# Patient Record
Sex: MALE | Race: WHITE | NOT HISPANIC OR LATINO | Employment: OTHER | URBAN - NONMETROPOLITAN AREA
[De-identification: names, ages, dates, MRNs, and addresses within clinical notes are randomized per-mention and may not be internally consistent; named-entity substitution may affect disease eponyms.]

---

## 2023-03-22 LAB
ANION GAP IN SER/PLAS: 13 MMOL/L (ref 10–20)
CALCIUM (MG/DL) IN SER/PLAS: 9 MG/DL (ref 8.6–10.3)
CARBON DIOXIDE, TOTAL (MMOL/L) IN SER/PLAS: 29 MMOL/L (ref 21–32)
CHLORIDE (MMOL/L) IN SER/PLAS: 100 MMOL/L (ref 98–107)
CREATININE (MG/DL) IN SER/PLAS: 0.73 MG/DL (ref 0.5–1.3)
GFR MALE: 90 ML/MIN/1.73M2
GLUCOSE (MG/DL) IN SER/PLAS: 95 MG/DL (ref 74–99)
POTASSIUM (MMOL/L) IN SER/PLAS: 4.7 MMOL/L (ref 3.5–5.3)
SODIUM (MMOL/L) IN SER/PLAS: 137 MMOL/L (ref 136–145)
UREA NITROGEN (MG/DL) IN SER/PLAS: 14 MG/DL (ref 6–23)

## 2023-05-17 DIAGNOSIS — I10 PRIMARY HYPERTENSION: Primary | ICD-10-CM

## 2023-05-17 RX ORDER — AMLODIPINE BESYLATE 5 MG/1
1 TABLET ORAL DAILY
COMMUNITY
Start: 2022-04-21 | End: 2023-05-17 | Stop reason: SDUPTHER

## 2023-05-18 RX ORDER — AMLODIPINE BESYLATE 5 MG/1
5 TABLET ORAL DAILY
Qty: 90 TABLET | Refills: 1 | Status: SHIPPED | OUTPATIENT
Start: 2023-05-18 | End: 2023-11-15 | Stop reason: SDUPTHER

## 2023-06-23 DIAGNOSIS — I10 PRIMARY HYPERTENSION: Primary | ICD-10-CM

## 2023-06-23 DIAGNOSIS — E78.49 OTHER HYPERLIPIDEMIA: ICD-10-CM

## 2023-06-23 RX ORDER — METOPROLOL TARTRATE 25 MG/1
25 TABLET, FILM COATED ORAL 2 TIMES DAILY
COMMUNITY
End: 2023-06-23 | Stop reason: SDUPTHER

## 2023-06-23 RX ORDER — ATORVASTATIN CALCIUM 40 MG/1
40 TABLET, FILM COATED ORAL NIGHTLY
COMMUNITY
End: 2023-06-23 | Stop reason: SDUPTHER

## 2023-06-24 RX ORDER — METOPROLOL TARTRATE 25 MG/1
25 TABLET, FILM COATED ORAL 2 TIMES DAILY
Qty: 90 TABLET | Refills: 0 | Status: SHIPPED | OUTPATIENT
Start: 2023-06-24 | End: 2023-08-10 | Stop reason: SDUPTHER

## 2023-06-24 RX ORDER — ATORVASTATIN CALCIUM 40 MG/1
40 TABLET, FILM COATED ORAL NIGHTLY
Qty: 90 TABLET | Refills: 0 | Status: SHIPPED | OUTPATIENT
Start: 2023-06-24 | End: 2023-07-20 | Stop reason: SDUPTHER

## 2023-07-20 DIAGNOSIS — E78.49 OTHER HYPERLIPIDEMIA: ICD-10-CM

## 2023-07-20 RX ORDER — ATORVASTATIN CALCIUM 40 MG/1
40 TABLET, FILM COATED ORAL NIGHTLY
Qty: 90 TABLET | Refills: 3 | Status: SHIPPED | OUTPATIENT
Start: 2023-07-20 | End: 2024-07-19

## 2023-07-31 ENCOUNTER — OFFICE VISIT (OUTPATIENT)
Dept: PRIMARY CARE | Facility: CLINIC | Age: 83
End: 2023-07-31
Payer: MEDICARE

## 2023-07-31 VITALS
HEART RATE: 82 BPM | HEIGHT: 70 IN | SYSTOLIC BLOOD PRESSURE: 120 MMHG | TEMPERATURE: 98.6 F | OXYGEN SATURATION: 94 % | DIASTOLIC BLOOD PRESSURE: 80 MMHG | BODY MASS INDEX: 19.33 KG/M2 | WEIGHT: 135 LBS

## 2023-07-31 DIAGNOSIS — I74.3 ARTERIAL EMBOLISM AND THROMBOSIS OF LOWER EXTREMITY (MULTI): ICD-10-CM

## 2023-07-31 DIAGNOSIS — Z13.6 SCREENING FOR CARDIOVASCULAR CONDITION: ICD-10-CM

## 2023-07-31 DIAGNOSIS — R79.9 ABNORMAL FINDING OF BLOOD CHEMISTRY, UNSPECIFIED: ICD-10-CM

## 2023-07-31 DIAGNOSIS — M25.551 PAIN OF BOTH HIP JOINTS: ICD-10-CM

## 2023-07-31 DIAGNOSIS — M25.552 PAIN OF BOTH HIP JOINTS: ICD-10-CM

## 2023-07-31 DIAGNOSIS — Z00.00 ROUTINE GENERAL MEDICAL EXAMINATION AT HEALTH CARE FACILITY: Primary | ICD-10-CM

## 2023-07-31 PROBLEM — I61.4: Status: ACTIVE | Noted: 2023-07-31

## 2023-07-31 PROBLEM — L08.9 WOUND INFECTION: Status: ACTIVE | Noted: 2023-07-31

## 2023-07-31 PROBLEM — J01.90 ACUTE SINUSITIS: Status: ACTIVE | Noted: 2023-07-31

## 2023-07-31 PROBLEM — I10 BENIGN ESSENTIAL HYPERTENSION: Status: ACTIVE | Noted: 2023-07-31

## 2023-07-31 PROBLEM — G91.2 NPH (NORMAL PRESSURE HYDROCEPHALUS) (MULTI): Status: ACTIVE | Noted: 2023-07-31

## 2023-07-31 PROBLEM — R31.9 HEMATURIA: Status: ACTIVE | Noted: 2023-07-31

## 2023-07-31 PROBLEM — L29.9 ITCHING: Status: ACTIVE | Noted: 2023-07-31

## 2023-07-31 PROBLEM — H66.90 ACUTE OTITIS MEDIA: Status: ACTIVE | Noted: 2023-07-31

## 2023-07-31 PROBLEM — J20.9 ACUTE BRONCHITIS: Status: ACTIVE | Noted: 2023-07-31

## 2023-07-31 PROBLEM — R73.9 HYPERGLYCEMIA: Status: ACTIVE | Noted: 2023-07-31

## 2023-07-31 PROBLEM — H61.21 EXCESSIVE CERUMEN IN RIGHT EAR CANAL: Status: ACTIVE | Noted: 2023-07-31

## 2023-07-31 PROBLEM — T14.8XXA WOUND INFECTION: Status: ACTIVE | Noted: 2023-07-31

## 2023-07-31 PROBLEM — R53.83 FATIGUE: Status: ACTIVE | Noted: 2023-07-31

## 2023-07-31 PROBLEM — E78.5 HYPERLIPIDEMIA: Status: ACTIVE | Noted: 2023-07-31

## 2023-07-31 PROBLEM — I48.0 PAROXYSMAL ATRIAL FIBRILLATION (MULTI): Status: ACTIVE | Noted: 2023-07-31

## 2023-07-31 PROBLEM — A37.90 PERTUSSIS: Status: ACTIVE | Noted: 2023-07-31

## 2023-07-31 PROBLEM — I63.9 ACUTE ISCHEMIC STROKE (MULTI): Status: ACTIVE | Noted: 2023-07-31

## 2023-07-31 PROBLEM — J44.9 COPD (CHRONIC OBSTRUCTIVE PULMONARY DISEASE) (MULTI): Status: ACTIVE | Noted: 2023-07-31

## 2023-07-31 PROBLEM — Z95.828 S/P BYPASS GRAFT OF EXTREMITY: Status: ACTIVE | Noted: 2023-07-31

## 2023-07-31 PROBLEM — S31.109A OPEN WOUND OF GROIN WITH COMPLICATION: Status: ACTIVE | Noted: 2023-07-31

## 2023-07-31 PROBLEM — Z20.822 SUSPECTED COVID-19 VIRUS INFECTION: Status: ACTIVE | Noted: 2023-07-31

## 2023-07-31 PROBLEM — E53.8 VITAMIN B12 DEFICIENCY: Status: ACTIVE | Noted: 2023-07-31

## 2023-07-31 PROBLEM — R82.998 CALCIUM OXALATE CRYSTALS IN URINE: Status: ACTIVE | Noted: 2023-07-31

## 2023-07-31 PROBLEM — J31.0 RHINITIS: Status: ACTIVE | Noted: 2023-07-31

## 2023-07-31 PROBLEM — I27.20 PULMONARY HYPERTENSION (MULTI): Status: ACTIVE | Noted: 2023-07-31

## 2023-07-31 PROBLEM — F51.02 ADJUSTMENT INSOMNIA: Status: ACTIVE | Noted: 2023-07-31

## 2023-07-31 PROBLEM — R42 VERTIGO: Status: ACTIVE | Noted: 2023-07-31

## 2023-07-31 PROBLEM — I73.9 PAD (PERIPHERAL ARTERY DISEASE) (CMS-HCC): Status: ACTIVE | Noted: 2023-07-31

## 2023-07-31 PROBLEM — H65.20 CHRONIC SEROUS OTITIS MEDIA: Status: ACTIVE | Noted: 2023-07-31

## 2023-07-31 PROBLEM — R05.3 COUGH, PERSISTENT: Status: ACTIVE | Noted: 2023-07-31

## 2023-07-31 PROBLEM — F41.9 ANXIETY: Status: ACTIVE | Noted: 2023-07-31

## 2023-07-31 PROBLEM — R31.29 MICROSCOPIC HEMATURIA: Status: ACTIVE | Noted: 2023-07-31

## 2023-07-31 PROCEDURE — 3074F SYST BP LT 130 MM HG: CPT

## 2023-07-31 PROCEDURE — 80061 LIPID PANEL: CPT

## 2023-07-31 PROCEDURE — 3079F DIAST BP 80-89 MM HG: CPT

## 2023-07-31 PROCEDURE — 1160F RVW MEDS BY RX/DR IN RCRD: CPT

## 2023-07-31 PROCEDURE — 1170F FXNL STATUS ASSESSED: CPT

## 2023-07-31 PROCEDURE — G0439 PPPS, SUBSEQ VISIT: HCPCS

## 2023-07-31 PROCEDURE — 1126F AMNT PAIN NOTED NONE PRSNT: CPT

## 2023-07-31 PROCEDURE — 85027 COMPLETE CBC AUTOMATED: CPT

## 2023-07-31 PROCEDURE — 1159F MED LIST DOCD IN RCRD: CPT

## 2023-07-31 RX ORDER — CLOPIDOGREL BISULFATE 75 MG/1
75 TABLET ORAL DAILY
COMMUNITY
End: 2024-04-09 | Stop reason: SDUPTHER

## 2023-07-31 ASSESSMENT — ENCOUNTER SYMPTOMS
GASTROINTESTINAL NEGATIVE: 1
MUSCULOSKELETAL NEGATIVE: 1
OCCASIONAL FEELINGS OF UNSTEADINESS: 0
HEADACHES: 0
UNEXPECTED WEIGHT CHANGE: 0
PSYCHIATRIC NEGATIVE: 1
ACTIVITY CHANGE: 0
ABDOMINAL PAIN: 0
SHORTNESS OF BREATH: 0
DEPRESSION: 0
FEVER: 0
ENDOCRINE NEGATIVE: 1
CARDIOVASCULAR NEGATIVE: 1
DIZZINESS: 0
FATIGUE: 0
WEAKNESS: 0
LOSS OF SENSATION IN FEET: 0
RESPIRATORY NEGATIVE: 1
ALLERGIC/IMMUNOLOGIC NEGATIVE: 1

## 2023-07-31 ASSESSMENT — PATIENT HEALTH QUESTIONNAIRE - PHQ9
2. FEELING DOWN, DEPRESSED OR HOPELESS: NOT AT ALL
1. LITTLE INTEREST OR PLEASURE IN DOING THINGS: NOT AT ALL
SUM OF ALL RESPONSES TO PHQ9 QUESTIONS 1 AND 2: 0

## 2023-07-31 ASSESSMENT — ACTIVITIES OF DAILY LIVING (ADL)
BATHING: INDEPENDENT
MANAGING_FINANCES: INDEPENDENT
DOING_HOUSEWORK: INDEPENDENT
DRESSING: INDEPENDENT
TAKING_MEDICATION: INDEPENDENT
GROCERY_SHOPPING: INDEPENDENT

## 2023-07-31 NOTE — PATIENT INSTRUCTIONS
See in 6 months for routine checkup  Continue healthy diet and exercise  Lipid, CBC today  Motorized wheelchair with [facility]    Thank you for coming in today, if any questions or concerns arise, please call my office.   ARMEN Oliveira-CNP

## 2023-07-31 NOTE — PROGRESS NOTES
"Subjective   Reason for Visit: Sheldon Arteaga is an 83 y.o. male here for a Medicare Wellness visit.     Past Medical, Surgical, and Family History reviewed and updated in chart.    Reviewed all medications by prescribing practitioner or clinical pharmacist (such as prescriptions, OTCs, herbal therapies and supplements) and documented in the medical record.    [facility] visit today  No concerns currently  Sees Vascular NP every 6 months r/t thrombus, newly started on Plavix.   Hx Stroke   Smokes 1 PPD    F2F for motorized wheelchair:  Muscle weakness, trouble walking short distances  Strength 3/5 bilateral legs  4/5 bilateral arms.   Bilateral Emboli history in both legs.   Bone graft in left knee.   Would not benefit from Cane or walker     No falls in the past 6 months.         Patient Care Team:  Marcus Costa MD as PCP - General  Marcus Costa MD [facility] PCP [facility]     Review of Systems   Constitutional:  Negative for activity change, fatigue, fever and unexpected weight change.   HENT: Negative.     Respiratory: Negative.  Negative for shortness of breath.    Cardiovascular: Negative.  Negative for chest pain.   Gastrointestinal: Negative.  Negative for abdominal pain.   Endocrine: Negative.    Musculoskeletal: Negative.    Skin: Negative.    Allergic/Immunologic: Negative.    Neurological:  Negative for dizziness, weakness and headaches.   Psychiatric/Behavioral: Negative.         Objective   Vitals:  /80   Pulse 82   Temp 37 °C (98.6 °F)   Ht 1.778 m (5' 10\")   Wt 61.2 kg (135 lb)   SpO2 94%   BMI 19.37 kg/m²       Physical Exam  Vitals and nursing note reviewed.   Constitutional:       Appearance: Normal appearance.   HENT:      Head: Normocephalic.      Mouth/Throat:      Mouth: Mucous membranes are moist.   Cardiovascular:      Rate and Rhythm: Normal rate and regular rhythm.      Pulses: Normal pulses.      Heart sounds: Normal heart sounds. No murmur heard.     No " friction rub. No gallop.   Pulmonary:      Effort: Pulmonary effort is normal. No respiratory distress.      Breath sounds: Normal breath sounds. No wheezing.   Abdominal:      General: Bowel sounds are normal. There is no distension.      Palpations: Abdomen is soft.      Tenderness: There is no abdominal tenderness.   Musculoskeletal:         General: No deformity. Normal range of motion.   Skin:     General: Skin is warm and dry.      Capillary Refill: Capillary refill takes less than 2 seconds.   Neurological:      General: No focal deficit present.      Mental Status: He is alert and oriented to person, place, and time.   Psychiatric:         Mood and Affect: Mood normal.         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

## 2023-08-01 ENCOUNTER — TELEPHONE (OUTPATIENT)
Dept: PRIMARY CARE | Facility: CLINIC | Age: 83
End: 2023-08-01
Payer: MEDICARE

## 2023-08-01 DIAGNOSIS — R79.9 ABNORMAL FINDING OF BLOOD CHEMISTRY, UNSPECIFIED: Primary | ICD-10-CM

## 2023-08-01 LAB
CHOLESTEROL (MG/DL) IN SER/PLAS: 157 MG/DL (ref 0–199)
CHOLESTEROL IN HDL (MG/DL) IN SER/PLAS: 53.5 MG/DL
CHOLESTEROL/HDL RATIO: 2.9
ERYTHROCYTE DISTRIBUTION WIDTH (RATIO) BY AUTOMATED COUNT: 14.3 % (ref 11.5–14.5)
ERYTHROCYTE MEAN CORPUSCULAR HEMOGLOBIN CONCENTRATION (G/DL) BY AUTOMATED: 30.5 G/DL (ref 32–36)
ERYTHROCYTE MEAN CORPUSCULAR VOLUME (FL) BY AUTOMATED COUNT: 102 FL (ref 80–100)
ERYTHROCYTES (10*6/UL) IN BLOOD BY AUTOMATED COUNT: 4.07 X10E12/L (ref 4.5–5.9)
HEMATOCRIT (%) IN BLOOD BY AUTOMATED COUNT: 41.6 % (ref 41–52)
HEMOGLOBIN (G/DL) IN BLOOD: 12.7 G/DL (ref 13.5–17.5)
LDL: 79 MG/DL (ref 0–99)
LEUKOCYTES (10*3/UL) IN BLOOD BY AUTOMATED COUNT: 6.6 X10E9/L (ref 4.4–11.3)
NRBC (PER 100 WBCS) BY AUTOMATED COUNT: 0 /100 WBC (ref 0–0)
PLATELETS (10*3/UL) IN BLOOD AUTOMATED COUNT: 241 X10E9/L (ref 150–450)
TRIGLYCERIDE (MG/DL) IN SER/PLAS: 121 MG/DL (ref 0–149)
VLDL: 24 MG/DL (ref 0–40)

## 2023-08-01 NOTE — RESULT ENCOUNTER NOTE
Lipid panel is normal, CBC shows slight anemia compared to prior study. Let's check again in 2 weeks to trend. Ordering.

## 2023-08-01 NOTE — TELEPHONE ENCOUNTER
----- Message from ARMEN Oliveira-CNP sent at 8/1/2023  8:03 AM EDT -----  Lipid panel is normal, CBC shows slight anemia compared to prior study. Let's check again in 2 weeks to trend. Ordering.

## 2023-08-10 DIAGNOSIS — I10 PRIMARY HYPERTENSION: ICD-10-CM

## 2023-08-10 RX ORDER — METOPROLOL TARTRATE 25 MG/1
25 TABLET, FILM COATED ORAL 2 TIMES DAILY
Qty: 180 TABLET | Refills: 0 | Status: SHIPPED | OUTPATIENT
Start: 2023-08-10 | End: 2023-11-06 | Stop reason: SDUPTHER

## 2023-08-28 ENCOUNTER — CLINICAL SUPPORT (OUTPATIENT)
Dept: PRIMARY CARE | Facility: CLINIC | Age: 83
End: 2023-08-28
Payer: MEDICARE

## 2023-08-28 DIAGNOSIS — R79.9 ABNORMAL FINDING OF BLOOD CHEMISTRY, UNSPECIFIED: ICD-10-CM

## 2023-08-28 PROCEDURE — 85025 COMPLETE CBC W/AUTO DIFF WBC: CPT

## 2023-08-29 ENCOUNTER — TELEPHONE (OUTPATIENT)
Dept: PRIMARY CARE | Facility: CLINIC | Age: 83
End: 2023-08-29
Payer: MEDICARE

## 2023-08-29 LAB
BASOPHILS (10*3/UL) IN BLOOD BY AUTOMATED COUNT: 0.05 X10E9/L (ref 0–0.1)
BASOPHILS/100 LEUKOCYTES IN BLOOD BY AUTOMATED COUNT: 0.8 % (ref 0–2)
EOSINOPHILS (10*3/UL) IN BLOOD BY AUTOMATED COUNT: 0.25 X10E9/L (ref 0–0.4)
EOSINOPHILS/100 LEUKOCYTES IN BLOOD BY AUTOMATED COUNT: 3.9 % (ref 0–6)
ERYTHROCYTE DISTRIBUTION WIDTH (RATIO) BY AUTOMATED COUNT: 14.8 % (ref 11.5–14.5)
ERYTHROCYTE MEAN CORPUSCULAR HEMOGLOBIN CONCENTRATION (G/DL) BY AUTOMATED: 31.2 G/DL (ref 32–36)
ERYTHROCYTE MEAN CORPUSCULAR VOLUME (FL) BY AUTOMATED COUNT: 101 FL (ref 80–100)
ERYTHROCYTES (10*6/UL) IN BLOOD BY AUTOMATED COUNT: 4.24 X10E12/L (ref 4.5–5.9)
HEMATOCRIT (%) IN BLOOD BY AUTOMATED COUNT: 43 % (ref 41–52)
HEMOGLOBIN (G/DL) IN BLOOD: 13.4 G/DL (ref 13.5–17.5)
IMMATURE GRANULOCYTES/100 LEUKOCYTES IN BLOOD BY AUTOMATED COUNT: 0.5 % (ref 0–0.9)
LEUKOCYTES (10*3/UL) IN BLOOD BY AUTOMATED COUNT: 6.4 X10E9/L (ref 4.4–11.3)
LYMPHOCYTES (10*3/UL) IN BLOOD BY AUTOMATED COUNT: 2.1 X10E9/L (ref 0.8–3)
LYMPHOCYTES/100 LEUKOCYTES IN BLOOD BY AUTOMATED COUNT: 32.9 % (ref 13–44)
MONOCYTES (10*3/UL) IN BLOOD BY AUTOMATED COUNT: 0.66 X10E9/L (ref 0.05–0.8)
MONOCYTES/100 LEUKOCYTES IN BLOOD BY AUTOMATED COUNT: 10.3 % (ref 2–10)
NEUTROPHILS (10*3/UL) IN BLOOD BY AUTOMATED COUNT: 3.3 X10E9/L (ref 1.6–5.5)
NEUTROPHILS/100 LEUKOCYTES IN BLOOD BY AUTOMATED COUNT: 51.6 % (ref 40–80)
NRBC (PER 100 WBCS) BY AUTOMATED COUNT: 0 /100 WBC (ref 0–0)
PLATELETS (10*3/UL) IN BLOOD AUTOMATED COUNT: 219 X10E9/L (ref 150–450)

## 2023-11-06 DIAGNOSIS — I10 PRIMARY HYPERTENSION: ICD-10-CM

## 2023-11-08 RX ORDER — METOPROLOL TARTRATE 25 MG/1
25 TABLET, FILM COATED ORAL 2 TIMES DAILY
Qty: 180 TABLET | Refills: 0 | Status: SHIPPED | OUTPATIENT
Start: 2023-11-08 | End: 2023-11-09

## 2023-11-09 DIAGNOSIS — I10 PRIMARY HYPERTENSION: ICD-10-CM

## 2023-11-09 RX ORDER — METOPROLOL TARTRATE 25 MG/1
25 TABLET, FILM COATED ORAL 2 TIMES DAILY
Qty: 180 TABLET | Refills: 0 | Status: SHIPPED | OUTPATIENT
Start: 2023-11-09 | End: 2024-02-09 | Stop reason: SDUPTHER

## 2023-11-15 DIAGNOSIS — I10 PRIMARY HYPERTENSION: ICD-10-CM

## 2023-11-16 RX ORDER — AMLODIPINE BESYLATE 5 MG/1
5 TABLET ORAL DAILY
Qty: 90 TABLET | Refills: 1 | Status: SHIPPED | OUTPATIENT
Start: 2023-11-16 | End: 2024-05-28 | Stop reason: SDUPTHER

## 2024-01-23 ENCOUNTER — APPOINTMENT (OUTPATIENT)
Dept: PRIMARY CARE | Facility: CLINIC | Age: 84
End: 2024-01-23
Payer: MEDICARE

## 2024-01-24 ENCOUNTER — OFFICE VISIT (OUTPATIENT)
Dept: PRIMARY CARE | Facility: CLINIC | Age: 84
End: 2024-01-24
Payer: MEDICARE

## 2024-01-24 VITALS
HEART RATE: 93 BPM | OXYGEN SATURATION: 96 % | DIASTOLIC BLOOD PRESSURE: 60 MMHG | SYSTOLIC BLOOD PRESSURE: 120 MMHG | WEIGHT: 139 LBS | TEMPERATURE: 97.1 F | BODY MASS INDEX: 19.94 KG/M2

## 2024-01-24 DIAGNOSIS — J01.00 ACUTE NON-RECURRENT MAXILLARY SINUSITIS: Primary | ICD-10-CM

## 2024-01-24 PROCEDURE — 1157F ADVNC CARE PLAN IN RCRD: CPT

## 2024-01-24 PROCEDURE — 1160F RVW MEDS BY RX/DR IN RCRD: CPT

## 2024-01-24 PROCEDURE — 3074F SYST BP LT 130 MM HG: CPT

## 2024-01-24 PROCEDURE — 99214 OFFICE O/P EST MOD 30 MIN: CPT

## 2024-01-24 PROCEDURE — 1159F MED LIST DOCD IN RCRD: CPT

## 2024-01-24 PROCEDURE — 3078F DIAST BP <80 MM HG: CPT

## 2024-01-24 PROCEDURE — 1126F AMNT PAIN NOTED NONE PRSNT: CPT

## 2024-01-24 RX ORDER — FLUTICASONE PROPIONATE 50 MCG
1 SPRAY, SUSPENSION (ML) NASAL DAILY
Qty: 16 G | Refills: 11 | Status: SHIPPED | OUTPATIENT
Start: 2024-01-24 | End: 2025-01-23

## 2024-01-24 RX ORDER — AMOXICILLIN AND CLAVULANATE POTASSIUM 875; 125 MG/1; MG/1
875 TABLET, FILM COATED ORAL 2 TIMES DAILY
Qty: 20 TABLET | Refills: 0 | Status: SHIPPED | OUTPATIENT
Start: 2024-01-24 | End: 2024-02-03

## 2024-01-24 RX ORDER — VITAMIN A 3000 MCG
1 CAPSULE ORAL AS NEEDED
Qty: 50 ML | Refills: 1 | Status: SHIPPED | OUTPATIENT
Start: 2024-01-24

## 2024-01-24 ASSESSMENT — ENCOUNTER SYMPTOMS
FEVER: 0
GASTROINTESTINAL NEGATIVE: 1
RHINORRHEA: 1
ENDOCRINE NEGATIVE: 1
SINUS PRESSURE: 1
CARDIOVASCULAR NEGATIVE: 1
HEMATOLOGIC/LYMPHATIC NEGATIVE: 1
FATIGUE: 1
NEUROLOGICAL NEGATIVE: 1
SORE THROAT: 1
ALLERGIC/IMMUNOLOGIC NEGATIVE: 1
EYE ITCHING: 1
RESPIRATORY NEGATIVE: 1
EYE DISCHARGE: 1
PSYCHIATRIC NEGATIVE: 1
MUSCULOSKELETAL NEGATIVE: 1

## 2024-01-24 NOTE — PATIENT INSTRUCTIONS
Start antibiotic for 10 days  Nasal sprays for the congestion    Thank you for coming in today, if any questions or concerns arise, please call my office.   Alvaro Cedeño, APRN-CNP

## 2024-01-24 NOTE — PROGRESS NOTES
Subjective   Patient ID: Sheldon Arteaga is a 83 y.o. male who presents for Sinusitis (Sheldon is here for a right sided sinus infection for over a month. Has been taking OTC cough medications, tylenol sinus and headache, with no relief.In the morning his nose runs for a few hours then starts to decrease a little. ).  Subjective  Sheldon Arteaga is a 83 y.o. male who presents for evaluation of sinus pain. Symptoms include: clear rhinorrhea, congestion, cough, frequent clearing of the throat, and sinus pressure. Onset of symptoms was a few weeks ago. Symptoms have been gradually worsening since that time. Past history is significant for occasional episodes of bronchitis. Patient is a non-smoker.    Objective  [unfilled]     Assessment/Plan  Acute bacterial sinusitis.    Augmentin per medication orders.  Added nasal sprays          Vitals:    01/24/24 1122   BP: 120/60   Pulse: 93   Temp: 36.2 °C (97.1 °F)   SpO2: 96%       Review of Systems   Constitutional:  Positive for fatigue. Negative for fever.   HENT:  Positive for congestion, dental problem, postnasal drip, rhinorrhea, sinus pressure and sore throat. Negative for hearing loss.    Eyes:  Positive for discharge and itching.   Respiratory: Negative.     Cardiovascular: Negative.    Gastrointestinal: Negative.    Endocrine: Negative.    Genitourinary: Negative.    Musculoskeletal: Negative.    Skin: Negative.    Allergic/Immunologic: Negative.    Neurological: Negative.    Hematological: Negative.    Psychiatric/Behavioral: Negative.         Objective   Physical Exam  Constitutional:       General: He is not in acute distress.     Appearance: Normal appearance.   HENT:      Head: Normocephalic.      Right Ear: Tympanic membrane normal.      Left Ear: Tympanic membrane normal.      Nose: Congestion and rhinorrhea present.      Mouth/Throat:      Pharynx: Oropharyngeal exudate and posterior oropharyngeal erythema present.   Eyes:      Extraocular Movements:  Extraocular movements intact.      Pupils: Pupils are equal, round, and reactive to light.   Cardiovascular:      Rate and Rhythm: Normal rate and regular rhythm.   Pulmonary:      Effort: No respiratory distress.   Abdominal:      General: Abdomen is flat. There is no distension.   Skin:     General: Skin is warm and dry.      Capillary Refill: Capillary refill takes less than 2 seconds.   Neurological:      Mental Status: He is alert.   Psychiatric:         Mood and Affect: Mood normal.         Assessment/Plan   Problem List Items Addressed This Visit       Acute sinusitis - Primary    Relevant Medications    amoxicillin-pot clavulanate (Augmentin) 875-125 mg tablet    fluticasone (Flonase) 50 mcg/actuation nasal spray    sodium chloride (Saline NasaL) 0.65 % nasal spray            Thank you for coming in today, please call my office if you have any concerns or questions.     Alvaro AYERS, CNP

## 2024-02-09 DIAGNOSIS — I10 PRIMARY HYPERTENSION: ICD-10-CM

## 2024-02-09 RX ORDER — METOPROLOL TARTRATE 25 MG/1
25 TABLET, FILM COATED ORAL 2 TIMES DAILY
Qty: 180 TABLET | Refills: 0 | Status: SHIPPED | OUTPATIENT
Start: 2024-02-09 | End: 2024-05-13 | Stop reason: SDUPTHER

## 2024-02-14 ENCOUNTER — TELEPHONE (OUTPATIENT)
Dept: PRIMARY CARE | Facility: CLINIC | Age: 84
End: 2024-02-14
Payer: MEDICARE

## 2024-02-14 DIAGNOSIS — U07.1 COVID-19: Primary | ICD-10-CM

## 2024-02-14 NOTE — TELEPHONE ENCOUNTER
Daughter called stating patient tested positive today for covid-symptoms started yesterday    Current symptoms include  -nasal congestion   -non productive cough  -headache  -sneezing  -sinus drainage      Requesting paxlovid if possible    [facility]

## 2024-04-09 DIAGNOSIS — I10 BENIGN ESSENTIAL HYPERTENSION: ICD-10-CM

## 2024-04-09 DIAGNOSIS — I74.3 ARTERIAL EMBOLISM AND THROMBOSIS OF LOWER EXTREMITY (MULTI): ICD-10-CM

## 2024-04-10 RX ORDER — CLOPIDOGREL BISULFATE 75 MG/1
75 TABLET ORAL DAILY
Qty: 90 TABLET | Refills: 1 | Status: SHIPPED | OUTPATIENT
Start: 2024-04-10

## 2024-05-04 ENCOUNTER — APPOINTMENT (OUTPATIENT)
Dept: RADIOLOGY | Facility: HOSPITAL | Age: 84
End: 2024-05-04
Payer: MEDICARE

## 2024-05-04 ENCOUNTER — HOSPITAL ENCOUNTER (OUTPATIENT)
Facility: HOSPITAL | Age: 84
Setting detail: OBSERVATION
Discharge: HOME | End: 2024-05-05
Attending: STUDENT IN AN ORGANIZED HEALTH CARE EDUCATION/TRAINING PROGRAM | Admitting: INTERNAL MEDICINE
Payer: MEDICARE

## 2024-05-04 ENCOUNTER — APPOINTMENT (OUTPATIENT)
Dept: CARDIOLOGY | Facility: HOSPITAL | Age: 84
End: 2024-05-04
Payer: MEDICARE

## 2024-05-04 DIAGNOSIS — J45.909 ASTHMA, UNSPECIFIED ASTHMA SEVERITY, UNSPECIFIED WHETHER COMPLICATED, UNSPECIFIED WHETHER PERSISTENT (HHS-HCC): ICD-10-CM

## 2024-05-04 DIAGNOSIS — J18.9 COMMUNITY ACQUIRED PNEUMONIA, UNSPECIFIED LATERALITY: Primary | ICD-10-CM

## 2024-05-04 DIAGNOSIS — J15.9 COMMUNITY ACQUIRED BACTERIAL PNEUMONIA: ICD-10-CM

## 2024-05-04 PROBLEM — J15.8 PNEUMONIA DUE TO OTHER SPECIFIED BACTERIA (MULTI): Status: ACTIVE | Noted: 2024-05-04

## 2024-05-04 LAB
ALBUMIN SERPL BCP-MCNC: 3.9 G/DL (ref 3.4–5)
ALP SERPL-CCNC: 60 U/L (ref 33–136)
ALT SERPL W P-5'-P-CCNC: 7 U/L (ref 10–52)
ANION GAP BLDV CALCULATED.4IONS-SCNC: 7 MMOL/L (ref 10–25)
ANION GAP SERPL CALC-SCNC: 12 MMOL/L (ref 10–20)
AST SERPL W P-5'-P-CCNC: 17 U/L (ref 9–39)
BASE EXCESS BLDV CALC-SCNC: 4.8 MMOL/L (ref -2–3)
BASOPHILS # BLD AUTO: 0.04 X10*3/UL (ref 0–0.1)
BASOPHILS NFR BLD AUTO: 0.4 %
BILIRUB SERPL-MCNC: 0.6 MG/DL (ref 0–1.2)
BODY TEMPERATURE: ABNORMAL
BUN SERPL-MCNC: 13 MG/DL (ref 6–23)
CA-I BLDV-SCNC: 1.13 MMOL/L (ref 1.1–1.33)
CALCIUM SERPL-MCNC: 8.4 MG/DL (ref 8.6–10.3)
CARDIAC TROPONIN I PNL SERPL HS: 5 NG/L (ref 0–20)
CARDIAC TROPONIN I PNL SERPL HS: 9 NG/L (ref 0–20)
CHLORIDE BLDV-SCNC: 95 MMOL/L (ref 98–107)
CHLORIDE SERPL-SCNC: 96 MMOL/L (ref 98–107)
CO2 SERPL-SCNC: 25 MMOL/L (ref 21–32)
CREAT SERPL-MCNC: 0.74 MG/DL (ref 0.5–1.3)
EGFRCR SERPLBLD CKD-EPI 2021: 89 ML/MIN/1.73M*2
EOSINOPHIL # BLD AUTO: 0.1 X10*3/UL (ref 0–0.4)
EOSINOPHIL NFR BLD AUTO: 1 %
ERYTHROCYTE [DISTWIDTH] IN BLOOD BY AUTOMATED COUNT: 13.9 % (ref 11.5–14.5)
FLUAV RNA RESP QL NAA+PROBE: NOT DETECTED
FLUBV RNA RESP QL NAA+PROBE: NOT DETECTED
GLUCOSE BLDV-MCNC: 129 MG/DL (ref 74–99)
GLUCOSE SERPL-MCNC: 158 MG/DL (ref 74–99)
HCO3 BLDV-SCNC: 29.9 MMOL/L (ref 22–26)
HCT VFR BLD AUTO: 31.7 % (ref 41–52)
HCT VFR BLD EST: 39 % (ref 41–52)
HGB BLD-MCNC: 10.6 G/DL (ref 13.5–17.5)
HGB BLDV-MCNC: 13 G/DL (ref 13.5–17.5)
IMM GRANULOCYTES # BLD AUTO: 0.06 X10*3/UL (ref 0–0.5)
IMM GRANULOCYTES NFR BLD AUTO: 0.6 % (ref 0–0.9)
INHALED O2 CONCENTRATION: 96 %
LACTATE BLDV-SCNC: 1.9 MMOL/L (ref 0.4–2)
LYMPHOCYTES # BLD AUTO: 0.93 X10*3/UL (ref 0.8–3)
LYMPHOCYTES NFR BLD AUTO: 9.6 %
MAGNESIUM SERPL-MCNC: 1.6 MG/DL (ref 1.6–2.4)
MCH RBC QN AUTO: 31.2 PG (ref 26–34)
MCHC RBC AUTO-ENTMCNC: 33.4 G/DL (ref 32–36)
MCV RBC AUTO: 93 FL (ref 80–100)
MONOCYTES # BLD AUTO: 0.79 X10*3/UL (ref 0.05–0.8)
MONOCYTES NFR BLD AUTO: 8.2 %
NEUTROPHILS # BLD AUTO: 7.75 X10*3/UL (ref 1.6–5.5)
NEUTROPHILS NFR BLD AUTO: 80.2 %
NRBC BLD-RTO: 0 /100 WBCS (ref 0–0)
OXYHGB MFR BLDV: 67.8 % (ref 45–75)
PCO2 BLDV: 45 MM HG (ref 41–51)
PH BLDV: 7.43 PH (ref 7.33–7.43)
PLATELET # BLD AUTO: 213 X10*3/UL (ref 150–450)
PO2 BLDV: 40 MM HG (ref 35–45)
POTASSIUM BLDV-SCNC: 4.2 MMOL/L (ref 3.5–5.3)
POTASSIUM SERPL-SCNC: 4 MMOL/L (ref 3.5–5.3)
PROT SERPL-MCNC: 6.7 G/DL (ref 6.4–8.2)
RBC # BLD AUTO: 3.4 X10*6/UL (ref 4.5–5.9)
RSV RNA RESP QL NAA+PROBE: NOT DETECTED
SAO2 % BLDV: 70 % (ref 45–75)
SARS-COV-2 RNA RESP QL NAA+PROBE: NOT DETECTED
SODIUM BLDV-SCNC: 128 MMOL/L (ref 136–145)
SODIUM SERPL-SCNC: 129 MMOL/L (ref 136–145)
WBC # BLD AUTO: 9.7 X10*3/UL (ref 4.4–11.3)

## 2024-05-04 PROCEDURE — 86738 MYCOPLASMA ANTIBODY: CPT | Performed by: INTERNAL MEDICINE

## 2024-05-04 PROCEDURE — 71046 X-RAY EXAM CHEST 2 VIEWS: CPT | Performed by: STUDENT IN AN ORGANIZED HEALTH CARE EDUCATION/TRAINING PROGRAM

## 2024-05-04 PROCEDURE — S4991 NICOTINE PATCH NONLEGEND: HCPCS | Performed by: INTERNAL MEDICINE

## 2024-05-04 PROCEDURE — 87637 SARSCOV2&INF A&B&RSV AMP PRB: CPT | Performed by: STUDENT IN AN ORGANIZED HEALTH CARE EDUCATION/TRAINING PROGRAM

## 2024-05-04 PROCEDURE — 84484 ASSAY OF TROPONIN QUANT: CPT | Performed by: STUDENT IN AN ORGANIZED HEALTH CARE EDUCATION/TRAINING PROGRAM

## 2024-05-04 PROCEDURE — 94668 MNPJ CHEST WALL SBSQ: CPT

## 2024-05-04 PROCEDURE — G0378 HOSPITAL OBSERVATION PER HR: HCPCS

## 2024-05-04 PROCEDURE — 87449 NOS EACH ORGANISM AG IA: CPT | Mod: GEALAB | Performed by: INTERNAL MEDICINE

## 2024-05-04 PROCEDURE — 96365 THER/PROPH/DIAG IV INF INIT: CPT | Performed by: INTERNAL MEDICINE

## 2024-05-04 PROCEDURE — 2500000001 HC RX 250 WO HCPCS SELF ADMINISTERED DRUGS (ALT 637 FOR MEDICARE OP): Performed by: INTERNAL MEDICINE

## 2024-05-04 PROCEDURE — 96361 HYDRATE IV INFUSION ADD-ON: CPT | Performed by: INTERNAL MEDICINE

## 2024-05-04 PROCEDURE — 87632 RESP VIRUS 6-11 TARGETS: CPT | Performed by: INTERNAL MEDICINE

## 2024-05-04 PROCEDURE — 94760 N-INVAS EAR/PLS OXIMETRY 1: CPT

## 2024-05-04 PROCEDURE — 84132 ASSAY OF SERUM POTASSIUM: CPT | Mod: 91 | Performed by: STUDENT IN AN ORGANIZED HEALTH CARE EDUCATION/TRAINING PROGRAM

## 2024-05-04 PROCEDURE — 36415 COLL VENOUS BLD VENIPUNCTURE: CPT | Performed by: STUDENT IN AN ORGANIZED HEALTH CARE EDUCATION/TRAINING PROGRAM

## 2024-05-04 PROCEDURE — 71250 CT THORAX DX C-: CPT | Performed by: STUDENT IN AN ORGANIZED HEALTH CARE EDUCATION/TRAINING PROGRAM

## 2024-05-04 PROCEDURE — 2500000002 HC RX 250 W HCPCS SELF ADMINISTERED DRUGS (ALT 637 FOR MEDICARE OP, ALT 636 FOR OP/ED): Performed by: INTERNAL MEDICINE

## 2024-05-04 PROCEDURE — 83930 ASSAY OF BLOOD OSMOLALITY: CPT | Mod: GEALAB | Performed by: INTERNAL MEDICINE

## 2024-05-04 PROCEDURE — 2500000001 HC RX 250 WO HCPCS SELF ADMINISTERED DRUGS (ALT 637 FOR MEDICARE OP): Performed by: STUDENT IN AN ORGANIZED HEALTH CARE EDUCATION/TRAINING PROGRAM

## 2024-05-04 PROCEDURE — 99223 1ST HOSP IP/OBS HIGH 75: CPT | Performed by: INTERNAL MEDICINE

## 2024-05-04 PROCEDURE — 2500000004 HC RX 250 GENERAL PHARMACY W/ HCPCS (ALT 636 FOR OP/ED): Performed by: INTERNAL MEDICINE

## 2024-05-04 PROCEDURE — 83735 ASSAY OF MAGNESIUM: CPT | Performed by: STUDENT IN AN ORGANIZED HEALTH CARE EDUCATION/TRAINING PROGRAM

## 2024-05-04 PROCEDURE — 71046 X-RAY EXAM CHEST 2 VIEWS: CPT

## 2024-05-04 PROCEDURE — 94640 AIRWAY INHALATION TREATMENT: CPT | Mod: 59

## 2024-05-04 PROCEDURE — 94667 MNPJ CHEST WALL 1ST: CPT

## 2024-05-04 PROCEDURE — 99285 EMERGENCY DEPT VISIT HI MDM: CPT | Mod: 25

## 2024-05-04 PROCEDURE — 96372 THER/PROPH/DIAG INJ SC/IM: CPT | Performed by: INTERNAL MEDICINE

## 2024-05-04 PROCEDURE — 2500000004 HC RX 250 GENERAL PHARMACY W/ HCPCS (ALT 636 FOR OP/ED): Performed by: STUDENT IN AN ORGANIZED HEALTH CARE EDUCATION/TRAINING PROGRAM

## 2024-05-04 PROCEDURE — 94664 DEMO&/EVAL PT USE INHALER: CPT

## 2024-05-04 PROCEDURE — 94640 AIRWAY INHALATION TREATMENT: CPT

## 2024-05-04 PROCEDURE — 2500000002 HC RX 250 W HCPCS SELF ADMINISTERED DRUGS (ALT 637 FOR MEDICARE OP, ALT 636 FOR OP/ED): Performed by: STUDENT IN AN ORGANIZED HEALTH CARE EDUCATION/TRAINING PROGRAM

## 2024-05-04 PROCEDURE — 87899 AGENT NOS ASSAY W/OPTIC: CPT | Mod: GEALAB | Performed by: INTERNAL MEDICINE

## 2024-05-04 PROCEDURE — 96368 THER/DIAG CONCURRENT INF: CPT | Performed by: INTERNAL MEDICINE

## 2024-05-04 PROCEDURE — 99406 BEHAV CHNG SMOKING 3-10 MIN: CPT | Performed by: INTERNAL MEDICINE

## 2024-05-04 PROCEDURE — 71250 CT THORAX DX C-: CPT

## 2024-05-04 PROCEDURE — 83935 ASSAY OF URINE OSMOLALITY: CPT | Mod: GEALAB | Performed by: INTERNAL MEDICINE

## 2024-05-04 PROCEDURE — 93005 ELECTROCARDIOGRAM TRACING: CPT

## 2024-05-04 PROCEDURE — 85025 COMPLETE CBC W/AUTO DIFF WBC: CPT | Performed by: STUDENT IN AN ORGANIZED HEALTH CARE EDUCATION/TRAINING PROGRAM

## 2024-05-04 PROCEDURE — 2500000001 HC RX 250 WO HCPCS SELF ADMINISTERED DRUGS (ALT 637 FOR MEDICARE OP): Performed by: NURSE PRACTITIONER

## 2024-05-04 RX ORDER — IBUPROFEN 200 MG
1 TABLET ORAL DAILY
Status: DISCONTINUED | OUTPATIENT
Start: 2024-05-04 | End: 2024-05-05 | Stop reason: HOSPADM

## 2024-05-04 RX ORDER — PREDNISONE 20 MG/1
40 TABLET ORAL DAILY
Status: DISCONTINUED | OUTPATIENT
Start: 2024-05-05 | End: 2024-05-05 | Stop reason: HOSPADM

## 2024-05-04 RX ORDER — FLUTICASONE PROPIONATE 50 MCG
1 SPRAY, SUSPENSION (ML) NASAL DAILY
Status: DISCONTINUED | OUTPATIENT
Start: 2024-05-04 | End: 2024-05-05 | Stop reason: HOSPADM

## 2024-05-04 RX ORDER — PREDNISONE 20 MG/1
40 TABLET ORAL ONCE
Status: COMPLETED | OUTPATIENT
Start: 2024-05-04 | End: 2024-05-04

## 2024-05-04 RX ORDER — CEFTRIAXONE 2 G/50ML
2 INJECTION, SOLUTION INTRAVENOUS ONCE
Status: COMPLETED | OUTPATIENT
Start: 2024-05-04 | End: 2024-05-04

## 2024-05-04 RX ORDER — IPRATROPIUM BROMIDE AND ALBUTEROL SULFATE 2.5; .5 MG/3ML; MG/3ML
3 SOLUTION RESPIRATORY (INHALATION)
Status: DISCONTINUED | OUTPATIENT
Start: 2024-05-04 | End: 2024-05-05 | Stop reason: HOSPADM

## 2024-05-04 RX ORDER — GUAIFENESIN 600 MG/1
1200 TABLET, EXTENDED RELEASE ORAL 2 TIMES DAILY
Status: DISCONTINUED | OUTPATIENT
Start: 2024-05-04 | End: 2024-05-05 | Stop reason: HOSPADM

## 2024-05-04 RX ORDER — TALC
6 POWDER (GRAM) TOPICAL NIGHTLY PRN
Status: DISCONTINUED | OUTPATIENT
Start: 2024-05-04 | End: 2024-05-05 | Stop reason: HOSPADM

## 2024-05-04 RX ORDER — ALBUTEROL SULFATE 0.83 MG/ML
2.5 SOLUTION RESPIRATORY (INHALATION) EVERY 4 HOURS PRN
Status: DISCONTINUED | OUTPATIENT
Start: 2024-05-04 | End: 2024-05-04

## 2024-05-04 RX ORDER — POLYETHYLENE GLYCOL 3350 17 G/17G
17 POWDER, FOR SOLUTION ORAL DAILY
Status: DISCONTINUED | OUTPATIENT
Start: 2024-05-04 | End: 2024-05-05 | Stop reason: HOSPADM

## 2024-05-04 RX ORDER — ALBUTEROL SULFATE 0.83 MG/ML
2.5 SOLUTION RESPIRATORY (INHALATION) EVERY 2 HOUR PRN
Status: DISCONTINUED | OUTPATIENT
Start: 2024-05-04 | End: 2024-05-05 | Stop reason: HOSPADM

## 2024-05-04 RX ORDER — BENZONATATE 100 MG/1
100 CAPSULE ORAL ONCE
Status: COMPLETED | OUTPATIENT
Start: 2024-05-04 | End: 2024-05-04

## 2024-05-04 RX ORDER — METOPROLOL TARTRATE 25 MG/1
25 TABLET, FILM COATED ORAL 2 TIMES DAILY
Status: DISCONTINUED | OUTPATIENT
Start: 2024-05-04 | End: 2024-05-05 | Stop reason: HOSPADM

## 2024-05-04 RX ORDER — ACETAMINOPHEN 325 MG/1
650 TABLET ORAL EVERY 4 HOURS PRN
Status: DISCONTINUED | OUTPATIENT
Start: 2024-05-04 | End: 2024-05-05 | Stop reason: HOSPADM

## 2024-05-04 RX ORDER — AMLODIPINE BESYLATE 5 MG/1
5 TABLET ORAL DAILY
Status: DISCONTINUED | OUTPATIENT
Start: 2024-05-05 | End: 2024-05-05 | Stop reason: HOSPADM

## 2024-05-04 RX ORDER — IPRATROPIUM BROMIDE AND ALBUTEROL SULFATE 2.5; .5 MG/3ML; MG/3ML
3 SOLUTION RESPIRATORY (INHALATION)
Status: DISCONTINUED | OUTPATIENT
Start: 2024-05-04 | End: 2024-05-04

## 2024-05-04 RX ORDER — BENZONATATE 100 MG/1
100 CAPSULE ORAL 3 TIMES DAILY PRN
Status: DISCONTINUED | OUTPATIENT
Start: 2024-05-04 | End: 2024-05-05 | Stop reason: HOSPADM

## 2024-05-04 RX ORDER — CLOPIDOGREL BISULFATE 75 MG/1
75 TABLET ORAL DAILY
Status: DISCONTINUED | OUTPATIENT
Start: 2024-05-05 | End: 2024-05-05 | Stop reason: HOSPADM

## 2024-05-04 RX ORDER — ACETAMINOPHEN 650 MG/1
650 SUPPOSITORY RECTAL EVERY 4 HOURS PRN
Status: DISCONTINUED | OUTPATIENT
Start: 2024-05-04 | End: 2024-05-05 | Stop reason: HOSPADM

## 2024-05-04 RX ORDER — IPRATROPIUM BROMIDE AND ALBUTEROL SULFATE 2.5; .5 MG/3ML; MG/3ML
3 SOLUTION RESPIRATORY (INHALATION) EVERY 20 MIN
Status: DISPENSED | OUTPATIENT
Start: 2024-05-04 | End: 2024-05-04

## 2024-05-04 RX ORDER — ENOXAPARIN SODIUM 100 MG/ML
40 INJECTION SUBCUTANEOUS EVERY 24 HOURS
Status: DISCONTINUED | OUTPATIENT
Start: 2024-05-04 | End: 2024-05-05 | Stop reason: HOSPADM

## 2024-05-04 RX ORDER — ACETAMINOPHEN 325 MG/1
975 TABLET ORAL ONCE
Status: COMPLETED | OUTPATIENT
Start: 2024-05-04 | End: 2024-05-04

## 2024-05-04 RX ORDER — IBUPROFEN 600 MG/1
600 TABLET ORAL ONCE
Status: COMPLETED | OUTPATIENT
Start: 2024-05-04 | End: 2024-05-04

## 2024-05-04 RX ORDER — ATORVASTATIN CALCIUM 40 MG/1
40 TABLET, FILM COATED ORAL NIGHTLY
Status: DISCONTINUED | OUTPATIENT
Start: 2024-05-04 | End: 2024-05-05 | Stop reason: HOSPADM

## 2024-05-04 RX ORDER — LEVOFLOXACIN 5 MG/ML
750 INJECTION, SOLUTION INTRAVENOUS EVERY 24 HOURS
Status: DISCONTINUED | OUTPATIENT
Start: 2024-05-05 | End: 2024-05-05 | Stop reason: HOSPADM

## 2024-05-04 RX ORDER — ACETAMINOPHEN 160 MG/5ML
650 SOLUTION ORAL EVERY 4 HOURS PRN
Status: DISCONTINUED | OUTPATIENT
Start: 2024-05-04 | End: 2024-05-05 | Stop reason: HOSPADM

## 2024-05-04 RX ADMIN — CEFTRIAXONE SODIUM 2 G: 2 INJECTION, SOLUTION INTRAVENOUS at 14:45

## 2024-05-04 RX ADMIN — Medication 6 MG: at 23:53

## 2024-05-04 RX ADMIN — SODIUM CHLORIDE, POTASSIUM CHLORIDE, SODIUM LACTATE AND CALCIUM CHLORIDE 1000 ML: 600; 310; 30; 20 INJECTION, SOLUTION INTRAVENOUS at 12:12

## 2024-05-04 RX ADMIN — IBUPROFEN 600 MG: 600 TABLET, FILM COATED ORAL at 13:13

## 2024-05-04 RX ADMIN — ACETAMINOPHEN 975 MG: 325 TABLET ORAL at 13:14

## 2024-05-04 RX ADMIN — METOPROLOL TARTRATE 25 MG: 25 TABLET, FILM COATED ORAL at 23:41

## 2024-05-04 RX ADMIN — BENZONATATE 100 MG: 100 CAPSULE ORAL at 13:13

## 2024-05-04 RX ADMIN — IPRATROPIUM BROMIDE AND ALBUTEROL SULFATE 3 ML: 2.5; .5 SOLUTION RESPIRATORY (INHALATION) at 13:30

## 2024-05-04 RX ADMIN — ENOXAPARIN SODIUM 40 MG: 40 INJECTION SUBCUTANEOUS at 20:04

## 2024-05-04 RX ADMIN — GUAIFENESIN 1200 MG: 600 TABLET ORAL at 20:04

## 2024-05-04 RX ADMIN — PREDNISONE 40 MG: 20 TABLET ORAL at 13:13

## 2024-05-04 RX ADMIN — DEXTROSE MONOHYDRATE 500 MG: 50 INJECTION, SOLUTION INTRAVENOUS at 14:45

## 2024-05-04 RX ADMIN — NICOTINE 1 PATCH: 14 PATCH, EXTENDED RELEASE TRANSDERMAL at 17:41

## 2024-05-04 RX ADMIN — IPRATROPIUM BROMIDE AND ALBUTEROL SULFATE 3 ML: 2.5; .5 SOLUTION RESPIRATORY (INHALATION) at 19:51

## 2024-05-04 RX ADMIN — IPRATROPIUM BROMIDE AND ALBUTEROL SULFATE 3 ML: 2.5; .5 SOLUTION RESPIRATORY (INHALATION) at 13:14

## 2024-05-04 RX ADMIN — ATORVASTATIN CALCIUM 40 MG: 40 TABLET, FILM COATED ORAL at 20:04

## 2024-05-04 SDOH — SOCIAL STABILITY: SOCIAL INSECURITY: ARE THERE ANY APPARENT SIGNS OF INJURIES/BEHAVIORS THAT COULD BE RELATED TO ABUSE/NEGLECT?: NO

## 2024-05-04 SDOH — HEALTH STABILITY: MENTAL HEALTH: HOW OFTEN DO YOU HAVE A DRINK CONTAINING ALCOHOL?: NEVER

## 2024-05-04 SDOH — ECONOMIC STABILITY: TRANSPORTATION INSECURITY
IN THE PAST 12 MONTHS, HAS THE LACK OF TRANSPORTATION KEPT YOU FROM MEDICAL APPOINTMENTS OR FROM GETTING MEDICATIONS?: NO

## 2024-05-04 SDOH — SOCIAL STABILITY: SOCIAL INSECURITY: ABUSE: ADULT

## 2024-05-04 SDOH — HEALTH STABILITY: MENTAL HEALTH: HOW MANY STANDARD DRINKS CONTAINING ALCOHOL DO YOU HAVE ON A TYPICAL DAY?: PATIENT DOES NOT DRINK

## 2024-05-04 SDOH — HEALTH STABILITY: MENTAL HEALTH: HOW OFTEN DO YOU HAVE 6 OR MORE DRINKS ON ONE OCCASION?: NEVER

## 2024-05-04 SDOH — SOCIAL STABILITY: SOCIAL INSECURITY: WERE YOU ABLE TO COMPLETE ALL THE BEHAVIORAL HEALTH SCREENINGS?: YES

## 2024-05-04 SDOH — SOCIAL STABILITY: SOCIAL INSECURITY: DO YOU FEEL ANYONE HAS EXPLOITED OR TAKEN ADVANTAGE OF YOU FINANCIALLY OR OF YOUR PERSONAL PROPERTY?: NO

## 2024-05-04 SDOH — ECONOMIC STABILITY: HOUSING INSECURITY
IN THE LAST 12 MONTHS, WAS THERE A TIME WHEN YOU DID NOT HAVE A STEADY PLACE TO SLEEP OR SLEPT IN A SHELTER (INCLUDING NOW)?: NO

## 2024-05-04 SDOH — SOCIAL STABILITY: SOCIAL INSECURITY: ARE YOU OR HAVE YOU BEEN THREATENED OR ABUSED PHYSICALLY, EMOTIONALLY, OR SEXUALLY BY ANYONE?: NO

## 2024-05-04 SDOH — ECONOMIC STABILITY: INCOME INSECURITY: HOW HARD IS IT FOR YOU TO PAY FOR THE VERY BASICS LIKE FOOD, HOUSING, MEDICAL CARE, AND HEATING?: NOT VERY HARD

## 2024-05-04 SDOH — ECONOMIC STABILITY: INCOME INSECURITY: IN THE LAST 12 MONTHS, WAS THERE A TIME WHEN YOU WERE NOT ABLE TO PAY THE MORTGAGE OR RENT ON TIME?: NO

## 2024-05-04 SDOH — ECONOMIC STABILITY: HOUSING INSECURITY: IN THE LAST 12 MONTHS, HOW MANY PLACES HAVE YOU LIVED?: 1

## 2024-05-04 SDOH — SOCIAL STABILITY: SOCIAL INSECURITY: HAVE YOU HAD THOUGHTS OF HARMING ANYONE ELSE?: NO

## 2024-05-04 SDOH — SOCIAL STABILITY: SOCIAL INSECURITY: HAVE YOU HAD ANY THOUGHTS OF HARMING ANYONE ELSE?: NO

## 2024-05-04 SDOH — SOCIAL STABILITY: SOCIAL INSECURITY: HAS ANYONE EVER THREATENED TO HURT YOUR FAMILY OR YOUR PETS?: NO

## 2024-05-04 SDOH — SOCIAL STABILITY: SOCIAL INSECURITY: DO YOU FEEL UNSAFE GOING BACK TO THE PLACE WHERE YOU ARE LIVING?: NO

## 2024-05-04 SDOH — ECONOMIC STABILITY: TRANSPORTATION INSECURITY
IN THE PAST 12 MONTHS, HAS LACK OF TRANSPORTATION KEPT YOU FROM MEETINGS, WORK, OR FROM GETTING THINGS NEEDED FOR DAILY LIVING?: NO

## 2024-05-04 SDOH — SOCIAL STABILITY: SOCIAL INSECURITY: DOES ANYONE TRY TO KEEP YOU FROM HAVING/CONTACTING OTHER FRIENDS OR DOING THINGS OUTSIDE YOUR HOME?: NO

## 2024-05-04 ASSESSMENT — ACTIVITIES OF DAILY LIVING (ADL)
BATHING: INDEPENDENT
HEARING - RIGHT EAR: FUNCTIONAL
JUDGMENT_ADEQUATE_SAFELY_COMPLETE_DAILY_ACTIVITIES: YES
PATIENT'S MEMORY ADEQUATE TO SAFELY COMPLETE DAILY ACTIVITIES?: YES
HEARING - LEFT EAR: FUNCTIONAL
DRESSING YOURSELF: INDEPENDENT
TOILETING: INDEPENDENT
LACK_OF_TRANSPORTATION: NO
FEEDING YOURSELF: INDEPENDENT
ADEQUATE_TO_COMPLETE_ADL: YES
WALKS IN HOME: INDEPENDENT
GROOMING: INDEPENDENT

## 2024-05-04 ASSESSMENT — LIFESTYLE VARIABLES
HAVE PEOPLE ANNOYED YOU BY CRITICIZING YOUR DRINKING: NO
HOW MANY STANDARD DRINKS CONTAINING ALCOHOL DO YOU HAVE ON A TYPICAL DAY: PATIENT DOES NOT DRINK
AUDIT-C TOTAL SCORE: 0
TOTAL SCORE: 0
EVER FELT BAD OR GUILTY ABOUT YOUR DRINKING: NO
AUDIT-C TOTAL SCORE: 0
EVER HAD A DRINK FIRST THING IN THE MORNING TO STEADY YOUR NERVES TO GET RID OF A HANGOVER: NO
HOW OFTEN DO YOU HAVE 6 OR MORE DRINKS ON ONE OCCASION: NEVER
HOW OFTEN DO YOU HAVE A DRINK CONTAINING ALCOHOL: NEVER
SKIP TO QUESTIONS 9-10: 1
HAVE YOU EVER FELT YOU SHOULD CUT DOWN ON YOUR DRINKING: NO
SKIP TO QUESTIONS 9-10: 1
AUDIT-C TOTAL SCORE: 0

## 2024-05-04 ASSESSMENT — COGNITIVE AND FUNCTIONAL STATUS - GENERAL
DAILY ACTIVITIY SCORE: 24
PATIENT BASELINE BEDBOUND: NO
MOBILITY SCORE: 24
DAILY ACTIVITIY SCORE: 24
MOBILITY SCORE: 24

## 2024-05-04 ASSESSMENT — ENCOUNTER SYMPTOMS
POLYDIPSIA: 0
APPETITE CHANGE: 0
DIFFICULTY URINATING: 0
FEVER: 0
PALPITATIONS: 0
SORE THROAT: 0
ABDOMINAL DISTENTION: 0
WOUND: 0
SINUS PAIN: 0
SHORTNESS OF BREATH: 1
CONSTIPATION: 0
ABDOMINAL PAIN: 0
NAUSEA: 0
STRIDOR: 0
CHEST TIGHTNESS: 0
MYALGIAS: 0
DYSURIA: 0
FREQUENCY: 0
CONFUSION: 0
BACK PAIN: 0
POLYPHAGIA: 0
WHEEZING: 0
DIAPHORESIS: 0
DIARRHEA: 0
SINUS PRESSURE: 0
COUGH: 1
TROUBLE SWALLOWING: 0
WEAKNESS: 0
VOMITING: 0

## 2024-05-04 ASSESSMENT — COLUMBIA-SUICIDE SEVERITY RATING SCALE - C-SSRS
1. IN THE PAST MONTH, HAVE YOU WISHED YOU WERE DEAD OR WISHED YOU COULD GO TO SLEEP AND NOT WAKE UP?: NO
2. HAVE YOU ACTUALLY HAD ANY THOUGHTS OF KILLING YOURSELF?: NO
6. HAVE YOU EVER DONE ANYTHING, STARTED TO DO ANYTHING, OR PREPARED TO DO ANYTHING TO END YOUR LIFE?: NO

## 2024-05-04 ASSESSMENT — PATIENT HEALTH QUESTIONNAIRE - PHQ9
SUM OF ALL RESPONSES TO PHQ9 QUESTIONS 1 & 2: 0
2. FEELING DOWN, DEPRESSED OR HOPELESS: NOT AT ALL
1. LITTLE INTEREST OR PLEASURE IN DOING THINGS: NOT AT ALL

## 2024-05-04 ASSESSMENT — PAIN SCALES - GENERAL
PAINLEVEL_OUTOF10: 0 - NO PAIN
PAINLEVEL_OUTOF10: 0 - NO PAIN

## 2024-05-04 ASSESSMENT — PAIN - FUNCTIONAL ASSESSMENT
PAIN_FUNCTIONAL_ASSESSMENT: 0-10
PAIN_FUNCTIONAL_ASSESSMENT: 0-10

## 2024-05-04 NOTE — ED PROVIDER NOTES
HPI   Chief Complaint   Patient presents with    URI       84-year-old otherwise healthy male here with 3 days of productive cough, increased shortness of breath, myalgias.  Denies any objective fevers, notes good p.o. intake, denies chest pain or pleurisy, lower extremity edema, hemoptysis.  He is a lifelong smoker, but denies a formal diagnosis of COPD.  Was given 1 DuoNeb in route.      History provided by:  Patient and medical records  History limited by:  Age   used: No                        [place] Coma Scale Score: 15                     Patient History   Past Medical History:   Diagnosis Date    Stroke (Multi)      Past Surgical History:   Procedure Laterality Date    CT ANGIO NECK  4/19/2022    CT NECK ANGIO W AND WO IV CONTRAST 4/19/2022 [facility] CLINICAL LEGACY    CT AORTA AND BILATERAL ILIOFEMORAL RUNOFF ANGIOGRAM W AND/OR WO IV CONTRAST  4/10/2022    CT AORTA AND BILATERAL ILIOFEMORAL RUNOFF ANGIOGRAM W AND/OR WO IV CONTRAST 4/10/2022 [facility] EMERGENCY LEGACY    CT AORTA AND BILATERAL ILIOFEMORAL RUNOFF ANGIOGRAM W AND/OR WO IV CONTRAST  4/13/2022    CT AORTA AND BILATERAL ILIOFEMORAL RUNOFF ANGIOGRAM W AND/OR WO IV CONTRAST 4/13/2022 [facility] CLINICAL LEGACY    CT AORTA AND BILATERAL ILIOFEMORAL RUNOFF ANGIOGRAM W AND/OR WO IV CONTRAST  5/22/2022    CT AORTA AND BILATERAL ILIOFEMORAL RUNOFF ANGIOGRAM W AND/OR WO IV CONTRAST 5/22/2022 [facility] EMERGENCY LEGACY    CT AORTA AND BILATERAL ILIOFEMORAL RUNOFF ANGIOGRAM W AND/OR WO IV CONTRAST  4/2/2023    CT AORTA AND BILATERAL ILIOFEMORAL RUNOFF ANGIOGRAM W AND/OR WO IV CONTRAST [facility] CT    CT HEAD ANGIO W AND WO IV CONTRAST  4/19/2022    CT HEAD ANGIO W AND WO IV CONTRAST 4/19/2022 [facility] CLINICAL LEGACY    OTHER SURGICAL HISTORY  09/17/2019    [place] tooth extraction    OTHER SURGICAL HISTORY  09/17/2019    Tonsillectomy with adenoidectomy    OTHER SURGICAL HISTORY  01/17/2020    Lower leg fracture repair    OTHER SURGICAL HISTORY  01/17/2020    Cardiac  catheterization    TOTAL HIP ARTHROPLASTY  08/31/2018    Hip Replacement     Family History   Problem Relation Name Age of Onset    Heart attack Mother      Colon cancer Father      Heart attack Brother      Heart block Brother       Social History     Tobacco Use    Smoking status: Every Day     Types: Cigarettes    Smokeless tobacco: Never   Vaping Use    Vaping status: Never Used   Substance Use Topics    Alcohol use: Never    Drug use: Never       Physical Exam   ED Triage Vitals [05/04/24 1202]   Temperature Heart Rate Respirations BP   37.1 °C (98.8 °F) 90 16 157/84      Pulse Ox Temp Source Heart Rate Source Patient Position   (!) 92 % Temporal Monitor Sitting      BP Location FiO2 (%)     Right arm --       Physical Exam  Constitutional:       Appearance: Normal appearance.   HENT:      Head: Normocephalic and atraumatic.      Right Ear: External ear normal.      Left Ear: External ear normal.      Nose: Congestion and rhinorrhea present.      Mouth/Throat:      Mouth: Mucous membranes are moist.      Pharynx: Oropharynx is clear.   Eyes:      Extraocular Movements: Extraocular movements intact.      Conjunctiva/sclera: Conjunctivae normal.      Pupils: Pupils are equal, round, and reactive to light.   Cardiovascular:      Rate and Rhythm: Normal rate and regular rhythm.      Pulses: Normal pulses.      Heart sounds: Normal heart sounds.   Pulmonary:      Effort: Pulmonary effort is normal.      Comments: Wheezes in bilateral lungs, bibasilar crackles  Abdominal:      Palpations: Abdomen is soft.      Tenderness: There is no abdominal tenderness.   Musculoskeletal:         General: Normal range of motion.      Cervical back: Normal range of motion and neck supple. No rigidity.      Right lower leg: No edema.      Left lower leg: No edema.   Skin:     General: Skin is warm and dry.      Capillary Refill: Capillary refill takes less than 2 seconds.   Neurological:      General: No focal deficit present.       Mental Status: He is alert and oriented to person, place, and time.      Sensory: No sensory deficit.      Motor: No weakness.   Psychiatric:         Mood and Affect: Mood normal.         Behavior: Behavior normal.         ED Course & MDM        Medical Decision Making  84-year-old male here with flulike symptoms, cough.  Presents hemodynamically stable, no acute distress, mentating appropriately, afebrile and requiring a new 2 L oxygen requirement to maintain saturations.  Physical exam notable for an overall well-appearing male, he does have crackles worse on the right than the left, and wheezes throughout bilateral lung fields, given his significant smoking history as well as CT findings, I do suspect he likely has an underlying diagnosis of COPD but does not have PFTs per the patient.  His CT does show pneumonia which was treated with ceftriaxone, azithromycin, and he was also treated for COPD exacerbation with steroids, DuoNebs, will need admission for new oxygen requirement and further treatment.  CT also showed interval enlargement of pulmonary nodule which was discussed with patient and inpatient team, I believe this is incidental but should be followed up.  Is also mildly dehydrated on exam, and has a sodium of 129 with intact mental status.    Amount and/or Complexity of Data Reviewed  Labs: ordered.  Radiology: ordered and independent interpretation performed.  ECG/medicine tests: ordered and independent interpretation performed.     Details: Normal sinus rhythm, no ST changes, normal axis    Risk  Decision regarding hospitalization.        Procedure  Procedures     Brooks Balbuena MD  Resident  05/04/24 0511       Brooks Balbuena MD  Resident  05/04/24 7509

## 2024-05-04 NOTE — ED TRIAGE NOTES
Pt states he has an upper resp infection x's 3 days. Pt states he hears wheezing and rattling in his chest. He states he is coughing up green sputum.

## 2024-05-04 NOTE — H&P
History Of Present Illness  This is a 83yo male with medical history significant for COPD, HTN, PAD, Hx of DVT, CVA 2 year ago, Afib, HLD presenting to [facility] ED with complaint of “shortness of breath and cough'.    Patient seen and examined at bedside today. Family present.  He reports for past 1-2 weeks he has been feeling more short of breath, coughing which started as dry now productive with green phlegm. No inciting factors. Denies sick contacts. No recent travel. Daughter states he has diagnosis of COPD many many years ago but not evaluated for it since. Uses albuterol nebulizer at home which he has used more of the past 1-2 weeks and is nearly out of the solution or is out currently - unsure of this. Trying to cut back on smoking wanting to quit. Agreeable to nicotine patch. No changes to medication. Doesn't think allergies including seasonal are playing a role. Denies lower extremity swelling or pain. Denies chest pain, palpitations. He and family aware of pulmonary nodule and plan to just keep eye on it given patient's age and wishes. He thinks he is staying hydrated and eating however past day he has reduced slightly. Doesn't feel dehydrated now.  No other issues brought up at this time.    12 point Review of Sytems negative unless stated above.    ED Course:  Vitals: T98.8 P90 R16 /84 92%___  Labs:  CBC: WBC (9.7) Hb (10.6)  CMP: Na (129) Ca (8.4)   Other: Mag (1.60) Troponin (9 à 5) Flu/RSV/COVID (Negative) VBG (7.43/45/40)  Imaging:  CXR: Redemonstration of chronic parenchymal changes in bilateral lungs. No acute cardiopulmonary process  CT Chest wo: Mucus plugging of distal bronchials in LLL and possible developing pneumonia in RLL superimposed on underlying chronic lung parenchymal changes. Interval evidence of 6.3mm nodular opacity in right lower lobe new compared to 2021. Mildly prominent mediastinal lymph nodes measuring up to 8mm. Enlarged main pulmonary artery can be associated with  pulmonary arterial hypertension. Moderate atherosclerotic vascular calcifications.   Interventions: Rocephin 2g, Azithromycin 500mg IV, Tessalon Perles 100mg PO, Ibuprofen 600mg PO, LR 1L, Prednisone 40mg PO, Duoneb    Allergies: He denies allergies    Past Medical History: COPD, HTN, PAD, Hx of DVT, CVA 2 year ago, Afib, HLD    Past Surgical History: Leg-Vascular surgeries 2 years ago, Left hip preplacement, Cardiac cath. Tonsillectomy, Stanton tooth extraction    Family Medical History: Positive for cancer, hyperlipidemia, heart issues, DM    Social History: Current smoker since 13 years old / Denies alcohol and illicit drug use     Code Status: DNR (but okay with ICU and elective intubation)    Past Medical History  He has a past medical history of Stroke (Multi).    Surgical History  He has a past surgical history that includes Total hip arthroplasty (08/31/2018); Other surgical history (09/17/2019); Other surgical history (09/17/2019); Other surgical history (01/17/2020); Other surgical history (01/17/2020); CT angio aorta and bilateral iliofemoral runoff w and or wo IV contrast (4/10/2022); CT angio aorta and bilateral iliofemoral runoff w and or wo IV contrast (4/13/2022); CT angio head w and wo IV contrast (4/19/2022); CT angio neck (4/19/2022); CT angio aorta and bilateral iliofemoral runoff w and or wo IV contrast (5/22/2022); and CT angio aorta and bilateral iliofemoral runoff w and or wo IV contrast (4/2/2023).     Social History  He reports that he has been smoking cigarettes. He has never used smokeless tobacco. He reports that he does not drink alcohol and does not use drugs.    Family History  Family History   Problem Relation Name Age of Onset    Heart attack Mother      Colon cancer Father      Heart attack Brother      Heart block Brother          Allergies  Doxycycline and T-painol extra strength    Review of Systems   Constitutional:  Negative for appetite change, diaphoresis and fever.   HENT:   Negative for sinus pressure, sinus pain, sore throat and trouble swallowing.    Eyes:  Negative for visual disturbance.   Respiratory:  Positive for cough and shortness of breath. Negative for chest tightness, wheezing and stridor.    Cardiovascular:  Negative for chest pain, palpitations and leg swelling.   Gastrointestinal:  Negative for abdominal distention, abdominal pain, constipation, diarrhea, nausea and vomiting.   Endocrine: Negative for polydipsia, polyphagia and polyuria.   Genitourinary:  Negative for decreased urine volume, difficulty urinating, dysuria, frequency and urgency.   Musculoskeletal:  Negative for back pain and myalgias.   Skin:  Negative for rash and wound.   Neurological:  Negative for syncope and weakness.   Psychiatric/Behavioral:  Negative for confusion.           Physical Exam  Constitutional: Pleasant, Awake/Alert/Oriented to person place and time. No distress  Head: Atraumatic, Normocephalic  Eyes: EOMI. JOAN  Neck:  No JVD  Cardiovascular: Regular rate and rhythm, S1, S2. No extra heart sounds or murmurs  Respiratory: Trace end-expiratory wheezing present bilaterally along with diminished air movement/breath sounds in the bases. I put on room air in room and satted 92-96% without oxygen. No accessory muscle use  Abdomen: Soft, Nontender. Bowel sounds appreciated  Musculoskeletal: ROM intact. Muscle strength grossly intact upper and lower extremities 5/5.   Neurological: CNII-XII intact. Sensation grossly intact  Extremities: Warm and dry. No acute rashes and lesions  Psychiatric: Appropriate mood and affect     Last Recorded Vitals  /58   Pulse 85   Temp 37.1 °C (98.8 °F) (Temporal)   Resp 16   Wt 63 kg (139 lb)   SpO2 94%     Relevant Results      Results for orders placed or performed during the hospital encounter of 05/04/24 (from the past 24 hour(s))   CBC and Auto Differential   Result Value Ref Range    WBC 9.7 4.4 - 11.3 x10*3/uL    nRBC 0.0 0.0 - 0.0 /100 WBCs     RBC 3.40 (L) 4.50 - 5.90 x10*6/uL    Hemoglobin 10.6 (L) 13.5 - 17.5 g/dL    Hematocrit 31.7 (L) 41.0 - 52.0 %    MCV 93 80 - 100 fL    MCH 31.2 26.0 - 34.0 pg    MCHC 33.4 32.0 - 36.0 g/dL    RDW 13.9 11.5 - 14.5 %    Platelets 213 150 - 450 x10*3/uL    Neutrophils % 80.2 40.0 - 80.0 %    Immature Granulocytes %, Automated 0.6 0.0 - 0.9 %    Lymphocytes % 9.6 13.0 - 44.0 %    Monocytes % 8.2 2.0 - 10.0 %    Eosinophils % 1.0 0.0 - 6.0 %    Basophils % 0.4 0.0 - 2.0 %    Neutrophils Absolute 7.75 (H) 1.60 - 5.50 x10*3/uL    Immature Granulocytes Absolute, Automated 0.06 0.00 - 0.50 x10*3/uL    Lymphocytes Absolute 0.93 0.80 - 3.00 x10*3/uL    Monocytes Absolute 0.79 0.05 - 0.80 x10*3/uL    Eosinophils Absolute 0.10 0.00 - 0.40 x10*3/uL    Basophils Absolute 0.04 0.00 - 0.10 x10*3/uL   Magnesium   Result Value Ref Range    Magnesium 1.60 1.60 - 2.40 mg/dL   Comprehensive metabolic panel   Result Value Ref Range    Glucose 158 (H) 74 - 99 mg/dL    Sodium 129 (L) 136 - 145 mmol/L    Potassium 4.0 3.5 - 5.3 mmol/L    Chloride 96 (L) 98 - 107 mmol/L    Bicarbonate 25 21 - 32 mmol/L    Anion Gap 12 10 - 20 mmol/L    Urea Nitrogen 13 6 - 23 mg/dL    Creatinine 0.74 0.50 - 1.30 mg/dL    eGFR 89 >60 mL/min/1.73m*2    Calcium 8.4 (L) 8.6 - 10.3 mg/dL    Albumin 3.9 3.4 - 5.0 g/dL    Alkaline Phosphatase 60 33 - 136 U/L    Total Protein 6.7 6.4 - 8.2 g/dL    AST 17 9 - 39 U/L    Bilirubin, Total 0.6 0.0 - 1.2 mg/dL    ALT 7 (L) 10 - 52 U/L   Blood Gas Venous Full Panel   Result Value Ref Range    POCT pH, Venous 7.43 7.33 - 7.43 pH    POCT pCO2, Venous 45 41 - 51 mm Hg    POCT pO2, Venous 40 35 - 45 mm Hg    POCT SO2, Venous 70 45 - 75 %    POCT Oxy Hemoglobin, Venous 67.8 45.0 - 75.0 %    POCT Hematocrit Calculated, Venous 39.0 (L) 41.0 - 52.0 %    POCT Sodium, Venous 128 (L) 136 - 145 mmol/L    POCT Potassium, Venous 4.2 3.5 - 5.3 mmol/L    POCT Chloride, Venous 95 (L) 98 - 107 mmol/L    POCT Ionized Calicum, Venous 1.13  1.10 - 1.33 mmol/L    POCT Glucose, Venous 129 (H) 74 - 99 mg/dL    POCT Lactate, Venous 1.9 0.4 - 2.0 mmol/L    POCT Base Excess, Venous 4.8 (H) -2.0 - 3.0 mmol/L    POCT HCO3 Calculated, Venous 29.9 (H) 22.0 - 26.0 mmol/L    POCT Hemoglobin, Venous 13.0 (L) 13.5 - 17.5 g/dL    POCT Anion Gap, Venous 7.0 (L) 10.0 - 25.0 mmol/L    Patient Temperature      FiO2 96 %   Influenza A, and B PCR   Result Value Ref Range    Flu A Result Not Detected Not Detected    Flu B Result Not Detected Not Detected   Sars-CoV-2 PCR   Result Value Ref Range    Coronavirus 2019, PCR Not Detected Not Detected   RSV PCR   Result Value Ref Range    RSV PCR Not Detected Not Detected   Troponin I, High Sensitivity, Initial   Result Value Ref Range    Troponin I, High Sensitivity 9 0 - 20 ng/L   Troponin, High Sensitivity, 1 Hour   Result Value Ref Range    Troponin I, High Sensitivity 5 0 - 20 ng/L     CT chest wo IV contrast    Result Date: 5/4/2024  Interpreted By:  Lucie De Leon, STUDY: CT CHEST WO IV CONTRAST;  5/4/2024 2:27 pm   INDICATION: Signs/Symptoms:concern pna; sob, cough.   COMPARISON: CT chest for PE dated 04/17/2021   ACCESSION NUMBER(S): [id number]   ORDERING CLINICIAN: SUSHMA BRYANT   TECHNIQUE: Helical data acquisition of the chest was obtained  without IV contrast material.  Images were reformatted in axial, coronal, and sagittal planes.   FINDINGS: LUNGS AND AIRWAYS: There are chronic parenchymal changes with coarse interstitial septal thickening and bronchiectasis in bilateral lower lobes, more pronounced in the right lower lobe compared to the left. There is interval development of few superimposed subpleural consolidative airspace opacities in the right lower lobe, new compared to prior CT examination (best seen on axial image 292/404). There is also interval evidence of mucous plugging of the distal bronchials in the posterior aspect of the left lower lobe with associated mild atelectatic changes in the left  lower lobe (best seen on axial image 289/404). Again noted is a calcified pulmonary nodule in the medial aspect of right lower lobe, similar compared to prior CT examination and is suggestive of a granuloma. However there is interval development of a new pulmonary nodule in the posterior aspect of right lower lobe measuring approximately 6.3 mm (axial image 250/404). There is also mild bronchiectasis with chronic interstitial septal thickening in the anterior aspect of the left upper lobe which is new compared to prior CT examination. No pleural effusions or pneumothorax.   MEDIASTINUM AND RUBINA, LOWER NECK AND AXILLA: The visualized thyroid gland is within normal limits.   There are few mildly prominent mediastinal lymph nodes, for example the largest lymph node in the right paratracheal region measures approximately 8 mm in short axis. These are mildly pronounced on today's examination compared to prior CT chest. No evidence of enlarged bilateral hilar lymphadenopathy within limits of noncontrast technique. No evidence of enlarged bilateral axillary lymphadenopathy.   Esophagus appears within normal limits as seen.   HEART AND VESSELS: Thoracic aorta demonstrates moderate atherosclerotic calcifications. Pulmonary artery is enlarged measuring a proximally 4 cm and is concerning for pulmonary arterial hypertension. Dense atherosclerotic calcifications of the coronary arteries are noted. The cardiac chambers are not enlarged.   No evidence of pericardial effusion.   UPPER ABDOMEN: The visualized subdiaphragmatic structures demonstrate no remarkable findings.   CHEST WALL AND OSSEOUS STRUCTURES: There are no suspicious osseous lesions. There are mild multilevel discogenic degenerative changes of the thoracic spine.       1.  Findings concerning for mucous plugging of distal bronchials in the left lower lobe and possible developing pneumonia in the right lower lobe superimposed on underlying chronic lung parenchymal  changes as described above. There are similar findings in the anterior aspect of left upper lobe, new compared to prior CT examination dated 04/17/2021 and could represent chronic lung parenchymal changes versus developing pneumonia. 2. Interval evidence of a 6.3 mm nodular opacity in the right lower lobe (axial image 250/404) new compared to prior CT examination dating back to 2021. Consider follow up non contrast chest CT at 6-12 months, then consider CT chest at 18-24 months. (Topher Monroe et al., Guidelines for management of incidental pulmonary nodules detected on CT images: From the Fleischner Society 2017, Radiology. 2017 Jul;284 (1):228-243.) FLEISCHNER.ACR.IF.2 3. Mildly prominent mediastinal lymph nodes measuring up to 8 mm. This could be reactive, however recommend attention on follow-up imaging. 4. Enlarged main pulmonary artery, which can be associated with pulmonary arterial hypertension in appropriate clinical setting. 5. Moderate atherosclerotic vascular calcifications as described above.   MACRO: Critical Finding:  See findings. Notification was initiated on 5/4/2024 at 3:06 pm by Dr. Lucie De Leon.  (**-YCF-**) Instructions:   Signed by: Lucie De Leon 5/4/2024 3:06 PM Dictation workstation:   [id number]    XR chest 2 views    Result Date: 5/4/2024  Interpreted By:  Lucie De Leon, STUDY: XR CHEST 2 VIEWS; ;  5/4/2024 1:06 pm   INDICATION: Signs/Symptoms:cough, sob.   COMPARISON: Chest radiograph dated 09/28/2023   ACCESSION NUMBER(S): [id number]   ORDERING CLINICIAN: SUSHMA BRYANT   FINDINGS: Cardiac silhouette is stable in size and morphology. Bilateral lungs demonstrate diffuse emphysematous changes, similar compared to prior radiographs. No dense focal consolidative airspace opacity is noted. No large pleural effusions or pneumothorax. No acute osseous findings.       Redemonstration of chronic parenchymal changes in bilateral lungs. Otherwise no acute cardiopulmonary process.      MACRO: None   Signed by: Lucie De Leon 5/4/2024 1:16 PM Dictation workstation:   [id number]     Assessment/Plan   Principal Problem:    Pneumonia due to other specified bacteria (Multi)  Active Problems:    Community acquired pneumonia    Community acquired bacterial pneumonia    This is a 83yo male with medical history significant for COPD, HTN, PAD, Hx of DVT, CVA 2 year ago, Afib, HLD admitted with diagnosis of Community acquired pneumonia, COPD exacerbation.    Assessment/Plan:  Acute Medical Issues:  # Community Acquired Pneumonia  Given sputum characteristics, will plan switch to Levaquin and likely discharge on this. 5 day course should suffice.  Labwork ordered including Strep pneumo and Legionella urine antigen, Mycoplasma IgM and Respiratory viral panel  Ordered Mucinex, Tessalon Perles to help with cough  Incentive Spirometry, Bronchial hygiene    # COPD exacerbation  Respiratory Regimen: Duoneb Q6hr, Albuterol Q4hr PRN, Prednisone 40mg PO daily  Recommend referral to pulmonology after discharge for updated PFT and adjustments to outpatient regimen  Will need Albuterol or Duoneb solution script at discharge    # Asymptomatic Hyponatremia  Most likely from dehydration. Monitor labs. Could be from pneumonia specific organism like Legionella so ruling that out with workup above. No further treatment at this time - Will base off of trend    # Tobacco abuse - Counseled on risks associated with persistent use and recommend he quit smoking tobacco. He is aware and weaning down. Agreeable to Nicotine 14mg patch while here. Ordered.    Clarification:  There is no diagnosis for acute hypoxic respiratory failure that I can see. He never reportedly dropped below 88% and there is no ABG to support a diagnosis. Was put on oxygen regardless. Taken off O2 in room and satted 92-96% on room air without. If becomes actually hypoxic overnight, diagnosis can be adjusted but at this time, there is no diagnosis.     Chronic  Medical Issues:  # Hypertension: Amlodipine 5mg PO daily, Metoprolol tartrate 25mg PO BID  # Peripheral artery disease: Clopidogrel 75mg PO daily, Atorvastatin 40mg PO daily  # Hyperlipidemia: Atorvastatin 40mg PO daily    Disposition: Patient admitted with above issues. Appears to be doing well already from ED. Anticipated length of hospital stay less than 2 midnights from time of admission. Time spent with hospital admission evaluation and planning approximately 75 minutes.      Gary Jules, DO

## 2024-05-05 VITALS
HEART RATE: 62 BPM | DIASTOLIC BLOOD PRESSURE: 65 MMHG | TEMPERATURE: 96.8 F | SYSTOLIC BLOOD PRESSURE: 115 MMHG | WEIGHT: 139 LBS | RESPIRATION RATE: 17 BRPM | HEIGHT: 70 IN | BODY MASS INDEX: 19.9 KG/M2 | OXYGEN SATURATION: 95 %

## 2024-05-05 LAB
ALBUMIN SERPL BCP-MCNC: 3.6 G/DL (ref 3.4–5)
ANION GAP SERPL CALC-SCNC: 11 MMOL/L (ref 10–20)
BUN SERPL-MCNC: 14 MG/DL (ref 6–23)
CALCIUM SERPL-MCNC: 8.5 MG/DL (ref 8.6–10.3)
CHLORIDE SERPL-SCNC: 98 MMOL/L (ref 98–107)
CO2 SERPL-SCNC: 28 MMOL/L (ref 21–32)
CREAT SERPL-MCNC: 0.75 MG/DL (ref 0.5–1.3)
EGFRCR SERPLBLD CKD-EPI 2021: 89 ML/MIN/1.73M*2
ERYTHROCYTE [DISTWIDTH] IN BLOOD BY AUTOMATED COUNT: 14 % (ref 11.5–14.5)
GLUCOSE SERPL-MCNC: 111 MG/DL (ref 74–99)
HCT VFR BLD AUTO: 33.7 % (ref 41–52)
HGB BLD-MCNC: 11 G/DL (ref 13.5–17.5)
LEGIONELLA AG UR QL: NEGATIVE
MCH RBC QN AUTO: 30.4 PG (ref 26–34)
MCHC RBC AUTO-ENTMCNC: 32.6 G/DL (ref 32–36)
MCV RBC AUTO: 93 FL (ref 80–100)
NRBC BLD-RTO: 0 /100 WBCS (ref 0–0)
OSMOLALITY SERPL: 280 MOSM/KG (ref 280–300)
OSMOLALITY UR: 491 MOSM/KG (ref 200–1200)
PHOSPHATE SERPL-MCNC: 3.7 MG/DL (ref 2.5–4.9)
PLATELET # BLD AUTO: 214 X10*3/UL (ref 150–450)
POTASSIUM SERPL-SCNC: 3.9 MMOL/L (ref 3.5–5.3)
RBC # BLD AUTO: 3.62 X10*6/UL (ref 4.5–5.9)
S PNEUM AG UR QL: NEGATIVE
SODIUM SERPL-SCNC: 133 MMOL/L (ref 136–145)
WBC # BLD AUTO: 11.4 X10*3/UL (ref 4.4–11.3)

## 2024-05-05 PROCEDURE — 99239 HOSP IP/OBS DSCHRG MGMT >30: CPT | Performed by: STUDENT IN AN ORGANIZED HEALTH CARE EDUCATION/TRAINING PROGRAM

## 2024-05-05 PROCEDURE — 2500000001 HC RX 250 WO HCPCS SELF ADMINISTERED DRUGS (ALT 637 FOR MEDICARE OP): Performed by: INTERNAL MEDICINE

## 2024-05-05 PROCEDURE — 94640 AIRWAY INHALATION TREATMENT: CPT

## 2024-05-05 PROCEDURE — S4991 NICOTINE PATCH NONLEGEND: HCPCS | Performed by: INTERNAL MEDICINE

## 2024-05-05 PROCEDURE — 2500000002 HC RX 250 W HCPCS SELF ADMINISTERED DRUGS (ALT 637 FOR MEDICARE OP, ALT 636 FOR OP/ED): Performed by: INTERNAL MEDICINE

## 2024-05-05 PROCEDURE — 36415 COLL VENOUS BLD VENIPUNCTURE: CPT | Performed by: INTERNAL MEDICINE

## 2024-05-05 PROCEDURE — 85027 COMPLETE CBC AUTOMATED: CPT | Performed by: INTERNAL MEDICINE

## 2024-05-05 PROCEDURE — 94760 N-INVAS EAR/PLS OXIMETRY 1: CPT

## 2024-05-05 PROCEDURE — 2500000004 HC RX 250 GENERAL PHARMACY W/ HCPCS (ALT 636 FOR OP/ED): Performed by: INTERNAL MEDICINE

## 2024-05-05 PROCEDURE — G0378 HOSPITAL OBSERVATION PER HR: HCPCS

## 2024-05-05 PROCEDURE — 96367 TX/PROPH/DG ADDL SEQ IV INF: CPT | Performed by: INTERNAL MEDICINE

## 2024-05-05 PROCEDURE — 94668 MNPJ CHEST WALL SBSQ: CPT

## 2024-05-05 PROCEDURE — 80069 RENAL FUNCTION PANEL: CPT | Performed by: INTERNAL MEDICINE

## 2024-05-05 RX ORDER — IPRATROPIUM BROMIDE AND ALBUTEROL SULFATE 2.5; .5 MG/3ML; MG/3ML
3 SOLUTION RESPIRATORY (INHALATION) 4 TIMES DAILY PRN
Qty: 90 ML | Refills: 0 | Status: SHIPPED | OUTPATIENT
Start: 2024-05-05 | End: 2024-06-04

## 2024-05-05 RX ORDER — DIPHENHYDRAMINE HCL 25 MG
25 CAPSULE ORAL EVERY 6 HOURS PRN
Status: DISCONTINUED | OUTPATIENT
Start: 2024-05-05 | End: 2024-05-05

## 2024-05-05 RX ORDER — PREDNISONE 20 MG/1
40 TABLET ORAL DAILY
Qty: 8 TABLET | Refills: 0 | Status: SHIPPED | OUTPATIENT
Start: 2024-05-06 | End: 2024-05-15 | Stop reason: HOSPADM

## 2024-05-05 RX ORDER — DIPHENHYDRAMINE HCL 25 MG
25 CAPSULE ORAL ONCE
Status: COMPLETED | OUTPATIENT
Start: 2024-05-05 | End: 2024-05-05

## 2024-05-05 RX ORDER — ALBUTEROL SULFATE 0.83 MG/ML
2.5 SOLUTION RESPIRATORY (INHALATION) EVERY 2 HOUR PRN
Qty: 75 ML | Refills: 11 | Status: SHIPPED | OUTPATIENT
Start: 2024-05-05 | End: 2024-05-08 | Stop reason: SDUPTHER

## 2024-05-05 RX ORDER — AMOXICILLIN AND CLAVULANATE POTASSIUM 875; 125 MG/1; MG/1
1 TABLET, FILM COATED ORAL 2 TIMES DAILY
Qty: 12 TABLET | Refills: 0 | Status: SHIPPED | OUTPATIENT
Start: 2024-05-05 | End: 2024-05-15 | Stop reason: HOSPADM

## 2024-05-05 RX ADMIN — NICOTINE 1 PATCH: 14 PATCH, EXTENDED RELEASE TRANSDERMAL at 08:06

## 2024-05-05 RX ADMIN — LEVOFLOXACIN 750 MG: 750 INJECTION, SOLUTION INTRAVENOUS at 05:42

## 2024-05-05 RX ADMIN — CLOPIDOGREL 75 MG: 75 TABLET ORAL at 08:06

## 2024-05-05 RX ADMIN — POLYETHYLENE GLYCOL 3350 17 G: 17 POWDER, FOR SOLUTION ORAL at 08:08

## 2024-05-05 RX ADMIN — AMLODIPINE BESYLATE 5 MG: 5 TABLET ORAL at 08:06

## 2024-05-05 RX ADMIN — GUAIFENESIN 1200 MG: 600 TABLET ORAL at 08:06

## 2024-05-05 RX ADMIN — IPRATROPIUM BROMIDE AND ALBUTEROL SULFATE 3 ML: 2.5; .5 SOLUTION RESPIRATORY (INHALATION) at 08:55

## 2024-05-05 RX ADMIN — DIPHENHYDRAMINE HYDROCHLORIDE 25 MG: 25 CAPSULE ORAL at 07:15

## 2024-05-05 RX ADMIN — METOPROLOL TARTRATE 25 MG: 25 TABLET, FILM COATED ORAL at 08:06

## 2024-05-05 RX ADMIN — PREDNISONE 40 MG: 20 TABLET ORAL at 08:06

## 2024-05-05 ASSESSMENT — COGNITIVE AND FUNCTIONAL STATUS - GENERAL
MOBILITY SCORE: 24
DAILY ACTIVITIY SCORE: 24

## 2024-05-05 ASSESSMENT — PAIN SCALES - GENERAL: PAINLEVEL_OUTOF10: 0 - NO PAIN

## 2024-05-05 NOTE — PROGRESS NOTES
05/05/24 0951   Discharge Planning   Living Arrangements Children  (daughter)   Support Systems Children   Assistance Needed A&Ox3, independent with ADLs, has a walker and cane but doesn't need to use them; room air at baseline, drives   Type of Residence Private residence   Home or Post Acute Services None   Patient expects to be discharged to: dc 5/5, home no needs   Does the patient need discharge transport arranged? No

## 2024-05-05 NOTE — NURSING NOTE
Discharge instruction reviewed with patient and daughter. Follow up appointments, new prescriptions and side effects noted. IV removed intact. No additional questions.

## 2024-05-05 NOTE — DISCHARGE SUMMARY
Discharge Diagnosis  Pneumonia due to other specified bacteria (Multi)    Issues Requiring Follow-Up  Post hospital follow up    Discharge Meds     Your medication list        START taking these medications        Instructions Last Dose Given Next Dose Due   amoxicillin-pot clavulanate 875-125 mg tablet  Commonly known as: Augmentin      Take 1 tablet by mouth 2 times a day for 6 days.       ipratropium-albuteroL 0.5-2.5 mg/3 mL nebulizer solution  Commonly known as: Duo-Neb      Take 3 mL by nebulization 4 times a day as needed for wheezing or shortness of breath.       predniSONE 20 mg tablet  Commonly known as: Deltasone  Start taking on: May 6, 2024      Take 2 tablets (40 mg) by mouth once daily for 4 days.              CONTINUE taking these medications        Instructions Last Dose Given Next Dose Due   amLODIPine 5 mg tablet  Commonly known as: Norvasc      Take 1 tablet (5 mg) by mouth once daily.       atorvastatin 40 mg tablet  Commonly known as: Lipitor      Take 1 tablet (40 mg) by mouth once daily at bedtime.       clopidogrel 75 mg tablet  Commonly known as: Plavix      Take 1 tablet (75 mg) by mouth once daily.       fluticasone 50 mcg/actuation nasal spray  Commonly known as: Flonase      Administer 1 spray into each nostril once daily. Shake gently. Before first use, prime pump. After use, clean tip and replace cap.       metoprolol tartrate 25 mg tablet  Commonly known as: Lopressor      Take 1 tablet (25 mg) by mouth 2 times a day.       Saline NasaL 0.65 % nasal spray  Generic drug: sodium chloride      Administer 1 spray into each nostril if needed for congestion.                 Where to Get Your Medications        These medications were sent to [facility] [address] [place] [id number] [facility]  [address] [facility] [address]      Phone: [phone]   amoxicillin-pot clavulanate 875-125 mg tablet  ipratropium-albuteroL 0.5-2.5 mg/3 mL nebulizer  solution  predniSONE 20 mg tablet         Test Results Pending At Discharge  Pending Labs       Order Current Status    Legionella Antigen, Urine In process    Mycoplasma pneumoniae antibody, IgM In process    Osmolality In process    Osmolality, urine In process    Respiratory Viral Panel In process    Streptococcus pneumoniae Antigen, Urine In process            Hospital Course   Pt is a 85yo male with medical history significant for COPD, HTN, PAD, Hx of DVT, CVA 2 year ago, Afib, HLD presenting to [facility] ED for SOB. He is found to have RLL pneumonia on CT chest which leads to COPD exacerbation. He is started on steroids, duoneb, abx. He remains on RA. Pt is hemodynamically stable for discharge at this time with close outpatient follow ups.      The patient was discharged in satisfactory condition.   Medications and side effect profile reviewed with patient.  More than 30 minutes were spent in coordinating patient discharge     Pertinent Physical Exam At Time of Discharge  Physical Exam  Vitals and nursing note reviewed.   Constitutional:       General: He is not in acute distress.     Appearance: Normal appearance. He is not ill-appearing or toxic-appearing.   HENT:      Head: Normocephalic and atraumatic.      Nose: Nose normal.      Mouth/Throat:      Mouth: Mucous membranes are moist.   Eyes:      Extraocular Movements: Extraocular movements intact.      Conjunctiva/sclera: Conjunctivae normal.      Pupils: Pupils are equal, round, and reactive to light.   Cardiovascular:      Rate and Rhythm: Normal rate and regular rhythm.      Heart sounds: No murmur heard.     No gallop.   Pulmonary:      Effort: Pulmonary effort is normal. No respiratory distress.      Breath sounds: Wheezing and rhonchi present. No rales.   Abdominal:      General: Abdomen is flat. Bowel sounds are normal. There is no distension.      Palpations: Abdomen is soft. There is no mass.      Tenderness: There is no abdominal tenderness.    Musculoskeletal:         General: No swelling or tenderness. Normal range of motion.      Cervical back: Normal range of motion and neck supple.   Skin:     General: Skin is warm and dry.   Neurological:      General: No focal deficit present.      Mental Status: He is alert and oriented to person, place, and time. Mental status is at baseline.   Psychiatric:         Mood and Affect: Mood normal.         Behavior: Behavior normal.         Thought Content: Thought content normal.         Judgment: Judgment normal.         Outpatient Follow-Up  Future Appointments   Date Time Provider Department Center   9/13/2024  9:00 AM [facility] 1 [facility]   9/13/2024 11:00 AM Agueda Sagastume, APRN-CNP [facility]         Tennille Berg MD  Hospitalist

## 2024-05-06 ENCOUNTER — PATIENT OUTREACH (OUTPATIENT)
Dept: PRIMARY CARE | Facility: CLINIC | Age: 84
End: 2024-05-06
Payer: MEDICARE

## 2024-05-06 NOTE — PROGRESS NOTES
Discharge Facility:[facility]  Discharge Diagnosis:Pneumonia   Admission Date:5/4/24  Discharge Date: 5/5/24    PCP Appointment Date:5/8/24  Specialist Appointment Date: N/A  Hospital Encounter and Summary: Linked  See discharge assessment below for further details  Engagement  Call Start Time: 1149 (5/6/2024 11:49 AM)    Medications  Medications reviewed with patient/caregiver?: Yes (5/6/2024 11:49 AM)  Is the patient having any side effects they believe may be caused by any medication additions or changes?: No (5/6/2024 11:49 AM)  Does the patient have all medications ordered at discharge?: Yes (5/6/2024 11:49 AM)  Prescription Comments: see med list,START taking:  amoxicillin-pot clavulanate (Augmentin)  ipratropium-albuteroL (Duo-Neb)  predniSONE (Deltasone)  Start taking on: May 6, 2024 (5/6/2024 11:49 AM)  Is the patient taking all medications as directed (includes completed medication regime)?: Yes (5/6/2024 11:49 AM)  Medication Comments: see med list (5/6/2024 11:49 AM)    Appointments  Care Management Interventions: Verified appointment date/time/provider (5/8/24 FUV) (5/6/2024 11:49 AM)  Has the patient kept scheduled appointments due by today?: Yes (5/6/2024 11:49 AM)  Care Management Interventions: Advised patient to keep appointment (5/6/2024 11:49 AM)    Patient Teaching  Does the patient have access to their discharge instructions?: Yes (5/6/2024 11:49 AM)  Care Management Interventions: Reviewed instructions with patient (5/6/2024 11:49 AM)  What is the patient's perception of their health status since discharge?: Same (5/6/2024 11:49 AM)  Is the patient/caregiver able to teach back the hierarchy of who to call/visit for symptoms/problems? PCP, Specialist, Home Health nurse, Urgent Care, ED, 911: Yes (5/6/2024 11:49 AM)    Wrap Up  Wrap Up Additional Comments: This CM spoke with pt via phone. Pt reports Currently feeling the same at home since discharge. New meds reviewed. Pt denies CP, SOB but did  state productivly coughinh up phlegm . Patient denies any further discharge questions/needs at this time. Emphasized that Follow up is needed after discharge to review the hospital recommendations, assess your response to your treatment.  Pt aware of my availability for non-emergent concerns. Contact info provided to patient (5/6/2024 11:49 AM)      Daily Tucker LPN

## 2024-05-07 LAB
ADENOVIRUS RVP, VIRC: NOT DETECTED
ENTEROVIRUS/RHINOVIRUS RVP, VIRC: NOT DETECTED
HUMAN BOCAVIRUS RVP, VIRC: NOT DETECTED
HUMAN CORONAVIRUS RVP, VIRC: NOT DETECTED
INFLUENZA A , VIRC: NOT DETECTED
INFLUENZA A H1N1-09 , VIRC: NOT DETECTED
INFLUENZA B PCR, VIRC: NOT DETECTED
M PNEUMO IGM SER IA-ACNC: 0.18 U/L
METAPNEUMOVIRUS , VIRC: NOT DETECTED
PARAINFLUENZA PCR, VIRC: NOT DETECTED
RSV PCR, RVP, VIRC: NOT DETECTED

## 2024-05-08 ENCOUNTER — OFFICE VISIT (OUTPATIENT)
Dept: PRIMARY CARE | Facility: CLINIC | Age: 84
End: 2024-05-08
Payer: MEDICARE

## 2024-05-08 VITALS
DIASTOLIC BLOOD PRESSURE: 60 MMHG | SYSTOLIC BLOOD PRESSURE: 118 MMHG | TEMPERATURE: 97.1 F | BODY MASS INDEX: 19.37 KG/M2 | OXYGEN SATURATION: 94 % | WEIGHT: 135 LBS | HEART RATE: 75 BPM

## 2024-05-08 DIAGNOSIS — J18.9 COMMUNITY ACQUIRED PNEUMONIA, UNSPECIFIED LATERALITY: ICD-10-CM

## 2024-05-08 DIAGNOSIS — J45.909 ASTHMA, UNSPECIFIED ASTHMA SEVERITY, UNSPECIFIED WHETHER COMPLICATED, UNSPECIFIED WHETHER PERSISTENT (HHS-HCC): ICD-10-CM

## 2024-05-08 DIAGNOSIS — R91.1 LUNG NODULE: Primary | ICD-10-CM

## 2024-05-08 PROCEDURE — 99496 TRANSJ CARE MGMT HIGH F2F 7D: CPT

## 2024-05-08 PROCEDURE — 3078F DIAST BP <80 MM HG: CPT

## 2024-05-08 PROCEDURE — 3074F SYST BP LT 130 MM HG: CPT

## 2024-05-08 PROCEDURE — 1157F ADVNC CARE PLAN IN RCRD: CPT

## 2024-05-08 PROCEDURE — 1159F MED LIST DOCD IN RCRD: CPT

## 2024-05-08 RX ORDER — ALBUTEROL SULFATE 0.83 MG/ML
2.5 SOLUTION RESPIRATORY (INHALATION) EVERY 2 HOUR PRN
Qty: 75 ML | Refills: 1 | Status: SHIPPED | OUTPATIENT
Start: 2024-05-08 | End: 2024-06-07

## 2024-05-08 RX ORDER — ALBUTEROL SULFATE 90 UG/1
2 AEROSOL, METERED RESPIRATORY (INHALATION) EVERY 4 HOURS PRN
Qty: 8 G | Refills: 5 | Status: SHIPPED | OUTPATIENT
Start: 2024-05-08 | End: 2025-05-08

## 2024-05-08 NOTE — PATIENT INSTRUCTIONS
Follow up breathing in 1 week  Chest XR follow up this week Friday    Thank you for coming in today, if any questions or concerns arise, please call my office.   ARMEN Oliveira-CNP

## 2024-05-08 NOTE — PROGRESS NOTES
Subjective   Patient ID: Sheldon Arteaga is a 84 y.o. male who presents for Hospital Follow-up (Sheldon is here for his hospital follow up after having pneumonia, Breathing is better today. Since he got out his pulse ox will get down to 89 at times. ).  Subjective  Sheldon Arteaga is a 84 y.o. male who presents for follow up on pneumonia. Symptoms include: shortness of breath at rest and non-pleuritic chest pain. Symptoms began several days ago, and have been gradually improving since that time. Patient denies dyspnea or chest pain. Treatment thus far includes: oral antibiotics per medication orders: effective. Past pulmonary history is significant for occasional episodes of bronchitis.    Energy is significantly improved, he is doing much better however there is rhonchi and lungs so we will repeat chest x-ray and see in 1 week follow-up    Objective  Oxygen saturation 94% on room air  [unfilled]     Assessment/Plan  Community acquired pneumonia.    The diagnosis was discussed along with the usual course and treatment.  Educational material distributed.  Arrangements made for hospital admission.          Vitals:    05/08/24 1026   BP: 118/60   Pulse: 75   Temp: 36.2 °C (97.1 °F)   SpO2: 94%       Review of Systems    Objective   Physical Exam  Pulmonary:      Breath sounds: Rhonchi present.         Assessment/Plan   Problem List Items Addressed This Visit       Community acquired pneumonia    Relevant Orders    XR chest 2 views     Other Visit Diagnoses       Lung nodule    -  Primary    Relevant Orders    CT chest wo IV contrast    Asthma, unspecified asthma severity, unspecified whether complicated, unspecified whether persistent ([facility])        Relevant Medications    albuterol 2.5 mg /3 mL (0.083 %) nebulizer solution    albuterol (Ventolin HFA) 90 mcg/actuation inhaler                 Thank you for coming in today, please call my office if you have any concerns or questions.     Alvaro AYERS,  CNP

## 2024-05-09 LAB
ATRIAL RATE: 88 BPM
P AXIS: 67 DEGREES
P OFFSET: 197 MS
P ONSET: 146 MS
PR INTERVAL: 144 MS
Q ONSET: 218 MS
QRS COUNT: 14 BEATS
QRS DURATION: 94 MS
QT INTERVAL: 376 MS
QTC CALCULATION(BAZETT): 454 MS
QTC FREDERICIA: 427 MS
R AXIS: 73 DEGREES
T AXIS: 74 DEGREES
T OFFSET: 406 MS
VENTRICULAR RATE: 88 BPM

## 2024-05-10 ENCOUNTER — HOSPITAL ENCOUNTER (OUTPATIENT)
Dept: RADIOLOGY | Facility: HOSPITAL | Age: 84
Discharge: HOME | End: 2024-05-10
Payer: MEDICARE

## 2024-05-10 DIAGNOSIS — J18.9 COMMUNITY ACQUIRED PNEUMONIA, UNSPECIFIED LATERALITY: ICD-10-CM

## 2024-05-10 PROCEDURE — 71046 X-RAY EXAM CHEST 2 VIEWS: CPT

## 2024-05-10 PROCEDURE — 71046 X-RAY EXAM CHEST 2 VIEWS: CPT | Performed by: RADIOLOGY

## 2024-05-10 NOTE — LETTER
May 14, 2024     Sheldon SHAW [address]  [address] [facility] [address]      Dear Mr. Arteaga:    Below are the results from your recent visit:    Resulted Orders   XR chest 2 views    Narrative    Interpreted By:  Kenneth Ahumada,   STUDY:  XR CHEST 2 VIEWS;  5/10/2024 12:44 pm      INDICATION:  Signs/Symptoms:pneumonia follow up.      COMPARISON:  05/04/2024      ACCESSION NUMBER(S):  [id number]      ORDERING CLINICIAN:  ALEJANDRO HOUSE      FINDINGS:                  CARDIOMEDIASTINAL SILHOUETTE:  Cardiomediastinal silhouette is normal in size and configuration.      LUNGS:  Lungs are hyperinflated.      ABDOMEN:  No remarkable upper abdominal findings.      BONES:  No acute osseous changes. Discogenic degenerative changes.        Impression    1.  No evidence of acute cardiopulmonary process. Hyperinflation.              MACRO:  None      Signed by: Kenneth Ahumada 5/13/2024 9:09 PM  Dictation workstation:   [id number]     The test results show that your current treatment is working. Please continue current treatment plan.   If you have any questions or concerns, please don't hesitate to call.         Sincerely,    GIAN Albrecht

## 2024-05-13 ENCOUNTER — APPOINTMENT (OUTPATIENT)
Dept: RADIOLOGY | Facility: HOSPITAL | Age: 84
DRG: 194 | End: 2024-05-13
Payer: MEDICARE

## 2024-05-13 ENCOUNTER — HOSPITAL ENCOUNTER (INPATIENT)
Facility: HOSPITAL | Age: 84
LOS: 1 days | Discharge: HOME | DRG: 194 | End: 2024-05-15
Attending: INTERNAL MEDICINE | Admitting: INTERNAL MEDICINE
Payer: MEDICARE

## 2024-05-13 ENCOUNTER — APPOINTMENT (OUTPATIENT)
Dept: CARDIOLOGY | Facility: HOSPITAL | Age: 84
DRG: 194 | End: 2024-05-13
Payer: MEDICARE

## 2024-05-13 DIAGNOSIS — I48.0 PAROXYSMAL ATRIAL FIBRILLATION (MULTI): ICD-10-CM

## 2024-05-13 DIAGNOSIS — J18.9 PNEUMONIA OF RIGHT LUNG DUE TO INFECTIOUS ORGANISM, UNSPECIFIED PART OF LUNG: Primary | ICD-10-CM

## 2024-05-13 DIAGNOSIS — I10 PRIMARY HYPERTENSION: ICD-10-CM

## 2024-05-13 DIAGNOSIS — I48.91 ATRIAL FIBRILLATION, UNSPECIFIED TYPE (MULTI): ICD-10-CM

## 2024-05-13 DIAGNOSIS — I27.20 PULMONARY HYPERTENSION (MULTI): ICD-10-CM

## 2024-05-13 DIAGNOSIS — R06.02 SHORTNESS OF BREATH: ICD-10-CM

## 2024-05-13 DIAGNOSIS — J18.9 COMMUNITY ACQUIRED BILATERAL LOWER LOBE PNEUMONIA: ICD-10-CM

## 2024-05-13 DIAGNOSIS — R53.83 OTHER FATIGUE: ICD-10-CM

## 2024-05-13 DIAGNOSIS — J15.9 COMMUNITY ACQUIRED BACTERIAL PNEUMONIA: ICD-10-CM

## 2024-05-13 LAB
ALBUMIN SERPL BCP-MCNC: 3.8 G/DL (ref 3.4–5)
ALP SERPL-CCNC: 57 U/L (ref 33–136)
ALT SERPL W P-5'-P-CCNC: 14 U/L (ref 10–52)
ANION GAP BLDV CALCULATED.4IONS-SCNC: 11 MMOL/L (ref 10–25)
ANION GAP SERPL CALC-SCNC: 15 MMOL/L (ref 10–20)
AST SERPL W P-5'-P-CCNC: 13 U/L (ref 9–39)
BASE EXCESS BLDV CALC-SCNC: 1.6 MMOL/L (ref -2–3)
BASOPHILS # BLD AUTO: 0.05 X10*3/UL (ref 0–0.1)
BASOPHILS NFR BLD AUTO: 0.4 %
BILIRUB SERPL-MCNC: 0.6 MG/DL (ref 0–1.2)
BNP SERPL-MCNC: 33 PG/ML (ref 0–99)
BODY TEMPERATURE: ABNORMAL
BUN SERPL-MCNC: 16 MG/DL (ref 6–23)
CA-I BLDV-SCNC: 1.17 MMOL/L (ref 1.1–1.33)
CALCIUM SERPL-MCNC: 8.6 MG/DL (ref 8.6–10.3)
CARDIAC TROPONIN I PNL SERPL HS: 5 NG/L (ref 0–20)
CARDIAC TROPONIN I PNL SERPL HS: 5 NG/L (ref 0–20)
CHLORIDE BLDV-SCNC: 102 MMOL/L (ref 98–107)
CHLORIDE SERPL-SCNC: 98 MMOL/L (ref 98–107)
CO2 SERPL-SCNC: 26 MMOL/L (ref 21–32)
CREAT SERPL-MCNC: 0.81 MG/DL (ref 0.5–1.3)
EGFRCR SERPLBLD CKD-EPI 2021: 87 ML/MIN/1.73M*2
EOSINOPHIL # BLD AUTO: 0.43 X10*3/UL (ref 0–0.4)
EOSINOPHIL NFR BLD AUTO: 3.8 %
ERYTHROCYTE [DISTWIDTH] IN BLOOD BY AUTOMATED COUNT: 14.6 % (ref 11.5–14.5)
FLUAV RNA RESP QL NAA+PROBE: NOT DETECTED
FLUBV RNA RESP QL NAA+PROBE: NOT DETECTED
GLUCOSE BLDV-MCNC: 85 MG/DL (ref 74–99)
GLUCOSE SERPL-MCNC: 181 MG/DL (ref 74–99)
HCO3 BLDV-SCNC: 25.8 MMOL/L (ref 22–26)
HCT VFR BLD AUTO: 39.4 % (ref 41–52)
HCT VFR BLD EST: 34 % (ref 41–52)
HGB BLD-MCNC: 12.7 G/DL (ref 13.5–17.5)
HGB BLDV-MCNC: 11.4 G/DL (ref 13.5–17.5)
IMM GRANULOCYTES # BLD AUTO: 0.13 X10*3/UL (ref 0–0.5)
IMM GRANULOCYTES NFR BLD AUTO: 1.2 % (ref 0–0.9)
INHALED O2 CONCENTRATION: 0 %
INR PPP: 1 (ref 0.9–1.1)
LACTATE BLDV-SCNC: 0.8 MMOL/L (ref 0.4–2)
LYMPHOCYTES # BLD AUTO: 2.43 X10*3/UL (ref 0.8–3)
LYMPHOCYTES NFR BLD AUTO: 21.7 %
MAGNESIUM SERPL-MCNC: 1.78 MG/DL (ref 1.6–2.4)
MCH RBC QN AUTO: 30.6 PG (ref 26–34)
MCHC RBC AUTO-ENTMCNC: 32.2 G/DL (ref 32–36)
MCV RBC AUTO: 95 FL (ref 80–100)
MONOCYTES # BLD AUTO: 0.72 X10*3/UL (ref 0.05–0.8)
MONOCYTES NFR BLD AUTO: 6.4 %
NEUTROPHILS # BLD AUTO: 7.42 X10*3/UL (ref 1.6–5.5)
NEUTROPHILS NFR BLD AUTO: 66.5 %
NRBC BLD-RTO: 0 /100 WBCS (ref 0–0)
OXYHGB MFR BLDV: 91 % (ref 45–75)
PCO2 BLDV: 38 MM HG (ref 41–51)
PH BLDV: 7.44 PH (ref 7.33–7.43)
PLATELET # BLD AUTO: 306 X10*3/UL (ref 150–450)
PO2 BLDV: 63 MM HG (ref 35–45)
POTASSIUM BLDV-SCNC: 4.4 MMOL/L (ref 3.5–5.3)
POTASSIUM SERPL-SCNC: 4.1 MMOL/L (ref 3.5–5.3)
PROT SERPL-MCNC: 6.6 G/DL (ref 6.4–8.2)
PROTHROMBIN TIME: 11.6 SECONDS (ref 9.8–12.8)
RBC # BLD AUTO: 4.15 X10*6/UL (ref 4.5–5.9)
RSV RNA RESP QL NAA+PROBE: NOT DETECTED
SAO2 % BLDV: 94 % (ref 45–75)
SARS-COV-2 RNA RESP QL NAA+PROBE: NOT DETECTED
SODIUM BLDV-SCNC: 134 MMOL/L (ref 136–145)
SODIUM SERPL-SCNC: 135 MMOL/L (ref 136–145)
WBC # BLD AUTO: 11.2 X10*3/UL (ref 4.4–11.3)

## 2024-05-13 PROCEDURE — 83880 ASSAY OF NATRIURETIC PEPTIDE: CPT

## 2024-05-13 PROCEDURE — 71275 CT ANGIOGRAPHY CHEST: CPT

## 2024-05-13 PROCEDURE — 96365 THER/PROPH/DIAG IV INF INIT: CPT

## 2024-05-13 PROCEDURE — 2500000004 HC RX 250 GENERAL PHARMACY W/ HCPCS (ALT 636 FOR OP/ED)

## 2024-05-13 PROCEDURE — 96367 TX/PROPH/DG ADDL SEQ IV INF: CPT

## 2024-05-13 PROCEDURE — 84484 ASSAY OF TROPONIN QUANT: CPT

## 2024-05-13 PROCEDURE — G0378 HOSPITAL OBSERVATION PER HR: HCPCS

## 2024-05-13 PROCEDURE — 85610 PROTHROMBIN TIME: CPT

## 2024-05-13 PROCEDURE — 99285 EMERGENCY DEPT VISIT HI MDM: CPT | Mod: 25

## 2024-05-13 PROCEDURE — 2550000001 HC RX 255 CONTRASTS

## 2024-05-13 PROCEDURE — 36415 COLL VENOUS BLD VENIPUNCTURE: CPT

## 2024-05-13 PROCEDURE — 83735 ASSAY OF MAGNESIUM: CPT

## 2024-05-13 PROCEDURE — 99223 1ST HOSP IP/OBS HIGH 75: CPT

## 2024-05-13 PROCEDURE — 71275 CT ANGIOGRAPHY CHEST: CPT | Performed by: RADIOLOGY

## 2024-05-13 PROCEDURE — 70450 CT HEAD/BRAIN W/O DYE: CPT | Performed by: RADIOLOGY

## 2024-05-13 PROCEDURE — 87637 SARSCOV2&INF A&B&RSV AMP PRB: CPT

## 2024-05-13 PROCEDURE — 93005 ELECTROCARDIOGRAM TRACING: CPT

## 2024-05-13 PROCEDURE — 84075 ASSAY ALKALINE PHOSPHATASE: CPT

## 2024-05-13 PROCEDURE — 70450 CT HEAD/BRAIN W/O DYE: CPT

## 2024-05-13 PROCEDURE — 84132 ASSAY OF SERUM POTASSIUM: CPT | Mod: 91

## 2024-05-13 PROCEDURE — 96361 HYDRATE IV INFUSION ADD-ON: CPT

## 2024-05-13 PROCEDURE — 85025 COMPLETE CBC W/AUTO DIFF WBC: CPT

## 2024-05-13 RX ORDER — METOPROLOL TARTRATE 25 MG/1
25 TABLET, FILM COATED ORAL 2 TIMES DAILY
Qty: 180 TABLET | Refills: 0 | Status: SHIPPED | OUTPATIENT
Start: 2024-05-13

## 2024-05-13 RX ORDER — ALBUTEROL SULFATE 0.83 MG/ML
2.5 SOLUTION RESPIRATORY (INHALATION) EVERY 2 HOUR PRN
Status: DISCONTINUED | OUTPATIENT
Start: 2024-05-13 | End: 2024-05-15 | Stop reason: HOSPADM

## 2024-05-13 RX ORDER — GUAIFENESIN 600 MG/1
600 TABLET, EXTENDED RELEASE ORAL 2 TIMES DAILY PRN
Status: DISCONTINUED | OUTPATIENT
Start: 2024-05-13 | End: 2024-05-15 | Stop reason: HOSPADM

## 2024-05-13 RX ORDER — ALBUTEROL SULFATE 0.83 MG/ML
3 SOLUTION RESPIRATORY (INHALATION)
Status: DISCONTINUED | OUTPATIENT
Start: 2024-05-14 | End: 2024-05-15 | Stop reason: HOSPADM

## 2024-05-13 RX ORDER — ALBUTEROL SULFATE 0.83 MG/ML
3 SOLUTION RESPIRATORY (INHALATION)
Status: DISCONTINUED | OUTPATIENT
Start: 2024-05-14 | End: 2024-05-13

## 2024-05-13 RX ORDER — CEFTRIAXONE 1 G/50ML
1 INJECTION, SOLUTION INTRAVENOUS ONCE
Status: COMPLETED | OUTPATIENT
Start: 2024-05-13 | End: 2024-05-13

## 2024-05-13 RX ORDER — AZITHROMYCIN 250 MG/1
250 TABLET, FILM COATED ORAL DAILY
Status: DISCONTINUED | OUTPATIENT
Start: 2024-05-14 | End: 2024-05-15 | Stop reason: HOSPADM

## 2024-05-13 RX ORDER — CEFTRIAXONE 1 G/50ML
1 INJECTION, SOLUTION INTRAVENOUS EVERY 24 HOURS
Status: DISCONTINUED | OUTPATIENT
Start: 2024-05-14 | End: 2024-05-15 | Stop reason: HOSPADM

## 2024-05-13 RX ADMIN — SODIUM CHLORIDE 1000 ML: 9 INJECTION, SOLUTION INTRAVENOUS at 19:19

## 2024-05-13 RX ADMIN — IOHEXOL 72 ML: 350 INJECTION, SOLUTION INTRAVENOUS at 20:07

## 2024-05-13 RX ADMIN — CEFTRIAXONE SODIUM 1 G: 1 INJECTION, SOLUTION INTRAVENOUS at 22:06

## 2024-05-13 RX ADMIN — AZITHROMYCIN MONOHYDRATE 500 MG: 500 INJECTION, POWDER, LYOPHILIZED, FOR SOLUTION INTRAVENOUS at 22:31

## 2024-05-13 SDOH — SOCIAL STABILITY: SOCIAL INSECURITY: WERE YOU ABLE TO COMPLETE ALL THE BEHAVIORAL HEALTH SCREENINGS?: YES

## 2024-05-13 SDOH — SOCIAL STABILITY: SOCIAL INSECURITY: HAVE YOU HAD THOUGHTS OF HARMING ANYONE ELSE?: NO

## 2024-05-13 ASSESSMENT — ACTIVITIES OF DAILY LIVING (ADL)
BATHING: INDEPENDENT
WALKS IN HOME: INDEPENDENT
ASSISTIVE_DEVICE: DENTURES LOWER;DENTURES UPPER
ADEQUATE_TO_COMPLETE_ADL: YES
HEARING - RIGHT EAR: DIFFICULTY WITH NOISE
LACK_OF_TRANSPORTATION: NO
PATIENT'S MEMORY ADEQUATE TO SAFELY COMPLETE DAILY ACTIVITIES?: YES
HEARING - LEFT EAR: DIFFICULTY WITH NOISE
TOILETING: INDEPENDENT
DRESSING YOURSELF: INDEPENDENT
FEEDING YOURSELF: INDEPENDENT
GROOMING: INDEPENDENT
JUDGMENT_ADEQUATE_SAFELY_COMPLETE_DAILY_ACTIVITIES: YES

## 2024-05-13 ASSESSMENT — LIFESTYLE VARIABLES
HOW OFTEN DO YOU HAVE 6 OR MORE DRINKS ON ONE OCCASION: NEVER
HAVE YOU EVER FELT YOU SHOULD CUT DOWN ON YOUR DRINKING: NO
HAVE PEOPLE ANNOYED YOU BY CRITICIZING YOUR DRINKING: NO
AUDIT-C TOTAL SCORE: 0
HOW MANY STANDARD DRINKS CONTAINING ALCOHOL DO YOU HAVE ON A TYPICAL DAY: PATIENT DOES NOT DRINK
EVER HAD A DRINK FIRST THING IN THE MORNING TO STEADY YOUR NERVES TO GET RID OF A HANGOVER: NO
AUDIT-C TOTAL SCORE: 0
SKIP TO QUESTIONS 9-10: 1
EVER FELT BAD OR GUILTY ABOUT YOUR DRINKING: NO
TOTAL SCORE: 0
HOW OFTEN DO YOU HAVE A DRINK CONTAINING ALCOHOL: NEVER

## 2024-05-13 ASSESSMENT — COGNITIVE AND FUNCTIONAL STATUS - GENERAL
WALKING IN HOSPITAL ROOM: A LITTLE
STANDING UP FROM CHAIR USING ARMS: A LITTLE
CLIMB 3 TO 5 STEPS WITH RAILING: A LITTLE
DAILY ACTIVITIY SCORE: 24
MOBILITY SCORE: 20
PATIENT BASELINE BEDBOUND: NO
MOVING TO AND FROM BED TO CHAIR: A LITTLE

## 2024-05-13 ASSESSMENT — PAIN - FUNCTIONAL ASSESSMENT: PAIN_FUNCTIONAL_ASSESSMENT: 0-10

## 2024-05-13 ASSESSMENT — PAIN DESCRIPTION - ORIENTATION: ORIENTATION: ANTERIOR

## 2024-05-13 ASSESSMENT — PATIENT HEALTH QUESTIONNAIRE - PHQ9
1. LITTLE INTEREST OR PLEASURE IN DOING THINGS: NOT AT ALL
2. FEELING DOWN, DEPRESSED OR HOPELESS: SEVERAL DAYS
SUM OF ALL RESPONSES TO PHQ9 QUESTIONS 1 & 2: 1

## 2024-05-13 ASSESSMENT — PAIN SCALES - GENERAL: PAINLEVEL_OUTOF10: 3

## 2024-05-13 ASSESSMENT — COLUMBIA-SUICIDE SEVERITY RATING SCALE - C-SSRS
6. HAVE YOU EVER DONE ANYTHING, STARTED TO DO ANYTHING, OR PREPARED TO DO ANYTHING TO END YOUR LIFE?: NO
2. HAVE YOU ACTUALLY HAD ANY THOUGHTS OF KILLING YOURSELF?: NO
1. IN THE PAST MONTH, HAVE YOU WISHED YOU WERE DEAD OR WISHED YOU COULD GO TO SLEEP AND NOT WAKE UP?: NO

## 2024-05-13 ASSESSMENT — PAIN DESCRIPTION - LOCATION: LOCATION: HEAD

## 2024-05-13 ASSESSMENT — PAIN DESCRIPTION - PAIN TYPE: TYPE: ACUTE PAIN

## 2024-05-14 ENCOUNTER — TELEPHONE (OUTPATIENT)
Dept: PRIMARY CARE | Facility: CLINIC | Age: 84
End: 2024-05-14
Payer: MEDICARE

## 2024-05-14 PROBLEM — J18.9 PNEUMONIA OF RIGHT LUNG DUE TO INFECTIOUS ORGANISM, UNSPECIFIED PART OF LUNG: Status: ACTIVE | Noted: 2024-05-14

## 2024-05-14 LAB
ALBUMIN SERPL BCP-MCNC: 3.3 G/DL (ref 3.4–5)
ANION GAP SERPL CALC-SCNC: 10 MMOL/L (ref 10–20)
BUN SERPL-MCNC: 12 MG/DL (ref 6–23)
CALCIUM SERPL-MCNC: 8.2 MG/DL (ref 8.6–10.3)
CHLORIDE SERPL-SCNC: 102 MMOL/L (ref 98–107)
CO2 SERPL-SCNC: 28 MMOL/L (ref 21–32)
CREAT SERPL-MCNC: 0.61 MG/DL (ref 0.5–1.3)
EGFRCR SERPLBLD CKD-EPI 2021: >90 ML/MIN/1.73M*2
ERYTHROCYTE [DISTWIDTH] IN BLOOD BY AUTOMATED COUNT: 14.5 % (ref 11.5–14.5)
GLUCOSE SERPL-MCNC: 89 MG/DL (ref 74–99)
HCT VFR BLD AUTO: 36.1 % (ref 41–52)
HGB BLD-MCNC: 11.5 G/DL (ref 13.5–17.5)
LACTATE SERPL-SCNC: 0.9 MMOL/L (ref 0.4–2)
LEGIONELLA AG UR QL: NEGATIVE
MAGNESIUM SERPL-MCNC: 1.88 MG/DL (ref 1.6–2.4)
MCH RBC QN AUTO: 30.3 PG (ref 26–34)
MCHC RBC AUTO-ENTMCNC: 31.9 G/DL (ref 32–36)
MCV RBC AUTO: 95 FL (ref 80–100)
NRBC BLD-RTO: 0 /100 WBCS (ref 0–0)
PHOSPHATE SERPL-MCNC: 3.8 MG/DL (ref 2.5–4.9)
PLATELET # BLD AUTO: 269 X10*3/UL (ref 150–450)
POTASSIUM SERPL-SCNC: 4.3 MMOL/L (ref 3.5–5.3)
PROCALCITONIN SERPL-MCNC: 0.08 NG/ML
RBC # BLD AUTO: 3.8 X10*6/UL (ref 4.5–5.9)
S PNEUM AG UR QL: NEGATIVE
SODIUM SERPL-SCNC: 136 MMOL/L (ref 136–145)
WBC # BLD AUTO: 8.6 X10*3/UL (ref 4.4–11.3)

## 2024-05-14 PROCEDURE — 94760 N-INVAS EAR/PLS OXIMETRY 1: CPT

## 2024-05-14 PROCEDURE — 36415 COLL VENOUS BLD VENIPUNCTURE: CPT

## 2024-05-14 PROCEDURE — 2500000002 HC RX 250 W HCPCS SELF ADMINISTERED DRUGS (ALT 637 FOR MEDICARE OP, ALT 636 FOR OP/ED): Mod: MUE | Performed by: INTERNAL MEDICINE

## 2024-05-14 PROCEDURE — 92610 EVALUATE SWALLOWING FUNCTION: CPT | Mod: GN

## 2024-05-14 PROCEDURE — 2500000004 HC RX 250 GENERAL PHARMACY W/ HCPCS (ALT 636 FOR OP/ED)

## 2024-05-14 PROCEDURE — 83735 ASSAY OF MAGNESIUM: CPT

## 2024-05-14 PROCEDURE — 2500000001 HC RX 250 WO HCPCS SELF ADMINISTERED DRUGS (ALT 637 FOR MEDICARE OP)

## 2024-05-14 PROCEDURE — 94640 AIRWAY INHALATION TREATMENT: CPT

## 2024-05-14 PROCEDURE — 80069 RENAL FUNCTION PANEL: CPT

## 2024-05-14 PROCEDURE — 87899 AGENT NOS ASSAY W/OPTIC: CPT | Mod: GEALAB

## 2024-05-14 PROCEDURE — 84145 PROCALCITONIN (PCT): CPT | Mod: GEALAB

## 2024-05-14 PROCEDURE — 2500000002 HC RX 250 W HCPCS SELF ADMINISTERED DRUGS (ALT 637 FOR MEDICARE OP, ALT 636 FOR OP/ED)

## 2024-05-14 PROCEDURE — 2500000004 HC RX 250 GENERAL PHARMACY W/ HCPCS (ALT 636 FOR OP/ED): Performed by: INTERNAL MEDICINE

## 2024-05-14 PROCEDURE — 85027 COMPLETE CBC AUTOMATED: CPT

## 2024-05-14 PROCEDURE — 2500000006 HC RX 250 W HCPCS SELF ADMINISTERED DRUGS (ALT 637 FOR ALL PAYERS)

## 2024-05-14 PROCEDURE — 1100000001 HC PRIVATE ROOM DAILY

## 2024-05-14 PROCEDURE — 83605 ASSAY OF LACTIC ACID: CPT

## 2024-05-14 PROCEDURE — 87449 NOS EACH ORGANISM AG IA: CPT | Mod: GEALAB

## 2024-05-14 PROCEDURE — 87075 CULTR BACTERIA EXCEPT BLOOD: CPT | Mod: 91,GEALAB

## 2024-05-14 RX ORDER — CLOPIDOGREL BISULFATE 75 MG/1
75 TABLET ORAL DAILY
Status: DISCONTINUED | OUTPATIENT
Start: 2024-05-14 | End: 2024-05-15 | Stop reason: HOSPADM

## 2024-05-14 RX ORDER — FLUTICASONE PROPIONATE 50 MCG
1 SPRAY, SUSPENSION (ML) NASAL DAILY
Status: DISCONTINUED | OUTPATIENT
Start: 2024-05-14 | End: 2024-05-15 | Stop reason: HOSPADM

## 2024-05-14 RX ORDER — METOPROLOL TARTRATE 25 MG/1
25 TABLET, FILM COATED ORAL 2 TIMES DAILY
Status: DISCONTINUED | OUTPATIENT
Start: 2024-05-14 | End: 2024-05-15 | Stop reason: HOSPADM

## 2024-05-14 RX ORDER — AMLODIPINE BESYLATE 5 MG/1
5 TABLET ORAL DAILY
Status: DISCONTINUED | OUTPATIENT
Start: 2024-05-14 | End: 2024-05-15 | Stop reason: HOSPADM

## 2024-05-14 RX ORDER — ENOXAPARIN SODIUM 100 MG/ML
40 INJECTION SUBCUTANEOUS EVERY 24 HOURS
Status: DISCONTINUED | OUTPATIENT
Start: 2024-05-14 | End: 2024-05-15

## 2024-05-14 RX ORDER — ATORVASTATIN CALCIUM 40 MG/1
40 TABLET, FILM COATED ORAL NIGHTLY
Status: DISCONTINUED | OUTPATIENT
Start: 2024-05-14 | End: 2024-05-15 | Stop reason: HOSPADM

## 2024-05-14 RX ADMIN — CEFTRIAXONE SODIUM 1 G: 1 INJECTION, SOLUTION INTRAVENOUS at 21:14

## 2024-05-14 RX ADMIN — CLOPIDOGREL 75 MG: 75 TABLET ORAL at 08:57

## 2024-05-14 RX ADMIN — GUAIFENESIN 600 MG: 600 TABLET ORAL at 08:57

## 2024-05-14 RX ADMIN — ENOXAPARIN SODIUM 40 MG: 40 INJECTION SUBCUTANEOUS at 14:38

## 2024-05-14 RX ADMIN — ATORVASTATIN CALCIUM 40 MG: 40 TABLET, FILM COATED ORAL at 21:13

## 2024-05-14 RX ADMIN — METOPROLOL TARTRATE 25 MG: 25 TABLET, FILM COATED ORAL at 08:57

## 2024-05-14 RX ADMIN — ALBUTEROL SULFATE 3 ML: 2.5 SOLUTION RESPIRATORY (INHALATION) at 09:42

## 2024-05-14 RX ADMIN — FLUTICASONE PROPIONATE 1 SPRAY: 50 SPRAY, METERED NASAL at 08:57

## 2024-05-14 RX ADMIN — METOPROLOL TARTRATE 25 MG: 25 TABLET, FILM COATED ORAL at 21:13

## 2024-05-14 RX ADMIN — ALBUTEROL SULFATE 3 ML: 2.5 SOLUTION RESPIRATORY (INHALATION) at 20:01

## 2024-05-14 RX ADMIN — ALBUTEROL SULFATE 3 ML: 2.5 SOLUTION RESPIRATORY (INHALATION) at 14:10

## 2024-05-14 RX ADMIN — AMLODIPINE BESYLATE 5 MG: 5 TABLET ORAL at 08:57

## 2024-05-14 RX ADMIN — AZITHROMYCIN DIHYDRATE 250 MG: 250 TABLET ORAL at 21:13

## 2024-05-14 ASSESSMENT — COGNITIVE AND FUNCTIONAL STATUS - GENERAL
MOVING TO AND FROM BED TO CHAIR: A LITTLE
MOBILITY SCORE: 20
DAILY ACTIVITIY SCORE: 24
MOBILITY SCORE: 20
WALKING IN HOSPITAL ROOM: A LITTLE
WALKING IN HOSPITAL ROOM: A LITTLE
STANDING UP FROM CHAIR USING ARMS: A LITTLE
STANDING UP FROM CHAIR USING ARMS: A LITTLE
CLIMB 3 TO 5 STEPS WITH RAILING: A LITTLE
CLIMB 3 TO 5 STEPS WITH RAILING: A LITTLE
WALKING IN HOSPITAL ROOM: A LITTLE
MOBILITY SCORE: 20
CLIMB 3 TO 5 STEPS WITH RAILING: A LITTLE
DAILY ACTIVITIY SCORE: 24
STANDING UP FROM CHAIR USING ARMS: A LITTLE
MOVING TO AND FROM BED TO CHAIR: A LITTLE
MOVING TO AND FROM BED TO CHAIR: A LITTLE
DAILY ACTIVITIY SCORE: 24

## 2024-05-14 ASSESSMENT — PAIN SCALES - GENERAL
PAINLEVEL_OUTOF10: 0 - NO PAIN

## 2024-05-14 ASSESSMENT — ACTIVITIES OF DAILY LIVING (ADL): LACK_OF_TRANSPORTATION: NO

## 2024-05-14 ASSESSMENT — PAIN - FUNCTIONAL ASSESSMENT
PAIN_FUNCTIONAL_ASSESSMENT: 0-10
PAIN_FUNCTIONAL_ASSESSMENT: 0-10

## 2024-05-14 NOTE — PROGRESS NOTES
[facility] Internal Medicine Daily Progress Note    Sheldon Arteaga is a 84 y.o. male on Hospital Day: 2 of admission presenting with Community acquired bilateral lower lobe pneumonia.    Subjective     Overnight Events: No acute overnight events.   Comfortable this morning. Still breathing well. No fever or chills overnight.     Objective   Overnight Vitals  Heart Rate:  [94]   Temp:  [36.2 °C (97.2 °F)]   Resp:  [16]   BP: (136)/(82)   SpO2:  [92 %-96 %]     Physical Exam   Physical Exam  Vitals reviewed.   HENT:      Head: Normocephalic and atraumatic.   Cardiovascular:      Rate and Rhythm: Normal rate and regular rhythm.      Heart sounds: Normal heart sounds. No murmur heard.     No gallop.   Pulmonary:      Effort: Pulmonary effort is normal.      Breath sounds: Rhonchi present.   Abdominal:      General: Abdomen is flat. There is no distension.      Palpations: Abdomen is soft.      Tenderness: There is no abdominal tenderness.   Musculoskeletal:      Right lower leg: No edema.      Left lower leg: No edema.   Skin:     General: Skin is warm and dry.   Neurological:      Mental Status: He is alert. Mental status is at baseline.   Psychiatric:         Mood and Affect: Mood normal.         Behavior: Behavior normal.           IOs  I/O last 3 completed shifts:  In: 1050 (17.2 mL/kg) [IV Piggyback:1050]  Out: - (0 mL/kg)   Weight: 61.1 kg   No intake/output data recorded.    Last stool output       Vent settings:         Labs:   Most recent labs:   Results from last 7 days   Lab Units 05/14/24  0605 05/13/24  1918   WBC AUTO x10*3/uL 8.6 11.2   HEMOGLOBIN g/dL 11.5* 12.7*   HEMATOCRIT % 36.1* 39.4*   PLATELETS AUTO x10*3/uL 269 306     Results from last 7 days   Lab Units 05/14/24  0605 05/13/24  1918   CO2 mmol/L 28 26   ANION GAP mmol/L 10 15   BUN mg/dL 12 16   CREATININE mg/dL 0.61 0.81   GLUCOSE mg/dL 89 181*   CALCIUM mg/dL 8.2* 8.6   MAGNESIUM mg/dL 1.88 1.78   PHOSPHORUS mg/dL 3.8  --       Results from  "last 7 days   Lab Units 05/13/24  1918   ALT U/L 14   AST U/L 13   ALK PHOS U/L 57      Results from last 7 days   Lab Units 05/13/24 1958   INR  1.0     Results from last 7 days   Lab Units 05/13/24 2047   FIO2 % 0     Results from last 7 days   Lab Units 05/13/24 2047   POCT PH, VENOUS pH 7.44*   POCT PCO2, VENOUS mm Hg 38*     Results from last 7 days   Lab Units 05/14/24  0605   LACTATE mmol/L 0.9           Micro/ID:   No results found for the last 90 days.                   No lab exists for component: \"AGALPCRNB\"   No results found for: \"URINECULTURE\", \"BLOODCULT\", \"CSFCULTSMEAR\"    Images  All images:   ECG 12 lead    Result Date: 5/14/2024  Sinus rhythm with Premature atrial complexes Otherwise normal ECG When compared with ECG of 04-MAY-2024 12:26, No significant change was found    CT angio chest for pulmonary embolism    Result Date: 5/13/2024  Interpreted By:  Germán Puente, STUDY: CT ANGIO CHEST FOR PULMONARY EMBOLISM;  5/13/2024 8:06 pm   INDICATION: Signs/Symptoms:sob worsening hx blood clots.   COMPARISON: None   ACCESSION NUMBER(S): [id number]   ORDERING CLINICIAN: HODA KNOX   TECHNIQUE: Helical data acquisition of the chest was obtained after intravenous administration of intravenous contrast, as per PE protocol. Images were reformatted in coronal and sagittal planes. Axial and coronal maximum intensity projection (MIP) images were created and reviewed.   FINDINGS: No findings of acute pulmonary embolism. Mildly enlarged main pulmonary artery suggestive of pulmonary arterial hypertension. Bronchial thickening patchy right-greater-than-left areas of airspace opacity concerning for pneumonia including aspiration given the bronchial thickening. Robust vascular calcification. Small sliding hiatal hernia. No pneumothorax debris seen in the small airways. Right lower lobe calcified granuloma. Small mediastinal lymph nodes presumed reactive. No pericardial effusion. No axillary adenopathy. "   Mild multilevel degenerative disc disease       1. No findings of acute pulmonary embolism 2. Bronchial thickening with debris seen in the small airways with nodular right lower lobe opacity concerning for developing pneumonia including aspiration type. Follow-up is advised 3. Heart size is enlarged. Robust vascular calcification. Enlarged main pulmonary artery compatible with pulmonary arterial hypertension     MACRO: None   Signed by: Germán Puente 5/13/2024 9:00 PM Dictation workstation:   [id number]    CT head wo IV contrast    Result Date: 5/13/2024  Interpreted By:  Germán Puente, STUDY: CT HEAD WO IV CONTRAST;  5/13/2024 7:44 pm   INDICATION: Signs/Symptoms:headache.   COMPARISON: None.   ACCESSION NUMBER(S): [id number]   ORDERING CLINICIAN: HODA KNOX   TECHNIQUE: Noncontrast axial CT scan of head was performed. Angled reformats in brain and bone windows were generated. The images were reviewed in bone, brain, blood and soft tissue windows.   FINDINGS: CSF Spaces: Global volume loss and chronic small vessel ischemic change. 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 Encephalomalacia seen in the left temporal lobe again seen   Parenchyma:  The grey-white differentiation is intact. There is no mass effect or midline shift.  There is no intracranial hemorrhage.   Calvarium: The calvarium is unremarkable.   Paranasal sinuses and mastoids: Visualized paranasal sinuses and mastoids are clear.       No evidence of acute cortical infarct or intracranial hemorrhage.   Global volume loss. Disproportionate ventriculomegaly which can be seen the setting of NPH. Left temporal region encephalomalacia not measurably changed   If persistent concern, consider MRI   MACRO: None   Signed by: Germán Puente 5/13/2024 8:40 PM Dictation workstation:   [id number]           Meds  Scheduled medications  albuterol, 3 mL, nebulization, TID  amLODIPine, 5 mg, oral, Daily  atorvastatin, 40  mg, oral, Nightly  azithromycin, 250 mg, oral, Daily  cefTRIAXone, 1 g, intravenous, q24h  clopidogrel, 75 mg, oral, Daily  fluticasone, 1 spray, Each Nostril, Daily  metoprolol tartrate, 25 mg, oral, BID      Continuous medications     PRN medications  PRN medications: albuterol, guaiFENesin          ASSESSMENT/PLAN  Sheldon Arteaga is a 84 y.o. male admitted for community-acquired pneumonia     Acute Medical Issues   #Community-Acquired Pneumonia  -Blood cultures x2 drawn in the ED  -Lactate 0.9  -Pro-Anupam 0.08  -Sputum culture pending  -Continue Rocephin and azithromycin (5/13 - current)   -Albuterol nebs q2h PRN  -Incentive spirometry q2h while awake   -Mucinex   -legionella / strep pneumo - neg     #Asymptomatic hyponatremia - resolved   -Monitor daily  -Legionella - negative      Chronic Medical Issues   # Hypertension: Amlodipine 5mg PO daily, Metoprolol tartrate 25mg PO BID  # Peripheral artery disease: Clopidogrel 75mg PO daily, Atorvastatin 40mg PO daily  # Hyperlipidemia: Atorvastatin 40mg PO daily     IVF: PRN   Electrolytes: Replete as needed   Diet: Regular  GI ppx: None  DVT ppx: Plavix  Antibiotics: Azithromycin and ceftriaxone (5/13 - current)   Lines: 1 PIV  Code: DNR   Emergency Contact - Stuart Arteaga - [phone]     Disposition: 84 y.o.male admitted for CAP. Cont IV antibiotics. Cultures pending. Possible discharge tomorrow    Sebas Pineda, DO  Internal Medicine, PGY-I

## 2024-05-14 NOTE — TELEPHONE ENCOUNTER
----- Message from CESARIO Oliveira sent at 5/14/2024  8:15 AM EDT -----  Results are normal, please notify the patient. Slight hyperinflation, continue breathing exercises, progressive mobility, plenty of fluids.

## 2024-05-14 NOTE — PROGRESS NOTES
05/14/24 1445   Discharge Planning   Living Arrangements Children  (Daughter lives with patient)   Support Systems Children   Assistance Needed Alert and oriented x 3, Independent with ADL's, Drives, No DME used but has Canes, Wheel chair(don't use), Ramp, Room air at baseline   Type of Residence Private residence  ([place]   Number of Stairs to Enter Residence 0   Number of Stairs Within Residence 0   Do you have animals or pets at home? Yes   Type of Animals or Pets 3 cats   Who is requesting discharge planning? Provider   Home or Post Acute Services None   Patient expects to be discharged to: Home with daughter, no discharge needs identified at this time   Does the patient need discharge transport arranged? No   Financial Resource Strain   How hard is it for you to pay for the very basics like food, housing, medical care, and heating? Not hard   Housing Stability   In the last 12 months, was there a time when you were not able to pay the mortgage or rent on time? N   In the last 12 months, how many places have you lived? 1   In the last 12 months, was there a time when you did not have a steady place to sleep or slept in a shelter (including now)? N   Transportation Needs   In the past 12 months, has lack of transportation kept you from medical appointments or from getting medications? no   In the past 12 months, has lack of transportation kept you from meetings, work, or from getting things needed for daily living? No   Patient Choice   Provider Choice list and CMS website (https://medicare.gov/care-compare#search) for post-acute Quality and Resource Measure Data were provided and reviewed with: Patient   Patient / Family choosing to utilize agency / facility established prior to hospitalization No

## 2024-05-14 NOTE — CARE PLAN
The patient's goals for the shift include  pt will be free of falls    The clinical goals for the shift include pt will have no episodes of SOB    Pt was able to meet his goal of zero falls this shift. Pt did not complain of any episodes of SOB. Pt visiting with family at the bedside. Patient ambulating in the lugo today. Tolerated well.     Problem: Pain - Adult  Goal: Verbalizes/displays adequate comfort level or baseline comfort level  Outcome: Progressing     Problem: Safety - Adult  Goal: Free from fall injury  Outcome: Progressing

## 2024-05-14 NOTE — PROGRESS NOTES
Speech-Language Pathology    SLP Adult Inpatient Speech-Language Pathology Clinical Swallow Evaluation    Patient Name: Sheldon Arteaga  MRN: [id number]  Today's Date: 5/14/2024   Time Calculation  Start Time: 1210  Stop Time: 1223  Time Calculation (min): 13 min         Current Problem:   1. Pneumonia of right lung due to infectious organism, unspecified part of lung        2. Shortness of breath        3. Other fatigue              Recommendations:  Solid Diet Recommendations : Regular (IDDSI Level 7)  Liquid Diet Recommendations: Thin (IDDSI Level 0)  Compensatory strategies: fully upright with PO, chew food thoroughly, liquid wash every 3-4 bites to clear possible pharyngeal stasis      Assessment:  Prognosis: Good  Strengths: Cognition      Plan:  Inpatient/Swing Bed or Outpatient: Inpatient  SLP Plan: No skilled SLP  SLP Discharge Recommendations: Home with no further SLP      Subjective   Current Problem: Below copied from HPI on admission.  HPI: Sheldon Arteaga is a 84 y.o. male  with a PMH significant of obesity, hypertension, PAD (lower extremity stents on Plavix), DVT, CVA 2 years ago, A-fib, hyperlipidemia presents to [facility] ED with a c/o weakness, fatigue and worsening shortness of breath.  Patient was recently admitted to the hospital 1 week ago and discharged within 24 hours for community-acquired pneumonia.  He was prescribed oral Augmentin and sent home.  Patient reports that he has been feeling well since admission up until Monday when he had a recrudescence of her respiratory symptoms.  Patient describes a worsening cough producing brown sputum accompanied by chest pain described as a tight sensation.  In addition the patient complains of brief episode of headache and a couple weeks ago he had black vision in the center of both eyes for a couple of seconds and then resolved without sequelae.  No other vision changes.  At the time of this encounter the patient denies diarrhea,  constipation, headache dizziness chest pain.  Upon admission to the ED CT head and CT angio were negative for acute findings.  There was indeed a bronchial thickening with debris seen in small airways with nodule right upper lobe opacity concerning for development of pneumonia.  Patient was started on azithromycin and Rocephin and transferred to the floor for further management.  Review of systems otherwise negative except was mentioned in the HPI above.     CT chest 5/13/24: IMPRESSION:  1. No findings of acute pulmonary embolism  2. Bronchial thickening with debris seen in the small airways with  nodular right lower lobe opacity concerning for developing pneumonia  including aspiration type. Follow-up is advised  3. Heart size is enlarged. Robust vascular calcification. Enlarged  main pulmonary artery compatible with pulmonary arterial hypertension    Pt sitting up in bed in room, had eaten entire lunch tray. Pt and nurse Will both deny any dysphagia. Pt reports no coughing or choking with PO. Pt does endorse coughing at other times and when lying down, but not during PO intake. Pt reports no difficulty taking pills. Pt on RA. Full dentures in place.     Pt did have MBSS on 4/18/22 which recommended a minced/moist diet with thin liquids and meds crushed in puree. No aspiration or penetration observed during study, but pt did exhibit pharyngeal stasis with solids.         General Visit Information:  Patient Class: Inpatient  Living Environment: Live with daughter.  Ordering Physician: Dr Handy MD  Reason for Referral: recurrent aspiration PNA per   Date of Order: 05/14/24  [employer] Diet: regular/thin  Current Diet : regular/thin      Objective       Baseline Assessment:    Pt seen bedside for CSE with puree, regular solids and thin liquids via sip cup. Pt exhibiting a relatively functional swallow mechanism at bedside. Adequate mastication, bolus formation and A-P transit with puree, thin and regular solids.  No oral residue post swallow. Timely swallow onset with all PO with clear vocal quality post swallow. No coughing or overt s/s aspiration with any PO over course of evaluation.     If continued concern is exhibited, pt may require solid consistency downgrade to Level 6 SB, or possibly minced/moist Level 5 which is more in line with diet recommendations from MBSS on 4/18/22. Pt did not aspirate or penetrate during MBSS but pharyngeal stasis was observed with solids, which was reduced with liquid wash.          Pain:  Pain Assessment: 0-10  Pain Score: 0 - No pain          Consistencies Trialed:  Consistencies Trialed: Yes  Consistencies Trialed: Thin (IDDSI Level 0) - Cup, Pureed/extremely thick (IDDSI Level 4), Regular (IDDSI Level 7)      Clinical Observations:  Patient Positioning: Upright in Bed        Inpatient:  Education Documentation    Reviewed results of CSE and recommendations with patient and nurse Will.

## 2024-05-14 NOTE — H&P
Chief Complaint   CAP vs HAP     HPI    HPI: Sheldon Arteaga is a 84 y.o. male  with a PMH significant of obesity, hypertension, PAD (lower extremity stents on Plavix), DVT, CVA 2 years ago, A-fib, hyperlipidemia presents to [facility] ED with a c/o weakness, fatigue and worsening shortness of breath.  Patient was recently admitted to the hospital 1 week ago and discharged within 24 hours for community-acquired pneumonia.  He was prescribed oral Augmentin and sent home.  Patient reports that he has been feeling well since admission up until Monday when he had a recrudescence of her respiratory symptoms.  Patient describes a worsening cough producing brown sputum accompanied by chest pain described as a tight sensation.  In addition the patient complains of brief episode of headache and a couple weeks ago he had black vision in the center of both eyes for a couple of seconds and then resolved without sequelae.  No other vision changes.  At the time of this encounter the patient denies diarrhea, constipation, headache dizziness chest pain.  Upon admission to the ED CT head and CT angio were negative for acute findings.  There was indeed a bronchial thickening with debris seen in small airways with nodule right upper lobe opacity concerning for development of pneumonia.  Patient was started on azithromycin and Rocephin and transferred to the floor for further management.  Review of systems otherwise negative except was mentioned in the HPI above.    ED course:     Vitals: Temperature 36.8, heart rate 82, respirations 17, blood pressure 129/75 with a pulse ox of 94% on room air  Labs:   -CBC: Unremarkable  -CMP: Glucose 181, sodium 135 otherwise unremarkable  -Troponin 1 high-sensitivity unremarkable  -BNP: Unremarkable  -Venous blood gas: 7.4 4/38/63/94/25.8  Imaging:   -CT head without IV contrast: No evidence of acute cortical infarct or intracranial hemorrhage  -CT angio chest: No pulmonary embolism.   Bronchial thickening with debris in the small airway concerning for developing pneumonia.  There is cardiomegaly with vascular calcification and enlargement pulmonary artery concerning for pulmonary arterial hypertension.    Micro:   -Blood culture ordered and pending  -Urine culture: Ordered and pending  -Strep pneumo/Legionella/sputum culture ordered and pending  -RSV by PCR unremarkable  -Influenza AMB by PCR unremarkable  -Covid unremarkable    Past Medical History     Past Medical History:   Diagnosis Date    Stroke (Multi)       Surgical History     Past Surgical History:   Procedure Laterality Date    CT ANGIO NECK  4/19/2022    CT NECK ANGIO W AND WO IV CONTRAST 4/19/2022 [facility] CLINICAL LEGACY    CT AORTA AND BILATERAL ILIOFEMORAL RUNOFF ANGIOGRAM W AND/OR WO IV CONTRAST  4/10/2022    CT AORTA AND BILATERAL ILIOFEMORAL RUNOFF ANGIOGRAM W AND/OR WO IV CONTRAST 4/10/2022 [facility] EMERGENCY LEGACY    CT AORTA AND BILATERAL ILIOFEMORAL RUNOFF ANGIOGRAM W AND/OR WO IV CONTRAST  4/13/2022    CT AORTA AND BILATERAL ILIOFEMORAL RUNOFF ANGIOGRAM W AND/OR WO IV CONTRAST 4/13/2022 [facility] CLINICAL LEGACY    CT AORTA AND BILATERAL ILIOFEMORAL RUNOFF ANGIOGRAM W AND/OR WO IV CONTRAST  5/22/2022    CT AORTA AND BILATERAL ILIOFEMORAL RUNOFF ANGIOGRAM W AND/OR WO IV CONTRAST 5/22/2022 [facility] EMERGENCY LEGACY    CT AORTA AND BILATERAL ILIOFEMORAL RUNOFF ANGIOGRAM W AND/OR WO IV CONTRAST  4/2/2023    CT AORTA AND BILATERAL ILIOFEMORAL RUNOFF ANGIOGRAM W AND/OR WO IV CONTRAST [facility] CT    CT HEAD ANGIO W AND WO IV CONTRAST  4/19/2022    CT HEAD ANGIO W AND WO IV CONTRAST 4/19/2022 [facility] CLINICAL LEGACY    OTHER SURGICAL HISTORY  09/17/2019    [place] tooth extraction    OTHER SURGICAL HISTORY  09/17/2019    Tonsillectomy with adenoidectomy    OTHER SURGICAL HISTORY  01/17/2020    Lower leg fracture repair    OTHER SURGICAL HISTORY  01/17/2020    Cardiac catheterization    TOTAL HIP ARTHROPLASTY  08/31/2018    Hip Replacement     Family History      Family History   Problem Relation Name Age of Onset    Heart attack Mother      Colon cancer Father      Heart attack Brother      Heart block Brother       Social History     Social History     Socioeconomic History    Marital status:      Spouse name: Not on file    Number of children: Not on file    Years of education: Not on file    Highest education level: Not on file   Occupational History    Not on file   Tobacco Use    Smoking status: Former     Types: Cigarettes    Smokeless tobacco: Never   Vaping Use    Vaping status: Never Used   Substance and Sexual Activity    Alcohol use: Never    Drug use: Never    Sexual activity: Not Currently   Other Topics Concern    Not on file   Social History Narrative    Not on file     Social Determinants of Health     Financial Resource Strain: Low Risk  (5/4/2024)    Overall Financial Resource Strain (CARDIA)     Difficulty of Paying Living Expenses: Not very hard   Food Insecurity: Not on file   Transportation Needs: No Transportation Needs (5/4/2024)    PRAPARE - Transportation     Lack of Transportation (Medical): No     Lack of Transportation (Non-Medical): No   Physical Activity: Not on file   Stress: Not on file   Social Connections: Not on file   Intimate Partner Violence: Not on file   Housing Stability: Low Risk  (5/4/2024)    Housing Stability Vital Sign     Unable to Pay for Housing in the Last Year: No     Number of Places Lived in the Last Year: 1     Unstable Housing in the Last Year: No     Tobacco Use: Medium Risk (5/8/2024)    Patient History     Smoking Tobacco Use: Former     Smokeless Tobacco Use: Never     Passive Exposure: Not on file      Social History     Substance and Sexual Activity   Alcohol Use Never      Allergies     Allergies   Allergen Reactions    Doxycycline Confusion    T-Painol Extra Strength Other    Levaquin [Levofloxacin] Itching and Rash      Meds    Scheduled medications  [START ON 5/14/2024] albuterol, 3 mL,  nebulization, q6h  azithromycin, 500 mg, intravenous, Once      Continuous medications     PRN medications       Objective     Vitals  Visit Vitals  /75   Pulse 82   Temp 36.8 °C (98.2 °F) (Temporal)   Resp 17   Ht 1.829 m (6')   Wt 61.2 kg (135 lb)   SpO2 94%   BMI 18.31 kg/m²   Smoking Status Former   BSA 1.76 m²        Physical Examination:  GEN:  A&Ox3, no acute distress, appears comfortable.  Conversational and appropriate.  No confusion or gross mental status changes.  EYES: EOMI, non-injected sclera.  ENT: Moist mucous membranes, no apparent injuries or lesions.   CARDIO: Normal rate and regular rhythm. No murmurs, rubs, or gallops.  2+ equal pulses of the distal extremities.   PULM: Rhonchi heard in the posterior lung fields  GI: BS+  Soft, non-distended. No rebound tenderness or guarding.  SKIN: Warm and dry, no rashes or lesions.  MSK: ROM intact the extremities without contractures.   EXT: No peripheral edema, contusions, or wounds.   NEURO: Cranial nerves II-XII grossly intact.   PSYCH: Appropriate mood and behavior, converses and responds appropriately during exam.    I/Os    Intake/Output Summary (Last 24 hours) at 5/13/2024 2243  Last data filed at 5/13/2024 2231  Gross per 24 hour   Intake 1050 ml   Output --   Net 1050 ml     Vent Settings       Labs:   Results from last 72 hours   Lab Units 05/13/24 1918   SODIUM mmol/L 135*   POTASSIUM mmol/L 4.1   CHLORIDE mmol/L 98   CO2 mmol/L 26   BUN mg/dL 16   CREATININE mg/dL 0.81   GLUCOSE mg/dL 181*   CALCIUM mg/dL 8.6   ANION GAP mmol/L 15   EGFR mL/min/1.73m*2 87      Results from last 72 hours   Lab Units 05/13/24  1918   WBC AUTO x10*3/uL 11.2   HEMOGLOBIN g/dL 12.7*   HEMATOCRIT % 39.4*   PLATELETS AUTO x10*3/uL 306   NEUTROS PCT AUTO % 66.5   LYMPHS PCT AUTO % 21.7   MONOS PCT AUTO % 6.4   EOS PCT AUTO % 3.8      Lab Results   Component Value Date    CALCIUM 8.6 05/13/2024    PHOS 3.7 05/05/2024      Lab Results   Component Value Date    CRP  "0.96 05/11/2022      [unfilled]     Micro/ID:   No results found for the last 90 days.                   No lab exists for component: \"AGALPCRNB\"   .ID  No results found for: \"URINECULTURE\", \"BLOODCULT\", \"CSFCULTSMEAR\"      Images    XR chest 2 views  Narrative: Interpreted By:  Kenneth Ahumada,   STUDY:  XR CHEST 2 VIEWS;  5/10/2024 12:44 pm      INDICATION:  Signs/Symptoms:pneumonia follow up.      COMPARISON:  05/04/2024      ACCESSION NUMBER(S):  [id number]      ORDERING CLINICIAN:  ALEJANDRO HOUSE      FINDINGS:                  CARDIOMEDIASTINAL SILHOUETTE:  Cardiomediastinal silhouette is normal in size and configuration.      LUNGS:  Lungs are hyperinflated.      ABDOMEN:  No remarkable upper abdominal findings.      BONES:  No acute osseous changes. Discogenic degenerative changes.      Impression: 1.  No evidence of acute cardiopulmonary process. Hyperinflation.              MACRO:  None      Signed by: Kenneth Ahumaad 5/13/2024 9:09 PM  Dictation workstation:   [id number]  CT angio chest for pulmonary embolism  Narrative: Interpreted By:  Germán Puente,   STUDY:  CT ANGIO CHEST FOR PULMONARY EMBOLISM;  5/13/2024 8:06 pm      INDICATION:  Signs/Symptoms:sob worsening hx blood clots.      COMPARISON:  None      ACCESSION NUMBER(S):  [id number]      ORDERING CLINICIAN:  HODA KNOX      TECHNIQUE:  Helical data acquisition of the chest was obtained after intravenous  administration of intravenous contrast, as per PE protocol. Images  were reformatted in coronal and sagittal planes. Axial and coronal  maximum intensity projection (MIP) images were created and reviewed.      FINDINGS:  No findings of acute pulmonary embolism. Mildly enlarged main  pulmonary artery suggestive of pulmonary arterial hypertension.  Bronchial thickening patchy right-greater-than-left areas of airspace  opacity concerning for pneumonia including aspiration given the  bronchial thickening. Robust vascular calcification. " Small sliding  hiatal hernia. No pneumothorax debris seen in the small airways.  Right lower lobe calcified granuloma. Small mediastinal lymph nodes  presumed reactive. No pericardial effusion. No axillary adenopathy.      Mild multilevel degenerative disc disease      Impression: 1. No findings of acute pulmonary embolism  2. Bronchial thickening with debris seen in the small airways with  nodular right lower lobe opacity concerning for developing pneumonia  including aspiration type. Follow-up is advised  3. Heart size is enlarged. Robust vascular calcification. Enlarged  main pulmonary artery compatible with pulmonary arterial hypertension          MACRO:  None      Signed by: Germán Puente 5/13/2024 9:00 PM  Dictation workstation:   [id number]  CT head wo IV contrast  Narrative: Interpreted By:  Germán Puente,   STUDY:  CT HEAD WO IV CONTRAST;  5/13/2024 7:44 pm      INDICATION:  Signs/Symptoms:headache.      COMPARISON:  None.      ACCESSION NUMBER(S):  [id number]      ORDERING CLINICIAN:  HODA KNOX      TECHNIQUE:  Noncontrast axial CT scan of head was performed. Angled reformats in  brain and bone windows were generated. The images were reviewed in  bone, brain, blood and soft tissue windows.      FINDINGS:  CSF Spaces: Global volume loss and chronic small vessel ischemic  change. 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 Encephalomalacia seen in the left temporal lobe again seen      Parenchyma:  The grey-white differentiation is intact. There is no  mass effect or midline shift.  There is no intracranial hemorrhage.      Calvarium: The calvarium is unremarkable.      Paranasal sinuses and mastoids: Visualized paranasal sinuses and  mastoids are clear.      Impression: No evidence of acute cortical infarct or intracranial hemorrhage.      Global volume loss. Disproportionate ventriculomegaly which can be  seen the setting of NPH. Left temporal region  encephalomalacia not  measurably changed      If persistent concern, consider MRI      MACRO:  None      Signed by: Germán Puente 5/13/2024 8:40 PM  Dictation workstation:   [id number]    Assessment and Plan    Problem list:   Principal Problem:    Community acquired bilateral lower lobe pneumonia      Sheldon Arteaga is a 84 y.o. male admitted for community-acquired pneumonia    Acute Medical Issues   #Community-Acquired Pneumonia  -Blood cultures x2 drawn in the ED  -Lactate/Pro-Anupam ordered and pending  -Sputum culture pending  -Continue Rocephin and azithromycin  -Albuterol nebs q2h PRN  -Incentive spirometry q2h while awake   -Mucinex     #Asymptomatic hyponatremia  -Monitor daily  -Legionella workup ordered and pending    Chronic Medical Issues   # Hypertension: Amlodipine 5mg PO daily, Metoprolol tartrate 25mg PO BID  # Peripheral artery disease: Clopidogrel 75mg PO daily, Atorvastatin 40mg PO daily  # Hyperlipidemia: Atorvastatin 40mg PO daily      IVF: PRN   Electrolytes: Replete as needed   Diet: Regular  GI ppx: None  DVT ppx: Plavix  Antibiotics: Azithromycin and ceftriaxone  Lines: 1 PIV  Code: DNR     Code Status: DNR   Emergency Contact: Extended Emergency Contact Information  Primary Emergency Contact: Stuart Arteaga  [place] Phone: [phone]  Relation: Child     Disposition: 84 y.o.male admitted for CAP.HS<48 hours     Vladimir Kim MD  5/13/2024  Internal Medicine PGY-2

## 2024-05-14 NOTE — CARE PLAN
Uneventful night. Pt rested comfortably. Minimal c/o pain this shift. Pt continues to progress towards meeting goals. VSS. Will continue to monitor.

## 2024-05-14 NOTE — RESULT ENCOUNTER NOTE
Results are normal, please notify the patient. Slight hyperinflation, continue breathing exercises, progressive mobility, plenty of fluids.

## 2024-05-14 NOTE — CARE PLAN
The patient's goals for the shift include improve my SOB and breathing.    The clinical goals for the shift include stay safe and use incentive spirometer while awake.

## 2024-05-14 NOTE — ED PROVIDER NOTES
HPI   No chief complaint on file.      Patient is an 84-year-old male with significant PMH of stroke, lower extremity stents on Plavix presents to the ED with cc of weakness fatigue worsening shortness of breath.  Patient was admitted to the hospital last week and discharged a day later.  Patient was admitted for pneumonia and was prescribed oral Augmentin for home.  Patient completed the course and endorses worsening shortness of breath and worse with exertion.  Patient has history of COPD.  Patient has a history of blood clots denies any recent travel or surgery.  Patient had a MI in the 90s denies any cardiac stents.  Patient cough has improved.  Patient denies any fevers.  Patient is still tolerating p.o. intake well.  Patient lives at home with his daughter.  Patient states he normally ambulates without assistance the last couple days has had increased fatigue and not able to get up.  Patient has had intermittent chest pain describes as a tight sensation.  Patient denies any nausea vomiting or abdominal pain.  Patient has had some diarrhea yesterday none today.  Patient endorses 2 episodes yesterday denies any melena or hematochezia.  Patient has had a headache and a couple weeks ago he states he had black vision in the center of both eyes for a couple seconds that then resolved has not had these symptoms since.  Patient denies any other vision changes. Patient is a former smoker denies any alcohol or street drug abuse.  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                          [place] Coma Scale Score: 15                     Patient History   Past Medical History:   Diagnosis Date    Stroke (Multi)      Past Surgical History:   Procedure Laterality Date    CT ANGIO NECK  4/19/2022    CT NECK ANGIO W AND WO IV CONTRAST 4/19/2022 [facility] CLINICAL LEGACY    CT AORTA AND BILATERAL ILIOFEMORAL RUNOFF ANGIOGRAM W AND/OR WO IV CONTRAST  4/10/2022    CT AORTA AND  BILATERAL ILIOFEMORAL RUNOFF ANGIOGRAM W AND/OR WO IV CONTRAST 4/10/2022 [facility]    CT AORTA AND BILATERAL ILIOFEMORAL RUNOFF ANGIOGRAM W AND/OR WO IV CONTRAST  4/13/2022    CT AORTA AND BILATERAL ILIOFEMORAL RUNOFF ANGIOGRAM W AND/OR WO IV CONTRAST 4/13/2022 [facility] CLINICAL LEGACY    CT AORTA AND BILATERAL ILIOFEMORAL RUNOFF ANGIOGRAM W AND/OR WO IV CONTRAST  5/22/2022    CT AORTA AND BILATERAL ILIOFEMORAL RUNOFF ANGIOGRAM W AND/OR WO IV CONTRAST 5/22/2022 [facility]    CT AORTA AND BILATERAL ILIOFEMORAL RUNOFF ANGIOGRAM W AND/OR WO IV CONTRAST  4/2/2023    CT AORTA AND BILATERAL ILIOFEMORAL RUNOFF ANGIOGRAM W AND/OR WO IV CONTRAST [employer] CT    CT HEAD ANGIO W AND WO IV CONTRAST  4/19/2022    CT HEAD ANGIO W AND WO IV CONTRAST 4/19/2022 [facility] CLINICAL LEGACY    OTHER SURGICAL HISTORY  09/17/2019    [place] tooth extraction    OTHER SURGICAL HISTORY  09/17/2019    Tonsillectomy with adenoidectomy    OTHER SURGICAL HISTORY  01/17/2020    Lower leg fracture repair    OTHER SURGICAL HISTORY  01/17/2020    Cardiac catheterization    TOTAL HIP ARTHROPLASTY  08/31/2018    Hip Replacement     Family History   Problem Relation Name Age of Onset    Heart attack Mother      Colon cancer Father      Heart attack Brother      Heart block Brother       Social History     Tobacco Use    Smoking status: Former     Types: Cigarettes    Smokeless tobacco: Never   Vaping Use    Vaping status: Never Used   Substance Use Topics    Alcohol use: Never    Drug use: Never       Physical Exam   ED Triage Vitals [05/13/24 1912]   Temperature Heart Rate Respirations BP   36.8 °C (98.2 °F) 82 20 131/74      Pulse Ox Temp Source Heart Rate Source Patient Position   95 % Temporal Monitor Lying      BP Location FiO2 (%)     Left arm --       Physical Exam  HENT:      Head: Normocephalic.   Eyes:      Extraocular Movements: Extraocular movements intact.      Pupils: Pupils are equal, round, and reactive to light.   Abdominal:       Palpations: Abdomen is soft.      Tenderness: There is no abdominal tenderness. There is no guarding or rebound.   Musculoskeletal:      Right lower leg: No edema.      Left lower leg: No edema.   Neurological:      General: No focal deficit present.      Mental Status: He is alert and oriented to person, place, and time. Mental status is at baseline.      Cranial Nerves: No cranial nerve deficit.      Sensory: No sensory deficit.      Motor: No weakness.   Psychiatric:         Mood and Affect: Mood normal.         ED Course & MDM   ED Course as of 05/13/24 2158   Mon May 13, 2024   1912 EKG interpreted by ED attending: Ventricular rate of 79 bpm.  Sinus rhythm with premature atrial complexes.  Normal axis.  Normal intervals.  No ST or T wave elevations or inversions consistent with acute ischemia. [JT]      ED Course User Index  [JT] Karan Roblero MD MPH         Diagnoses as of 05/13/24 2158   Pneumonia of right lung due to infectious organism, unspecified part of lung   Shortness of breath   Other fatigue       Medical Decision Making  Medical Decision Making:  Patient presented as described in HPI. Patient case including ROS, PE, and treatment and plan discussed with ED attending if attached as cosigner. Due to patients presentation orders completed include as documented.  Patient presents to the ED with cc of worsening shortness of breath fatigue and weakness.  Patient was admitted to the hospital a week ago for pneumonia and was discharged the next day.  Patient states he has not felt better since then and states especially the last couple days he has had worsening shortness of breath and fatigue.  Patient is nontoxic-appearing abdomen soft nontender no neurofocal deficits lung sounds with Rales diffusely and diminished, no peripheral edema.  Labs are unremarkable pending imaging.  CT head is unremarkable CT angio chest shows no acute findings or PE bronchial thickening with debris seen in small airways  with nodule right lower lobe opacity concerning for developing pneumonia including aspiration type follow-up was advised.  Patient started on IV Rocephin and azithromycin.  Ambulatory pulse ox was around 93%.  Patient is still feeling short of breath and fatigue does not feel comfortable going home.  Patient will be admitted.  Hospitalist accepted the patient.  Patient remained stable in the ER.        Patient care discussed with: N/A  Social Determinants affecting care: N/A    Final diagnosis and disposition as below.  See CI    Hospitalize. I discussed the differential; results and admit plan with the patient and/or family/friend/caregiver if present.  I emphasized the importance of hospitalization need for re-evaluation/continued monitoring/interventions.. They agreed that if they feel their condition is worsening or if they have any other concern they should alert staff immediately for further assistance. I gave the patient an opportunity to ask all questions they had and answered all of them accordingly. The patient and/or family/friend/caregiver expressed understanding verbally and that they would comply.       Disposition:  admit    Hospitalize. Discussed findings and treatment done here in ED with admitting physician. It would be a risk to discharge the patient in their condition due to possibility of deterioration in their condition and the need for urgent interventions.    This note has been transcribed using voice recognition and may contain grammatical errors, misplaced words, incorrect words, incorrect phrases or other errors.        Labs Reviewed   CBC WITH AUTO DIFFERENTIAL - Abnormal       Result Value    WBC 11.2      nRBC 0.0      RBC 4.15 (*)     Hemoglobin 12.7 (*)     Hematocrit 39.4 (*)     MCV 95      MCH 30.6      MCHC 32.2      RDW 14.6 (*)     Platelets 306      Neutrophils % 66.5      Immature Granulocytes %, Automated 1.2 (*)     Lymphocytes % 21.7      Monocytes % 6.4      Eosinophils %  3.8      Basophils % 0.4      Neutrophils Absolute 7.42 (*)     Immature Granulocytes Absolute, Automated 0.13      Lymphocytes Absolute 2.43      Monocytes Absolute 0.72      Eosinophils Absolute 0.43 (*)     Basophils Absolute 0.05     COMPREHENSIVE METABOLIC PANEL - Abnormal    Glucose 181 (*)     Sodium 135 (*)     Potassium 4.1      Chloride 98      Bicarbonate 26      Anion Gap 15      Urea Nitrogen 16      Creatinine 0.81      eGFR 87      Calcium 8.6      Albumin 3.8      Alkaline Phosphatase 57      Total Protein 6.6      AST 13      Bilirubin, Total 0.6      ALT 14     BLOOD GAS VENOUS FULL PANEL - Abnormal    POCT pH, Venous 7.44 (*)     POCT pCO2, Venous 38 (*)     POCT pO2, Venous 63 (*)     POCT SO2, Venous 94 (*)     POCT Oxy Hemoglobin, Venous 91.0 (*)     POCT Hematocrit Calculated, Venous 34.0 (*)     POCT Sodium, Venous 134 (*)     POCT Potassium, Venous 4.4      POCT Chloride, Venous 102      POCT Ionized Calicum, Venous 1.17      POCT Glucose, Venous 85      POCT Lactate, Venous 0.8      POCT Base Excess, Venous 1.6      POCT HCO3 Calculated, Venous 25.8      POCT Hemoglobin, Venous 11.4 (*)     POCT Anion Gap, Venous 11.0      Patient Temperature        FiO2 0     SARS-COV-2 PCR - Normal    Coronavirus 2019, PCR Not Detected      Narrative:     This assay has received FDA Emergency Use Authorization (EUA) and is only authorized for the duration of time that circumstances exist to justify the authorization of the emergency use of in vitro diagnostic tests for the detection of SARS-CoV-2 virus and/or diagnosis of COVID-19 infection under section 564(b)(1) of the Act, 21 U.S.C. 360bbb-3(b)(1). This assay is an in vitro diagnostic nucleic acid amplification test for the qualitative detection of SARS-CoV-2 from nasopharyngeal specimens and has been validated for use at [facility]. Negative results do not preclude COVID-19 infections and should not be used as the sole basis  for diagnosis, treatment, or other management decisions.     INFLUENZA A AND B PCR - Normal    Flu A Result Not Detected      Flu B Result Not Detected      Narrative:     This assay is an in vitro diagnostic multiplex nucleic acid amplification test for the detection and discrimination of Influenza A & B from nasopharyngeal specimens, and has been validated for use at [facility]. Negative results do not preclude Influenza A/B infections, and should not be used as the sole basis for diagnosis, treatment, or other management decisions. If Influenza A/B and RSV PCR results are negative, testing for Parainfluenza virus, Adenovirus and Metapneumovirus is routinely performed for [facility] pediatric oncology and intensive care inpatients, and is available on other patients by placing an add-on request.   RSV PCR - Normal    RSV PCR Not Detected      Narrative:     This assay is an FDA-cleared, in vitro diagnostic nucleic acid amplification test for the detection of RSV from nasopharyngeal specimens, and has been validated for use at [facility]. Negative results do not preclude RSV infections, and should not be used as the sole basis for diagnosis, treatment, or other management decisions. If Influenza A/B and RSV PCR results are negative, testing for Parainfluenza virus, Adenovirus and Metapneumovirus is routinely performed for pediatric oncology and intensive care inpatients at [facility], and is available on other patients by placing an add-on request.       PROTIME-INR - Normal    Protime 11.6      INR 1.0     B-TYPE NATRIURETIC PEPTIDE - Normal    BNP 33      Narrative:        <100 pg/mL - Heart failure unlikely  100-299 pg/mL - Intermediate probability of acute heart                  failure exacerbation. Correlate with clinical                  context and patient history.    >=300 pg/mL - Heart Failure likely. Correlate with clinical                  context and patient  history.    BNP testing is performed using different testing methodology at [facility] than at other [facility]. Direct result comparisons should only be made within the same method.      SERIAL TROPONIN-INITIAL - Normal    Troponin I, High Sensitivity 5      Narrative:     Less than 99th percentile of normal range cutoff-  Female and children under 18 years old <14 ng/L; Male <21 ng/L: Negative  Repeat testing should be performed if clinically indicated.     Female and children under 18 years old 14-50 ng/L; Male 21-50 ng/L:  Consistent with possible cardiac damage and possible increased clinical   risk. Serial measurements may help to assess extent of myocardial damage.     >50 ng/L: Consistent with cardiac damage, increased clinical risk and  myocardial infarction. Serial measurements may help assess extent of   myocardial damage.      NOTE: Children less than 1 year old may have higher baseline troponin   levels and results should be interpreted in conjunction with the overall   clinical context.     NOTE: Troponin I testing is performed using a different   testing methodology at [facility] than at other   [facility]. Direct result comparisons should only   be made within the same method.   TROPONIN I, HIGH SENSITIVITY - Normal    Troponin I, High Sensitivity 5      Narrative:     Less than 99th percentile of normal range cutoff-  Female and children under 18 years old <14 ng/L; Male <21 ng/L: Negative  Repeat testing should be performed if clinically indicated.     Female and children under 18 years old 14-50 ng/L; Male 21-50 ng/L:  Consistent with possible cardiac damage and possible increased clinical   risk. Serial measurements may help to assess extent of myocardial damage.     >50 ng/L: Consistent with cardiac damage, increased clinical risk and  myocardial infarction. Serial measurements may help assess extent of   myocardial damage.      NOTE: Children less than 1  year old may have higher baseline troponin   levels and results should be interpreted in conjunction with the overall   clinical context.     NOTE: Troponin I testing is performed using a different   testing methodology at [facility] than at other   [facility] hospitals. Direct result comparisons should only   be made within the same method.   MAGNESIUM    Magnesium       TROPONIN SERIES- (INITIAL, 1 HR)    Narrative:     The following orders were created for panel order Troponin I Series, High Sensitivity (0, 1 HR).  Procedure                               Abnormality         Status                     ---------                               -----------         ------                     Troponin I, High Sensiti...[[id number]]  Normal              Final result                 Please view results for these tests on the individual orders.      CT angio chest for pulmonary embolism   Final Result   1. No findings of acute pulmonary embolism   2. Bronchial thickening with debris seen in the small airways with   nodular right lower lobe opacity concerning for developing pneumonia   including aspiration type. Follow-up is advised   3. Heart size is enlarged. Robust vascular calcification. Enlarged   main pulmonary artery compatible with pulmonary arterial hypertension             MACRO:   None        Signed by: Germán Puente 5/13/2024 9:00 PM   Dictation workstation:   [id number]      CT head wo IV contrast   Final Result   No evidence of acute cortical infarct or intracranial hemorrhage.        Global volume loss. Disproportionate ventriculomegaly which can be   seen the setting of NPH. Left temporal region encephalomalacia not   measurably changed        If persistent concern, consider MRI        MACRO:   None        Signed by: Germán Puente 5/13/2024 8:40 PM   Dictation workstation:   [id number]           Procedure  Procedures     Gracia Tian PA-C  05/13/24 2158       Gracia Tian  ISAAC  05/13/24 2152

## 2024-05-15 ENCOUNTER — APPOINTMENT (OUTPATIENT)
Dept: PRIMARY CARE | Facility: CLINIC | Age: 84
End: 2024-05-15
Payer: MEDICARE

## 2024-05-15 VITALS
HEIGHT: 72 IN | BODY MASS INDEX: 18.24 KG/M2 | HEART RATE: 76 BPM | WEIGHT: 134.7 LBS | TEMPERATURE: 97.7 F | RESPIRATION RATE: 17 BRPM | DIASTOLIC BLOOD PRESSURE: 64 MMHG | SYSTOLIC BLOOD PRESSURE: 104 MMHG | OXYGEN SATURATION: 96 %

## 2024-05-15 PROCEDURE — 99239 HOSP IP/OBS DSCHRG MGMT >30: CPT

## 2024-05-15 PROCEDURE — 2500000002 HC RX 250 W HCPCS SELF ADMINISTERED DRUGS (ALT 637 FOR MEDICARE OP, ALT 636 FOR OP/ED): Performed by: INTERNAL MEDICINE

## 2024-05-15 PROCEDURE — 94760 N-INVAS EAR/PLS OXIMETRY 1: CPT

## 2024-05-15 PROCEDURE — 2500000006 HC RX 250 W HCPCS SELF ADMINISTERED DRUGS (ALT 637 FOR ALL PAYERS)

## 2024-05-15 PROCEDURE — 2500000001 HC RX 250 WO HCPCS SELF ADMINISTERED DRUGS (ALT 637 FOR MEDICARE OP)

## 2024-05-15 PROCEDURE — 94640 AIRWAY INHALATION TREATMENT: CPT

## 2024-05-15 RX ORDER — AZITHROMYCIN 250 MG/1
250 TABLET, FILM COATED ORAL ONCE
Status: COMPLETED | OUTPATIENT
Start: 2024-05-15 | End: 2024-05-15

## 2024-05-15 RX ORDER — AZITHROMYCIN 250 MG/1
250 TABLET, FILM COATED ORAL DAILY
Qty: 4 TABLET | Refills: 0 | Status: SHIPPED | OUTPATIENT
Start: 2024-05-15 | End: 2024-05-19

## 2024-05-15 RX ORDER — GUAIFENESIN 600 MG/1
600 TABLET, EXTENDED RELEASE ORAL 2 TIMES DAILY PRN
Qty: 28 TABLET | Refills: 0 | Status: SHIPPED | OUTPATIENT
Start: 2024-05-15 | End: 2024-05-29

## 2024-05-15 RX ORDER — CEFDINIR 300 MG/1
300 CAPSULE ORAL 2 TIMES DAILY
Qty: 12 CAPSULE | Refills: 0 | Status: SHIPPED | OUTPATIENT
Start: 2024-05-15 | End: 2024-05-21

## 2024-05-15 RX ADMIN — CLOPIDOGREL 75 MG: 75 TABLET ORAL at 08:43

## 2024-05-15 RX ADMIN — FLUTICASONE PROPIONATE 1 SPRAY: 50 SPRAY, METERED NASAL at 08:43

## 2024-05-15 RX ADMIN — METOPROLOL TARTRATE 25 MG: 25 TABLET, FILM COATED ORAL at 08:43

## 2024-05-15 RX ADMIN — APIXABAN 5 MG: 5 TABLET, FILM COATED ORAL at 11:12

## 2024-05-15 RX ADMIN — ALBUTEROL SULFATE 3 ML: 2.5 SOLUTION RESPIRATORY (INHALATION) at 08:33

## 2024-05-15 RX ADMIN — AZITHROMYCIN DIHYDRATE 250 MG: 250 TABLET ORAL at 11:21

## 2024-05-15 ASSESSMENT — COGNITIVE AND FUNCTIONAL STATUS - GENERAL
CLIMB 3 TO 5 STEPS WITH RAILING: A LITTLE
DAILY ACTIVITIY SCORE: 24
STANDING UP FROM CHAIR USING ARMS: A LITTLE
WALKING IN HOSPITAL ROOM: A LITTLE
MOBILITY SCORE: 20
MOVING TO AND FROM BED TO CHAIR: A LITTLE

## 2024-05-15 ASSESSMENT — PAIN - FUNCTIONAL ASSESSMENT: PAIN_FUNCTIONAL_ASSESSMENT: 0-10

## 2024-05-15 ASSESSMENT — PAIN SCALES - GENERAL: PAINLEVEL_OUTOF10: 0 - NO PAIN

## 2024-05-15 NOTE — HOSPITAL COURSE
Sheldon Arteaga is a 84 y.o. male  with a PMH significant of obesity, hypertension, PAD (lower extremity stents on Plavix), DVT, CVA 2 years ago, A-fib, hyperlipidemia presents to [facility] ED with a c/o weakness, fatigue and worsening shortness of breath.  Patient was recently admitted to the hospital 1 week ago and discharged within 24 hours for community-acquired pneumonia.  He was prescribed oral Augmentin and sent home.  Patient reports that he has been feeling well since admission up until Monday when he had a recrudescence of her respiratory symptoms.  Patient describes a worsening cough producing brown sputum accompanied by chest pain described as a tight sensation.  In addition the patient complains of brief episode of headache and a couple weeks ago he had black vision in the center of both eyes for a couple of seconds and then resolved without sequelae.  No other vision changes.  At the time of this encounter the patient denies diarrhea, constipation, headache dizziness chest pain.  Upon admission to the ED CT head and CT angio were negative for acute findings.  There was indeed a bronchial thickening with debris seen in small airways with nodule right upper lobe opacity concerning for development of pneumonia.  Patient was started on azithromycin and Rocephin and transferred to the floor for further management.     Floor COURSE:  Strep pneumo, Legionella were both negative.  Patient did have improvement with Rocephin and azithromycin.  SLP consulted for aspiration risk concern-no skilled SLP needed and cleared for discharge.    Discharged with a total of 7-day course of cefdinir/Rocephin and a 5-day course of azithromycin.

## 2024-05-15 NOTE — PROGRESS NOTES
05/15/24 1025   Discharge Planning   Living Arrangements Children  (Daughter)   Support Systems Children   Assistance Needed Home with daughter, A&Ox3, independent with ADL's and uses no DME for ambulation at baseline; has canes, wheelchair, wheelchair ramp; drives, room air at baseline. Patient denies further needs upon discharge.   Type of Residence Private residence   Number of Stairs to Enter Residence 0   Number of Stairs Within Residence 0   Do you have animals or pets at home? Yes   Type of Animals or Pets 3 cats   Who is requesting discharge planning? Provider   Home or Post Acute Services None   Patient expects to be discharged to: Home with daughter, no discharge needs identified. Patient anticipates discharge home this afternoon. Patient continues to deny further home going needs and patient's daughter will provide transport home.   Does the patient need discharge transport arranged? No     5/15/24 at 1250: [facility] called patient to set up PCP appointment. [facility] scheduled PCP appointment through [facility] for 5/21/24 at 915 am. Patient aware.

## 2024-05-15 NOTE — CARE PLAN
The patient's goals for the shift include  pt will be free of falls.    The clinical goals for the shift include pt will have no SOB episodes    During shift, pt was able to meet both goals. Pt being discharged today to home with daughter.    Problem: Pain - Adult  Goal: Verbalizes/displays adequate comfort level or baseline comfort level  Outcome: Met     Problem: Safety - Adult  Goal: Free from fall injury  Outcome: Met     Problem: Discharge Planning  Goal: Discharge to home or other facility with appropriate resources  Outcome: Met

## 2024-05-15 NOTE — DISCHARGE SUMMARY
Discharge Diagnosis  Community acquired bilateral lower lobe pneumonia  Asymptomatic hyponatremia    Issues Requiring Follow-Up  Follow-up with your PCP for atrial fibrillation-previously on Eliquis? and restarted on discharge.  Follow-up with PCP/pulmonology for lung nodules  Follow-up with PCP/pulmonology for evaluation of pulmonary hypertension/enlarged main pulmonary artery    Discharge Meds     Your medication list        START taking these medications        Instructions Last Dose Given Next Dose Due   apixaban 5 mg tablet  Commonly known as: Eliquis      Take 1 tablet (5 mg) by mouth 2 times a day.       azithromycin 250 mg tablet  Commonly known as: Zithromax      Take 1 tablet (250 mg) by mouth once daily for 4 days.       cefdinir 300 mg capsule  Commonly known as: Omnicef      Take 1 capsule (300 mg) by mouth 2 times a day for 6 days.       guaiFENesin 600 mg 12 hr tablet  Commonly known as: Mucinex      Take 1 tablet (600 mg) by mouth 2 times a day as needed for cough for up to 14 days. Do not crush, chew, or split.              CONTINUE taking these medications        Instructions Last Dose Given Next Dose Due   albuterol 2.5 mg /3 mL (0.083 %) nebulizer solution      Take 3 mL (2.5 mg) by nebulization every 2 hours if needed for wheezing or shortness of breath.       albuterol 90 mcg/actuation inhaler  Commonly known as: Ventolin HFA      Inhale 2 puffs every 4 hours if needed for wheezing or shortness of breath.       amLODIPine 5 mg tablet  Commonly known as: Norvasc      Take 1 tablet (5 mg) by mouth once daily.       atorvastatin 40 mg tablet  Commonly known as: Lipitor      Take 1 tablet (40 mg) by mouth once daily at bedtime.       clopidogrel 75 mg tablet  Commonly known as: Plavix      Take 1 tablet (75 mg) by mouth once daily.       fluticasone 50 mcg/actuation nasal spray  Commonly known as: Flonase      Administer 1 spray into each nostril once daily. Shake gently. Before first use, prime  pump. After use, clean tip and replace cap.       ipratropium-albuteroL 0.5-2.5 mg/3 mL nebulizer solution  Commonly known as: Duo-Neb      Take 3 mL by nebulization 4 times a day as needed for wheezing or shortness of breath.       metoprolol tartrate 25 mg tablet  Commonly known as: Lopressor      Take 1 tablet (25 mg) by mouth 2 times a day.       Saline NasaL 0.65 % nasal spray  Generic drug: sodium chloride      Administer 1 spray into each nostril if needed for congestion.              STOP taking these medications      amoxicillin-pot clavulanate 875-125 mg tablet  Commonly known as: Augmentin        predniSONE 20 mg tablet  Commonly known as: Deltasone                  Where to Get Your Medications        These medications were sent to [facility] [address] [place] [id number] [facility]  [address] [facility] [address]      Phone: [phone]   apixaban 5 mg tablet  azithromycin 250 mg tablet  cefdinir 300 mg capsule  guaiFENesin 600 mg 12 hr tablet         Test Results Pending At Discharge  Pending Labs       Order Current Status    Respiratory Culture/Smear Preliminary result            Hospital Course  Sheldon Arteaga is a 84 y.o. male  with a PMH significant of obesity, hypertension, PAD (lower extremity stents on Plavix), DVT, CVA 2 years ago, A-fib, hyperlipidemia presents to [facility] ED with a c/o weakness, fatigue and worsening shortness of breath.  Patient was recently admitted to the hospital 1 week ago and discharged within 24 hours for community-acquired pneumonia.  He was prescribed oral Augmentin and sent home.  Patient reports that he has been feeling well since admission up until Monday when he had a recrudescence of her respiratory symptoms.  Patient describes a worsening cough producing brown sputum accompanied by chest pain described as a tight sensation.  In addition the patient complains of brief episode of headache and a couple weeks ago  he had black vision in the center of both eyes for a couple of seconds and then resolved without sequelae.  No other vision changes.  At the time of this encounter the patient denies diarrhea, constipation, headache dizziness chest pain.  Upon admission to the ED CT head and CT angio were negative for acute findings.  There was indeed a bronchial thickening with debris seen in small airways with nodule right upper lobe opacity concerning for development of pneumonia.  Patient was started on azithromycin and Rocephin and transferred to the floor for further management.     Floor COURSE:  Strep pneumo, Legionella were both negative.  Patient did have improvement with Rocephin and azithromycin.  SLP consulted for aspiration risk concern-no skilled SLP needed and cleared for discharge.    Discharged with a total of 7-day course of cefdinir/Rocephin and a 5-day course of azithromycin.    Pertinent Physical Exam At Time of Discharge  Physical Exam  Vitals reviewed.   HENT:      Head: Normocephalic and atraumatic.   Cardiovascular:      Rate and Rhythm: Normal rate and regular rhythm.      Heart sounds: Normal heart sounds. No murmur heard.     No gallop.   Pulmonary:      Effort: Pulmonary effort is normal.      Breath sounds: Rhonchi present.   Abdominal:      General: Abdomen is flat. There is no distension.      Palpations: Abdomen is soft.      Tenderness: There is no abdominal tenderness.   Musculoskeletal:      Right lower leg: No edema.      Left lower leg: No edema.   Skin:     General: Skin is warm and dry.   Neurological:      Mental Status: He is alert. Mental status is at baseline.   Psychiatric:         Mood and Affect: Mood normal.         Behavior: Behavior normal.         Outpatient Follow-Up  Future Appointments   Date Time Provider Department Center   9/13/2024  9:00 AM [facility] 1 [facility]   9/13/2024 11:00 AM [facility]         Sebas Pineda DO

## 2024-05-15 NOTE — DISCHARGE INSTRUCTIONS
You recently in the hospital for commune acquired pneumonia.  We will send you home with 2 different antibiotics.    The following recommendations are made for you at time of hospital discharge:  -Recommend repeat CT scan as outpatient with PCP/pulmonology as patient with lung nodules on CT scan on 5/4/2024 as well.  Referral sent to pulmonology.  -Recommend to follow-up with pulmonology/PCP as an outpatient for further evaluation of dilated pulmonary artery/ possible pulmonary hypertension.  -Follow up with your PCP regarding your anticoagulation and Eliquis for your history of A fib. A new 30 day supply has been sent  -For antibiotics, take azithromycin 250 mg every night for the next 4 days starting tonight. Take cefdinir 300 mg twice a day for the next 6 days starting today as well.  -You can take Mucinex (guaifenesin) 600mg every 12 hours as needed to help loosen your mucus.   -A 30 day supply of Eliquis (blood thinner) has been sent in case you do not have it at home. This is for your atrial fibrillation.     Please take your home medications as instructed prior to hospitalization.     Please follow-up with your primary care provider within 5-7 days from time of discharge. Please call your PCP's office to schedule an appointment. You may need to bring photo ID and insurance card to your appointment.     If you experience any worsening symptoms or any new acute concerns arise, please contact your primary care provider to discuss and possibly arrange an appointment. You need to call your doctor or return to the closest ED if you develop any new or concerning symptoms such as fevers, chills, chest pain, shortness of breath, diarrhea, and so forth.  If you cannot get in touch with your primary care provider or severe symptoms are present, please return to nearest emergency room/urgent care for evaluation and treatment.

## 2024-05-16 ENCOUNTER — PATIENT OUTREACH (OUTPATIENT)
Dept: PRIMARY CARE | Facility: CLINIC | Age: 84
End: 2024-05-16
Payer: MEDICARE

## 2024-05-16 LAB
BACTERIA SPEC RESP CULT: NORMAL
GRAM STN SPEC: NORMAL
GRAM STN SPEC: NORMAL

## 2024-05-16 NOTE — PROGRESS NOTES
Discharge Facility:[facility]  Discharge Diagnosis:CAP bilateral lower lobe  Admission Date:5/13/24  Discharge Date: 5/15/24    PCP Appointment Date:5/21/24  Specialist Appointment Date: Cardiology and Pulmonology  Hospital Encounter and Summary: Linked   See discharge assessment below for further details  Engagement  Call Start Time: 0951 (5/16/2024  9:50 AM)    Medications  Medications reviewed with patient/caregiver?: Yes (5/16/2024  9:50 AM)  Is the patient having any side effects they believe may be caused by any medication additions or changes?: No (5/16/2024  9:50 AM)  Does the patient have all medications ordered at discharge?: Yes (5/16/2024  9:50 AM)  Prescription Comments: START taking: apixaban (Eliquis) azithromycin (Zithromax) cefdinir (Omnicef) guaiFENesin (Mucinex) STOP taking: amoxicillin-pot clavulanate 875-125 mg tablet (Augmentin) predniSONE 20 mg (5/16/2024  9:50 AM)  Is the patient taking all medications as directed (includes completed medication regime)?: Yes (5/16/2024  9:50 AM)  Medication Comments: see med list (5/16/2024  9:50 AM)    Appointments  Does the patient have a primary care provider?: Yes (5/16/2024  9:50 AM)  Care Management Interventions: Verified appointment date/time/provider (5/16/2024  9:50 AM)  Has the patient kept scheduled appointments due by today?: Yes (5/16/2024  9:50 AM)  Care Management Interventions: Advised patient to keep appointment (5/16/2024  9:50 AM)    Patient Teaching  Does the patient have access to their discharge instructions?: Yes (5/16/2024  9:50 AM)  Care Management Interventions: Reviewed instructions with patient (5/16/2024  9:50 AM)  What is the patient's perception of their health status since discharge?: Improving (5/16/2024  9:50 AM)  Is the patient/caregiver able to teach back the hierarchy of who to call/visit for symptoms/problems? PCP, Specialist, Home Health nurse, Urgent Care, ED, 911: Yes (5/16/2024  9:50 AM)    Wrap Up  Wrap Up Additional  Comments: This CM spoke with pt via phone. Pt reports doing well at home since discharge. New meds reviewed. Pt denies CP and SOB. Patient denies any further discharge questions/needs at this time. Emphasized that Follow up is needed after discharge to review the hospital recommendations, assess your response to your treatment.  Pt aware of my availability for non-emergent concerns. Contact info provided to patient (5/16/2024  9:50 AM)      Daily Tucker LPN

## 2024-05-20 NOTE — SIGNIFICANT EVENT
Follow Up Phone Call    Outgoing phone call    Spoke to: Sheldon Arteaga Relationship:self   Phone number: [phone]      Outcome: No answer/busy signal   No chief complaint on file.         Diagnosis:Not applicable         Yes - the patient is able to be screened

## 2024-05-21 ENCOUNTER — OFFICE VISIT (OUTPATIENT)
Dept: PRIMARY CARE | Facility: CLINIC | Age: 84
End: 2024-05-21
Payer: MEDICARE

## 2024-05-21 VITALS
SYSTOLIC BLOOD PRESSURE: 120 MMHG | OXYGEN SATURATION: 99 % | WEIGHT: 131 LBS | HEART RATE: 104 BPM | DIASTOLIC BLOOD PRESSURE: 70 MMHG | BODY MASS INDEX: 17.77 KG/M2 | TEMPERATURE: 96.8 F

## 2024-05-21 DIAGNOSIS — J15.9 COMMUNITY ACQUIRED BACTERIAL PNEUMONIA: ICD-10-CM

## 2024-05-21 DIAGNOSIS — I48.0 PAF (PAROXYSMAL ATRIAL FIBRILLATION) (MULTI): Primary | ICD-10-CM

## 2024-05-21 DIAGNOSIS — J18.9 COMMUNITY ACQUIRED BILATERAL LOWER LOBE PNEUMONIA: ICD-10-CM

## 2024-05-21 PROBLEM — I70.229 CRITICAL ISCHEMIA OF LOWER EXTREMITY (MULTI): Status: ACTIVE | Noted: 2024-05-21

## 2024-05-21 PROBLEM — R10.9 ACUTE RIGHT FLANK PAIN: Status: ACTIVE | Noted: 2024-05-21

## 2024-05-21 PROBLEM — J06.9 URI (UPPER RESPIRATORY INFECTION): Status: ACTIVE | Noted: 2024-05-21

## 2024-05-21 PROCEDURE — 3078F DIAST BP <80 MM HG: CPT

## 2024-05-21 PROCEDURE — 1157F ADVNC CARE PLAN IN RCRD: CPT

## 2024-05-21 PROCEDURE — 1159F MED LIST DOCD IN RCRD: CPT

## 2024-05-21 PROCEDURE — 99496 TRANSJ CARE MGMT HIGH F2F 7D: CPT

## 2024-05-21 PROCEDURE — 1111F DSCHRG MED/CURRENT MED MERGE: CPT

## 2024-05-21 PROCEDURE — 3074F SYST BP LT 130 MM HG: CPT

## 2024-05-21 NOTE — PATIENT INSTRUCTIONS
Hold Eliquis for now  Continue deep breathing exercises  I will reach out to Cardiology on your case.    Thank you for coming in today, if any questions or concerns arise, please call my office.   ARMEN Oliveira-CNP

## 2024-05-21 NOTE — PROGRESS NOTES
Subjective   Patient ID: Sheldon Arteaga is a 84 y.o. male who presents for Hospital Follow-up (Sheldon is here for his hospital follow up of double pneumonia. He was not getting better until the second round in the hospital. Still has a cough all the time is up all night, does not feel bad today. Not back to normal yet. Cough medication does help when he remembers to take them. ).  Restarted on Eliquis    He was on Eliquis after CVA and arterial thrombosis in 2022, per neurology note.  May 2022 - AFIB with RVR, then placed on event monitor after arterial thrombosis    Per chart, unable to see Holter Monitor result from Dr. Costa  He did see Dr. Jacobson and vasc surg in 2021  He has been off Eliquis for over 2 years    Per the Holter report in 2022 May, he did not have any episode of AFIB, only short tachycardia episodes.    His breathing appears to be much improved, clear diminished bilaterally          Vitals:    05/21/24 0911   BP: 120/70   Pulse: 104   Temp: 36 °C (96.8 °F)   SpO2: 99%       Review of Systems    Objective   Physical Exam    Assessment/Plan   Problem List Items Addressed This Visit       Community acquired bacterial pneumonia    Community acquired bilateral lower lobe pneumonia            Thank you for coming in today, please call my office if you have any concerns or questions.     Alvaro AYERS, CNP

## 2024-05-21 NOTE — SIGNIFICANT EVENT
Follow Up Phone Call    Outgoing phone call    Spoke to: Sheldon Arteaga Relationship:spouse   Phone number: [phone]      Outcome: contacted patient/ family   No chief complaint on file.         Diagnosis:Not applicable    States he is feeling better. No further questions or concerns.

## 2024-05-22 LAB
ATRIAL RATE: 79 BPM
P AXIS: 76 DEGREES
P OFFSET: 205 MS
P ONSET: 149 MS
PR INTERVAL: 136 MS
Q ONSET: 217 MS
QRS COUNT: 13 BEATS
QRS DURATION: 92 MS
QT INTERVAL: 394 MS
QTC CALCULATION(BAZETT): 451 MS
QTC FREDERICIA: 432 MS
R AXIS: 64 DEGREES
T AXIS: 73 DEGREES
T OFFSET: 414 MS
VENTRICULAR RATE: 79 BPM

## 2024-05-27 DIAGNOSIS — R73.9 HYPERGLYCEMIA: ICD-10-CM

## 2024-05-27 DIAGNOSIS — I63.9 ACUTE ISCHEMIC STROKE (MULTI): ICD-10-CM

## 2024-05-27 DIAGNOSIS — I61.4: ICD-10-CM

## 2024-05-27 DIAGNOSIS — J44.9 CHRONIC OBSTRUCTIVE PULMONARY DISEASE, UNSPECIFIED COPD TYPE (MULTI): Primary | ICD-10-CM

## 2024-05-28 DIAGNOSIS — I10 PRIMARY HYPERTENSION: ICD-10-CM

## 2024-05-28 RX ORDER — AMLODIPINE BESYLATE 5 MG/1
5 TABLET ORAL DAILY
Qty: 90 TABLET | Refills: 1 | Status: SHIPPED | OUTPATIENT
Start: 2024-05-28

## 2024-05-28 NOTE — TELEPHONE ENCOUNTER
----- Message from ARMEN Oliveira-CNP sent at 5/21/2024  9:45 AM EDT -----  I am starting Event monitor on him 15 days,  with Cardiology in [place]. Need to find out whether he does have paroxysmal AFIB and if he needs eliquis. Please notify. Thanks

## 2024-05-30 ENCOUNTER — PATIENT OUTREACH (OUTPATIENT)
Dept: PRIMARY CARE | Facility: CLINIC | Age: 84
End: 2024-05-30
Payer: MEDICARE

## 2024-05-30 NOTE — PROGRESS NOTES
Call regarding appt. with PCP on  5/21/24 after hospitalization.  At time of outreach call the patient feels as if their condition has improved  since last visit.  Reviewed with patient the PCP appointment and any questions or concerns regarding the appt.

## 2024-06-03 ENCOUNTER — HOSPITAL ENCOUNTER (OUTPATIENT)
Dept: CARDIOLOGY | Facility: HOSPITAL | Age: 84
Discharge: HOME | End: 2024-06-03
Payer: MEDICARE

## 2024-06-03 DIAGNOSIS — I48.0 PAF (PAROXYSMAL ATRIAL FIBRILLATION) (MULTI): ICD-10-CM

## 2024-06-03 PROCEDURE — 93246 EXT ECG>7D<15D RECORDING: CPT

## 2024-06-13 ENCOUNTER — PATIENT OUTREACH (OUTPATIENT)
Dept: PRIMARY CARE | Facility: CLINIC | Age: 84
End: 2024-06-13
Payer: MEDICARE

## 2024-06-13 NOTE — PROGRESS NOTES
Unable to reach patient for discharge follow up call.   LVM with call back number for patient to call if needed.

## 2024-07-29 DIAGNOSIS — E78.49 OTHER HYPERLIPIDEMIA: ICD-10-CM

## 2024-07-29 RX ORDER — ATORVASTATIN CALCIUM 40 MG/1
40 TABLET, FILM COATED ORAL NIGHTLY
Qty: 90 TABLET | Refills: 3 | Status: SHIPPED | OUTPATIENT
Start: 2024-07-29 | End: 2025-07-29

## 2024-08-14 ENCOUNTER — PATIENT OUTREACH (OUTPATIENT)
Dept: PRIMARY CARE | Facility: CLINIC | Age: 84
End: 2024-08-14
Payer: MEDICARE

## 2024-08-15 DIAGNOSIS — I10 PRIMARY HYPERTENSION: ICD-10-CM

## 2024-08-15 RX ORDER — METOPROLOL TARTRATE 25 MG/1
25 TABLET, FILM COATED ORAL 2 TIMES DAILY
Qty: 180 TABLET | Refills: 0 | Status: SHIPPED | OUTPATIENT
Start: 2024-08-15

## 2024-09-13 ENCOUNTER — APPOINTMENT (OUTPATIENT)
Dept: VASCULAR MEDICINE | Facility: HOSPITAL | Age: 84
End: 2024-09-13
Payer: MEDICARE

## 2024-09-13 ENCOUNTER — APPOINTMENT (OUTPATIENT)
Dept: VASCULAR SURGERY | Facility: HOSPITAL | Age: 84
End: 2024-09-13
Payer: MEDICARE

## 2024-10-04 DIAGNOSIS — I10 BENIGN ESSENTIAL HYPERTENSION: ICD-10-CM

## 2024-10-04 RX ORDER — CLOPIDOGREL BISULFATE 75 MG/1
75 TABLET ORAL DAILY
Qty: 90 TABLET | Refills: 0 | Status: SHIPPED | OUTPATIENT
Start: 2024-10-04

## 2024-10-11 ENCOUNTER — HOSPITAL ENCOUNTER (OUTPATIENT)
Dept: VASCULAR MEDICINE | Facility: HOSPITAL | Age: 84
Discharge: HOME | End: 2024-10-11
Payer: MEDICARE

## 2024-10-11 ENCOUNTER — OFFICE VISIT (OUTPATIENT)
Dept: VASCULAR SURGERY | Facility: HOSPITAL | Age: 84
End: 2024-10-11
Payer: MEDICARE

## 2024-10-11 ENCOUNTER — APPOINTMENT (OUTPATIENT)
Dept: VASCULAR MEDICINE | Facility: HOSPITAL | Age: 84
End: 2024-10-11
Payer: MEDICARE

## 2024-10-11 VITALS
WEIGHT: 133 LBS | HEART RATE: 74 BPM | HEIGHT: 72 IN | SYSTOLIC BLOOD PRESSURE: 145 MMHG | DIASTOLIC BLOOD PRESSURE: 66 MMHG | BODY MASS INDEX: 18.01 KG/M2

## 2024-10-11 DIAGNOSIS — I74.3 EMBOLISM AND THROMBOSIS OF ARTERIES OF THE LOWER EXTREMITIES (MULTI): ICD-10-CM

## 2024-10-11 DIAGNOSIS — I73.9 PERIPHERAL VASCULAR DISEASE, UNSPECIFIED (CMS-HCC): ICD-10-CM

## 2024-10-11 DIAGNOSIS — Z95.828 PRESENCE OF OTHER VASCULAR IMPLANTS AND GRAFTS: ICD-10-CM

## 2024-10-11 DIAGNOSIS — I73.9 PAD (PERIPHERAL ARTERY DISEASE) (CMS-HCC): Primary | ICD-10-CM

## 2024-10-11 PROCEDURE — 99214 OFFICE O/P EST MOD 30 MIN: CPT | Performed by: SURGERY

## 2024-10-11 PROCEDURE — 93922 UPR/L XTREMITY ART 2 LEVELS: CPT | Performed by: INTERNAL MEDICINE

## 2024-10-11 PROCEDURE — 93922 UPR/L XTREMITY ART 2 LEVELS: CPT

## 2024-10-11 PROCEDURE — 1157F ADVNC CARE PLAN IN RCRD: CPT | Performed by: SURGERY

## 2024-10-11 PROCEDURE — 1159F MED LIST DOCD IN RCRD: CPT | Performed by: SURGERY

## 2024-10-11 PROCEDURE — 3078F DIAST BP <80 MM HG: CPT | Performed by: SURGERY

## 2024-10-11 PROCEDURE — 93925 LOWER EXTREMITY STUDY: CPT

## 2024-10-11 PROCEDURE — 93925 LOWER EXTREMITY STUDY: CPT | Performed by: INTERNAL MEDICINE

## 2024-10-11 PROCEDURE — 3074F SYST BP LT 130 MM HG: CPT | Performed by: SURGERY

## 2024-10-11 NOTE — PROGRESS NOTES
Vascular Surgery Clinic Note    PCP: Alvaro Cedeño, APRN-CNP    CC: PAD   Subjective   HPI:  Sheldon Arteaga is 84 y.o. male with history of HTN, HLD, COPD, DVT, PAD s/p left fem-pop bypass c/b infection s/p resection of fem-pop bypass with right CFA to left profunda bypass with reversed right femoral vein (5/2022) who presents for follow-up who is here for annual follow-up. Doing well. Ambulates well without assistance. No claudication, rest pain or wounds. Continues to smoke.    Past Vascular History:  5/2020: resection of fem-pop bypass with right CFA to left profunda bypass with reversed right femoral vein for infection of left fem-pop bypass graft    Past Vascular Testing:  Graft duplex (10/11/2024): no change from previous year  ONDINA (10/11/2024): unchanged from previous year    PMH/PSH: HTN, HLD, COPD, DVT, PAD s/p left fem-pop bypass c/b infection s/p resection of fem-pop bypass with right CFA to left profunda bypass with reversed right femoral vein (5/2022)     Home Meds:  Current Outpatient Medications on File Prior to Visit   Medication Sig Dispense Refill    albuterol (Ventolin HFA) 90 mcg/actuation inhaler Inhale 2 puffs every 4 hours if needed for wheezing or shortness of breath. 8 g 5    albuterol 2.5 mg /3 mL (0.083 %) nebulizer solution Take 3 mL (2.5 mg) by nebulization every 2 hours if needed for wheezing or shortness of breath. 75 mL 1    amLODIPine (Norvasc) 5 mg tablet Take 1 tablet (5 mg) by mouth once daily. 90 tablet 1    atorvastatin (Lipitor) 40 mg tablet Take 1 tablet (40 mg) by mouth once daily at bedtime. 90 tablet 3    clopidogrel (Plavix) 75 mg tablet Take 1 tablet by mouth once daily 90 tablet 0    ipratropium-albuteroL (Duo-Neb) 0.5-2.5 mg/3 mL nebulizer solution Take 3 mL by nebulization 4 times a day as needed for wheezing or shortness of breath. 90 mL 0    metoprolol tartrate (Lopressor) 25 mg tablet Take 1 tablet (25 mg) by mouth 2 times a day. 180 tablet 0    sodium  chloride (Saline NasaL) 0.65 % nasal spray Administer 1 spray into each nostril if needed for congestion. 50 mL 1    apixaban (Eliquis) 5 mg tablet Take 1 tablet (5 mg) by mouth 2 times a day. (Patient not taking: Reported on 5/21/2024) 60 tablet 0    fluticasone (Flonase) 50 mcg/actuation nasal spray Administer 1 spray into each nostril once daily. Shake gently. Before first use, prime pump. After use, clean tip and replace cap. (Patient not taking: Reported on 10/11/2024) 16 g 11     No current facility-administered medications on file prior to visit.      Allergies:  Allergies   Allergen Reactions    Doxycycline Confusion    T-Painol Extra Strength Other    Levaquin [Levofloxacin] Itching and Rash     SH/FH:  Tobacco Use: Medium Risk (10/11/2024)    Patient History     Smoking Tobacco Use: Former     Smokeless Tobacco Use: Never     Passive Exposure: Not on file     ROS: 12 system negative except HPI    Objective   Vitals:  Vitals:    10/11/24 1116   BP: 145/66   Pulse:      Exam:  Constitutional: No acute distress  Neuro:  AOx3, grossly intact  CV: no tachycardia  Pulm: non-labored on room air  GI: soft, non-tender, non-distended  Extremities: sensory and motor intact, no wounds  Pulses: monophasic left PT/DP, multiphasic right PT/DP    Assessment/Plan   Sheldon Arteaga is 84 y.o. male with history of HTN, HLD, COPD, DVT, PAD s/p left fem-pop bypass c/b infection s/p resection of fem-pop bypass with right CFA to left profunda bypass with reversed right femoral vein (5/2022) who presents for follow-up who is here for annual follow-up.     Stable graft duplex and ONDINA. No claudication, rest pain or tissue loss.     Plan:  Continue atorvastatin and plavix  Encourage smoking cessation  Avoid injury to legs  Follow-up in one year with ONDINA and graft duplex    Monet Meade MD    I saw and evaluated the patient. I personally obtained the key and critical portions of the history and physical exam or was physically  present for key and critical portions performed by the resident/fellow. I reviewed the resident/fellow's documentation and discussed the patient with the resident/fellow. I agree with the resident/fellow's medical decision making as documented in the note.    Parker Olmedo MD  Professor & Chief, Division of Vascular Surgery & Endovascular Therapy

## 2024-10-15 DIAGNOSIS — I10 BENIGN ESSENTIAL HYPERTENSION: ICD-10-CM

## 2024-10-16 RX ORDER — CLOPIDOGREL BISULFATE 75 MG/1
75 TABLET ORAL DAILY
Qty: 90 TABLET | Refills: 0 | Status: SHIPPED | OUTPATIENT
Start: 2024-10-16

## 2024-10-17 ENCOUNTER — APPOINTMENT (OUTPATIENT)
Dept: RADIOLOGY | Facility: HOSPITAL | Age: 84
End: 2024-10-17
Payer: COMMERCIAL

## 2024-10-17 ENCOUNTER — APPOINTMENT (OUTPATIENT)
Dept: CARDIOLOGY | Facility: HOSPITAL | Age: 84
End: 2024-10-17
Payer: COMMERCIAL

## 2024-10-17 ENCOUNTER — HOSPITAL ENCOUNTER (INPATIENT)
Facility: HOSPITAL | Age: 84
LOS: 1 days | Discharge: HOME | End: 2024-10-18
Attending: STUDENT IN AN ORGANIZED HEALTH CARE EDUCATION/TRAINING PROGRAM | Admitting: STUDENT IN AN ORGANIZED HEALTH CARE EDUCATION/TRAINING PROGRAM
Payer: COMMERCIAL

## 2024-10-17 DIAGNOSIS — J15.9 COMMUNITY ACQUIRED BACTERIAL PNEUMONIA: Primary | ICD-10-CM

## 2024-10-17 DIAGNOSIS — J18.9 COMMUNITY ACQUIRED BILATERAL LOWER LOBE PNEUMONIA: ICD-10-CM

## 2024-10-17 DIAGNOSIS — E87.1 HYPONATREMIA: ICD-10-CM

## 2024-10-17 DIAGNOSIS — R26.81 UNSTEADY GAIT: ICD-10-CM

## 2024-10-17 DIAGNOSIS — R53.1 GENERALIZED WEAKNESS: ICD-10-CM

## 2024-10-17 LAB
ALBUMIN SERPL BCP-MCNC: 3.9 G/DL (ref 3.4–5)
ALP SERPL-CCNC: 69 U/L (ref 33–136)
ALT SERPL W P-5'-P-CCNC: 8 U/L (ref 10–52)
ANION GAP BLDV CALCULATED.4IONS-SCNC: 8 MMOL/L (ref 10–25)
ANION GAP SERPL CALC-SCNC: 12 MMOL/L (ref 10–20)
AST SERPL W P-5'-P-CCNC: 18 U/L (ref 9–39)
BASE EXCESS BLDV CALC-SCNC: -0.5 MMOL/L (ref -2–3)
BASOPHILS # BLD AUTO: 0.04 X10*3/UL (ref 0–0.1)
BASOPHILS NFR BLD AUTO: 0.6 %
BILIRUB SERPL-MCNC: 0.4 MG/DL (ref 0–1.2)
BNP SERPL-MCNC: 24 PG/ML (ref 0–99)
BODY TEMPERATURE: ABNORMAL
BUN SERPL-MCNC: 17 MG/DL (ref 6–23)
CA-I BLDV-SCNC: 1.08 MMOL/L (ref 1.1–1.33)
CALCIUM SERPL-MCNC: 8.6 MG/DL (ref 8.6–10.3)
CARDIAC TROPONIN I PNL SERPL HS: 5 NG/L (ref 0–20)
CHLORIDE BLDV-SCNC: 100 MMOL/L (ref 98–107)
CHLORIDE SERPL-SCNC: 98 MMOL/L (ref 98–107)
CO2 SERPL-SCNC: 25 MMOL/L (ref 21–32)
CREAT SERPL-MCNC: 0.78 MG/DL (ref 0.5–1.3)
EGFRCR SERPLBLD CKD-EPI 2021: 88 ML/MIN/1.73M*2
EOSINOPHIL # BLD AUTO: 0.29 X10*3/UL (ref 0–0.4)
EOSINOPHIL NFR BLD AUTO: 4.7 %
ERYTHROCYTE [DISTWIDTH] IN BLOOD BY AUTOMATED COUNT: 14.5 % (ref 11.5–14.5)
FLUAV RNA RESP QL NAA+PROBE: NOT DETECTED
FLUBV RNA RESP QL NAA+PROBE: NOT DETECTED
GLUCOSE BLD MANUAL STRIP-MCNC: 179 MG/DL (ref 74–99)
GLUCOSE BLDV-MCNC: 157 MG/DL (ref 74–99)
GLUCOSE SERPL-MCNC: 178 MG/DL (ref 74–99)
HCO3 BLDV-SCNC: 24.2 MMOL/L (ref 22–26)
HCT VFR BLD AUTO: 38.3 % (ref 41–52)
HCT VFR BLD EST: 41 % (ref 41–52)
HGB BLD-MCNC: 12.5 G/DL (ref 13.5–17.5)
HGB BLDV-MCNC: 13.5 G/DL (ref 13.5–17.5)
IMM GRANULOCYTES # BLD AUTO: 0.02 X10*3/UL (ref 0–0.5)
IMM GRANULOCYTES NFR BLD AUTO: 0.3 % (ref 0–0.9)
INHALED O2 CONCENTRATION: 21 %
INR PPP: 1 (ref 0.9–1.1)
LACTATE BLDV-SCNC: 1.4 MMOL/L (ref 0.4–2)
LACTATE SERPL-SCNC: 1.5 MMOL/L (ref 0.4–2)
LYMPHOCYTES # BLD AUTO: 1.94 X10*3/UL (ref 0.8–3)
LYMPHOCYTES NFR BLD AUTO: 31.4 %
MAGNESIUM SERPL-MCNC: 1.96 MG/DL (ref 1.6–2.4)
MCH RBC QN AUTO: 31.1 PG (ref 26–34)
MCHC RBC AUTO-ENTMCNC: 32.6 G/DL (ref 32–36)
MCV RBC AUTO: 95 FL (ref 80–100)
MONOCYTES # BLD AUTO: 0.71 X10*3/UL (ref 0.05–0.8)
MONOCYTES NFR BLD AUTO: 11.5 %
NEUTROPHILS # BLD AUTO: 3.18 X10*3/UL (ref 1.6–5.5)
NEUTROPHILS NFR BLD AUTO: 51.5 %
NRBC BLD-RTO: 0 /100 WBCS (ref 0–0)
OXYHGB MFR BLDV: 75.8 % (ref 45–75)
PCO2 BLDV: 39 MM HG (ref 41–51)
PH BLDV: 7.4 PH (ref 7.33–7.43)
PLATELET # BLD AUTO: 192 X10*3/UL (ref 150–450)
PO2 BLDV: 46 MM HG (ref 35–45)
POTASSIUM BLDV-SCNC: 4.1 MMOL/L (ref 3.5–5.3)
POTASSIUM SERPL-SCNC: 4.1 MMOL/L (ref 3.5–5.3)
PROT SERPL-MCNC: 6.6 G/DL (ref 6.4–8.2)
PROTHROMBIN TIME: 11.6 SECONDS (ref 9.8–12.8)
RBC # BLD AUTO: 4.02 X10*6/UL (ref 4.5–5.9)
SAO2 % BLDV: 81 % (ref 45–75)
SARS-COV-2 RNA RESP QL NAA+PROBE: NOT DETECTED
SODIUM BLDV-SCNC: 128 MMOL/L (ref 136–145)
SODIUM SERPL-SCNC: 131 MMOL/L (ref 136–145)
WBC # BLD AUTO: 6.2 X10*3/UL (ref 4.4–11.3)

## 2024-10-17 PROCEDURE — 84132 ASSAY OF SERUM POTASSIUM: CPT | Performed by: HEALTH CARE PROVIDER

## 2024-10-17 PROCEDURE — 1100000001 HC PRIVATE ROOM DAILY

## 2024-10-17 PROCEDURE — 70450 CT HEAD/BRAIN W/O DYE: CPT | Performed by: RADIOLOGY

## 2024-10-17 PROCEDURE — 85610 PROTHROMBIN TIME: CPT | Performed by: HEALTH CARE PROVIDER

## 2024-10-17 PROCEDURE — 82435 ASSAY OF BLOOD CHLORIDE: CPT | Performed by: HEALTH CARE PROVIDER

## 2024-10-17 PROCEDURE — 99285 EMERGENCY DEPT VISIT HI MDM: CPT

## 2024-10-17 PROCEDURE — 84484 ASSAY OF TROPONIN QUANT: CPT | Performed by: HEALTH CARE PROVIDER

## 2024-10-17 PROCEDURE — 82947 ASSAY GLUCOSE BLOOD QUANT: CPT

## 2024-10-17 PROCEDURE — 87636 SARSCOV2 & INF A&B AMP PRB: CPT | Performed by: HEALTH CARE PROVIDER

## 2024-10-17 PROCEDURE — 82947 ASSAY GLUCOSE BLOOD QUANT: CPT | Performed by: HEALTH CARE PROVIDER

## 2024-10-17 PROCEDURE — 71045 X-RAY EXAM CHEST 1 VIEW: CPT

## 2024-10-17 PROCEDURE — 36415 COLL VENOUS BLD VENIPUNCTURE: CPT | Performed by: HEALTH CARE PROVIDER

## 2024-10-17 PROCEDURE — 70450 CT HEAD/BRAIN W/O DYE: CPT

## 2024-10-17 PROCEDURE — 70551 MRI BRAIN STEM W/O DYE: CPT | Performed by: STUDENT IN AN ORGANIZED HEALTH CARE EDUCATION/TRAINING PROGRAM

## 2024-10-17 PROCEDURE — 83880 ASSAY OF NATRIURETIC PEPTIDE: CPT | Performed by: HEALTH CARE PROVIDER

## 2024-10-17 PROCEDURE — 93005 ELECTROCARDIOGRAM TRACING: CPT

## 2024-10-17 PROCEDURE — 71045 X-RAY EXAM CHEST 1 VIEW: CPT | Performed by: RADIOLOGY

## 2024-10-17 PROCEDURE — 83735 ASSAY OF MAGNESIUM: CPT | Performed by: HEALTH CARE PROVIDER

## 2024-10-17 PROCEDURE — 70551 MRI BRAIN STEM W/O DYE: CPT

## 2024-10-17 PROCEDURE — 83605 ASSAY OF LACTIC ACID: CPT | Performed by: HEALTH CARE PROVIDER

## 2024-10-17 PROCEDURE — 85025 COMPLETE CBC W/AUTO DIFF WBC: CPT | Performed by: HEALTH CARE PROVIDER

## 2024-10-17 PROCEDURE — 82330 ASSAY OF CALCIUM: CPT | Performed by: HEALTH CARE PROVIDER

## 2024-10-17 ASSESSMENT — LIFESTYLE VARIABLES
EVER HAD A DRINK FIRST THING IN THE MORNING TO STEADY YOUR NERVES TO GET RID OF A HANGOVER: NO
TOTAL SCORE: 0
HAVE PEOPLE ANNOYED YOU BY CRITICIZING YOUR DRINKING: NO
HAVE YOU EVER FELT YOU SHOULD CUT DOWN ON YOUR DRINKING: NO
EVER FELT BAD OR GUILTY ABOUT YOUR DRINKING: NO

## 2024-10-17 ASSESSMENT — PAIN SCALES - GENERAL: PAINLEVEL_OUTOF10: 0 - NO PAIN

## 2024-10-17 ASSESSMENT — COLUMBIA-SUICIDE SEVERITY RATING SCALE - C-SSRS
6. HAVE YOU EVER DONE ANYTHING, STARTED TO DO ANYTHING, OR PREPARED TO DO ANYTHING TO END YOUR LIFE?: NO
1. IN THE PAST MONTH, HAVE YOU WISHED YOU WERE DEAD OR WISHED YOU COULD GO TO SLEEP AND NOT WAKE UP?: NO
2. HAVE YOU ACTUALLY HAD ANY THOUGHTS OF KILLING YOURSELF?: NO

## 2024-10-17 ASSESSMENT — PAIN - FUNCTIONAL ASSESSMENT: PAIN_FUNCTIONAL_ASSESSMENT: 0-10

## 2024-10-17 NOTE — ED PROVIDER NOTES
HPI   Chief Complaint   Patient presents with    Weakness, Gen       CC: weakness  HPI:    84 y.o. male with history of HTN, HLD, COPD, DVT, PAD s/p left fem-pop bypass c/b infection s/p resection of fem-pop bypass with right CFA to left profunda bypass with reversed right femoral vein (5/2022), A-fib on Plavix, CVA who presents for complaints of generalized weakness, lower extremity weakness for the past 3 days, states he feels off balance as well, denies any nausea vomiting diarrhea, he denies any fever, chills, he denies any chest pain, shortness of breath, he does report increased cough, currently longtime tobacco use.  He denies having any headache, denies any dizziness or cervical neck tenderness.    Additional Limitations to History:   External Records Reviewed: I reviewed recent and relevant outside records including   History Obtained From:     Past Medical History: Per HPI  Medications: Reviewed in EMR and with patient  Allergies:  Reviewed in EMR  Past Surgical History:   Social History:     ------------------------------------------------------------------------------------------------------  Physical Exam:  --Vital signs reviewed in nursing triage note, EMR flow sheets, and at patient's bedside  GEN:  A&Ox3, no acute distress, appears comfortable.  Conversational and appropriate.  No confusion or gross mental status changes.  EYES: EOMI, non-injected sclera.  ENT: Moist mucous membranes, no apparent injuries or lesions.   CARDIO: Normal rate and regular rhythm. No murmurs, rubs, or gallops.  2+ equal pulses of the distal extremities.   PULM: Clear to auscultation bilaterally. No rales, rhonchi, or wheezes. Good symmetric chest expansion.  GI: Soft, non-tender, non-distended. No rebound tenderness or guarding.  SKIN: Warm and dry, no rashes or lesions.  MSK: ROM intact the extremities without contractures.   EXT: No peripheral edema, contusions, or wounds.   NEURO: Cranial nerves II-XII grossly intact.  Sensation to light touch intact and equal bilaterally in upper and lower extremities.  Symmetric 5/5 strength in upper and lower extremities.  Unsteady gait  PSYCH: Appropriate mood and behavior, converses and responds appropriately during exam.  -------------------------------------------------------------------------------------------------------      Differential Diagnoses Considered:   Chronic Medical Conditions Significantly Affecting Care:   Diagnostic testing considered: [PERC, D-Dimer, PECARN, etc.]    - EKG interpreted by myself normal sinus rhythm ventricular 85 CT interval 136 normal QRS duration no prolonged QT/QTc no obvious ST elevation, depression or acute ischemic findings.  - I independently interpreted: [CXR, CT, POCUS, etc. including your interpretation]  - Labs notable for     Escalation of Care: Appropriate for   Social Determinants of Health Significantly Affecting Care: [Homelessness, lacking transportation, uninsured, unable to afford medications]  Prescription Drug Consideration: [Antibiotics, antivirals, pain medications, etc.]  Discussion of Management with Other Providers:  I discussed the patient/results with: [admitting team, consultant, radiologist, social work, EPAT, case management, PT/OT, RT, PCP, etc.]      Bhavin Mccurdy PA-C              Patient History   Past Medical History:   Diagnosis Date    Stroke (Multi)      Past Surgical History:   Procedure Laterality Date    CT ANGIO NECK  4/19/2022    CT NECK ANGIO W AND WO IV CONTRAST 4/19/2022 [facility] CLINICAL LEGAC[facility]    CT AORTA AND BILATERAL ILIOFEMORAL RUNOFF ANGIOGRAM W AND/OR WO IV CONTRAST  4/10/2022    CT AORTA AND BILATERAL ILIOFEMORAL RUNOFF ANGIOGRAM W AND/OR WO IV CONTRAST 4/10/2022 [facility]    CT AORTA AND BILATERAL ILIOFEMORAL RUNOFF ANGIOGRAM W AND/OR WO IV CONTRAST  4/13/2022    CT AORTA AND BILATERAL ILIOFEMORAL RUNOFF ANGIOGRAM W AND/OR WO IV CONTRAST 4/13/2022 [facility] CLINICAL LEGACY    CT AORTA AND BILATERAL  ILIOFEMORAL RUNOFF ANGIOGRAM W AND/OR WO IV CONTRAST  5/22/2022    CT AORTA AND BILATERAL ILIOFEMORAL RUNOFF ANGIOGRAM W AND/OR WO IV CONTRAST 5/22/2022 [facility]    CT AORTA AND BILATERAL ILIOFEMORAL RUNOFF ANGIOGRAM W AND/OR WO IV CONTRAST  4/2/2023    CT AORTA AND BILATERAL ILIOFEMORAL RUNOFF ANGIOGRAM W AND/OR WO IV CONTRAST [facility] CT    CT HEAD ANGIO W AND WO IV CONTRAST  4/19/2022    CT HEAD ANGIO W AND WO IV CONTRAST 4/19/2022 [facility]    OTHER SURGICAL HISTORY  09/17/2019    [place] tooth extraction    OTHER SURGICAL HISTORY  09/17/2019    Tonsillectomy with adenoidectomy    OTHER SURGICAL HISTORY  01/17/2020    Lower leg fracture repair    OTHER SURGICAL HISTORY  01/17/2020    Cardiac catheterization    TOTAL HIP ARTHROPLASTY  08/31/2018    Hip Replacement     Family History   Problem Relation Name Age of Onset    Heart attack Mother      Colon cancer Father      Heart attack Brother      Heart block Brother       Social History     Tobacco Use    Smoking status: Former     Types: Cigarettes    Smokeless tobacco: Never   Vaping Use    Vaping status: Never Used   Substance Use Topics    Alcohol use: Never    Drug use: Never       Physical Exam   ED Triage Vitals [10/17/24 1854]   Temperature Heart Rate Respirations BP   36.4 °C (97.5 °F) 95 20 133/74      Pulse Ox Temp Source Heart Rate Source Patient Position   95 % Temporal Monitor --      BP Location FiO2 (%)     -- --       Physical Exam      ED Course & MDM   Diagnoses as of 10/19/24 1216   Generalized weakness   Unsteady gait   Hyponatremia                 No data recorded     [place] Coma Scale Score: 15 (10/17/24 1903 : Nori Parnell)                           Medical Decision Making  84-year-old male with history of hypertension hyperlipidemia PAD COPD on Plavix with 3-day history of generalized weakness, unsteady gait, he is neurologically intact he medically stable, nontoxic-appearing, no other focal  deficits on  examination, unsteady gait,, somewhat shuffling patient will be admitted to observation at this time for further workup including MRI, OT PT, correct mild hyponatremia.        Procedure  Procedures     Bhavin Mccurdy PA-C  10/17/24 1948       Bhavin Mccurdy PA-C  10/17/24 2206       Bhavin Mccurdy PA-C  10/19/24 1215

## 2024-10-17 NOTE — ED TRIAGE NOTES
Patient c/o dizziness and generalized weakness for the past 3 days. Patient has a hx of a prior stroke. Patient states for the past 2 days he has had difficulty walking and keeping his balance.

## 2024-10-18 ENCOUNTER — APPOINTMENT (OUTPATIENT)
Dept: RADIOLOGY | Facility: HOSPITAL | Age: 84
End: 2024-10-18
Payer: COMMERCIAL

## 2024-10-18 VITALS
HEART RATE: 79 BPM | HEIGHT: 72 IN | OXYGEN SATURATION: 93 % | SYSTOLIC BLOOD PRESSURE: 120 MMHG | RESPIRATION RATE: 18 BRPM | WEIGHT: 133 LBS | BODY MASS INDEX: 18.01 KG/M2 | TEMPERATURE: 99 F | DIASTOLIC BLOOD PRESSURE: 72 MMHG

## 2024-10-18 LAB
ALBUMIN SERPL BCP-MCNC: 3.5 G/DL (ref 3.4–5)
ALP SERPL-CCNC: 59 U/L (ref 33–136)
ALT SERPL W P-5'-P-CCNC: 7 U/L (ref 10–52)
ANION GAP SERPL CALC-SCNC: 12 MMOL/L (ref 10–20)
APPEARANCE UR: CLEAR
AST SERPL W P-5'-P-CCNC: 15 U/L (ref 9–39)
BILIRUB SERPL-MCNC: 0.5 MG/DL (ref 0–1.2)
BILIRUB UR STRIP.AUTO-MCNC: NEGATIVE MG/DL
BUN SERPL-MCNC: 11 MG/DL (ref 6–23)
CALCIUM SERPL-MCNC: 8.3 MG/DL (ref 8.6–10.3)
CHLORIDE SERPL-SCNC: 101 MMOL/L (ref 98–107)
CHLORIDE UR-SCNC: 56 MMOL/L
CHLORIDE/CREATININE (MMOL/G) IN URINE: 94 MMOL/G CREAT (ref 23–275)
CO2 SERPL-SCNC: 26 MMOL/L (ref 21–32)
COLOR UR: NORMAL
CREAT SERPL-MCNC: 0.62 MG/DL (ref 0.5–1.3)
CREAT UR-MCNC: 59.8 MG/DL (ref 20–370)
CRP SERPL-MCNC: 4 MG/DL
EGFRCR SERPLBLD CKD-EPI 2021: >90 ML/MIN/1.73M*2
ERYTHROCYTE [DISTWIDTH] IN BLOOD BY AUTOMATED COUNT: 14.2 % (ref 11.5–14.5)
ERYTHROCYTE [SEDIMENTATION RATE] IN BLOOD BY WESTERGREN METHOD: 24 MM/H (ref 0–20)
GLUCOSE SERPL-MCNC: 91 MG/DL (ref 74–99)
GLUCOSE UR STRIP.AUTO-MCNC: NORMAL MG/DL
HCT VFR BLD AUTO: 37.7 % (ref 41–52)
HGB BLD-MCNC: 12.1 G/DL (ref 13.5–17.5)
HOLD SPECIMEN: NORMAL
KETONES UR STRIP.AUTO-MCNC: NEGATIVE MG/DL
LEUKOCYTE ESTERASE UR QL STRIP.AUTO: NEGATIVE
MAGNESIUM SERPL-MCNC: 1.81 MG/DL (ref 1.6–2.4)
MCH RBC QN AUTO: 30.3 PG (ref 26–34)
MCHC RBC AUTO-ENTMCNC: 32.1 G/DL (ref 32–36)
MCV RBC AUTO: 95 FL (ref 80–100)
NITRITE UR QL STRIP.AUTO: NEGATIVE
NRBC BLD-RTO: 0 /100 WBCS (ref 0–0)
OSMOLALITY SERPL: 282 MOSM/KG (ref 280–300)
OSMOLALITY UR: 353 MOSM/KG (ref 200–1200)
PH UR STRIP.AUTO: 6 [PH]
PHOSPHATE SERPL-MCNC: 3.4 MG/DL (ref 2.5–4.9)
PLATELET # BLD AUTO: 195 X10*3/UL (ref 150–450)
POTASSIUM SERPL-SCNC: 3.9 MMOL/L (ref 3.5–5.3)
POTASSIUM UR-SCNC: 22 MMOL/L
POTASSIUM/CREAT UR-RTO: 37 MMOL/G CREAT
PROT SERPL-MCNC: 6.2 G/DL (ref 6.4–8.2)
PROT UR STRIP.AUTO-MCNC: NEGATIVE MG/DL
RBC # BLD AUTO: 3.99 X10*6/UL (ref 4.5–5.9)
RBC # UR STRIP.AUTO: NEGATIVE /UL
SODIUM SERPL-SCNC: 135 MMOL/L (ref 136–145)
SODIUM UR-SCNC: 64 MMOL/L
SODIUM/CREAT UR-RTO: 107 MMOL/G CREAT
SP GR UR STRIP.AUTO: 1.01
UROBILINOGEN UR STRIP.AUTO-MCNC: NORMAL MG/DL
WBC # BLD AUTO: 5.3 X10*3/UL (ref 4.4–11.3)

## 2024-10-18 PROCEDURE — 94667 MNPJ CHEST WALL 1ST: CPT

## 2024-10-18 PROCEDURE — 87899 AGENT NOS ASSAY W/OPTIC: CPT | Mod: GEALAB

## 2024-10-18 PROCEDURE — 2500000004 HC RX 250 GENERAL PHARMACY W/ HCPCS (ALT 636 FOR OP/ED)

## 2024-10-18 PROCEDURE — 36415 COLL VENOUS BLD VENIPUNCTURE: CPT

## 2024-10-18 PROCEDURE — 71250 CT THORAX DX C-: CPT | Performed by: RADIOLOGY

## 2024-10-18 PROCEDURE — 9420000001 HC RT PATIENT EDUCATION 5 MIN

## 2024-10-18 PROCEDURE — 94669 MECHANICAL CHEST WALL OSCILL: CPT

## 2024-10-18 PROCEDURE — 85027 COMPLETE CBC AUTOMATED: CPT

## 2024-10-18 PROCEDURE — 86140 C-REACTIVE PROTEIN: CPT

## 2024-10-18 PROCEDURE — 94760 N-INVAS EAR/PLS OXIMETRY 1: CPT

## 2024-10-18 PROCEDURE — 99236 HOSP IP/OBS SAME DATE HI 85: CPT

## 2024-10-18 PROCEDURE — 84145 PROCALCITONIN (PCT): CPT | Mod: GEALAB

## 2024-10-18 PROCEDURE — 84075 ASSAY ALKALINE PHOSPHATASE: CPT

## 2024-10-18 PROCEDURE — 2500000002 HC RX 250 W HCPCS SELF ADMINISTERED DRUGS (ALT 637 FOR MEDICARE OP, ALT 636 FOR OP/ED)

## 2024-10-18 PROCEDURE — 85652 RBC SED RATE AUTOMATED: CPT

## 2024-10-18 PROCEDURE — 71250 CT THORAX DX C-: CPT

## 2024-10-18 PROCEDURE — 83930 ASSAY OF BLOOD OSMOLALITY: CPT | Mod: GEALAB

## 2024-10-18 PROCEDURE — 97165 OT EVAL LOW COMPLEX 30 MIN: CPT | Mod: GO

## 2024-10-18 PROCEDURE — 87449 NOS EACH ORGANISM AG IA: CPT | Mod: GEALAB

## 2024-10-18 PROCEDURE — 83735 ASSAY OF MAGNESIUM: CPT

## 2024-10-18 PROCEDURE — 83935 ASSAY OF URINE OSMOLALITY: CPT | Mod: GEALAB

## 2024-10-18 PROCEDURE — 81003 URINALYSIS AUTO W/O SCOPE: CPT | Performed by: HEALTH CARE PROVIDER

## 2024-10-18 PROCEDURE — 2500000001 HC RX 250 WO HCPCS SELF ADMINISTERED DRUGS (ALT 637 FOR MEDICARE OP)

## 2024-10-18 PROCEDURE — 84100 ASSAY OF PHOSPHORUS: CPT

## 2024-10-18 PROCEDURE — 80053 COMPREHEN METABOLIC PANEL: CPT

## 2024-10-18 PROCEDURE — 82436 ASSAY OF URINE CHLORIDE: CPT | Mod: GEALAB

## 2024-10-18 RX ORDER — ATORVASTATIN CALCIUM 40 MG/1
40 TABLET, FILM COATED ORAL NIGHTLY
Status: DISCONTINUED | OUTPATIENT
Start: 2024-10-18 | End: 2024-10-18 | Stop reason: HOSPADM

## 2024-10-18 RX ORDER — ALBUTEROL SULFATE 0.83 MG/ML
2.5 SOLUTION RESPIRATORY (INHALATION) EVERY 2 HOUR PRN
Status: DISCONTINUED | OUTPATIENT
Start: 2024-10-18 | End: 2024-10-18

## 2024-10-18 RX ORDER — AMOXICILLIN AND CLAVULANATE POTASSIUM 875; 125 MG/1; MG/1
1 TABLET, FILM COATED ORAL EVERY 12 HOURS SCHEDULED
Qty: 10 TABLET | Refills: 0 | Status: ON HOLD | OUTPATIENT
Start: 2024-10-18 | End: 2024-10-24

## 2024-10-18 RX ORDER — ALBUTEROL SULFATE 90 UG/1
2 INHALANT RESPIRATORY (INHALATION) EVERY 4 HOURS PRN
Status: DISCONTINUED | OUTPATIENT
Start: 2024-10-18 | End: 2024-10-18 | Stop reason: HOSPADM

## 2024-10-18 RX ORDER — IPRATROPIUM BROMIDE AND ALBUTEROL SULFATE 2.5; .5 MG/3ML; MG/3ML
3 SOLUTION RESPIRATORY (INHALATION) 4 TIMES DAILY PRN
Status: DISCONTINUED | OUTPATIENT
Start: 2024-10-18 | End: 2024-10-18

## 2024-10-18 RX ORDER — AMOXICILLIN AND CLAVULANATE POTASSIUM 875; 125 MG/1; MG/1
1 TABLET, FILM COATED ORAL EVERY 12 HOURS SCHEDULED
Status: DISCONTINUED | OUTPATIENT
Start: 2024-10-18 | End: 2024-10-18 | Stop reason: HOSPADM

## 2024-10-18 RX ORDER — FLUTICASONE PROPIONATE 50 MCG
1 SPRAY, SUSPENSION (ML) NASAL DAILY
Status: DISCONTINUED | OUTPATIENT
Start: 2024-10-18 | End: 2024-10-18 | Stop reason: HOSPADM

## 2024-10-18 RX ORDER — AMLODIPINE BESYLATE 5 MG/1
5 TABLET ORAL DAILY
Status: DISCONTINUED | OUTPATIENT
Start: 2024-10-18 | End: 2024-10-18 | Stop reason: HOSPADM

## 2024-10-18 RX ORDER — POLYETHYLENE GLYCOL 3350 17 G/17G
17 POWDER, FOR SOLUTION ORAL DAILY
Status: DISCONTINUED | OUTPATIENT
Start: 2024-10-18 | End: 2024-10-18 | Stop reason: HOSPADM

## 2024-10-18 RX ORDER — METOPROLOL TARTRATE 25 MG/1
25 TABLET, FILM COATED ORAL 2 TIMES DAILY
Status: DISCONTINUED | OUTPATIENT
Start: 2024-10-18 | End: 2024-10-18 | Stop reason: HOSPADM

## 2024-10-18 RX ORDER — CLOPIDOGREL BISULFATE 75 MG/1
75 TABLET ORAL DAILY
Status: DISCONTINUED | OUTPATIENT
Start: 2024-10-18 | End: 2024-10-18 | Stop reason: HOSPADM

## 2024-10-18 RX ORDER — IPRATROPIUM BROMIDE AND ALBUTEROL SULFATE 2.5; .5 MG/3ML; MG/3ML
3 SOLUTION RESPIRATORY (INHALATION) EVERY 2 HOUR PRN
Status: DISCONTINUED | OUTPATIENT
Start: 2024-10-18 | End: 2024-10-18 | Stop reason: HOSPADM

## 2024-10-18 RX ADMIN — AMOXICILLIN AND CLAVULANATE POTASSIUM 1 TABLET: 875; 125 TABLET, FILM COATED ORAL at 09:32

## 2024-10-18 RX ADMIN — CLOPIDOGREL 75 MG: 75 TABLET ORAL at 08:35

## 2024-10-18 RX ADMIN — METOPROLOL TARTRATE 25 MG: 25 TABLET, FILM COATED ORAL at 08:35

## 2024-10-18 RX ADMIN — POLYETHYLENE GLYCOL 3350 17 G: 17 POWDER, FOR SOLUTION ORAL at 08:35

## 2024-10-18 RX ADMIN — APIXABAN 5 MG: 5 TABLET, FILM COATED ORAL at 01:23

## 2024-10-18 RX ADMIN — FLUTICASONE PROPIONATE 1 SPRAY: 50 SPRAY, METERED NASAL at 08:38

## 2024-10-18 RX ADMIN — SALINE NASAL SPRAY 1 SPRAY: 1.5 SOLUTION NASAL at 08:37

## 2024-10-18 RX ADMIN — APIXABAN 5 MG: 5 TABLET, FILM COATED ORAL at 08:35

## 2024-10-18 RX ADMIN — AMLODIPINE BESYLATE 5 MG: 5 TABLET ORAL at 08:35

## 2024-10-18 SDOH — SOCIAL STABILITY: SOCIAL INSECURITY
WITHIN THE LAST YEAR, HAVE YOU BEEN RAPED OR FORCED TO HAVE ANY KIND OF SEXUAL ACTIVITY BY YOUR PARTNER OR EX-PARTNER?: NO

## 2024-10-18 SDOH — ECONOMIC STABILITY: FOOD INSECURITY: WITHIN THE PAST 12 MONTHS, THE FOOD YOU BOUGHT JUST DIDN'T LAST AND YOU DIDN'T HAVE MONEY TO GET MORE.: NEVER TRUE

## 2024-10-18 SDOH — ECONOMIC STABILITY: INCOME INSECURITY: IN THE PAST 12 MONTHS HAS THE ELECTRIC, GAS, OIL, OR WATER COMPANY THREATENED TO SHUT OFF SERVICES IN YOUR HOME?: NO

## 2024-10-18 SDOH — SOCIAL STABILITY: SOCIAL INSECURITY: WITHIN THE LAST YEAR, HAVE YOU BEEN AFRAID OF YOUR PARTNER OR EX-PARTNER?: NO

## 2024-10-18 SDOH — SOCIAL STABILITY: SOCIAL INSECURITY
WITHIN THE LAST YEAR, HAVE YOU BEEN KICKED, HIT, SLAPPED, OR OTHERWISE PHYSICALLY HURT BY YOUR PARTNER OR EX-PARTNER?: NO

## 2024-10-18 SDOH — SOCIAL STABILITY: SOCIAL INSECURITY: WITHIN THE LAST YEAR, HAVE YOU BEEN HUMILIATED OR EMOTIONALLY ABUSED IN OTHER WAYS BY YOUR PARTNER OR EX-PARTNER?: NO

## 2024-10-18 SDOH — SOCIAL STABILITY: SOCIAL INSECURITY: ARE THERE ANY APPARENT SIGNS OF INJURIES/BEHAVIORS THAT COULD BE RELATED TO ABUSE/NEGLECT?: NO

## 2024-10-18 SDOH — SOCIAL STABILITY: SOCIAL INSECURITY: HAVE YOU HAD ANY THOUGHTS OF HARMING ANYONE ELSE?: NO

## 2024-10-18 SDOH — SOCIAL STABILITY: SOCIAL INSECURITY: HAS ANYONE EVER THREATENED TO HURT YOUR FAMILY OR YOUR PETS?: NO

## 2024-10-18 SDOH — ECONOMIC STABILITY: FOOD INSECURITY: WITHIN THE PAST 12 MONTHS, YOU WORRIED THAT YOUR FOOD WOULD RUN OUT BEFORE YOU GOT THE MONEY TO BUY MORE.: NEVER TRUE

## 2024-10-18 SDOH — ECONOMIC STABILITY: HOUSING INSECURITY: IN THE LAST 12 MONTHS, WAS THERE A TIME WHEN YOU WERE NOT ABLE TO PAY THE MORTGAGE OR RENT ON TIME?: NO

## 2024-10-18 SDOH — ECONOMIC STABILITY: HOUSING INSECURITY: IN THE PAST 12 MONTHS, HOW MANY TIMES HAVE YOU MOVED WHERE YOU WERE LIVING?: 0

## 2024-10-18 SDOH — ECONOMIC STABILITY: HOUSING INSECURITY: AT ANY TIME IN THE PAST 12 MONTHS, WERE YOU HOMELESS OR LIVING IN A SHELTER (INCLUDING NOW)?: NO

## 2024-10-18 SDOH — SOCIAL STABILITY: SOCIAL INSECURITY: ABUSE: ADULT

## 2024-10-18 SDOH — SOCIAL STABILITY: SOCIAL INSECURITY: ARE YOU OR HAVE YOU BEEN THREATENED OR ABUSED PHYSICALLY, EMOTIONALLY, OR SEXUALLY BY ANYONE?: NO

## 2024-10-18 SDOH — SOCIAL STABILITY: SOCIAL INSECURITY: WERE YOU ABLE TO COMPLETE ALL THE BEHAVIORAL HEALTH SCREENINGS?: YES

## 2024-10-18 SDOH — SOCIAL STABILITY: SOCIAL INSECURITY: DOES ANYONE TRY TO KEEP YOU FROM HAVING/CONTACTING OTHER FRIENDS OR DOING THINGS OUTSIDE YOUR HOME?: NO

## 2024-10-18 SDOH — SOCIAL STABILITY: SOCIAL INSECURITY: DO YOU FEEL ANYONE HAS EXPLOITED OR TAKEN ADVANTAGE OF YOU FINANCIALLY OR OF YOUR PERSONAL PROPERTY?: NO

## 2024-10-18 SDOH — ECONOMIC STABILITY: TRANSPORTATION INSECURITY: IN THE PAST 12 MONTHS, HAS LACK OF TRANSPORTATION KEPT YOU FROM MEDICAL APPOINTMENTS OR FROM GETTING MEDICATIONS?: NO

## 2024-10-18 SDOH — ECONOMIC STABILITY: FOOD INSECURITY: HOW HARD IS IT FOR YOU TO PAY FOR THE VERY BASICS LIKE FOOD, HOUSING, MEDICAL CARE, AND HEATING?: NOT HARD AT ALL

## 2024-10-18 SDOH — SOCIAL STABILITY: SOCIAL INSECURITY: DO YOU FEEL UNSAFE GOING BACK TO THE PLACE WHERE YOU ARE LIVING?: NO

## 2024-10-18 SDOH — SOCIAL STABILITY: SOCIAL INSECURITY: HAVE YOU HAD THOUGHTS OF HARMING ANYONE ELSE?: NO

## 2024-10-18 ASSESSMENT — COGNITIVE AND FUNCTIONAL STATUS - GENERAL
CLIMB 3 TO 5 STEPS WITH RAILING: A LITTLE
CLIMB 3 TO 5 STEPS WITH RAILING: A LITTLE
DRESSING REGULAR LOWER BODY CLOTHING: A LITTLE
WALKING IN HOSPITAL ROOM: A LITTLE
MOBILITY SCORE: 21
HELP NEEDED FOR BATHING: A LITTLE
DRESSING REGULAR LOWER BODY CLOTHING: A LITTLE
DAILY ACTIVITIY SCORE: 24
WALKING IN HOSPITAL ROOM: A LITTLE
STANDING UP FROM CHAIR USING ARMS: A LITTLE
DAILY ACTIVITIY SCORE: 23
HELP NEEDED FOR BATHING: A LITTLE
CLIMB 3 TO 5 STEPS WITH RAILING: A LITTLE
DAILY ACTIVITIY SCORE: 22
MOBILITY SCORE: 21
HELP NEEDED FOR BATHING: A LITTLE
STANDING UP FROM CHAIR USING ARMS: A LITTLE
PATIENT BASELINE BEDBOUND: NO
MOBILITY SCORE: 22
WALKING IN HOSPITAL ROOM: A LITTLE
DAILY ACTIVITIY SCORE: 22

## 2024-10-18 ASSESSMENT — ENCOUNTER SYMPTOMS
HEADACHES: 1
NUMBNESS: 0
WHEEZING: 0
DIAPHORESIS: 0
SHORTNESS OF BREATH: 0
ABDOMINAL PAIN: 0
DIZZINESS: 1
DIARRHEA: 0
LIGHT-HEADEDNESS: 0
ACTIVITY CHANGE: 1
CONSTIPATION: 0
NAUSEA: 0
CHEST TIGHTNESS: 0
DYSURIA: 0
FEVER: 1
FATIGUE: 1
TREMORS: 0
PHOTOPHOBIA: 1
VOMITING: 0
ABDOMINAL DISTENTION: 0
COUGH: 1

## 2024-10-18 ASSESSMENT — ACTIVITIES OF DAILY LIVING (ADL)
DRESSING YOURSELF: INDEPENDENT
WALKS IN HOME: INDEPENDENT
HEARING - LEFT EAR: FUNCTIONAL
LACK_OF_TRANSPORTATION: NO
JUDGMENT_ADEQUATE_SAFELY_COMPLETE_DAILY_ACTIVITIES: YES
LACK_OF_TRANSPORTATION: NO
FEEDING YOURSELF: INDEPENDENT
ADEQUATE_TO_COMPLETE_ADL: YES
ASSISTIVE_DEVICE: DENTURES LOWER;DENTURES UPPER;EYEGLASSES
TOILETING: INDEPENDENT
GROOMING: INDEPENDENT
LACK_OF_TRANSPORTATION: NO
BATHING_ASSISTANCE: STAND BY
ADL_ASSISTANCE: INDEPENDENT
HEARING - RIGHT EAR: FUNCTIONAL
PATIENT'S MEMORY ADEQUATE TO SAFELY COMPLETE DAILY ACTIVITIES?: YES
BATHING: INDEPENDENT

## 2024-10-18 ASSESSMENT — PAIN SCALES - GENERAL
PAINLEVEL_OUTOF10: 0 - NO PAIN

## 2024-10-18 ASSESSMENT — PATIENT HEALTH QUESTIONNAIRE - PHQ9
SUM OF ALL RESPONSES TO PHQ9 QUESTIONS 1 & 2: 0
1. LITTLE INTEREST OR PLEASURE IN DOING THINGS: NOT AT ALL
2. FEELING DOWN, DEPRESSED OR HOPELESS: NOT AT ALL

## 2024-10-18 ASSESSMENT — LIFESTYLE VARIABLES
HOW MANY STANDARD DRINKS CONTAINING ALCOHOL DO YOU HAVE ON A TYPICAL DAY: PATIENT DOES NOT DRINK
HOW OFTEN DO YOU HAVE 6 OR MORE DRINKS ON ONE OCCASION: NEVER
AUDIT-C TOTAL SCORE: 0
AUDIT-C TOTAL SCORE: 0
SKIP TO QUESTIONS 9-10: 1
HOW OFTEN DO YOU HAVE A DRINK CONTAINING ALCOHOL: NEVER

## 2024-10-18 ASSESSMENT — PAIN - FUNCTIONAL ASSESSMENT: PAIN_FUNCTIONAL_ASSESSMENT: 0-10

## 2024-10-18 NOTE — CARE PLAN
The patient's goals for the shift include      The clinical goals for the shift include Pt will remain HDS throughout shift      Problem: Pain - Adult  Goal: Verbalizes/displays adequate comfort level or baseline comfort level  Outcome: Progressing     Problem: Safety - Adult  Goal: Free from fall injury  Outcome: Progressing     Problem: Discharge Planning  Goal: Discharge to home or other facility with appropriate resources  Outcome: Progressing     Problem: Chronic Conditions and Co-morbidities  Goal: Patient's chronic conditions and co-morbidity symptoms are monitored and maintained or improved  Outcome: Progressing

## 2024-10-18 NOTE — HOSPITAL COURSE
Sheldon Arteaga is a 84 y.o. with PMH of HTN, HLD, COPD, Afib (not on anticoagulation per patient preference), DVT, possible HFpEF (EF 60-65%,), CVA (2022), PAD s/p resection of fem-pop bypass with right CFA to left profunda bypass with reversed right femoral vein (5/2022) who presents with weakness and unsteady gait for the past 2-3 days. In the ED, Vitals were unremarkable, and patient was afebrile. Na level was decreased at 131. There was no leukocytosis and patient was negative for Influenza and Covid. VBG was unremarkable. CXR was ordered and showed signs of chronic emphysema, but no acute process. CT head showed chronic ischemic changes, but no acute intracranial signs. Brain MRI was ordered in ED, which showed signs of chronic encephalomalacia likely sequela from prior infarcts, but no signs of acute changes. PT/OT consulted, will need physical therapy at home. CT chest showed subtle patchy airspace and ground-glass opacities in the lingula and right lower lobe. Will discharge with PO Augmentin x 5d for CAP.

## 2024-10-18 NOTE — PROGRESS NOTES
Occupational Therapy    Evaluation    Patient Name: Sheldon Arteaga  MRN: [id number]  Department: [address]  Room: [address] [place] [address]  Today's Date: 10/18/2024  Time Calculation  Start Time: 1327  Stop Time: 1337  Time Calculation (min): 10 min        Assessment:  OT Assessment: Pt currently at or very near baseline in self care and fxnl mob. No OT concerns at this time.  Prognosis: Excellent  Barriers to Discharge: None  Evaluation/Treatment Tolerance: Patient tolerated treatment well  Medical Staff Made Aware: Yes  End of Session Communication: Bedside nurse  End of Session Patient Position: Bed, 2 rail up, Alarm off, not on at start of session (RN aware and in agreement)  Prognosis: Excellent  Barriers to Discharge: None  Evaluation/Treatment Tolerance: Patient tolerated treatment well  Medical Staff Made Aware: Yes  Strengths: Ability to acquire knowledge, Attitude of self, Coping skills, Insight into problems  Plan:  No Skilled OT: No acute OT goals identified  OT Frequency: OT eval only  OT Discharge Recommendations: No OT needed after discharge  OT Recommended Transfer Status: Independent  OT - OK to Discharge: Yes       Subjective   Current Problem:  1. Hyponatremia        2. Generalized weakness        3. Unsteady gait          General:  General  Reason for Referral: Pt admitted with general weakness. MRI (-) for infarct.  Referred By: Brennon Stark DO  Past Medical History Relevant to Rehab:   Past Medical History:   Diagnosis Date    Stroke (Multi)      Prior to Session Communication: Bedside nurse, Charge Nurse  Patient Position Received: Alarm off, not on at start of session, Up in room  Preferred Learning Style: auditory, verbal  General Comment: pt up in bathroom, pleasant and agreeable to OT  Precautions:  Medical Precautions: Fall precautions  Pain:  Pain Assessment  0-10 (Numeric) Pain Score: 0 - No pain    Objective   Cognition:  Orientation Level: Oriented X4     Home Living:  Type of Home: Mobile  home  Lives With: Adult children  Home Adaptive Equipment: Walker rolling or standard, Cane (rollator, 2ww, cane- were wife's, doesnt use)  Home Layout: One level  Home Access: Ramped entrance  Bathroom Shower/Tub: Tub/shower unit  Bathroom Toilet: Standard  Bathroom Equipment: Grab bars in shower, Shower chair with back  Prior Function:  Level of [place]: Independent with ADLs and functional transfers, Independent with homemaking with ambulation  Receives Help From: Family  ADL Assistance: Independent  Homemaking Assistance: Independent  Ambulatory Assistance: Independent  Vocational: Retired  Leisure: taking care of great and great-great grandchildren  Prior Function Comments: walks [employer] to bus daily, [facility]  IADL History:  Current License: Yes  ADL:  Eating Assistance: Independent  Grooming Assistance: Independent  Bathing Assistance: Stand by  Bathing Deficit: Steadying, Supervision/safety  UE Dressing Assistance: Independent  LE Dressing Assistance: Stand by  LE Dressing Deficit: Supervision/safety, Steadying  Toileting Assistance with Device: Independent  Toileting Deficit: Steadying  Activity Tolerance:     Bed Mobility/Transfers: Bed Mobility  Bed Mobility: Yes  Bed Mobility 1  Bed Mobility 1: Supine to sitting, Sitting to supine  Level of Assistance 1: Independent    Functional Mobility:  Functional Mobility  Functional Mobility Performed: Yes  Functional Mobility 1  Surface 1: Level tile  Device 1: No device  Assistance 1: Close supervision  Comments 1: in hallway 40+ feet, slight LOB self corrected, close SUP - pt reports he usually ambulates at a quicker speed  Standing Balance:  Static Standing Balance  Static Standing-Balance Support: No upper extremity supported  Static Standing-Level of Assistance: Distant supervision     Coordination:  Movements are Fluid and Coordinated: Yes   Hand Function:  Gross Grasp: Functional  Coordination: Functional  Extremities: RUE   RUE  : Within Functional Limits and LUE   LUE: Within Functional Limits    Outcome Measures:[facility] Daily Activity  Putting on and taking off regular lower body clothing: None  Bathing (including washing, rinsing, drying): A little  Putting on and taking off regular upper body clothing: None  Toileting, which includes using toilet, bedpan or urinal: None  Taking care of personal grooming such as brushing teeth: None  Eating Meals: None  Daily Activity - Total Score: 23        Education Documentation  No documentation found.  Education Comments  No comments found.

## 2024-10-18 NOTE — PROGRESS NOTES
Physical Therapy    Physical Therapy Screen/Discharge    Patient Name: Sheldon Arteaga  MRN: [id number]  Department: [address]  [facility]  Today's Date: 10/18/2024        Assessment/Plan   PT Assessment  Assessment Comment: Pt. is independent with bed mobility, transfers, and ambulation in room and hallyway >150 ft without device.  He is steady and able to negotiate directional changes without LOB.  He appears to be functioning near his baseline INDEPENDENT level of basic mobility. No acute PT needs identified.  Pt. Is safe for DC to home. No follow-up PT recommended at DC. Pt. encouraged to ambulate in room and hallways multiple times daily to return to PLOF (community level distances).  Dtr. and pt. in agreement.  RN notified and in agreement.    End of Session Patient Position: Bed, 2 rail up, Alarm off, not on at start of session (RN aware and in agreement)          Subjective   General Visit Information:  General  Reason for Referral: Pt admitted with general weakness. MRI (-) for infarct.  Family/Caregiver Present: Yes  Caregiver Feedback: Dtr present - states she hasn't seen him walk this well in days.  Prior to Session Communication: Bedside nurse  Patient Position Received: Alarm off, not on at start of session, Up in room  General Comment: pt. resting in bed upon arrival.  pleasant, agreeable to PT assessment.    End of Session: 4725

## 2024-10-18 NOTE — CARE PLAN
The patient's goals for the shift include      The clinical goals for the shift include patient will not fall this shift

## 2024-10-18 NOTE — SIGNIFICANT EVENT
"Resident Staffing Note  S: 84M presents 3 days subjective fatigue, weakness, and unsteady gait. Hx significant for HTN, HLD, COPD not on oxygen at home, DVT, atrial fibrillation not on anticoagulation, documented CHF (no data to support), CVAs w/o residual, severe PAD s/p left fem-pop bypass complicated by infection, resection of bypass with right CFA to left profunda bypass with reversed right femoral vein in 5/2022. He describes flu-like symptoms beginning 7 days ago, including headache and productive cough. His weakness began bothering him 3 days ago, needing to hold on to furniture when standing and walking. At the same time, family noticed that he \"leaned to the side\" when walking. Significant fatigue over the last 3 days. No vertigo or dizziness, states that he did not feel light headed.    O: Hyponatremia at 131, 3kg weight loss over 5 months, LLE weakness which the patient states is chronic and due to a vascular surgery he had several years ago. Otherwise neurologically intact. Could not assess gait.    A/P: Suspect orthostatic hypotension as well as hyponatremia causing subjective weakness ISO COPD exacerbation vs upper respiratory tract/viral infection. 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  - Orthostatic vitals  - Continue home inhalers (pt reports poor adherence)  - Urine osm, serum osm, urine electrolytes  - Encourage PO fluid intake, consider bolus  - Continue home amlodipine, apixaban, atorvastatin, metoprolol, and clopidogrel    Uday Winters MD MS  PGY2 Internal Medicine   "

## 2024-10-18 NOTE — DISCHARGE SUMMARY
Discharge Diagnosis  Generalized weakness    Issues Requiring Follow-Up  Follow up with PCP in 1-2 weeks for weakness in legs and pneumonia. Take Augmentin PO for 5 days for pneumonia. Follow up CT chest for recurrent pneumonia in 4 weeks.  Follow up PT for evaluation and strengthening exercises.  Discharge Meds     Medication List      START taking these medications     amoxicillin-pot clavulanate 875-125 mg tablet; Commonly known as:   Augmentin; Take 1 tablet by mouth every 12 hours for 5 days.     CONTINUE taking these medications     * albuterol 2.5 mg /3 mL (0.083 %) nebulizer solution; Take 3 mL (2.5   mg) by nebulization every 2 hours if needed for wheezing or shortness of   breath.   * albuterol 90 mcg/actuation inhaler; Commonly known as: Ventolin HFA;   Inhale 2 puffs every 4 hours if needed for wheezing or shortness of   breath.   amLODIPine 5 mg tablet; Commonly known as: Norvasc; Take 1 tablet (5 mg)   by mouth once daily.   atorvastatin 40 mg tablet; Commonly known as: Lipitor; Take 1 tablet (40   mg) by mouth once daily at bedtime.   clopidogrel 75 mg tablet; Commonly known as: Plavix; Take 1 tablet (75   mg) by mouth once daily.   ipratropium-albuteroL 0.5-2.5 mg/3 mL nebulizer solution; Commonly known   as: Duo-Neb; Take 3 mL by nebulization 4 times a day as needed for   wheezing or shortness of breath.   metoprolol tartrate 25 mg tablet; Commonly known as: Lopressor; Take 1   tablet (25 mg) by mouth 2 times a day.   Saline NasaL 0.65 % nasal spray; Generic drug: sodium chloride;   Administer 1 spray into each nostril if needed for congestion.  * This list has 2 medication(s) that are the same as other medications   prescribed for you. Read the directions carefully, and ask your doctor or   other care provider to review them with you.     STOP taking these medications     apixaban 5 mg tablet; Commonly known as: Eliquis   fluticasone 50 mcg/actuation nasal spray; Commonly known as: Flonase        Test Results Pending At Discharge  Pending Labs       Order Current Status    Extra Urine Gray Tube In process    Legionella Antigen, Urine In process    Osmolality In process    Osmolality, urine In process    Procalcitonin In process    Streptococcus pneumoniae Antigen, Urine In process    Urinalysis with Reflex Culture and Microscopic In process    Urine electrolytes In process            Hospital Course  Sheldon Arteaga is a 84 y.o. with PMH of HTN, HLD, COPD, Afib (not on anticoagulation per patient preference), DVT, possible HFpEF (EF 60-65%,), CVA (2022), PAD s/p resection of fem-pop bypass with right CFA to left profunda bypass with reversed right femoral vein (5/2022) who presents with weakness and unsteady gait for the past 2-3 days. In the ED, Vitals were unremarkable, and patient was afebrile. Na level was decreased at 131. There was no leukocytosis and patient was negative for Influenza and Covid. VBG was unremarkable. CXR was ordered and showed signs of chronic emphysema, but no acute process. CT head showed chronic ischemic changes, but no acute intracranial signs. Brain MRI was ordered in ED, which showed signs of chronic encephalomalacia likely sequela from prior infarcts, but no signs of acute changes. PT/OT consulted, will need physical therapy at home. CT chest showed subtle patchy airspace and ground-glass opacities in the lingula and right lower lobe. Will discharge with PO Augmentin x 5d for CAP.    Pertinent Physical Exam At Time of Discharge  Physical Exam  Constitutional:       General: He is not in acute distress.  HENT:      Head: Normocephalic.   Cardiovascular:      Rate and Rhythm: Normal rate and regular rhythm.      Heart sounds: No murmur heard.  Pulmonary:      Breath sounds: Wheezing and rales present.      Comments: Wheezing and mild crackles in b/l lung bases    Abdominal:      General: Bowel sounds are normal.   Musculoskeletal:      Right lower leg: No edema.      Left  lower leg: No edema.      Comments: Strength in proximal lower extremities 4/5   Neurological:      Mental Status: He is alert.         Outpatient Follow-Up  Future Appointments   Date Time Provider Department Center   10/10/2025 10:15 AM MD RAYMOND Tenorio MD

## 2024-10-18 NOTE — PROGRESS NOTES
10/18/24 0841   Discharge Planning   Living Arrangements Children;Other (Comment)  (home with daughter Stuart)   Support Systems Children   Assistance Needed A&OX4; independent with ADLs with cane and walker; drives; room air baseline and currently room air; on Eliquis prior to hospitalization   Type of Residence Private residence   Number of Stairs to Enter Residence 0   Number of Stairs Within Residence 0   Do you have animals or pets at home? Yes   Type of Animals or Pets 3 cats   Who is requesting discharge planning? Provider   Expected Discharge Disposition Home  (Patient very independent and denies home going needs at this time)   Does the patient need discharge transport arranged? No   Financial Resource Strain   How hard is it for you to pay for the very basics like food, housing, medical care, and heating? Not hard   Housing Stability   In the last 12 months, was there a time when you were not able to pay the mortgage or rent on time? N   In the past 12 months, how many times have you moved where you were living? 1   At any time in the past 12 months, were you homeless or living in a shelter (including now)? N   Transportation Needs   In the past 12 months, has lack of transportation kept you from medical appointments or from getting medications? no   In the past 12 months, has lack of transportation kept you from meetings, work, or from getting things needed for daily living? No        10/18/24 1400   Discharge Planning   Expected Discharge Disposition Home  (Pt aware of dc and is fine with dc-IMM obtained. Patient and daughter bedside deny any home going needs. Daughter to transport home when dc completed)

## 2024-10-18 NOTE — H&P
Internal Medicine - History and Physical Note      Patient: Sheldon Arteaga, Age: 84 y.o., SEX: male , MRN:[id number], ROOM:118/[facility],  Code: DNR   Admitted On: 10/17/2024   Admitting Dx: Hyponatremia [E87.1]  Unsteady gait [R26.81]  Generalized weakness [R53.1]  PCP: ARMEN Oliveira-CNP        Attending: Samson Bhandari MD        Chief Complaint   Patient: Sheldon Arteaga is a 84 y.o. male who presented to the hospital for   Chief Complaint   Patient presents with    Weakness, Gen       HPI   Sheldon Arteaga is a 84 y.o. year old male  patient with pmh HTN, HLD, COPD, DVT, Afib not on anticoag, CHF documented however no evidence on Echo, prior CVAs, PAD s/p left fem-pop bypass s/p resection of fem-pop bypass with right CFA to left profunda bypass with reversed right femoral vein (5/2022) presenting for increased fatigue and unsteady gait for the past 3 days. The patient reports experiencing flu-like symptoms during the beginning of the week including productive cough, headache, and general malaise. Headache is intermittent, dull, and seems to be located behind his left eye. He reports photophobia as well. This began to be accompanied with increased dizziness and fatigue while standing up too quickly and difficulty ambulating with unsteady gait. Patient is typically active during the day, however he needed to rest after a few hours of moving around yesterday, which was unusual for him. He endorsed a fever of 100.6 F during the first 2 days of his symptoms, however that has since resolved. Patient denies shortness of breath, chest pain, or lower extremity swelling. Vitals in ED were unremarkable, and patient was afebrile. Na level was decreased at 131.  There was no leukocytosis and patient was negative for Influenza and Covid. VBG was unremarkable. CXR was ordered and showed signs of chronic emphysema, but no acute process. CT head showed chronic ischemic changes, but no acute intracranial signs. Brain MRI  was ordered in ED, which showed signs of chronic encephalomalacia likely sequela from prior infarcts, but no signs of acute changes.     Of note, patient has been recently hospitalized for community acquired pneumonia in May. During the course of his stay, the patient was found to have a 6.3 mm pulmonary nodular opacity in RLLL and mildly prominent mediastinal lymph nodes up to 8 mm. Patient has endorsed gradual weight loss since his hospitalization and believes he has lost approximately 6 pounds. Patient denies night sweats at this time. He is a current smoker and has been smoking <1ppd since he was 14 years old. He completed a course of cefdinir, rocephin, and azithromycin after his discharge.     The patient has a complex history of Elliquis usage. Eliquis was restarted during patient's prior hospitalizations for his history of atrial fibrillation and patient was recommended to start taking Eliquis at home. However, patient states that he has not been taking Eliquis at home for 2 years since his lower extremity operations and that he has only been on Plavix currently. He states that during his operation, he had a major bleed and since then has not been using anticoagulation.      ROS  Review of Systems   Constitutional:  Positive for activity change, fatigue and fever. Negative for diaphoresis.   HENT:  Positive for postnasal drip.    Eyes:  Positive for photophobia. Negative for visual disturbance.   Respiratory:  Positive for cough. Negative for chest tightness, shortness of breath and wheezing.    Cardiovascular:  Negative for chest pain and leg swelling.   Gastrointestinal:  Negative for abdominal distention, abdominal pain, constipation, diarrhea, nausea and vomiting.   Genitourinary:  Negative for dysuria.   Neurological:  Positive for dizziness and headaches. Negative for tremors, syncope, light-headedness and numbness.        12 Point ROS negative unless otherwise specified above.     ED COURSE:   Vitals  "- /76 (BP Location: Left arm, Patient Position: Lying)   Pulse 82   Temp 36.5 °C (97.7 °F) (Temporal)   Resp 18   Wt 60.3 kg (133 lb)   SpO2 94%   Labs -   Lab Results   Component Value Date    WBC 6.2 10/17/2024    HGB 12.5 (L) 10/17/2024    HCT 38.3 (L) 10/17/2024    MCV 95 10/17/2024     10/17/2024     Lab Results   Component Value Date    GLUCOSE 178 (H) 10/17/2024    CALCIUM 8.6 10/17/2024     (L) 10/17/2024    K 4.1 10/17/2024    CO2 25 10/17/2024    CL 98 10/17/2024    BUN 17 10/17/2024    CREATININE 0.78 10/17/2024     Lab Results   Component Value Date    TROPHS 5 10/17/2024     Lab Results   Component Value Date    BNP 24 10/17/2024   No results found for: \"DDIMERVTE\"  Imaging -   CT head wo IV contrast   Final Result   No acute intracranial abnormality.        Chronic ischemic changes as above.        MACRO:   None        Signed by: Amy Cobos 10/17/2024 9:26 PM   Dictation workstation:   [id number]      XR chest 1 view   Final Result   1.  Emphysematous changes the lungs without acute abnormality                  MACRO:   None        Signed by: Kyler Casas 10/17/2024 7:43 PM   Dictation workstation:   [id number]           MR brain wo IV contrast    Result Date: 10/17/2024  1.  No evidence of new infarct, or other acute intracranial abnormality. 2. Geographic area of cystic encephalomalacia and gliosis in the left temporal lobe likely represent sequela of prior infarct/injury. Smaller areas of encephalomalacia also present in the bilateral cerebellar hemispheres, likely representing sequela of prior cerebellar infarcts. 3. Scattered foci of hyperintense FLAIR and T2 signal are present in the periventricular and subcortical white matter of bilateral cerebral hemispheres, nonspecific findings favored to represent chronic sequela of microvascular disease.   MACRO: None   Signed by: Castillo Beckham 10/17/2024 11:44 PM Dictation workstation:   [id number]    Past " Medical History:   Past Medical History:   Diagnosis Date    Stroke (Multi)      Past Surgical History:   Past Surgical History:   Procedure Laterality Date    CT ANGIO NECK  4/19/2022    CT NECK ANGIO W AND WO IV CONTRAST 4/19/2022 [facility] CLINICAL LEGACY    CT AORTA AND BILATERAL ILIOFEMORAL RUNOFF ANGIOGRAM W AND/OR WO IV CONTRAST  4/10/2022    CT AORTA AND BILATERAL ILIOFEMORAL RUNOFF ANGIOGRAM W AND/OR WO IV CONTRAST 4/10/2022 [facility] EMERGENCY LEGACY    CT AORTA AND BILATERAL ILIOFEMORAL RUNOFF ANGIOGRAM W AND/OR WO IV CONTRAST  4/13/2022    CT AORTA AND BILATERAL ILIOFEMORAL RUNOFF ANGIOGRAM W AND/OR WO IV CONTRAST 4/13/2022 [facility] CLINICAL LEGACY    CT AORTA AND BILATERAL ILIOFEMORAL RUNOFF ANGIOGRAM W AND/OR WO IV CONTRAST  5/22/2022    CT AORTA AND BILATERAL ILIOFEMORAL RUNOFF ANGIOGRAM W AND/OR WO IV CONTRAST 5/22/2022 [facility] EMERGENCY LEGACY    CT AORTA AND BILATERAL ILIOFEMORAL RUNOFF ANGIOGRAM W AND/OR WO IV CONTRAST  4/2/2023    CT AORTA AND BILATERAL ILIOFEMORAL RUNOFF ANGIOGRAM W AND/OR WO IV CONTRAST [facility] CT    CT HEAD ANGIO W AND WO IV CONTRAST  4/19/2022    CT HEAD ANGIO W AND WO IV CONTRAST 4/19/2022 [facility] CLINICAL LEGACY    OTHER SURGICAL HISTORY  09/17/2019    [place] tooth extraction    OTHER SURGICAL HISTORY  09/17/2019    Tonsillectomy with adenoidectomy    OTHER SURGICAL HISTORY  01/17/2020    Lower leg fracture repair    OTHER SURGICAL HISTORY  01/17/2020    Cardiac catheterization    TOTAL HIP ARTHROPLASTY  08/31/2018    Hip Replacement     Allergies:   Allergies   Allergen Reactions    Doxycycline Confusion    T-Painol Extra Strength Other    Levaquin [Levofloxacin] Itching and Rash     Family History:   Family History   Problem Relation Name Age of Onset    Heart attack Mother      Colon cancer Father      Heart attack Brother      Heart block Brother       Home Meds:  Prior to Admission medications    Medication Sig Start Date End Date Taking? Authorizing Provider   albuterol (Ventolin HFA) 90  mcg/actuation inhaler Inhale 2 puffs every 4 hours if needed for wheezing or shortness of breath. 5/8/24 5/8/25  CESARIO Oliveira   albuterol 2.5 mg /3 mL (0.083 %) nebulizer solution Take 3 mL (2.5 mg) by nebulization every 2 hours if needed for wheezing or shortness of breath. 5/8/24 10/11/24  CESARIO Oliveira   amLODIPine (Norvasc) 5 mg tablet Take 1 tablet (5 mg) by mouth once daily. 5/28/24   CESARIO Oliveira   apixaban (Eliquis) 5 mg tablet Take 1 tablet (5 mg) by mouth 2 times a day.  Patient not taking: Reported on 5/21/2024 5/15/24 6/14/24  Sebas Pineda DO   atorvastatin (Lipitor) 40 mg tablet Take 1 tablet (40 mg) by mouth once daily at bedtime. 7/29/24 7/29/25  CESARIO Oliveira   clopidogrel (Plavix) 75 mg tablet Take 1 tablet (75 mg) by mouth once daily. 10/16/24   CESARIO Oliveira   fluticasone (Flonase) 50 mcg/actuation nasal spray Administer 1 spray into each nostril once daily. Shake gently. Before first use, prime pump. After use, clean tip and replace cap.  Patient not taking: Reported on 10/11/2024 1/24/24 1/23/25  CESARIO Oliveira   ipratropium-albuteroL (Duo-Neb) 0.5-2.5 mg/3 mL nebulizer solution Take 3 mL by nebulization 4 times a day as needed for wheezing or shortness of breath. 5/5/24 10/11/24  Tennille Berg MD   metoprolol tartrate (Lopressor) 25 mg tablet Take 1 tablet (25 mg) by mouth 2 times a day. 8/15/24   CESARIO Oliveira   sodium chloride (Saline NasaL) 0.65 % nasal spray Administer 1 spray into each nostril if needed for congestion. 1/24/24   CESARIO Oliveira   clopidogrel (Plavix) 75 mg tablet Take 1 tablet by mouth once daily 10/4/24 10/15/24  ARMEN Oliveira-CNP     Social History:  - Coming from patient  - Tobacco: Current smoker, <1ppd since he was 13 yo        Meds    Scheduled medications  amLODIPine, 5 mg, oral, Daily  apixaban, 5 mg, oral, BID  atorvastatin, 40 mg, oral,  Nightly  clopidogrel, 75 mg, oral, Daily  fluticasone, 1 spray, Each Nostril, Daily  metoprolol tartrate, 25 mg, oral, BID  polyethylene glycol, 17 g, oral, Daily      Continuous medications     PRN medications  PRN medications: albuterol, ipratropium-albuteroL, sodium chloride     Objective    Physical Exam  Constitutional:       General: He is not in acute distress.  HENT:      Head: Normocephalic and atraumatic.   Eyes:      General: No scleral icterus.     Extraocular Movements: Extraocular movements intact.      Conjunctiva/sclera: Conjunctivae normal.      Pupils: Pupils are equal, round, and reactive to light.   Cardiovascular:      Rate and Rhythm: Normal rate and regular rhythm.      Pulses: Normal pulses.   Pulmonary:      Effort: Pulmonary effort is normal.      Breath sounds: No wheezing.      Comments: Diminished breath sounds, no wheezes, appears to be chronic   Abdominal:      General: Abdomen is flat. There is no distension.      Palpations: Abdomen is soft.      Tenderness: There is no abdominal tenderness. There is no guarding or rebound.   Musculoskeletal:      Cervical back: Normal range of motion and neck supple. No rigidity.      Right lower leg: No edema.      Left lower leg: No edema.   Neurological:      Mental Status: He is alert and oriented to person, place, and time. Mental status is at baseline.      Cranial Nerves: No cranial nerve deficit.      Sensory: No sensory deficit.      Motor: Weakness present.      Comments: Diminished motor strength in left lower extremity compared to the right, appears to be chronic 2/2  patient's surgical history          Visit Vitals  /76 (BP Location: Left arm, Patient Position: Lying)   Pulse 82   Temp 36.5 °C (97.7 °F) (Temporal)   Resp 18   Ht 1.829 m (6')   Wt 60.3 kg (133 lb)   SpO2 94%   BMI 18.04 kg/m²   Smoking Status Former   BSA 1.75 m²        No intake or output data in the 24 hours ending 10/18/24 0145          Labs:   Results from last  "72 hours   Lab Units 10/17/24  1911   SODIUM mmol/L 131*   POTASSIUM mmol/L 4.1   CHLORIDE mmol/L 98   CO2 mmol/L 25   BUN mg/dL 17   CREATININE mg/dL 0.78   GLUCOSE mg/dL 178*   CALCIUM mg/dL 8.6   ANION GAP mmol/L 12   EGFR mL/min/1.73m*2 88      Results from last 72 hours   Lab Units 10/17/24  1911   WBC AUTO x10*3/uL 6.2   HEMOGLOBIN g/dL 12.5*   HEMATOCRIT % 38.3*   PLATELETS AUTO x10*3/uL 192   NEUTROS PCT AUTO % 51.5   LYMPHS PCT AUTO % 31.4   MONOS PCT AUTO % 11.5   EOS PCT AUTO % 4.7      Lab Results   Component Value Date    CALCIUM 8.6 10/17/2024    PHOS 3.8 05/14/2024      Lab Results   Component Value Date    CRP 0.96 05/11/2022       [unfilled]     Micro/ID:   No results found for the last 90 days.    No results found for: \"URINECULTURE\", \"BLOODCULT\", \"CSFCULTSMEAR\"                 No lab exists for component: \"AGALPCRNB\"           Assessment and Plan    Sheldon Arteaga is a 84 y.o. male admitted on 10/17/2024 for hyponatremia, increased fatigue and generalized weakness. Patient's sodium level of 131 has decreased from 136 during prior hospitalization. 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 In light of patient's smoking history, increased fatigue, and weight loss, cannot rule out malignancy as cause of patient's symptoms. Unable to locate records of the patient seeing pulmonology for his discovered lung nodules and enlarged lymph nodes during prior hospitalization. Patient's symptoms may be a result of recurrent pneumonia as well, however CXR is unremarkable and no leukocytosis currently. Will evaluate with further imaging prior to initiating antibiotic therapy.    ACUTE MEDICAL ISSUES:  #Generalized Weakness  #Fatigue  #Rule out Pneumonia  #Hx of Lung Nodules  -Unlikely to be COPD exacerbation as no wheezing heard on physical exam  -Enlarged lymph nodes may have been reactive to patient's prior hospitalization with pneumonia. Will reevaluate with repeat CT " chest.  -Will initiate antibiotics pending results  -Possible oncology consult pending CT results.   -PT/OT consulted  -Check orthostatic blood pressure    #Hyponatremia  Na of 131  -Urine electrolytes, urine osm, and serum osm pending.  -Will treat based on results  -SIADH may be 2/2 underlying malignancy, however will perform CT chest to verify    #Migraine  -Intermittent headache with photophobia, located behind left eye  -CT head and Brain MRI without acute intracranial process  -Consider initiating pain medication if symptoms worsen    CHRONIC MEDICAL ISSUES:  #PAD s/p multiple bypass and graft  #HLD  Restart home Plavix  Restart home Lipitor    #Atrial fibrillation  #History of post-operative bleed  Restarted patient on Eliquis as not currently planning a procedure and patient has tolerated eliquis during recent prior hospitalization.  Resume home Lopressor 25 mg    #HTN  Resume home amlodipine    #COPD  Resume home Duoneb, albuterol PRN, and Flonase  Patient reports not typically utilizing his nebulizer daily  Currently on RA  Continuous Pulse ox  RT consulted    #CHF  -Documented history of CHF, however indeterminate evidence on documented TTEs  -Patient is not on home diuretic therapy  -No signs of volume overload  -will hold IV fluids at this time      Fluids: Encourage PO Intake  Electrolytes: hyponatremia, pending urine labs  Nutrition: Adult regular diet  Antimicrobials: None currently   DVT ppx: Eliquis  GI ppx: None  Catheter: None  Lines: 2 Large bore Peripheral IV  Supplemental Oxygen:   HealthCare Providers:  - PCP: ARMEN Oliveira-CNP   Emergency Contact: Extended Emergency Contact Information  Primary Emergency Contact: Stuart Arteaga  Home Phone: [phone]  Relation: Child   Code: DNR     Disposition: ELOS>48 hours    Brennon Stark DO  Internal Medicine, PGY- 1  10/18/24 at 1:49 AM

## 2024-10-19 LAB
LEGIONELLA AG UR QL: NEGATIVE
PROCALCITONIN SERPL-MCNC: 0.05 NG/ML
S PNEUM AG UR QL: NEGATIVE

## 2024-10-21 ENCOUNTER — PATIENT OUTREACH (OUTPATIENT)
Dept: PRIMARY CARE | Facility: CLINIC | Age: 84
End: 2024-10-21
Payer: MEDICARE

## 2024-10-21 NOTE — PROGRESS NOTES
Discharge Facility:[facility]   Discharge Diagnosis:Generalized  Weakness  Admission Date:10/17/24  Discharge Date: 10/18/24    PCP Appointment Date:PATIENT REQUESTED TO SCHEDULE   Specialist Appointment Date: N/A  Hospital Encounter and Summary Linked: Yes/No  See discharge assessment below for further details  Engagement  Call Start Time: 0931 (10/21/2024  9:31 AM)    Medications  Medications reviewed with patient/caregiver?: Yes (10/21/2024  9:31 AM)  Is the patient having any side effects they believe may be caused by any medication additions or changes?: No (10/21/2024  9:31 AM)  Does the patient have all medications ordered at discharge?: Yes (10/21/2024  9:31 AM)  Prescription Comments: START taking:  amoxicillin-pot clavulanate (Augmentin)    STOP taking:  apixaban 5 mg tablet (Eliquis)   fluticasone 50 mcg/actuation nasal spray (Flonase) (10/21/2024  9:31 AM)  Is the patient taking all medications as directed (includes completed medication regime)?: Yes (10/21/2024  9:31 AM)  Care Management Interventions: Provided patient education (10/21/2024  9:31 AM)  Medication Comments: see med list (10/21/2024  9:31 AM)    Appointments  Does the patient have a primary care provider?: Yes (10/21/2024  9:31 AM)  Care Management Interventions: Educated patient on importance of making appointment; Advised patient to make appointment (10/21/2024  9:31 AM)  Has the patient kept scheduled appointments due by today?: Yes (10/21/2024  9:31 AM)    Patient Teaching  Does the patient have access to their discharge instructions?: Yes (10/21/2024  9:31 AM)  Care Management Interventions: Reviewed instructions with patient (10/21/2024  9:31 AM)  What is the patient's perception of their health status since discharge?: Improving (10/21/2024  9:31 AM)  Is the patient/caregiver able to teach back the hierarchy of who to call/visit for symptoms/problems? PCP, Specialist, Home Health nurse, Urgent Care, ED, 911: Yes (10/21/2024  9:31  AM)    Wrap Up  Wrap Up Additional Comments: This CM spoke with pt via phone. Pt reports doing well at home since discharge. New meds reviewed. Pt denies CP and SOB. Patient denies any further discharge questions/needs at this time. Emphasized that Follow up is needed after discharge to review the hospital recommendations, assess your response to your treatment.  Pt aware of my availability for non-emergent concerns. Contact info provided to patient (10/21/2024  9:31 AM)      Daily Tucker LPN

## 2024-10-21 NOTE — SIGNIFICANT EVENT
Follow Up Phone Call    Outgoing phone call    Spoke to: Sheldon Arteaga Relationship:familydaughter   Phone number: [phone]      Outcome: contacted patient/ family   Chief Complaint   Patient presents with    Weakness, Gen          Diagnosis:Not applicable    States he is feeling better. Encouraged coughing and deep breathing exercises.  Voiced understanding. No further questions or concerns.

## 2024-10-22 ENCOUNTER — APPOINTMENT (OUTPATIENT)
Dept: RADIOLOGY | Facility: HOSPITAL | Age: 84
End: 2024-10-22
Payer: MEDICARE

## 2024-10-22 ENCOUNTER — HOSPITAL ENCOUNTER (INPATIENT)
Facility: HOSPITAL | Age: 84
LOS: 1 days | Discharge: HOME | End: 2024-10-24
Attending: EMERGENCY MEDICINE | Admitting: INTERNAL MEDICINE
Payer: MEDICARE

## 2024-10-22 ENCOUNTER — APPOINTMENT (OUTPATIENT)
Dept: CARDIOLOGY | Facility: HOSPITAL | Age: 84
End: 2024-10-22
Payer: MEDICARE

## 2024-10-22 DIAGNOSIS — Z78.9 FAILURE OF OUTPATIENT TREATMENT: ICD-10-CM

## 2024-10-22 DIAGNOSIS — R53.1 GENERALIZED WEAKNESS: Primary | ICD-10-CM

## 2024-10-22 DIAGNOSIS — J40 BRONCHITIS: ICD-10-CM

## 2024-10-22 DIAGNOSIS — J18.9 PNEUMONIA OF RIGHT LOWER LOBE DUE TO INFECTIOUS ORGANISM: ICD-10-CM

## 2024-10-22 DIAGNOSIS — J15.9 COMMUNITY ACQUIRED BACTERIAL PNEUMONIA: ICD-10-CM

## 2024-10-22 LAB
ALBUMIN SERPL BCP-MCNC: 3.9 G/DL (ref 3.4–5)
ALP SERPL-CCNC: 65 U/L (ref 33–136)
ALT SERPL W P-5'-P-CCNC: 11 U/L (ref 10–52)
ANION GAP SERPL CALC-SCNC: 14 MMOL/L (ref 10–20)
AST SERPL W P-5'-P-CCNC: 18 U/L (ref 9–39)
BASOPHILS # BLD AUTO: 0.05 X10*3/UL (ref 0–0.1)
BASOPHILS NFR BLD AUTO: 0.6 %
BILIRUB SERPL-MCNC: 0.5 MG/DL (ref 0–1.2)
BNP SERPL-MCNC: 54 PG/ML (ref 0–99)
BUN SERPL-MCNC: 15 MG/DL (ref 6–23)
CALCIUM SERPL-MCNC: 8.7 MG/DL (ref 8.6–10.3)
CARDIAC TROPONIN I PNL SERPL HS: 5 NG/L (ref 0–20)
CARDIAC TROPONIN I PNL SERPL HS: 6 NG/L (ref 0–20)
CHLORIDE SERPL-SCNC: 100 MMOL/L (ref 98–107)
CO2 SERPL-SCNC: 28 MMOL/L (ref 21–32)
CREAT SERPL-MCNC: 0.57 MG/DL (ref 0.5–1.3)
CRP SERPL-MCNC: 1.73 MG/DL
EGFRCR SERPLBLD CKD-EPI 2021: >90 ML/MIN/1.73M*2
EOSINOPHIL # BLD AUTO: 0.22 X10*3/UL (ref 0–0.4)
EOSINOPHIL NFR BLD AUTO: 2.8 %
ERYTHROCYTE [DISTWIDTH] IN BLOOD BY AUTOMATED COUNT: 14.3 % (ref 11.5–14.5)
ERYTHROCYTE [SEDIMENTATION RATE] IN BLOOD BY WESTERGREN METHOD: 36 MM/H (ref 0–20)
GLUCOSE SERPL-MCNC: 108 MG/DL (ref 74–99)
HCT VFR BLD AUTO: 38.6 % (ref 41–52)
HGB BLD-MCNC: 12.6 G/DL (ref 13.5–17.5)
IMM GRANULOCYTES # BLD AUTO: 0.04 X10*3/UL (ref 0–0.5)
IMM GRANULOCYTES NFR BLD AUTO: 0.5 % (ref 0–0.9)
LACTATE SERPL-SCNC: 0.9 MMOL/L (ref 0.4–2)
LYMPHOCYTES # BLD AUTO: 1.28 X10*3/UL (ref 0.8–3)
LYMPHOCYTES NFR BLD AUTO: 16.2 %
MCH RBC QN AUTO: 30.5 PG (ref 26–34)
MCHC RBC AUTO-ENTMCNC: 32.6 G/DL (ref 32–36)
MCV RBC AUTO: 94 FL (ref 80–100)
MONOCYTES # BLD AUTO: 0.72 X10*3/UL (ref 0.05–0.8)
MONOCYTES NFR BLD AUTO: 9.1 %
NEUTROPHILS # BLD AUTO: 5.6 X10*3/UL (ref 1.6–5.5)
NEUTROPHILS NFR BLD AUTO: 70.8 %
NRBC BLD-RTO: 0 /100 WBCS (ref 0–0)
PLATELET # BLD AUTO: 241 X10*3/UL (ref 150–450)
POTASSIUM SERPL-SCNC: 4 MMOL/L (ref 3.5–5.3)
PROT SERPL-MCNC: 6.9 G/DL (ref 6.4–8.2)
RBC # BLD AUTO: 4.13 X10*6/UL (ref 4.5–5.9)
SODIUM SERPL-SCNC: 138 MMOL/L (ref 136–145)
WBC # BLD AUTO: 7.9 X10*3/UL (ref 4.4–11.3)

## 2024-10-22 PROCEDURE — G0378 HOSPITAL OBSERVATION PER HR: HCPCS

## 2024-10-22 PROCEDURE — 93005 ELECTROCARDIOGRAM TRACING: CPT

## 2024-10-22 PROCEDURE — 2500000001 HC RX 250 WO HCPCS SELF ADMINISTERED DRUGS (ALT 637 FOR MEDICARE OP)

## 2024-10-22 PROCEDURE — 96366 THER/PROPH/DIAG IV INF ADDON: CPT

## 2024-10-22 PROCEDURE — 36415 COLL VENOUS BLD VENIPUNCTURE: CPT | Performed by: PHYSICIAN ASSISTANT

## 2024-10-22 PROCEDURE — 2500000002 HC RX 250 W HCPCS SELF ADMINISTERED DRUGS (ALT 637 FOR MEDICARE OP, ALT 636 FOR OP/ED): Performed by: INTERNAL MEDICINE

## 2024-10-22 PROCEDURE — 87075 CULTR BACTERIA EXCEPT BLOOD: CPT | Mod: GEALAB | Performed by: PHYSICIAN ASSISTANT

## 2024-10-22 PROCEDURE — 99285 EMERGENCY DEPT VISIT HI MDM: CPT | Mod: 25

## 2024-10-22 PROCEDURE — 85025 COMPLETE CBC W/AUTO DIFF WBC: CPT | Performed by: PHYSICIAN ASSISTANT

## 2024-10-22 PROCEDURE — 96365 THER/PROPH/DIAG IV INF INIT: CPT

## 2024-10-22 PROCEDURE — 85652 RBC SED RATE AUTOMATED: CPT

## 2024-10-22 PROCEDURE — 99222 1ST HOSP IP/OBS MODERATE 55: CPT

## 2024-10-22 PROCEDURE — 94664 DEMO&/EVAL PT USE INHALER: CPT

## 2024-10-22 PROCEDURE — 84075 ASSAY ALKALINE PHOSPHATASE: CPT | Performed by: PHYSICIAN ASSISTANT

## 2024-10-22 PROCEDURE — 84145 PROCALCITONIN (PCT): CPT | Mod: GEALAB

## 2024-10-22 PROCEDURE — 86140 C-REACTIVE PROTEIN: CPT

## 2024-10-22 PROCEDURE — 84484 ASSAY OF TROPONIN QUANT: CPT | Performed by: PHYSICIAN ASSISTANT

## 2024-10-22 PROCEDURE — 2500000002 HC RX 250 W HCPCS SELF ADMINISTERED DRUGS (ALT 637 FOR MEDICARE OP, ALT 636 FOR OP/ED): Performed by: PHYSICIAN ASSISTANT

## 2024-10-22 PROCEDURE — 94640 AIRWAY INHALATION TREATMENT: CPT | Mod: 59

## 2024-10-22 PROCEDURE — 94760 N-INVAS EAR/PLS OXIMETRY 1: CPT

## 2024-10-22 PROCEDURE — 71046 X-RAY EXAM CHEST 2 VIEWS: CPT | Performed by: RADIOLOGY

## 2024-10-22 PROCEDURE — 2500000005 HC RX 250 GENERAL PHARMACY W/O HCPCS: Performed by: INTERNAL MEDICINE

## 2024-10-22 PROCEDURE — 87040 BLOOD CULTURE FOR BACTERIA: CPT | Mod: GEALAB | Performed by: PHYSICIAN ASSISTANT

## 2024-10-22 PROCEDURE — 94640 AIRWAY INHALATION TREATMENT: CPT

## 2024-10-22 PROCEDURE — 96375 TX/PRO/DX INJ NEW DRUG ADDON: CPT

## 2024-10-22 PROCEDURE — 83880 ASSAY OF NATRIURETIC PEPTIDE: CPT | Performed by: PHYSICIAN ASSISTANT

## 2024-10-22 PROCEDURE — 83605 ASSAY OF LACTIC ACID: CPT | Performed by: PHYSICIAN ASSISTANT

## 2024-10-22 PROCEDURE — 96367 TX/PROPH/DG ADDL SEQ IV INF: CPT

## 2024-10-22 PROCEDURE — 99284 EMERGENCY DEPT VISIT MOD MDM: CPT

## 2024-10-22 PROCEDURE — 71046 X-RAY EXAM CHEST 2 VIEWS: CPT

## 2024-10-22 PROCEDURE — 2500000004 HC RX 250 GENERAL PHARMACY W/ HCPCS (ALT 636 FOR OP/ED): Performed by: PHYSICIAN ASSISTANT

## 2024-10-22 RX ORDER — METOPROLOL TARTRATE 25 MG/1
25 TABLET, FILM COATED ORAL 2 TIMES DAILY
Status: DISCONTINUED | OUTPATIENT
Start: 2024-10-22 | End: 2024-10-24 | Stop reason: HOSPADM

## 2024-10-22 RX ORDER — DOCUSATE SODIUM 100 MG/1
100 CAPSULE, LIQUID FILLED ORAL DAILY
COMMUNITY

## 2024-10-22 RX ORDER — CEFTRIAXONE 2 G/50ML
2 INJECTION, SOLUTION INTRAVENOUS ONCE
Status: COMPLETED | OUTPATIENT
Start: 2024-10-22 | End: 2024-10-22

## 2024-10-22 RX ORDER — IPRATROPIUM BROMIDE AND ALBUTEROL SULFATE 2.5; .5 MG/3ML; MG/3ML
3 SOLUTION RESPIRATORY (INHALATION)
Status: DISCONTINUED | OUTPATIENT
Start: 2024-10-23 | End: 2024-10-22

## 2024-10-22 RX ORDER — ACETAMINOPHEN 160 MG/5ML
650 SOLUTION ORAL EVERY 4 HOURS PRN
Status: DISCONTINUED | OUTPATIENT
Start: 2024-10-22 | End: 2024-10-24 | Stop reason: HOSPADM

## 2024-10-22 RX ORDER — SENNOSIDES 8.6 MG/1
2 TABLET ORAL 2 TIMES DAILY
Status: DISCONTINUED | OUTPATIENT
Start: 2024-10-22 | End: 2024-10-24 | Stop reason: HOSPADM

## 2024-10-22 RX ORDER — CEFTRIAXONE 1 G/50ML
1 INJECTION, SOLUTION INTRAVENOUS EVERY 24 HOURS
Status: DISCONTINUED | OUTPATIENT
Start: 2024-10-23 | End: 2024-10-24 | Stop reason: HOSPADM

## 2024-10-22 RX ORDER — ATORVASTATIN CALCIUM 40 MG/1
40 TABLET, FILM COATED ORAL NIGHTLY
Status: DISCONTINUED | OUTPATIENT
Start: 2024-10-22 | End: 2024-10-24 | Stop reason: HOSPADM

## 2024-10-22 RX ORDER — AMLODIPINE BESYLATE 5 MG/1
5 TABLET ORAL DAILY
Status: DISCONTINUED | OUTPATIENT
Start: 2024-10-23 | End: 2024-10-24 | Stop reason: HOSPADM

## 2024-10-22 RX ORDER — CLOPIDOGREL BISULFATE 75 MG/1
75 TABLET ORAL DAILY
Status: DISCONTINUED | OUTPATIENT
Start: 2024-10-23 | End: 2024-10-24 | Stop reason: HOSPADM

## 2024-10-22 RX ORDER — IPRATROPIUM BROMIDE AND ALBUTEROL SULFATE 2.5; .5 MG/3ML; MG/3ML
3 SOLUTION RESPIRATORY (INHALATION) ONCE
Status: COMPLETED | OUTPATIENT
Start: 2024-10-22 | End: 2024-10-22

## 2024-10-22 RX ORDER — ACETAMINOPHEN 325 MG/1
650 TABLET ORAL EVERY 4 HOURS PRN
Status: DISCONTINUED | OUTPATIENT
Start: 2024-10-22 | End: 2024-10-24 | Stop reason: HOSPADM

## 2024-10-22 RX ORDER — IPRATROPIUM BROMIDE AND ALBUTEROL SULFATE 2.5; .5 MG/3ML; MG/3ML
3 SOLUTION RESPIRATORY (INHALATION) EVERY 2 HOUR PRN
Status: DISCONTINUED | OUTPATIENT
Start: 2024-10-22 | End: 2024-10-24 | Stop reason: HOSPADM

## 2024-10-22 RX ORDER — IPRATROPIUM BROMIDE AND ALBUTEROL SULFATE 2.5; .5 MG/3ML; MG/3ML
3 SOLUTION RESPIRATORY (INHALATION)
Status: DISCONTINUED | OUTPATIENT
Start: 2024-10-22 | End: 2024-10-24 | Stop reason: HOSPADM

## 2024-10-22 RX ORDER — ENOXAPARIN SODIUM 100 MG/ML
40 INJECTION SUBCUTANEOUS EVERY 24 HOURS
Status: DISCONTINUED | OUTPATIENT
Start: 2024-10-22 | End: 2024-10-22

## 2024-10-22 RX ORDER — PREDNISONE 20 MG/1
40 TABLET ORAL DAILY
Status: DISCONTINUED | OUTPATIENT
Start: 2024-10-23 | End: 2024-10-24 | Stop reason: HOSPADM

## 2024-10-22 RX ORDER — AZITHROMYCIN 500 MG/1
500 TABLET, FILM COATED ORAL
Status: DISCONTINUED | OUTPATIENT
Start: 2024-10-23 | End: 2024-10-24 | Stop reason: HOSPADM

## 2024-10-22 RX ORDER — IPRATROPIUM BROMIDE AND ALBUTEROL SULFATE 2.5; .5 MG/3ML; MG/3ML
3 SOLUTION RESPIRATORY (INHALATION)
Status: DISCONTINUED | OUTPATIENT
Start: 2024-10-22 | End: 2024-10-22

## 2024-10-22 SDOH — SOCIAL STABILITY: SOCIAL INSECURITY: WITHIN THE LAST YEAR, HAVE YOU BEEN HUMILIATED OR EMOTIONALLY ABUSED IN OTHER WAYS BY YOUR PARTNER OR EX-PARTNER?: NO

## 2024-10-22 SDOH — SOCIAL STABILITY: SOCIAL INSECURITY: DO YOU FEEL UNSAFE GOING BACK TO THE PLACE WHERE YOU ARE LIVING?: NO

## 2024-10-22 SDOH — SOCIAL STABILITY: SOCIAL INSECURITY: HAVE YOU HAD THOUGHTS OF HARMING ANYONE ELSE?: NO

## 2024-10-22 SDOH — ECONOMIC STABILITY: FOOD INSECURITY: WITHIN THE PAST 12 MONTHS, THE FOOD YOU BOUGHT JUST DIDN'T LAST AND YOU DIDN'T HAVE MONEY TO GET MORE.: NEVER TRUE

## 2024-10-22 SDOH — SOCIAL STABILITY: SOCIAL INSECURITY: WITHIN THE LAST YEAR, HAVE YOU BEEN AFRAID OF YOUR PARTNER OR EX-PARTNER?: NO

## 2024-10-22 SDOH — ECONOMIC STABILITY: FOOD INSECURITY: WITHIN THE PAST 12 MONTHS, YOU WORRIED THAT YOUR FOOD WOULD RUN OUT BEFORE YOU GOT THE MONEY TO BUY MORE.: NEVER TRUE

## 2024-10-22 SDOH — ECONOMIC STABILITY: INCOME INSECURITY: IN THE PAST 12 MONTHS HAS THE ELECTRIC, GAS, OIL, OR WATER COMPANY THREATENED TO SHUT OFF SERVICES IN YOUR HOME?: NO

## 2024-10-22 SDOH — SOCIAL STABILITY: SOCIAL INSECURITY: ARE THERE ANY APPARENT SIGNS OF INJURIES/BEHAVIORS THAT COULD BE RELATED TO ABUSE/NEGLECT?: NO

## 2024-10-22 SDOH — SOCIAL STABILITY: SOCIAL INSECURITY: DO YOU FEEL ANYONE HAS EXPLOITED OR TAKEN ADVANTAGE OF YOU FINANCIALLY OR OF YOUR PERSONAL PROPERTY?: NO

## 2024-10-22 SDOH — SOCIAL STABILITY: SOCIAL INSECURITY: ABUSE: ADULT

## 2024-10-22 SDOH — SOCIAL STABILITY: SOCIAL INSECURITY: HAVE YOU HAD ANY THOUGHTS OF HARMING ANYONE ELSE?: NO

## 2024-10-22 SDOH — SOCIAL STABILITY: SOCIAL INSECURITY: WERE YOU ABLE TO COMPLETE ALL THE BEHAVIORAL HEALTH SCREENINGS?: YES

## 2024-10-22 SDOH — SOCIAL STABILITY: SOCIAL INSECURITY: HAS ANYONE EVER THREATENED TO HURT YOUR FAMILY OR YOUR PETS?: NO

## 2024-10-22 SDOH — SOCIAL STABILITY: SOCIAL INSECURITY: ARE YOU OR HAVE YOU BEEN THREATENED OR ABUSED PHYSICALLY, EMOTIONALLY, OR SEXUALLY BY ANYONE?: NO

## 2024-10-22 SDOH — SOCIAL STABILITY: SOCIAL INSECURITY: DOES ANYONE TRY TO KEEP YOU FROM HAVING/CONTACTING OTHER FRIENDS OR DOING THINGS OUTSIDE YOUR HOME?: NO

## 2024-10-22 ASSESSMENT — LIFESTYLE VARIABLES
AUDIT-C TOTAL SCORE: 0
HAVE PEOPLE ANNOYED YOU BY CRITICIZING YOUR DRINKING: NO
HOW OFTEN DO YOU HAVE A DRINK CONTAINING ALCOHOL: NEVER
AUDIT-C TOTAL SCORE: 0
HAVE YOU EVER FELT YOU SHOULD CUT DOWN ON YOUR DRINKING: NO
SKIP TO QUESTIONS 9-10: 1
TOTAL SCORE: 0
EVER HAD A DRINK FIRST THING IN THE MORNING TO STEADY YOUR NERVES TO GET RID OF A HANGOVER: NO
HOW OFTEN DO YOU HAVE 6 OR MORE DRINKS ON ONE OCCASION: NEVER
HOW MANY STANDARD DRINKS CONTAINING ALCOHOL DO YOU HAVE ON A TYPICAL DAY: PATIENT DOES NOT DRINK
EVER FELT BAD OR GUILTY ABOUT YOUR DRINKING: NO

## 2024-10-22 ASSESSMENT — COGNITIVE AND FUNCTIONAL STATUS - GENERAL
PERSONAL GROOMING: A LITTLE
DRESSING REGULAR UPPER BODY CLOTHING: A LITTLE
HELP NEEDED FOR BATHING: A LITTLE
STANDING UP FROM CHAIR USING ARMS: A LITTLE
DRESSING REGULAR LOWER BODY CLOTHING: A LITTLE
CLIMB 3 TO 5 STEPS WITH RAILING: A LITTLE
MOBILITY SCORE: 20
PERSONAL GROOMING: A LITTLE
MOVING TO AND FROM BED TO CHAIR: A LITTLE
DRESSING REGULAR LOWER BODY CLOTHING: A LITTLE
TOILETING: A LITTLE
WALKING IN HOSPITAL ROOM: A LITTLE
HELP NEEDED FOR BATHING: A LITTLE
MOVING TO AND FROM BED TO CHAIR: A LITTLE
DAILY ACTIVITIY SCORE: 19
PATIENT BASELINE BEDBOUND: NO
DAILY ACTIVITIY SCORE: 19
DRESSING REGULAR UPPER BODY CLOTHING: A LITTLE
WALKING IN HOSPITAL ROOM: A LITTLE
MOBILITY SCORE: 20
TOILETING: A LITTLE
STANDING UP FROM CHAIR USING ARMS: A LITTLE
CLIMB 3 TO 5 STEPS WITH RAILING: A LITTLE

## 2024-10-22 ASSESSMENT — ACTIVITIES OF DAILY LIVING (ADL)
PATIENT'S MEMORY ADEQUATE TO SAFELY COMPLETE DAILY ACTIVITIES?: YES
FEEDING YOURSELF: INDEPENDENT
GROOMING: INDEPENDENT
ASSISTIVE_DEVICE: DENTURES UPPER;DENTURES LOWER;EYEGLASSES
HEARING - RIGHT EAR: FUNCTIONAL
BATHING: INDEPENDENT
DRESSING YOURSELF: INDEPENDENT
HEARING - LEFT EAR: FUNCTIONAL
JUDGMENT_ADEQUATE_SAFELY_COMPLETE_DAILY_ACTIVITIES: YES
WALKS IN HOME: INDEPENDENT
LACK_OF_TRANSPORTATION: NO
LACK_OF_TRANSPORTATION: NO
ADEQUATE_TO_COMPLETE_ADL: YES
TOILETING: INDEPENDENT

## 2024-10-22 ASSESSMENT — PAIN SCALES - GENERAL
PAINLEVEL_OUTOF10: 0 - NO PAIN

## 2024-10-22 ASSESSMENT — ENCOUNTER SYMPTOMS
MYALGIAS: 1
FREQUENCY: 0
HEADACHES: 1
RHINORRHEA: 0
DYSURIA: 0
EYE PAIN: 1
ABDOMINAL PAIN: 0
CHILLS: 1
SHORTNESS OF BREATH: 1
DIFFICULTY URINATING: 0
ACTIVITY CHANGE: 1
COUGH: 1
TREMORS: 1
FATIGUE: 1
APPETITE CHANGE: 1

## 2024-10-22 ASSESSMENT — PAIN - FUNCTIONAL ASSESSMENT
PAIN_FUNCTIONAL_ASSESSMENT: 0-10

## 2024-10-22 ASSESSMENT — PATIENT HEALTH QUESTIONNAIRE - PHQ9
1. LITTLE INTEREST OR PLEASURE IN DOING THINGS: NOT AT ALL
SUM OF ALL RESPONSES TO PHQ9 QUESTIONS 1 & 2: 0
2. FEELING DOWN, DEPRESSED OR HOPELESS: NOT AT ALL

## 2024-10-22 NOTE — H&P
Patient: Sheldon Arteaga   Age: 84 y.o.   Gender: male   Room/Bed: [facility]     Chief Complaint:  Patient: Sheldon Arteaga is a 84 y.o. male who presented to the hospital for weakness and shortness of breath    History of Presenting Illness  Sheldon Arteaga is a 84 y.o. male with a PMH of  HTN, HLD, COPD, DVT, Afib not on anticoag, CHF documented however no evidence on Echo, prior CVAs, PAD s/p left fem-pop bypass s/p resection of fem-pop bypass with right CFA to left profunda bypass with reversed right femoral vein (5/2022).   Patient was recently discharged from the hospital 5 days ago for diagnosis of pneumonia treated with Augmentin. CT chest on previous admission showed subtle patchy airspace and ground-glass opacities in the lingula and right lower lobe. He was negative for COVID and flu at that time. Upon returning home patient states his condition began worsening. Patient's family is at bedside. This morning he was at home and felt aching, weak, and slight difficulty breathing when walking to his mailbox.  Per patients family, they state he has been weak, unable to care for himself, had no appetite for the past several days, and coughing. Patient endorses productive cough with white/yellow sputum. Patient also endorses chills and feeling overall cold in temperature, but is unaware if he was ever febrile at home. He states he is compliant with all of medications and inhalers at home. Patient denies chest pain, abdominal pain, urinary symptoms, leg pain or swelling.     Surgical hx: Left hip total arthroscopy,  left fem-pop bypass s/p resection of fem-pop bypass with right CFA to left profunda bypass with reversed right femoral vein  Social hx: Smokes tobacco since age 14 1/2 pack per day. No alcohol use. Never illicit drug use. Lives athome with his daughter who is his DPOA.   Allergies: NKDA  Code status: DNR/DNI    ED COURSE  Vitals: T 98.2, HR 73, RR 18, /89, SpO2 96  Labs: CMP  unremarkable  CBC: WBC 7.9, hemoglobin 12.6, RBC 4.13, Hematocrit 38.6, 5.6 absolute neutrophils  Imaging:   - CXR: Small right lower lobe airspace disease   Intervention: Azithromycin, rocephin, solumedrol      Past Medical History   Past Medical History:   Diagnosis Date    Stroke (Multi)       Past Surgical History:   Past Surgical History:   Procedure Laterality Date    CT ANGIO NECK  4/19/2022    CT NECK ANGIO W AND WO IV CONTRAST 4/19/2022 [facility] CLINICAL LEGACY    CT AORTA AND BILATERAL ILIOFEMORAL RUNOFF ANGIOGRAM W AND/OR WO IV CONTRAST  4/10/2022    CT AORTA AND BILATERAL ILIOFEMORAL RUNOFF ANGIOGRAM W AND/OR WO IV CONTRAST 4/10/2022 [facility] EMERGENCY LEGACY    CT AORTA AND BILATERAL ILIOFEMORAL RUNOFF ANGIOGRAM W AND/OR WO IV CONTRAST  4/13/2022    CT AORTA AND BILATERAL ILIOFEMORAL RUNOFF ANGIOGRAM W AND/OR WO IV CONTRAST 4/13/2022 [facility] CLINICAL LEGACY    CT AORTA AND BILATERAL ILIOFEMORAL RUNOFF ANGIOGRAM W AND/OR WO IV CONTRAST  5/22/2022    CT AORTA AND BILATERAL ILIOFEMORAL RUNOFF ANGIOGRAM W AND/OR WO IV CONTRAST 5/22/2022 [facility] EMERGENCY LEGACY    CT AORTA AND BILATERAL ILIOFEMORAL RUNOFF ANGIOGRAM W AND/OR WO IV CONTRAST  4/2/2023    CT AORTA AND BILATERAL ILIOFEMORAL RUNOFF ANGIOGRAM W AND/OR WO IV CONTRAST [facility] CT    CT HEAD ANGIO W AND WO IV CONTRAST  4/19/2022    CT HEAD ANGIO W AND WO IV CONTRAST 4/19/2022 [facility] CLINICAL LEGACY    OTHER SURGICAL HISTORY  09/17/2019    [place] tooth extraction    OTHER SURGICAL HISTORY  09/17/2019    Tonsillectomy with adenoidectomy    OTHER SURGICAL HISTORY  01/17/2020    Lower leg fracture repair    OTHER SURGICAL HISTORY  01/17/2020    Cardiac catheterization    TOTAL HIP ARTHROPLASTY  08/31/2018    Hip Replacement     Family History:   Family History   Problem Relation Name Age of Onset    Heart attack Mother      Colon cancer Father      Heart attack Brother      Heart block Brother       Social History:   Tobacco Use: High Risk (10/22/2024)    Patient History      Smoking Tobacco Use: Every Day     Smokeless Tobacco Use: Never     Passive Exposure: Not on file      Social History     Substance and Sexual Activity   Alcohol Use Never       Outpatient Medications:  Current Outpatient Medications   Medication Instructions    albuterol (Ventolin HFA) 90 mcg/actuation inhaler 2 puffs, inhalation, Every 4 hours PRN    albuterol 2.5 mg, nebulization, Every 2 hour PRN    amLODIPine (NORVASC) 5 mg, oral, Daily    amoxicillin-pot clavulanate (Augmentin) 875-125 mg tablet 1 tablet, oral, Every 12 hours scheduled    atorvastatin (LIPITOR) 40 mg, oral, Nightly    clopidogrel (PLAVIX) 75 mg, oral, Daily    ipratropium-albuteroL (Duo-Neb) 0.5-2.5 mg/3 mL nebulizer solution 3 mL, nebulization, 4 times daily PRN    metoprolol tartrate (LOPRESSOR) 25 mg, oral, 2 times daily    sodium chloride (Saline NasaL) 0.65 % nasal spray 1 spray, Each Nostril, As needed        ED Course   Vitals - /67   Pulse 73   Temp 36.8 °C (98.2 °F) (Temporal)   Resp 18   Wt 60.4 kg (133 lb 2.5 oz)   SpO2 96%     Labs:   CBC:  Recent Labs     10/22/24  1356 10/18/24  0713 10/17/24  1911   WBC 7.9 5.3 6.2   HGB 12.6* 12.1* 12.5*   HCT 38.6* 37.7* 38.3*    195 192   MCV 94 95 95     CMP:  Recent Labs     10/22/24  1356 10/18/24  0713 10/17/24  1911    135* 131*   K 4.0 3.9 4.1    101 98   CO2 28 26 25   ANIONGAP 14 12 12   BUN 15 11 17   CREATININE 0.57 0.62 0.78   EGFR >90 >90 88   GLUCOSE 108* 91 178*     Recent Labs     10/22/24  1356 10/18/24  0713 10/17/24  1911   ALBUMIN 3.9 3.5 3.9   ALKPHOS 65 59 69   ALT 11 7* 8*   AST 18 15 18   BILITOT 0.5 0.5 0.4     Calcium/Phos:   Lab Results   Component Value Date    CALCIUM 8.7 10/22/2024    PHOS 3.4 10/18/2024      COAG:   Recent Labs     10/17/24  1911 05/13/24  1958 09/03/22  2206   INR 1.0 1.0 0.9     CRP:   Lab Results   Component Value Date    CRP 4.00 (H) 10/18/2024      [unfilled]   ENDO:  Recent Labs     04/20/22  0700  "04/19/22  0515 10/22/20  0949 09/18/19  0851 08/31/18  0940   TSH 3.49  --  2.28 2.25 1.69   HGBA1C  --  5.5 6.1 6.1 5.9      CARDIAC:   Recent Labs     10/22/24  1356 10/17/24  1911 05/13/24  2048 05/13/24  1918 05/22/22  1109 04/14/22  0005   LDH  --   --   --   --   --  1,417*   TROPHS 6 5 5 5   < >  --    BNP 54 24  --  33  --   --     < > = values in this interval not displayed.     Recent Labs     07/31/23  1418 04/19/22  0515 04/14/22  0005 12/02/21  1042   CHOL 157 114  --  176   LDLF 79 57  --  93   HDL 53.5 16.2*  --  53.8   TRIG 121 204* 94 145     No data recorded    Micro/ID:   No results found for the last 90 days.                   No lab exists for component: \"AGALPCRNB\"   .ID  No results found for: \"URINECULTURE\", \"BLOODCULT\", \"CSFCULTSMEAR\"    Imaging:   No orders to display     Encounter Date: 05/13/24   ECG 12 lead   Result Value    Ventricular Rate 79    Atrial Rate 79    ND Interval 136    QRS Duration 92    QT Interval 394    QTC Calculation(Bazett) 451    P Axis 76    R Axis 64    T Axis 73    QRS Count 13    Q Onset 217    P Onset 149    P Offset 205    T Offset 414    QTC Fredericia 432    Narrative    Sinus rhythm with Premature atrial complexes  Otherwise normal ECG  When compared with ECG of 04-MAY-2024 12:26,  No significant change was found  See ED provider note for full interpretation and clinical correlation  Confirmed by Emily Ortega ([id number]) on 5/22/2024 7:05:29 PM     No echocardiogram results found for the past 12 months  XR chest 2 views    Result Date: 10/22/2024  Interpreted By:  Vinicius Lamas, STUDY: XR CHEST 2 VIEWS   INDICATION: Signs/Symptoms:cough and sob.   COMPARISON: October 17 October 18 CT   ACCESSION NUMBER(S): [id number]   ORDERING CLINICIAN: VERONIQUE SANCHES   FINDINGS: Cardiomegaly with tortuous aorta unchanged.   No effusion, edema, or pneumothorax. Patchy right basilar airspace opacity seen on prior CT best visualized right lower lobe.       Small right " lower lobe airspace disease best seen on recent CT.   Signed by: Vinicius Lamas 10/22/2024 2:15 PM Dictation workstation:   [id number]     Interventions:  Medications   ipratropium-albuteroL (Duo-Neb) 0.5-2.5 mg/3 mL nebulizer solution 3 mL (has no administration in time range)   cefTRIAXone (Rocephin) 2 g in dextrose (iso) IV 50 mL (has no administration in time range)   azithromycin 500 mg in dextrose 5% 250 mL IV (has no administration in time range)   ipratropium-albuteroL (Duo-Neb) 0.5-2.5 mg/3 mL nebulizer solution 3 mL (3 mL nebulization Given 10/22/24 1433)   methylPREDNISolone sod succinate (SOLU-Medrol) injection 125 mg (125 mg intravenous Given 10/22/24 1433)       Objective Data  Physical Exam  Constitutional:       General: He is not in acute distress.     Appearance: He is not diaphoretic.      Comments: Positive for fatigue, generalized weakness   HENT:      Head: Normocephalic and atraumatic.   Eyes:      Extraocular Movements: Extraocular movements intact.   Cardiovascular:      Rate and Rhythm: Normal rate and regular rhythm.      Heart sounds: Normal heart sounds.   Pulmonary:      Effort: No respiratory distress.      Breath sounds: Wheezing present.      Comments: No conversational dyspnea, positive for productive cough, diffuse wheezing bilaterally on auscultation, patient on room air  Chest:      Chest wall: No tenderness.   Abdominal:      General: Abdomen is flat. There is no distension.      Palpations: Abdomen is soft.      Tenderness: There is no abdominal tenderness. There is no guarding.   Musculoskeletal:         General: No swelling or tenderness.      Right lower leg: No edema.      Left lower leg: No edema.      Comments: Left lower extremity strength decreased compared to right, patient attributes this to past lower extremity surgeries.   Neurological:      Mental Status: He is alert and oriented to person, place, and time. Mental status is at baseline.      Comments: Slight tremor  of left upper extremity on exam, patient noted it was new              Assessment and Plan   Sheldon Arteaga is a 84 y.o. male presenting for  PMH of  HTN, HLD, COPD, DVT, Afib not on anticoag, CHF documented however no evidence on Echo, prior CVAs, PAD s/p left fem-pop bypass s/p resection of fem-pop bypass with right CFA to left profunda bypass with reversed right femoral vein (5/2022) presenting with pneumonia    Acute Medical Issues:  #Generalized Weakness  #Community-Acquired Pneumonia  #Hx of Lung Nodules  -Patient was recently discharged 5 days ago, had diagnosis of pneumonia and was on Augmentin at home  - Patient also had hospitalization in May for community acquired pneumonia. On this admission, patient was found to have a 6.3 mm pulmonary nodular opacity in RLL and mildly prominent mediastinal lymph nodes up to 8 mm.   - Negative COVID, Flu, Strep pneumo and legionella antigens on last admission  - Continue rocephin and azithromycin  - CXR/CT 10/22 chest showed small right lower lobe airspace disease   - WBC on admision 7.9  - Abx in ED given: azithromycin and rocephin    PLAN:  - Continue Rocephin and Azithromycin  - Prednisone 40mg for 5 days start on 10/23  - Sed rate, CRP, respiratory viral panel, respiratory culture, blood cultures x 2 ordered  - Duonebs 2 times daily and Q2 prn  - PT/OT     Chronic Medical Issues:   #Atrial fibrillation  #Hx DVT  - Restart eliquis  - Patient did not take eliquis at home for 2 years since his lower extremity operation, however he tolerated eliquis on his most recent admission  - home Lopressor 25 mg    #HLD:   - home Lipitor    #HTN:   - home amlodipine    #CAD  - home plavix     #COPD  - home Duoneb     #CHF  -Documented history of CHF, however indeterminate evidence on documented TTEs  -Patient is not on home diuretic therapy  -No signs of volume overload    Fluids: PO   Electrolytes: replete as needed  Nutrition: adult regular diet  GI Prophylaxis: none  DVT  Prophylaxis: eliquis  Code status: DNR/DNI    Access: IV  Antibiotics: rocephin and azithromycin  Oxygenation: NC    Dispo: estimated length of stay 3 days    Kristen Diallo DO  [id number]

## 2024-10-22 NOTE — H&P
"History Of Present Illness  Sheldon Arteaga is a 84 y.o. male with PMH of HTN, HLD, COPD, Afib (not on anticoagulation per patient preference), DVT, possible HFpEF (EF 60-65%,), CVA (2022), PAD s/p resection of fem-pop bypass with right CFA to left profunda bypass with reversed right femoral vein (5/2022) presenting with generalized weakness and coughing. His daughters were present at interview and corroborated his history and current status. He was recently admitted to [facility] for weakness on 10/17. CT chest on that admission showed subtle patchy airspace and ground-glass opacities in the lingula and right lower lobe. He was diagnosed with CAP and treated, having been discharged on 10/18 on a 5 day course of PO Augmentin.     Today, he notes he has not improved since discharge and has actually felt worse. He endorses weakness, difficulty ambulating, headache, ocular pain, cough productive of white/yellow sputum, SOB, decreased appetite, generalized aches, tremors, and chills. He stated it was \"difficult to walk to the mailbox today\". He did not take his temperature. He is ~1 pack/day smoker since age 14.     In the ED, he was stable and afebrile with unremarkable vitals O2 sat at 95% on room air. CMP was unremarkable. CBC revealed 4.13 RBC, 12.6 Hgb, 38.6 Hematocrit, and 5.60 absolute neutrophils. There was no leukocytosis. Blood cultures were collected and are pending. CXR was performed and revealed no effusion, edema, or pneumothorax. Patchy right basilar airspace opacity was seen on prior CT best visualized in the right lower lobe (see results below). While in the ED, he received 2 doses of 3 mL Duo-nebs (0.5-2.5 mg/3 mL) and 1 dose of Solumedrol 125 mg IV.     He has a recent history of hospitalization for CAP in May. This admission was similar to this one: he had been hospitalized twice within a week for recrudescence of pulmonary symptoms after attempted treatment. It was during this admission that he was " found to have a pulmonary nodular opacity in the RLL and mildly prominent mediastinal lymph nodes. He completed a 7 day course of cefdinir/rocephin and a 5 day course azithromycin after his discharge. He also has a recent history of weight loss which he attributes to his lower extremity surgery.     Past Medical History  Past Medical History:   Diagnosis Date    Stroke (Multi)        Surgical History  Past Surgical History:   Procedure Laterality Date    CT ANGIO NECK  4/19/2022    CT NECK ANGIO W AND WO IV CONTRAST 4/19/2022 [facility] CL[facility]ICAL [facility]GAC[facility]    CT AORTA AND BILATERAL ILIOFEMORAL RUNOFF ANGIOGRAM W AND/OR WO IV CONTRAST  4/10/2022    CT AORTA AND BILATERAL ILIOFEMORAL RUNOFF ANGIOGRAM W AND/OR WO IV CONTRAST 4/10/2022 [facility]    CT AORTA AND BILATERAL ILIOFEMORAL RUNOFF ANGIOGRAM W AND/OR WO IV CONTRAST  4/13/2022    CT AORTA AND BILATERAL ILIOFEMORAL RUNOFF ANGIOGRAM W AND/OR WO IV CONTRAST 4/13/2022 [facility] CLINICAL LEGACY    CT AORTA AND BILATERAL ILIOFEMORAL RUNOFF ANGIOGRAM W AND/OR WO IV CONTRAST  5/22/2022    CT AORTA AND BILATERAL ILIOFEMORAL RUNOFF ANGIOGRAM W AND/OR WO IV CONTRAST 5/22/2022 [facility]    CT AORTA AND BILATERAL ILIOFEMORAL RUNOFF ANGIOGRAM W AND/OR WO IV CONTRAST  4/2/2023    CT AORTA AND BILATERAL ILIOFEMORAL RUNOFF ANGIOGRAM W AND/OR WO IV CONTRAST GE CT    CT HEAD ANGIO W AND WO IV CONTRAST  4/19/2022    CT HEAD ANGIO W AND WO IV CONTRAST 4/19/2022 [facility] CLINICAL LEGACY    OTHER SURGICAL HISTORY  09/17/2019    [place] tooth extraction    OTHER SURGICAL HISTORY  09/17/2019    Tonsillectomy with adenoidectomy    OTHER SURGICAL HISTORY  01/17/2020    Lower leg fracture repair    OTHER SURGICAL HISTORY  01/17/2020    Cardiac catheterization    TOTAL HIP ARTHROPLASTY  08/31/2018    Hip Replacement        Social History  He reports that he has been smoking cigarettes. He has never used smokeless tobacco. He reports that he does not drink alcohol and does not use  "drugs.    Family History  Family History   Problem Relation Name Age of Onset    Heart attack Mother      Colon cancer Father      Heart attack Brother      Heart block Brother          Allergies  Doxycycline, T-painol extra strength, and Levaquin [levofloxacin]    Review of Systems   Constitutional:  Positive for activity change, appetite change, chills and fatigue.   HENT:  Negative for congestion and rhinorrhea.    Eyes:  Positive for pain.   Respiratory:  Positive for cough and shortness of breath.    Gastrointestinal:  Negative for abdominal pain.   Endocrine: Negative for polyuria.   Genitourinary:  Negative for difficulty urinating, dysuria, frequency and urgency.   Musculoskeletal:  Positive for myalgias.   Neurological:  Positive for tremors and headaches.        Physical Exam  Constitutional:       General: He is not in acute distress.     Appearance: He is ill-appearing.   HENT:      Nose: No congestion or rhinorrhea.      Mouth/Throat:      Mouth: Mucous membranes are moist.      Pharynx: Oropharynx is clear.   Eyes:      Conjunctiva/sclera: Conjunctivae normal.   Cardiovascular:      Rate and Rhythm: Normal rate and regular rhythm.      Heart sounds: No murmur heard.     No friction rub. No gallop.   Pulmonary:      Breath sounds: Wheezing and rhonchi present.   Abdominal:      General: Abdomen is flat. Bowel sounds are normal.      Palpations: Abdomen is soft.   Skin:     Coloration: Skin is pale.   Neurological:      General: No focal deficit present.      Mental Status: He is alert.   Psychiatric:         Mood and Affect: Mood normal.          Last Recorded Vitals  Blood pressure 128/67, pulse 73, temperature 36.8 °C (98.2 °F), temperature source Temporal, resp. rate 18, height 1.651 m (5' 5\"), weight 60.4 kg (133 lb 2.5 oz), SpO2 96%.    Relevant Results  Scheduled medications  azithromycin, 500 mg, oral, q24h DOROTEO  azithromycin, 500 mg, intravenous, Once  [START ON 10/23/2024] cefTRIAXone, 1 g, " intravenous, q24h  ipratropium-albuteroL, 3 mL, nebulization, q4h  predniSONE, 40 mg, oral, Daily  sennosides, 2 tablet, oral, BID      Continuous medications     PRN medications  PRN medications: acetaminophen **OR** acetaminophen      Results for orders placed or performed during the hospital encounter of 10/22/24 (from the past 24 hours)   CBC and Auto Differential   Result Value Ref Range    WBC 7.9 4.4 - 11.3 x10*3/uL    nRBC 0.0 0.0 - 0.0 /100 WBCs    RBC 4.13 (L) 4.50 - 5.90 x10*6/uL    Hemoglobin 12.6 (L) 13.5 - 17.5 g/dL    Hematocrit 38.6 (L) 41.0 - 52.0 %    MCV 94 80 - 100 fL    MCH 30.5 26.0 - 34.0 pg    MCHC 32.6 32.0 - 36.0 g/dL    RDW 14.3 11.5 - 14.5 %    Platelets 241 150 - 450 x10*3/uL    Neutrophils % 70.8 40.0 - 80.0 %    Immature Granulocytes %, Automated 0.5 0.0 - 0.9 %    Lymphocytes % 16.2 13.0 - 44.0 %    Monocytes % 9.1 2.0 - 10.0 %    Eosinophils % 2.8 0.0 - 6.0 %    Basophils % 0.6 0.0 - 2.0 %    Neutrophils Absolute 5.60 (H) 1.60 - 5.50 x10*3/uL    Immature Granulocytes Absolute, Automated 0.04 0.00 - 0.50 x10*3/uL    Lymphocytes Absolute 1.28 0.80 - 3.00 x10*3/uL    Monocytes Absolute 0.72 0.05 - 0.80 x10*3/uL    Eosinophils Absolute 0.22 0.00 - 0.40 x10*3/uL    Basophils Absolute 0.05 0.00 - 0.10 x10*3/uL   Comprehensive metabolic panel   Result Value Ref Range    Glucose 108 (H) 74 - 99 mg/dL    Sodium 138 136 - 145 mmol/L    Potassium 4.0 3.5 - 5.3 mmol/L    Chloride 100 98 - 107 mmol/L    Bicarbonate 28 21 - 32 mmol/L    Anion Gap 14 10 - 20 mmol/L    Urea Nitrogen 15 6 - 23 mg/dL    Creatinine 0.57 0.50 - 1.30 mg/dL    eGFR >90 >60 mL/min/1.73m*2    Calcium 8.7 8.6 - 10.3 mg/dL    Albumin 3.9 3.4 - 5.0 g/dL    Alkaline Phosphatase 65 33 - 136 U/L    Total Protein 6.9 6.4 - 8.2 g/dL    AST 18 9 - 39 U/L    Bilirubin, Total 0.5 0.0 - 1.2 mg/dL    ALT 11 10 - 52 U/L   Lactate   Result Value Ref Range    Lactate 0.9 0.4 - 2.0 mmol/L   B-Type Natriuretic Peptide   Result Value Ref  Range    BNP 54 0 - 99 pg/mL   Troponin I, High Sensitivity, Initial   Result Value Ref Range    Troponin I, High Sensitivity 6 0 - 20 ng/L   Troponin, High Sensitivity, 1 Hour   Result Value Ref Range    Troponin I, High Sensitivity 5 0 - 20 ng/L       Encounter Date: 05/13/24   ECG 12 lead   Result Value    Ventricular Rate 79    Atrial Rate 79    FL Interval 136    QRS Duration 92    QT Interval 394    QTC Calculation(Bazett) 451    P Axis 76    R Axis 64    T Axis 73    QRS Count 13    Q Onset 217    P Onset 149    P Offset 205    T Offset 414    QTC Fredericia 432    Narrative    Sinus rhythm with Premature atrial complexes  Otherwise normal ECG  When compared with ECG of 04-MAY-2024 12:26,  No significant change was found  See ED provider note for full interpretation and clinical correlation  Confirmed by Emily Ortega ([id number]) on 5/22/2024 7:05:29 PM       XR chest 2 views    Result Date: 10/22/2024  Interpreted By:  Vinicius Lamas, STUDY: XR CHEST 2 VIEWS   INDICATION: Signs/Symptoms:cough and sob.   COMPARISON: October 17 October 18 CT   ACCESSION NUMBER(S): [id number]   ORDERING CLINICIAN: VERONIQUE SANCHES   FINDINGS: Cardiomegaly with tortuous aorta unchanged.   No effusion, edema, or pneumothorax. Patchy right basilar airspace opacity seen on prior CT best visualized right lower lobe.       Small right lower lobe airspace disease best seen on recent CT.   Signed by: Vinicius Lamas 10/22/2024 2:15 PM Dictation workstation:   [id number]         Assessment/Plan   Sheldon Arteaga is an 84 y.o. male with PMH of COPD, A-Fib (not on anticoagulation per patient preference), DVT, possible HFpEF (EF 60-65%,), HTN, HLD, CVA (2022), PAD s/p resection of fem-pop bypass and right CFA to left profunda bypass with reversed right femoral vein (5/2022) presenting with generalized weakness and coughing after recent treatment for CAP. He is 3 days out from a recent discharge (10/18) for the same symptoms. Patient's CMP  was unremarkable. CBC revealed 4.13 RBC, 12.6 Hgb, 38.6 Hematocrit, and 5.60 absolute neutrophils. There was no leukocytosis. The most likely diagnosis is continuation/recurrence of CAP, however the (albeit subjective) lack of fevers and absence of leukocytosis complicates this diagnosis. Concomitant COPD exacerbation could be considered given wheezes on exam and continuation/worsening of pulmonary symptoms. Malignancy should be considered given his recent history of weight loss, significant smoking history, pulmonary symptoms, and fatigue/weakness.     Acute Problem Plan  #Generalized weakness   #CAP  -Repeat CT chest   -Continue (finish) azithromycin 500 mg in dextrose 5% 250 mL IV  -Continue (finish) ceftriaxone 2 g in dextrose (iso) IV 50 mL  -Strep pneumo swab   -Legionella swab   -F/U pending blood cultures   -PT/OT consulted for assessment/recs  -Orthostatic BP     Chronic Problem Plan  #COPD  -Continue home Duo-neb 0.5-2.5 mg/3 mL nebulizer solution QID PRN   -Continue home albuterol 90 mcg two puffs Q4 PRN   -Consult RT     #Atrial fibrillation  #DVT history   -Restart Eliquis inpatient (patient had stopped taking after lower extremity surgery because of post-operative bleeding he experienced)     #HTN   -Continue home lopressor 25 mg PO BID   -Continue home amlodipine 5 mg PO daily     #HLD  -Continue home Atorvastatin 40 mg PO nightly      #CHF  -No indication for treatment at this time (no extremity edema noted or concern on CXR for pulmonary edema)     #PAD  -Continue home Clopidogrel 75 mg PO daily      #Smoking history  -Transdermal nicotine patch while inpatient Q24       Fluids: PO  Electrolytes: Replete PRN   Nutrition: Regular diet  Antimicrobials: None currently  DVT ppx: Eliquis  GI ppx: None  Catheter: None  Lines: PIV  Supplemental Oxygen: None  Emergency Contact: Extended Emergency Contact Information  Primary Emergency Contact: Julita Arteagaglen  Home Phone: [phone]  Relation:  Child  Code: DNR, DNI       Patient seen and discussed with Dr. Kristen Diallo, PGY-1; Dr. Massimo Acevedo, PGY-3; and Dr. Yosvany Rider, attending hospitalist.     KASSIDY GARZA  Medical Student

## 2024-10-22 NOTE — ED PROVIDER NOTES
The patient was seen by the midlevel/resident.  I have personally saw the patient and made/approved the management plan and take responsibility for the patient management.  I reviewed the EKG's (when done) and agree with the interpretation.  I have seen and examined the patient; agree with the workup, evaluation, MDM, and diagnosis.  The care plan has been discussed with the midlevel/resident; I have reviewed the note and agree with the documented findings.     Presents with general weakness and coughing.  He had diagnosis of pneumonia on previous imaging and was treated with Augmentin.  He has 2 pills left been taking twice a day.  He is not getting better in fact any worse he is not really eating much and not really getting around well at home.  He is currently getting breathing treatment on exam he does have cough and dyspnea.  He has had recent workup including CT scans and swabs that were negative.  He was here just few days ago treated in emergency room.  Plan is to hospitalize for failed outpatient treatment.  We will try different antibiotics.  Will hospitalize.  Diagnoses as of 10/24/24 0739   Generalized weakness   Bronchitis   Failure of outpatient treatment   Pneumonia of right lower lobe due to infectious organism     MD Denzel Zepeda MD  10/22/24 1455       Deznel Desai MD  10/24/24 0692

## 2024-10-22 NOTE — PROGRESS NOTES
10/22/24 1358   Discharge Planning   Living Arrangements Children;Other (Comment)  (Home with daughter Stuart)   Support Systems Children   Assistance Needed A&OX4; independent with ADLs with cane and walker; drives; room air baseline and currently room air; on Eliquis prior to hospitalization   Type of Residence Private residence   Number of Stairs to Enter Residence 0   Number of Stairs Within Residence 0   Do you have animals or pets at home? Yes   Type of Animals or Pets 3 cats   Who is requesting discharge planning? Provider   Expected Discharge Disposition Home  (DC dispo is pending workup and PT OT Carmencita)   Does the patient need discharge transport arranged? Yes   RoundTrip coordination needed? Yes   Has discharge transport been arranged? No   Financial Resource Strain   How hard is it for you to pay for the very basics like food, housing, medical care, and heating? Not hard   Housing Stability   In the last 12 months, was there a time when you were not able to pay the mortgage or rent on time? N   In the past 12 months, how many times have you moved where you were living? 1   At any time in the past 12 months, were you homeless or living in a shelter (including now)? N   Transportation Needs   In the past 12 months, has lack of transportation kept you from medical appointments or from getting medications? no   In the past 12 months, has lack of transportation kept you from meetings, work, or from getting things needed for daily living? No     10/22/2024 1400pm  Spoke with patient and patient's daughter bedside in ED

## 2024-10-22 NOTE — PROGRESS NOTES
Pharmacy Medication History Review    Sheldon Arteaga is a 84 y.o. male admitted for Community acquired bacterial pneumonia. Pharmacy reviewed the patient's [id number] medications and allergies for accuracy.    The list below reflectives the updated PTA list. Please review each medication in order reconciliation for additional clarification and justification.  Prior to Admission Medications   Prescriptions Last Dose Informant Patient Reported? Taking?   albuterol (Ventolin HFA) 90 mcg/actuation inhaler Not Taking  No No   Sig: Inhale 2 puffs every 4 hours if needed for wheezing or shortness of breath.   Patient not taking: Reported on 10/22/2024   albuterol 2.5 mg /3 mL (0.083 %) nebulizer solution 10/21/2024 Evening  Yes Yes   Sig: Take 3 mL (2.5 mg) by nebulization every 2 hours if needed for wheezing or shortness of breath.   amLODIPine (Norvasc) 5 mg tablet 10/22/2024 Morning  Yes Yes   Sig: Take 1 tablet (5 mg) by mouth once daily.   amoxicillin-pot clavulanate (Augmentin) 875-125 mg tablet 10/22/2024 Morning  Yes Yes   Sig: Take 1 tablet by mouth every 12 hours for 5 days.   atorvastatin (Lipitor) 40 mg tablet 10/21/2024 Evening  Yes Yes   Sig: Take 1 tablet (40 mg) by mouth once daily at bedtime.   clopidogrel (Plavix) 75 mg tablet 10/22/2024 Morning  Yes Yes   Sig: Take 1 tablet (75 mg) by mouth once daily.   docusate sodium (Colace) 100 mg capsule 10/22/2024 Morning  Yes Yes   Sig: Take 1 capsule (100 mg) by mouth once daily.   ipratropium-albuteroL (Duo-Neb) 0.5-2.5 mg/3 mL nebulizer solution Unknown  Yes Yes   Sig: Take 3 mL by nebulization 4 times a day as needed for wheezing or shortness of breath.   metoprolol tartrate (Lopressor) 25 mg tablet 10/22/2024 Morning  Yes Yes   Sig: Take 1 tablet (25 mg) by mouth 2 times a day.   sodium chloride (Saline NasaL) 0.65 % nasal spray Unknown  Yes Yes   Sig: Administer 1 spray into each nostril if needed for congestion.      Facility-Administered  Medications: None           The list below reflectives the updated allergy list. Please review each documented allergy for additional clarification and justification.  Allergies  Reviewed by Mya Flynn RN on 10/22/2024        Severity Reactions Comments    Doxycycline Not Specified Confusion     T-painol Extra Strength Not Specified Other     Levaquin [levofloxacin] Low Itching, Rash             Below are additional concerns with the patient's PTA list.      Agueda Gottlieb

## 2024-10-22 NOTE — ED PROVIDER NOTES
HPI   Chief Complaint   Patient presents with    Weakness, Gen     BIBS from home. Was recently discharged  from this facility and dx with PNA. Has been feeling weaker each day,appetite has been poor and coughing has increased       History of present illness:  84-year-old male presents emergency room for complaints of worsening shortness of breath and generalized weakness and inability to care for himself.  The patient states that he was recently mid to the hospital for pneumonia and treated and sent home on Augmentin.  He states has been home for about 4 days and states that over the past couple days he is gotten to the point where he can does not even get out of bed to care for himself.  He states he finally called EMS today to be brought back to the emergency room.  He states that the coughing is has been getting worse.  He states he is feels short of breath as well at all times.  Past medical history of hypertension hyperlipidemia COPD DVTs, A-fib without anticoagulation, congestive heart failure and peripheral artery disease.    Social history: Negative for alcohol and drug use.    Review of systems:   Gen.: No weight loss, fatigue, anorexia, insomnia, fever.   Eyes: No vision loss, double vision  ENT: No pharyngitis, neck pain  Cardiac: No chest pain, palpitations, syncope  Pulmonary: No shortness of breath, hemoptysis.   Heme/lymph: No swollen glands, fever, bleeding.   GI: No abdominal pain, change in bowel habits, melena, hematemesis, hematochezia, nausea, vomiting, diarrhea.   : No discharge, dysuria, frequency, urgency, hematuria.   Musculoskeletal: No limb pain, joint pain, joint swelling.   Skin: No rashes.   Review of systems is otherwise negative unless stated above or in history of present illness.      Physical exam:  General: Vitals noted, no distress. Afebrile.   EENT: No lymphadenopathy appreciated  Cardiac: Regular, rate, rhythm, no murmur.   Pulmonary: Lungs clear bilaterally with good  aeration. No adventitious breath sounds.   Abdomen: Soft, nonsurgical. Nontender. No peritoneal signs. Normoactive bowel sounds.   Extremities: No peripheral edema.   Skin: No rash.   Neuro: No focal neurologic deficits         Medical decision making:   Testing: CBC shows slight anemia with hemoglobin 12.6, CMP is unremarkable BNP is at 54 lactate 0.9 troponin x 2 negative, chest x-ray shows concerns for pneumonia in the left lower side as interpreted by radiology  Treatment: Hospital-acquired pneumonia protocol activated  Reevaluation:   Plan: 84-year-old male presents emergency room for complaints of worsening shortness of breath and generalized weakness and inability to care for himself.  The patient states that he was recently mid to the hospital for pneumonia and treated and sent home on Augmentin.  He states has been home for about 4 days and states that over the past couple days he is gotten to the point where he can does not even get out of bed to care for himself.  He states he finally called EMS today to be brought back to the emergency room.  He states that the coughing is has been getting worse.  He states he is feels short of breath as well at all times.  Past medical history of hypertension hyperlipidemia COPD DVTs, A-fib without anticoagulation, congestive heart failure and peripheral artery disease. Cardiac: Regular, rate, rhythm, no murmur. Pulmonary: Lungs clear bilaterally with good aeration. No adventitious breath sounds.  I explained to the patient test results and explained to him they need to be admitted for further care and treatment.  He was agreeable to this plan was admitted to the care of the hospitalist team at this time for further management of his pneumonia.  Impression:   1.  Pneumonia            EKG taken on October 22, 2024 at 1530 shows sinus rhythm at 90 no STEMI no T wave version normal axis no AV block      History provided by:  Patient   used: No             Patient History   Past Medical History:   Diagnosis Date    Stroke (Multi)      Past Surgical History:   Procedure Laterality Date    CT ANGIO NECK  4/19/2022    CT NECK ANGIO W AND WO IV CONTRAST 4/19/2022 [facility] CLINICAL LEGACY    CT AORTA AND BILATERAL ILIOFEMORAL RUNOFF ANGIOGRAM W AND/OR WO IV CONTRAST  4/10/2022    CT AORTA AND BILATERAL ILIOFEMORAL RUNOFF ANGIOGRAM W AND/OR WO IV CONTRAST 4/10/2022 [facility] EMERGENCY LEGACY    CT AORTA AND BILATERAL ILIOFEMORAL RUNOFF ANGIOGRAM W AND/OR WO IV CONTRAST  4/13/2022    CT AORTA AND BILATERAL ILIOFEMORAL RUNOFF ANGIOGRAM W AND/OR WO IV CONTRAST 4/13/2022 [facility] CLINICAL LEGACY    CT AORTA AND BILATERAL ILIOFEMORAL RUNOFF ANGIOGRAM W AND/OR WO IV CONTRAST  5/22/2022    CT AORTA AND BILATERAL ILIOFEMORAL RUNOFF ANGIOGRAM W AND/OR WO IV CONTRAST 5/22/2022 [facility] EMERGENCY LEGACY    CT AORTA AND BILATERAL ILIOFEMORAL RUNOFF ANGIOGRAM W AND/OR WO IV CONTRAST  4/2/2023    CT AORTA AND BILATERAL ILIOFEMORAL RUNOFF ANGIOGRAM W AND/OR WO IV CONTRAST [facility] CT    CT HEAD ANGIO W AND WO IV CONTRAST  4/19/2022    CT HEAD ANGIO W AND WO IV CONTRAST 4/19/2022 [facility] CLINICAL LEGACY    OTHER SURGICAL HISTORY  09/17/2019    Big Arm tooth extraction    OTHER SURGICAL HISTORY  09/17/2019    Tonsillectomy with adenoidectomy    OTHER SURGICAL HISTORY  01/17/2020    Lower leg fracture repair    OTHER SURGICAL HISTORY  01/17/2020    Cardiac catheterization    TOTAL HIP ARTHROPLASTY  08/31/2018    Hip Replacement     Family History   Problem Relation Name Age of Onset    Heart attack Mother      Colon cancer Father      Heart attack Brother      Heart block Brother       Social History     Tobacco Use    Smoking status: Every Day     Types: Cigarettes    Smokeless tobacco: Never   Vaping Use    Vaping status: Never Used   Substance Use Topics    Alcohol use: Never    Drug use: Never       Physical Exam   ED Triage Vitals   Temp Heart Rate Resp BP   10/22/24 1345 10/22/24 1345 10/22/24 1345  10/22/24 1345   36.8 °C (98.2 °F) 73 18 129/89      SpO2 Temp Source Heart Rate Source Patient Position   10/22/24 1345 10/22/24 1345 10/22/24 1652 10/22/24 1652   96 % Temporal Monitor Sitting      BP Location FiO2 (%)     10/22/24 1345 --     Right arm        Physical Exam      ED Course & MDM   Diagnoses as of 10/22/24 1906   Generalized weakness   Bronchitis   Failure of outpatient treatment   Pneumonia of right lower lobe due to infectious organism                 No data recorded     Meg Coma Scale Score: 15 (10/22/24 1707 : Bre Dozier, TERRI)                           Medical Decision Making      Procedure  Procedures     Yunier Judd PA-C  10/24/24 7590

## 2024-10-23 LAB
ALBUMIN SERPL BCP-MCNC: 3.4 G/DL (ref 3.4–5)
ALP SERPL-CCNC: 55 U/L (ref 33–136)
ALT SERPL W P-5'-P-CCNC: 9 U/L (ref 10–52)
ANION GAP SERPL CALC-SCNC: 14 MMOL/L (ref 10–20)
AST SERPL W P-5'-P-CCNC: 13 U/L (ref 9–39)
ATRIAL RATE: 90 BPM
BILIRUB SERPL-MCNC: 0.4 MG/DL (ref 0–1.2)
BUN SERPL-MCNC: 17 MG/DL (ref 6–23)
CALCIUM SERPL-MCNC: 8.5 MG/DL (ref 8.6–10.3)
CHLORIDE SERPL-SCNC: 101 MMOL/L (ref 98–107)
CO2 SERPL-SCNC: 25 MMOL/L (ref 21–32)
CREAT SERPL-MCNC: 0.64 MG/DL (ref 0.5–1.3)
EGFRCR SERPLBLD CKD-EPI 2021: >90 ML/MIN/1.73M*2
ERYTHROCYTE [DISTWIDTH] IN BLOOD BY AUTOMATED COUNT: 14 % (ref 11.5–14.5)
GLUCOSE SERPL-MCNC: 127 MG/DL (ref 74–99)
HCT VFR BLD AUTO: 33.2 % (ref 41–52)
HGB BLD-MCNC: 10.9 G/DL (ref 13.5–17.5)
MAGNESIUM SERPL-MCNC: 1.89 MG/DL (ref 1.6–2.4)
MCH RBC QN AUTO: 30.7 PG (ref 26–34)
MCHC RBC AUTO-ENTMCNC: 32.8 G/DL (ref 32–36)
MCV RBC AUTO: 94 FL (ref 80–100)
NRBC BLD-RTO: 0 /100 WBCS (ref 0–0)
P AXIS: 55 DEGREES
P OFFSET: 189 MS
P ONSET: 157 MS
PHOSPHATE SERPL-MCNC: 3.3 MG/DL (ref 2.5–4.9)
PLATELET # BLD AUTO: 231 X10*3/UL (ref 150–450)
POTASSIUM SERPL-SCNC: 4.6 MMOL/L (ref 3.5–5.3)
PR INTERVAL: 116 MS
PROCALCITONIN SERPL-MCNC: 0.13 NG/ML
PROT SERPL-MCNC: 6 G/DL (ref 6.4–8.2)
Q ONSET: 215 MS
QRS COUNT: 15 BEATS
QRS DURATION: 104 MS
QT INTERVAL: 398 MS
QTC CALCULATION(BAZETT): 486 MS
QTC FREDERICIA: 455 MS
R AXIS: 68 DEGREES
RBC # BLD AUTO: 3.55 X10*6/UL (ref 4.5–5.9)
SODIUM SERPL-SCNC: 135 MMOL/L (ref 136–145)
T AXIS: 44 DEGREES
T OFFSET: 414 MS
VENTRICULAR RATE: 90 BPM
WBC # BLD AUTO: 7.2 X10*3/UL (ref 4.4–11.3)

## 2024-10-23 PROCEDURE — 84100 ASSAY OF PHOSPHORUS: CPT

## 2024-10-23 PROCEDURE — 2500000004 HC RX 250 GENERAL PHARMACY W/ HCPCS (ALT 636 FOR OP/ED)

## 2024-10-23 PROCEDURE — 94640 AIRWAY INHALATION TREATMENT: CPT

## 2024-10-23 PROCEDURE — 83735 ASSAY OF MAGNESIUM: CPT

## 2024-10-23 PROCEDURE — 2500000005 HC RX 250 GENERAL PHARMACY W/O HCPCS: Performed by: INTERNAL MEDICINE

## 2024-10-23 PROCEDURE — 36415 COLL VENOUS BLD VENIPUNCTURE: CPT

## 2024-10-23 PROCEDURE — 2500000001 HC RX 250 WO HCPCS SELF ADMINISTERED DRUGS (ALT 637 FOR MEDICARE OP)

## 2024-10-23 PROCEDURE — 96366 THER/PROPH/DIAG IV INF ADDON: CPT

## 2024-10-23 PROCEDURE — 94760 N-INVAS EAR/PLS OXIMETRY 1: CPT

## 2024-10-23 PROCEDURE — 97165 OT EVAL LOW COMPLEX 30 MIN: CPT | Mod: GO

## 2024-10-23 PROCEDURE — 85027 COMPLETE CBC AUTOMATED: CPT

## 2024-10-23 PROCEDURE — 1200000002 HC GENERAL ROOM WITH TELEMETRY DAILY

## 2024-10-23 PROCEDURE — 2500000002 HC RX 250 W HCPCS SELF ADMINISTERED DRUGS (ALT 637 FOR MEDICARE OP, ALT 636 FOR OP/ED): Performed by: INTERNAL MEDICINE

## 2024-10-23 PROCEDURE — 80053 COMPREHEN METABOLIC PANEL: CPT

## 2024-10-23 PROCEDURE — 97161 PT EVAL LOW COMPLEX 20 MIN: CPT | Mod: GP

## 2024-10-23 RX ORDER — LANOLIN ALCOHOL/MO/W.PET/CERES
400 CREAM (GRAM) TOPICAL DAILY
Status: COMPLETED | OUTPATIENT
Start: 2024-10-23 | End: 2024-10-23

## 2024-10-23 RX ORDER — LANOLIN ALCOHOL/MO/W.PET/CERES
400 CREAM (GRAM) TOPICAL DAILY
Status: DISCONTINUED | OUTPATIENT
Start: 2024-10-23 | End: 2024-10-23

## 2024-10-23 ASSESSMENT — COGNITIVE AND FUNCTIONAL STATUS - GENERAL
STANDING UP FROM CHAIR USING ARMS: A LITTLE
WALKING IN HOSPITAL ROOM: A LITTLE
TOILETING: A LITTLE
HELP NEEDED FOR BATHING: A LITTLE
PERSONAL GROOMING: A LITTLE
PERSONAL GROOMING: A LITTLE
HELP NEEDED FOR BATHING: A LITTLE
MOVING TO AND FROM BED TO CHAIR: A LITTLE
MOVING TO AND FROM BED TO CHAIR: A LITTLE
DRESSING REGULAR LOWER BODY CLOTHING: A LITTLE
WALKING IN HOSPITAL ROOM: A LITTLE
HELP NEEDED FOR BATHING: A LITTLE
DAILY ACTIVITIY SCORE: 19
TOILETING: A LITTLE
DAILY ACTIVITIY SCORE: 19
MOBILITY SCORE: 20
TOILETING: A LITTLE
MOBILITY SCORE: 20
CLIMB 3 TO 5 STEPS WITH RAILING: A LITTLE
DAILY ACTIVITIY SCORE: 22
DRESSING REGULAR UPPER BODY CLOTHING: A LITTLE
STANDING UP FROM CHAIR USING ARMS: A LITTLE
DRESSING REGULAR UPPER BODY CLOTHING: A LITTLE
STANDING UP FROM CHAIR USING ARMS: A LITTLE
CLIMB 3 TO 5 STEPS WITH RAILING: A LITTLE
DRESSING REGULAR LOWER BODY CLOTHING: A LITTLE
CLIMB 3 TO 5 STEPS WITH RAILING: A LITTLE
MOBILITY SCORE: 20
MOVING TO AND FROM BED TO CHAIR: A LITTLE
WALKING IN HOSPITAL ROOM: A LITTLE

## 2024-10-23 ASSESSMENT — PAIN - FUNCTIONAL ASSESSMENT
PAIN_FUNCTIONAL_ASSESSMENT: 0-10

## 2024-10-23 ASSESSMENT — ACTIVITIES OF DAILY LIVING (ADL)
BATHING_ASSISTANCE: MODIFIED INDEPENDENT (DEVICE)
ADL_ASSISTANCE: INDEPENDENT
ADLS_ADDRESSED: YES
ADL_ASSISTANCE: INDEPENDENT

## 2024-10-23 ASSESSMENT — PAIN SCALES - GENERAL
PAINLEVEL_OUTOF10: 0 - NO PAIN

## 2024-10-23 NOTE — PROGRESS NOTES
Occupational Therapy    Evaluation    Patient Name: Sheldon Arteaga  MRN: [id number]  Department: [address]  Room: [address] [place], [place] [address]  Today's Date: 10/23/2024  Time Calculation  Start Time: 1036  Stop Time: 1049  Time Calculation (min): 13 min    Assessment  IP OT Assessment  OT Assessment: Pt presents near his reported baseline, no further skilled OT needs identified. Encouraged frequent OOB activity during this LOS with staff supervision  Barriers to Discharge: None  Evaluation/Treatment Tolerance: Patient tolerated treatment well  Medical Staff Made Aware: Yes  End of Session Communication: Bedside nurse, PCT/NA/CTA  End of Session Patient Position: Up in chair, Alarm on  Plan:  No Skilled OT: No acute OT goals identified  OT Discharge Recommendations: No OT needed after discharge, No further acute OT  OT - OK to Discharge: Yes (per OT POC)    Subjective     General:  General  Reason for Referral: Pneumonia, impaired ADL  Referred By: Yosvany Rider  Past Medical History Relevant to Rehab: HTN, HLD, COPD, DVT, Afib not on anticoag, CHF documented however no evidence on Echo, prior CVAs, PAD s/p left fem-pop bypass s/p resection of fem-pop bypass with right CFA to left profunda bypass with reversed right femoral vein (5/2022)  Family/Caregiver Present: No  Co-Treatment: PT  Co-Treatment Reason: maximize pt outcomes  Prior to Session Communication: Bedside nurse  Patient Position Received: Bed, 3 rail up, Alarm on  General Comment: Pleasant and cooperative  Precautions:  Medical Precautions: Fall precautions (2L O2)           Pain:  Pain Assessment  Pain Assessment: 0-10  0-10 (Numeric) Pain Score: 0 - No pain    Objective   Cognition:  Overall Cognitive Status: Within Functional Limits  Orientation Level: Oriented X4           Home Living:  Type of Home: Mobile home  Lives With: Adult children (dtr)  Home Adaptive Equipment: Walker rolling or standard, Cane, Wheelchair-manual, Wheelchair-power  Home Layout: One  level  Home Access: Ramped entrance  Bathroom Shower/Tub: Tub/shower unit  Bathroom Equipment: Shower chair with back   Prior Function:  Level of [place]: Independent with ADLs and functional transfers, Independent with homemaking with ambulation  Receives Help From: Family  ADL Assistance: Independent  Homemaking Assistance: Independent  Ambulatory Assistance: Independent  Prior Function Comments: (+) driving, completes housework    ADL:  Eating Assistance: Independent  Grooming Assistance: Independent  Bathing Assistance: Modified independent (Device)  UE Dressing Assistance: Independent  LE Dressing Assistance: Independent  Toileting Assistance with Device: Stand by  Activity Tolerance:  Endurance: Tolerates 10 - 20 min exercise with multiple rests  Bed Mobility/Transfers: Bed Mobility  Bed Mobility: Yes  Bed Mobility 1  Bed Mobility 1: Supine to sitting  Level of Assistance 1: Independent    Transfers  Transfer: Yes  Transfer 1  Transfer From 1: Sit to, Stand to  Transfer to 1: Sit, Stand  Technique 1: Sit to stand, Stand to sit  Transfer Level of Assistance 1: Close supervision      Functional Mobility:  Functional Mobility  Functional Mobility Performed: Yes  Functional Mobility 1  Surface 1: Level tile  Device 1: No device  Assistance 1: Close supervision  Comments 1: Ambulated in room, including direction changes, no LOB  Sensation:  Light Touch: No apparent deficits  Strength:  Strength Comments: BUE functional strength demonstrated in activity  Hand Function:  Hand Function  Gross Grasp: Functional  Coordination: Functional  Extremities: RUE   RUE : Within Functional Limits and LUE   LUE: Within Functional Limits    Outcome Measures: [facility] Daily Activity  Putting on and taking off regular lower body clothing: None  Bathing (including washing, rinsing, drying): A little  Putting on and taking off regular upper body clothing: None  Toileting, which includes using toilet, bedpan or urinal: A  little  Taking care of personal grooming such as brushing teeth: None  Eating Meals: None  Daily Activity - Total Score: 22

## 2024-10-23 NOTE — PROGRESS NOTES
Physical Therapy    Physical Therapy Evaluation    Patient Name: Sheldon Arteaga  MRN: [id number]  Department: [address]  Room: [address] [place], [place] [address]  Today's Date: 10/23/2024   Time Calculation  Start Time: 1035  Stop Time: 1050  Time Calculation (min): 15 min    Assessment/Plan   PT Assessment  PT Assessment Results: Decreased strength, Decreased endurance, Impaired balance, Decreased mobility  Rehab Prognosis: Excellent  Barriers to Discharge: Medical management  Evaluation/Treatment Tolerance: Patient tolerated treatment well  Medical Staff Made Aware: Yes  Strengths: Ability to acquire knowledge, Housing layout, Insight into problems, Support of Caregivers  Barriers to Participation: Comorbidities  End of Session Communication: Bedside nurse  Assessment Comment: Patient pleasant, eager to improve and motivated to return home. Patient is near baseline function, requiring supplemental O2. No skilled PT intervention necessary at this time.  End of Session Patient Position: Up in chair, Alarm on (all needs within reach)     PT Plan  PT Plan: PT Eval only  PT Eval Only Reason: At baseline function  PT Frequency: PT eval only  PT Discharge Recommendations: No further acute PT  Equipment Recommended upon Discharge:  (None)  PT Recommended Transfer Status: Assist x1  PT - OK to Discharge: Yes (per PT POC)    Subjective   General Visit Information:  General  Reason for Referral: Impaired functional mobility; pneumonia  Referred By: Yosvany Rider  Past Medical History Relevant to Rehab: HTN, HLD, COPD, DVT, Afib not on anticoag, CHF documented however no evidence on Echo, prior CVAs, PAD s/p left fem-pop bypass s/p resection of fem-pop bypass with right CFA to left profunda bypass with reversed right femoral vein (5/2022)  Family/Caregiver Present: No  Co-Treatment: OT  Co-Treatment Reason: Maximize patients functional mobility and safety  Prior to Session Communication: Bedside nurse  Patient Position Received: Bed, 3 rail up,  Alarm on  General Comment: Patient pleasant, cooperative and agreeable to therapy assessment  Home Living:  Home Living  Type of Home: Mobile home  Lives With: Adult children (Dtr)  Home Adaptive Equipment: Walker rolling or standard, Cane, Wheelchair-manual, Wheelchair-power  Home Layout: One level  Home Access: Ramped entrance  Bathroom Shower/Tub: Tub/shower unit  Bathroom Equipment: Shower chair with back  Prior Level of Function:  Prior Function Per Pt/Caregiver Report  Level of [place]: Independent with ADLs and functional transfers, Independent with homemaking with ambulation  Receives Help From: Family  ADL Assistance: Independent  Homemaking Assistance: Independent  Ambulatory Assistance: Independent  Prior Function Comments: (+) driving  Precautions:  Precautions  Medical Precautions: Fall precautions (2L O2)    SpO2 93% in sitting, 2L O2     Objective   Pain:  Pain Assessment  Pain Assessment: 0-10  0-10 (Numeric) Pain Score: 0 - No pain  Cognition:  Cognition  Overall Cognitive Status: Within Functional Limits  Orientation Level: Oriented X4    General Assessments:    Activity Tolerance  Endurance: Tolerates 10 - 20 min exercise with multiple rests    Sensation  Light Touch: No apparent deficits    Strength  Strength Comments: L LE 4+/5, R LE 4/5    Coordination  Movements are Fluid and Coordinated: Yes    Postural Control  Postural Control: Within Functional Limits    Static Sitting Balance  Static Sitting-Balance Support: No upper extremity supported, Feet supported  Static Sitting-Level of Assistance: Independent  Dynamic Sitting Balance  Dynamic Sitting-Balance Support: No upper extremity supported, Feet supported  Dynamic Sitting-Level of Assistance: Independent  Dynamic Sitting-Balance: Forward lean    Static Standing Balance  Static Standing-Balance Support: Bilateral upper extremity supported  Static Standing-Level of Assistance: Close supervision  Dynamic Standing Balance  Dynamic  Standing-Balance Support: Bilateral upper extremity supported  Dynamic Standing-Level of Assistance: Close supervision  Dynamic Standing-Balance: Turning  Functional Assessments:  ADL  ADL's Addressed: Yes (Independent for donning socks)    Bed Mobility  Bed Mobility: Yes  Bed Mobility 1  Bed Mobility 1: Supine to sitting  Level of Assistance 1: Independent    Transfers  Transfer: Yes  Transfer 1  Transfer From 1: Sit to, Stand to  Transfer to 1: Sit, Stand  Technique 1: Sit to stand, Stand to sit  Transfer Level of Assistance 1: Close supervision    Ambulation/Gait Training  Ambulation/Gait Training Performed: Yes  Ambulation/Gait Training 1  Surface 1: Level tile  Device 1: No device  Assistance 1: Close supervision  Quality of Gait 1:  (steady gait, decreased migue)  Comments/Distance (ft) 1: 40'    Outcome Measures:  [facility] Basic Mobility  Turning from your back to your side while in a flat bed without using bedrails: None  Moving from lying on your back to sitting on the side of a flat bed without using bedrails: None  Moving to and from bed to chair (including a wheelchair): A little  Standing up from a chair using your arms (e.g. wheelchair or bedside chair): A little  To walk in hospital room: A little  Climbing 3-5 steps with railing: A little  Basic Mobility - Total Score: 20    Education Documentation  Precautions, taught by Margo Green PT at 10/23/2024 12:08 PM.  Learner: Patient  Readiness: Acceptance  Method: Explanation  Response: Verbalizes Understanding    Body Mechanics, taught by Margo Green PT at 10/23/2024 12:08 PM.  Learner: Patient  Readiness: Acceptance  Method: Explanation  Response: Verbalizes Understanding    Mobility Training, taught by Margo Green PT at 10/23/2024 12:08 PM.  Learner: Patient  Readiness: Acceptance  Method: Explanation  Response: Verbalizes Understanding    Education Comments  No comments found.

## 2024-10-23 NOTE — HOSPITAL COURSE
Sheldon Arteaga is a 84 y.o. male with PMH of HTN, HLD, COPD, Afib (not on anticoagulation per patient preference), DVT, possible HFpEF (EF 60-65%,), CVA (2022), PAD s/p resection of fem-pop bypass with right CFA to left profunda bypass with reversed right femoral vein (5/2022) presenting for community acquired pneumonia. Of note, he had been recently admitted for pneumonia and discharged on 10/18 with a 5 day course of Augmentin. Patient was negative for covid, Influenza, legionella and strep pneumo antigens at that time.    ED course: No leukocytosis, and the CXR revealed small right lower lobe airspace disease. He was started on azithromycin and rocephin and received two 3mL Duo-nebs and one dose of Solumedrol 125 mg IV.     Hospital course: patient continued on azithromycin and rocephin. Started on prednisone 40mg for 5 days. Patient continues to have productive cough with white sputum. Patient continually improved throughout treatment course. Discharge on 10/24 with augmentin.

## 2024-10-23 NOTE — PROGRESS NOTES
Patient: Sheldon Arteaga Age: 84 y.o.   Gender: male Room/bed: 226/226-A     Attending: Yosvany Rider DO  Code Status:  DNR and No Intubation    Overnight Events  No acute overnight events    Subjective  Patient states his breathing feels improved today and is on room air. Patient noted that his nasal cannula fell off, but did not feel like he needed supplemental oxygen. Patient endorses productive cough with white sputum. Denies fever, chills, chest pain, chest pain, abdominal pain, urinary symptoms.      Objective:  Physical Exam  Constitutional:       General: He is not in acute distress.     Appearance: Normal appearance. He is normal weight.   Cardiovascular:      Rate and Rhythm: Normal rate and regular rhythm.      Heart sounds: Normal heart sounds.   Pulmonary:      Effort: No respiratory distress.      Breath sounds: Wheezing present.      Comments: Patient on room air. No conversational dyspnea. Positive for productive cough. Sputum is white/clear in color. Diffuse wheezing heard on auscultation.  Chest:      Chest wall: No tenderness.   Abdominal:      General: There is no distension.      Palpations: Abdomen is soft.      Tenderness: There is no abdominal tenderness.   Musculoskeletal:         General: No swelling or tenderness.      Right lower leg: No edema.      Left lower leg: No edema.   Skin:     General: Skin is warm and dry.   Neurological:      General: No focal deficit present.      Mental Status: He is alert and oriented to person, place, and time.   Psychiatric:         Mood and Affect: Mood normal.         Behavior: Behavior normal.          Temp:  [36.1 °C (97 °F)-36.9 °C (98.4 °F)] 36.4 °C (97.5 °F)  Heart Rate:  [70-86] 70  Resp:  [16-20] 18  BP: (111-136)/(64-72) 136/72      Intake/Output Summary (Last 24 hours) at 10/23/2024 1439  Last data filed at 10/23/2024 1011  Gross per 24 hour   Intake 640 ml   Output 475 ml   Net 165 ml       Vitals:    10/22/24 1652   Weight: 58.3 kg (128  "lb 8 oz)             I/Os    Intake/Output Summary (Last 24 hours) at 10/23/2024 1439  Last data filed at 10/23/2024 1011  Gross per 24 hour   Intake 640 ml   Output 475 ml   Net 165 ml       Labs:   CBC:  Recent Labs     10/23/24  0626 10/22/24  1356 10/18/24  0713   WBC 7.2 7.9 5.3   HGB 10.9* 12.6* 12.1*   HCT 33.2* 38.6* 37.7*    241 195   MCV 94 94 95     CMP:  Recent Labs     10/23/24  0626 10/22/24  1356 10/18/24  0713   * 138 135*   K 4.6 4.0 3.9    100 101   CO2 25 28 26   ANIONGAP 14 14 12   BUN 17 15 11   CREATININE 0.64 0.57 0.62   EGFR >90 >90 >90   GLUCOSE 127* 108* 91     Recent Labs     10/23/24  0626 10/22/24  1356 10/18/24  0713   ALBUMIN 3.4 3.9 3.5   ALKPHOS 55 65 59   ALT 9* 11 7*   AST 13 18 15   BILITOT 0.4 0.5 0.5     Calcium/Phos:   Lab Results   Component Value Date    CALCIUM 8.5 (L) 10/23/2024    PHOS 3.3 10/23/2024      COAG:   Recent Labs     10/17/24  1911 05/13/24  1958 09/03/22  2206   INR 1.0 1.0 0.9     CRP:   Lab Results   Component Value Date    CRP 1.73 (H) 10/22/2024      [unfilled]   ENDO:  Recent Labs     04/20/22  0700 04/19/22  0515 10/22/20  0949 09/18/19  0851 08/31/18  0940   TSH 3.49  --  2.28 2.25 1.69   HGBA1C  --  5.5 6.1 6.1 5.9      CARDIAC:   Recent Labs     10/22/24  1537 10/22/24  1356 10/17/24  1911 05/13/24  2048 05/13/24 1918 05/22/22  1109 04/14/22  0005   LDH  --   --   --   --   --   --  1,417*   TROPHS 5 6 5   < > 5   < >  --    BNP  --  54 24  --  33  --   --     < > = values in this interval not displayed.     Recent Labs     07/31/23  1418 04/19/22  0515 04/14/22  0005 12/02/21  1042   CHOL 157 114  --  176   LDLF 79 57  --  93   HDL 53.5 16.2*  --  53.8   TRIG 121 204* 94 145     No data recorded    Micro/ID:   No results found for the last 90 days.                   No lab exists for component: \"AGALPCRNB\"   .ID  Lab Results   Component Value Date    BLOODCULT Loaded on Instrument - Culture in progress 10/22/2024    BLOODCULT " Loaded on Instrument - Culture in progress 10/22/2024       Images:  ECG 12 lead  Sinus rhythm with sinus arrhythmia with occasional Premature ventricular complexes  Cannot rule out Inferior infarct , age undetermined  Abnormal ECG  When compared with ECG of 13-MAY-2024 19:09,  Premature ventricular complexes are now Present  Premature atrial complexes are no longer Present  Nonspecific T wave abnormality now evident in Inferior leads       Medications:  Scheduled medications  amLODIPine, 5 mg, oral, Daily  apixaban, 5 mg, oral, BID  atorvastatin, 40 mg, oral, Nightly  azithromycin, 500 mg, oral, q24h DOROTEO  cefTRIAXone, 1 g, intravenous, q24h  clopidogrel, 75 mg, oral, Daily  ipratropium-albuteroL, 3 mL, nebulization, BID  metoprolol tartrate, 25 mg, oral, BID  predniSONE, 40 mg, oral, Daily  sennosides, 2 tablet, oral, BID      Continuous medications     PRN medications  PRN medications: acetaminophen **OR** acetaminophen, ipratropium-albuteroL, oxygen, sodium chloride     Assessment and Plan:  Sheldon Arteaga is a 84 y.o. male presenting for  PMH of  HTN, HLD, COPD, DVT, Afib not on anticoag, CHF documented however no evidence on Echo, prior CVAs, PAD s/p left fem-pop bypass s/p resection of fem-pop bypass with right CFA to left profunda bypass with reversed right femoral vein (5/2022) presenting with pneumonia.     Acute Medical Issues:  #Generalized Weakness  #Community-Acquired Pneumonia  #Hx of Lung Nodules  - Patient was recently discharged 6 days ago, had diagnosis of pneumonia and was on Augmentin at home  - Patient also had hospitalization in May for community acquired pneumonia. On this admission, patient was found to have a 6.3 mm pulmonary nodular opacity in RLL and mildly prominent mediastinal lymph nodes up to 8 mm.   - Negative COVID, Flu, Strep pneumo and legionella antigens on last admission  - Continue rocephin and azithromycin  - CXR/CT 10/22 chest showed small right lower lobe airspace disease    - WBC on admision 7.9  - Abx in ED given: azithromycin and rocephin     PLAN:  - Continue Rocephin and Azithromycin  - Prednisone 40mg for 5 days start on 10/23  - Sed rate 22, CRP 1.73  - respiratory viral panel, respiratory culture pending  - blood cultures x 2 in process  - Duonebs 2 times daily and Q2 prn  - PT/OT     Chronic Medical Issues:   #Atrial fibrillation  #Hx DVT  - Restart eliquis  - Patient did not take eliquis at home for 2 years since his lower extremity operation, however he tolerated eliquis on his most recent admission  - home Lopressor 25 mg     #HLD:   - home Lipitor     #HTN:   - home amlodipine     #CAD  - home plavix     #COPD  - home Duoneb     #CHF  -Documented history of CHF, however indeterminate evidence on documented TTEs  -Patient is not on home diuretic therapy  -No signs of volume overload     Fluids: PO   Electrolytes: replete as needed  Nutrition: adult regular diet  GI Prophylaxis: none  DVT Prophylaxis: eliquis  Code status: DNR/DNI     Access: IV  Antibiotics: rocephin and azithromycin  Oxygenation: NC     Dispo: estimated length of stay 3 days       Kristen Diallo DO  PGY1

## 2024-10-23 NOTE — PROGRESS NOTES
10/23/24 1122   Discharge Planning   Living Arrangements Children  (Patient is from home with his daughter)   Support Systems Children   Assistance Needed Alert and oriented x 3; Independent with ADL's, Drives; Cane and Walker used at home.   Type of Residence Private residence   Number of Stairs to Enter Residence 0   Number of Stairs Within Residence 0   Do you have animals or pets at home? Yes   Type of Animals or Pets 3 cats   Who is requesting discharge planning? Provider   Home or Post Acute Services None   Expected Discharge Disposition Home   Does the patient need discharge transport arranged? No   RoundTrip coordination needed? No   Has discharge transport been arranged? No   Patient Choice   Provider Choice list and CMS website (https://medicare.gov/care-compare#search) for post-acute Quality and Resource Measure Data were provided and reviewed with: Patient;Family   Patient / Family choosing to utilize agency / facility established prior to hospitalization No

## 2024-10-23 NOTE — CARE PLAN
The patient's goals for the shift include      The clinical goals for the shift include wean off oxygen    Problem: Pain - Adult  Goal: Verbalizes/displays adequate comfort level or baseline comfort level  Outcome: Progressing     Problem: Safety - Adult  Goal: Free from fall injury  Outcome: Progressing     Problem: Discharge Planning  Goal: Discharge to home or other facility with appropriate resources  Outcome: Progressing

## 2024-10-23 NOTE — PROGRESS NOTES
"Sheldon Arteaga is a 84 y.o. male on day 0 of admission presenting with Community acquired bacterial pneumonia.    Subjective   Patient was awake in bed this morning. There were no acute overnight events. He stated he slept well, had good appetite, and that his BMs and urination were at baseline. He endorsed continued cough productive of white/yellow sputum, wheezing, weakness, and headache. He denied chest pain, SOB, and abdominal pain.     Objective     Physical Exam  Constitutional:       General: He is not in acute distress.  HENT:      Nose: No congestion or rhinorrhea.      Mouth/Throat:      Mouth: Mucous membranes are moist.      Pharynx: Oropharynx is clear.   Eyes:      Conjunctiva/sclera: Conjunctivae normal.   Cardiovascular:      Rate and Rhythm: Normal rate and regular rhythm.      Pulses: Normal pulses.      Heart sounds: No murmur heard.     No friction rub. No gallop.   Pulmonary:      Effort: No respiratory distress.      Breath sounds: Wheezing and rales present.      Comments: Inspiratory rales and expiratory wheezes appreciated bilaterally in the upper and lower listening areas.   Chest:      Chest wall: No tenderness.   Abdominal:      General: Abdomen is flat. Bowel sounds are normal. There is no distension.      Palpations: Abdomen is soft. There is no mass.      Tenderness: There is no abdominal tenderness.   Musculoskeletal:         General: No swelling or tenderness.   Skin:     General: Skin is warm and dry.      Capillary Refill: Capillary refill takes less than 2 seconds.   Neurological:      General: No focal deficit present.      Mental Status: He is alert. Mental status is at baseline.   Psychiatric:         Mood and Affect: Mood normal.         Behavior: Behavior normal.     Last Recorded Vitals  Blood pressure 115/67, pulse 70, temperature 36.3 °C (97.3 °F), temperature source Temporal, resp. rate 18, height 1.651 m (5' 5\"), weight 58.3 kg (128 lb 8 oz), SpO2 95%.  Intake/Output " last 3 Shifts:  I/O last 3 completed shifts:  In: 590 (10.1 mL/kg) [P.O.:340; IV Piggyback:250]  Out: - (0 mL/kg)   Weight: 58.3 kg     Relevant Results  Scheduled medications  amLODIPine, 5 mg, oral, Daily  apixaban, 5 mg, oral, BID  atorvastatin, 40 mg, oral, Nightly  azithromycin, 500 mg, oral, q24h DOROTEO  cefTRIAXone, 1 g, intravenous, q24h  clopidogrel, 75 mg, oral, Daily  ipratropium-albuteroL, 3 mL, nebulization, BID  metoprolol tartrate, 25 mg, oral, BID  predniSONE, 40 mg, oral, Daily  sennosides, 2 tablet, oral, BID      Continuous medications     PRN medications  PRN medications: acetaminophen **OR** acetaminophen, ipratropium-albuteroL, oxygen, sodium chloride      Results for orders placed or performed during the hospital encounter of 10/22/24 (from the past 24 hours)   CBC and Auto Differential   Result Value Ref Range    WBC 7.9 4.4 - 11.3 x10*3/uL    nRBC 0.0 0.0 - 0.0 /100 WBCs    RBC 4.13 (L) 4.50 - 5.90 x10*6/uL    Hemoglobin 12.6 (L) 13.5 - 17.5 g/dL    Hematocrit 38.6 (L) 41.0 - 52.0 %    MCV 94 80 - 100 fL    MCH 30.5 26.0 - 34.0 pg    MCHC 32.6 32.0 - 36.0 g/dL    RDW 14.3 11.5 - 14.5 %    Platelets 241 150 - 450 x10*3/uL    Neutrophils % 70.8 40.0 - 80.0 %    Immature Granulocytes %, Automated 0.5 0.0 - 0.9 %    Lymphocytes % 16.2 13.0 - 44.0 %    Monocytes % 9.1 2.0 - 10.0 %    Eosinophils % 2.8 0.0 - 6.0 %    Basophils % 0.6 0.0 - 2.0 %    Neutrophils Absolute 5.60 (H) 1.60 - 5.50 x10*3/uL    Immature Granulocytes Absolute, Automated 0.04 0.00 - 0.50 x10*3/uL    Lymphocytes Absolute 1.28 0.80 - 3.00 x10*3/uL    Monocytes Absolute 0.72 0.05 - 0.80 x10*3/uL    Eosinophils Absolute 0.22 0.00 - 0.40 x10*3/uL    Basophils Absolute 0.05 0.00 - 0.10 x10*3/uL   Comprehensive metabolic panel   Result Value Ref Range    Glucose 108 (H) 74 - 99 mg/dL    Sodium 138 136 - 145 mmol/L    Potassium 4.0 3.5 - 5.3 mmol/L    Chloride 100 98 - 107 mmol/L    Bicarbonate 28 21 - 32 mmol/L    Anion Gap 14 10 - 20  mmol/L    Urea Nitrogen 15 6 - 23 mg/dL    Creatinine 0.57 0.50 - 1.30 mg/dL    eGFR >90 >60 mL/min/1.73m*2    Calcium 8.7 8.6 - 10.3 mg/dL    Albumin 3.9 3.4 - 5.0 g/dL    Alkaline Phosphatase 65 33 - 136 U/L    Total Protein 6.9 6.4 - 8.2 g/dL    AST 18 9 - 39 U/L    Bilirubin, Total 0.5 0.0 - 1.2 mg/dL    ALT 11 10 - 52 U/L   Lactate   Result Value Ref Range    Lactate 0.9 0.4 - 2.0 mmol/L   B-Type Natriuretic Peptide   Result Value Ref Range    BNP 54 0 - 99 pg/mL   Troponin I, High Sensitivity, Initial   Result Value Ref Range    Troponin I, High Sensitivity 6 0 - 20 ng/L   Sedimentation rate, automated   Result Value Ref Range    Sedimentation Rate 36 (H) 0 - 20 mm/h   Troponin, High Sensitivity, 1 Hour   Result Value Ref Range    Troponin I, High Sensitivity 5 0 - 20 ng/L   Blood Culture    Specimen: Peripheral Venipuncture; Blood culture   Result Value Ref Range    Blood Culture Loaded on Instrument - Culture in progress    Blood Culture    Specimen: Peripheral Venipuncture; Blood culture   Result Value Ref Range    Blood Culture Loaded on Instrument - Culture in progress    C-reactive protein   Result Value Ref Range    C-Reactive Protein 1.73 (H) <1.00 mg/dL   Procalcitonin   Result Value Ref Range    Procalcitonin 0.13 (H) <=0.07 ng/mL   CBC   Result Value Ref Range    WBC 7.2 4.4 - 11.3 x10*3/uL    nRBC 0.0 0.0 - 0.0 /100 WBCs    RBC 3.55 (L) 4.50 - 5.90 x10*6/uL    Hemoglobin 10.9 (L) 13.5 - 17.5 g/dL    Hematocrit 33.2 (L) 41.0 - 52.0 %    MCV 94 80 - 100 fL    MCH 30.7 26.0 - 34.0 pg    MCHC 32.8 32.0 - 36.0 g/dL    RDW 14.0 11.5 - 14.5 %    Platelets 231 150 - 450 x10*3/uL   Comprehensive metabolic panel   Result Value Ref Range    Glucose 127 (H) 74 - 99 mg/dL    Sodium 135 (L) 136 - 145 mmol/L    Potassium 4.6 3.5 - 5.3 mmol/L    Chloride 101 98 - 107 mmol/L    Bicarbonate 25 21 - 32 mmol/L    Anion Gap 14 10 - 20 mmol/L    Urea Nitrogen 17 6 - 23 mg/dL    Creatinine 0.64 0.50 - 1.30 mg/dL    eGFR  >90 >60 mL/min/1.73m*2    Calcium 8.5 (L) 8.6 - 10.3 mg/dL    Albumin 3.4 3.4 - 5.0 g/dL    Alkaline Phosphatase 55 33 - 136 U/L    Total Protein 6.0 (L) 6.4 - 8.2 g/dL    AST 13 9 - 39 U/L    Bilirubin, Total 0.4 0.0 - 1.2 mg/dL    ALT 9 (L) 10 - 52 U/L   Magnesium   Result Value Ref Range    Magnesium 1.89 1.60 - 2.40 mg/dL   Phosphorus   Result Value Ref Range    Phosphorus 3.3 2.5 - 4.9 mg/dL     XR chest 2 views    Result Date: 10/22/2024  Interpreted By:  Vinicius Lamas, STUDY: XR CHEST 2 VIEWS   INDICATION: Signs/Symptoms:cough and sob.   COMPARISON: October 17 October 18 CT   ACCESSION NUMBER(S): [id number]   ORDERING CLINICIAN: VERONIQUE SANCHES   FINDINGS: Cardiomegaly with tortuous aorta unchanged.   No effusion, edema, or pneumothorax. Patchy right basilar airspace opacity seen on prior CT best visualized right lower lobe.       Small right lower lobe airspace disease best seen on recent CT.   Signed by: Vinicius Lamas 10/22/2024 2:15 PM Dictation workstation:   [id number]    CT chest wo IV contrast    Result Date: 10/18/2024  Interpreted By:  Eva Lamb, STUDY: CT CHEST WO IV CONTRAST;  10/18/2024 2:30 am   INDICATION: Signs/Symptoms:Rales on physical exam, weight loss, Hx of lung nodules, and mediastinal lymph node enlargement.   COMPARISON: CT scan of the chest 05/13/2024.   ACCESSION NUMBER(S): [id number]   ORDERING CLINICIAN: KASSIDY STONE   TECHNIQUE: Axial CT images of the chest obtained without contrast. Coronal sagittal reformats were obtained.   FINDINGS: VESSELS: Ectatic ascending thoracic aorta measures 3.8 cm. Atherosclerotic calcifications in the aorta and arch vessels. Lack of contrast limits evaluation of the vasculature. Main pulmonary artery is dilated up to 3.8 cm which can be seen with pulmonary arterial hypertension.   HEART: Normal size. No pericardial effusion. Aortic root, mitral annular and marked coronary artery calcifications noted.   MEDIASTINUM AND RUBINA: No axillary  adenopathy. Nonenlarged mediastinal lymph nodes noted. Lack of contrast limits evaluation of the sj.   LUNG, PLEURA, AND LARGE AIRWAYS: Centrilobular and paraseptal emphysema noted. Subtle patchy airspace and ground-glass opacities noted in the lingula and right lower lobe concerning for atypical infectious/inflammatory process. No effusion or pneumothorax. Stable calcified left pleural plaques. Bronchiectatic changes noted in the lung bases.   CHEST WALL AND LOWER NECK: Small to moderate-size hiatal hernia.   UPPER ABDOMEN: Small accessory splenule noted. Moderate fatty atrophy of the pancreas.   BONES: Multilevel degenerative changes of the spine.       Centrilobular and paraseptal emphysema noted. Subtle patchy airspace and ground-glass opacities in the lingula and right lower lobe concerning for atypical infectious/inflammatory process.   Main pulmonary artery is dilated up to 3.8 cm which can be seen with pulmonary arterial hypertension.   Additional findings as noted above.   MACRO: None   Signed by: Eva Lamb 10/18/2024 2:45 AM Dictation workstation:   [id number]      Assessment/Plan   Sheldon Arteaga is an 84 y.o. male with PMH of COPD, A-Fib (not on anticoagulation per patient preference), DVT, possible HFpEF (EF 60-65%,), HTN, HLD, CVA (2022), PAD s/p resection of fem-pop bypass and right CFA to left profunda bypass with reversed right femoral vein (5/2022) presenting with generalized weakness and coughing after recent treatment for CAP. He is 6 days out from a recent discharge (10/18) for pneumonia. Patient's CMP today was unremarkable. CBC revealed anemia which is baseline. There was no leukocytosis. He is Covid, flu, legionella, and strep pneumo negative. Blood cultures are pending.     Acute Problem Plan  #Generalized weakness   #CAP  #Likely mild COPD exacerbation   -Continue (finish) azithromycin 500 mg IV  -Continue (finish) ceftriaxone 1 g IV   -Continue prednisone 40 mg PO daily for 5 days    -Continue home Duo-neb 0.5-2.5 mg/3 mL nebulizer solution QID PRN   -Continue home albuterol 90 mcg two puffs Q4 PRN  -Per PT: no skilled PT intervention necessary at this time   -F/U pending blood cultures     Chronic Problem Plan   #Atrial fibrillation  #DVT history   -Restart Eliquis inpatient (patient had stopped taking after lower extremity surgery because of post-operative bleeding he experienced)      #HTN   -Continue home lopressor 25 mg PO BID   -Continue home amlodipine 5 mg PO daily      #HLD  -Continue home Atorvastatin 40 mg PO nightly       #CHF  -No indication for treatment at this time (no extremity edema noted or concern on CXR for pulmonary edema)      #PAD  -Continue home Clopidogrel 75 mg PO daily       #Smoking history  -Transdermal nicotine patch while inpatient Q24         Fluids: PO  Electrolytes: Replete PRN   Nutrition: Regular diet  Antimicrobials: None currently  DVT ppx: Eliquis  GI ppx: None  Catheter: None  Lines: PIV  Supplemental Oxygen: None  Emergency Contact: Extended Emergency Contact Information  Primary Emergency Contact: Julita Arteagaglen  Home Phone: [phone]  Relation: Child  Code: DNR, DNI         Patient seen and discussed with Dr. Kristen Diallo, PGY-1; Dr. Massimo Acevedo, PGY-3; and Dr. Yosvany Rider, attending hospitalist.     KASSIDY GARZA  Medical Student

## 2024-10-24 VITALS
SYSTOLIC BLOOD PRESSURE: 111 MMHG | TEMPERATURE: 97.9 F | HEIGHT: 65 IN | RESPIRATION RATE: 18 BRPM | DIASTOLIC BLOOD PRESSURE: 56 MMHG | OXYGEN SATURATION: 92 % | BODY MASS INDEX: 21.41 KG/M2 | HEART RATE: 67 BPM | WEIGHT: 128.5 LBS

## 2024-10-24 LAB
ALBUMIN SERPL BCP-MCNC: 3.4 G/DL (ref 3.4–5)
ANION GAP SERPL CALC-SCNC: 12 MMOL/L (ref 10–20)
BUN SERPL-MCNC: 20 MG/DL (ref 6–23)
CALCIUM SERPL-MCNC: 8.5 MG/DL (ref 8.6–10.3)
CHLORIDE SERPL-SCNC: 102 MMOL/L (ref 98–107)
CO2 SERPL-SCNC: 26 MMOL/L (ref 21–32)
CREAT SERPL-MCNC: 0.65 MG/DL (ref 0.5–1.3)
EGFRCR SERPLBLD CKD-EPI 2021: >90 ML/MIN/1.73M*2
ERYTHROCYTE [DISTWIDTH] IN BLOOD BY AUTOMATED COUNT: 14.3 % (ref 11.5–14.5)
GLUCOSE SERPL-MCNC: 103 MG/DL (ref 74–99)
HCT VFR BLD AUTO: 34.9 % (ref 41–52)
HGB BLD-MCNC: 10.9 G/DL (ref 13.5–17.5)
MAGNESIUM SERPL-MCNC: 2.06 MG/DL (ref 1.6–2.4)
MCH RBC QN AUTO: 30.4 PG (ref 26–34)
MCHC RBC AUTO-ENTMCNC: 31.2 G/DL (ref 32–36)
MCV RBC AUTO: 98 FL (ref 80–100)
NRBC BLD-RTO: 0.2 /100 WBCS (ref 0–0)
PHOSPHATE SERPL-MCNC: 3.4 MG/DL (ref 2.5–4.9)
PLATELET # BLD AUTO: 293 X10*3/UL (ref 150–450)
POTASSIUM SERPL-SCNC: 4.4 MMOL/L (ref 3.5–5.3)
RBC # BLD AUTO: 3.58 X10*6/UL (ref 4.5–5.9)
SODIUM SERPL-SCNC: 136 MMOL/L (ref 136–145)
WBC # BLD AUTO: 12.8 X10*3/UL (ref 4.4–11.3)

## 2024-10-24 PROCEDURE — 2500000002 HC RX 250 W HCPCS SELF ADMINISTERED DRUGS (ALT 637 FOR MEDICARE OP, ALT 636 FOR OP/ED): Performed by: INTERNAL MEDICINE

## 2024-10-24 PROCEDURE — 2500000001 HC RX 250 WO HCPCS SELF ADMINISTERED DRUGS (ALT 637 FOR MEDICARE OP)

## 2024-10-24 PROCEDURE — 36415 COLL VENOUS BLD VENIPUNCTURE: CPT

## 2024-10-24 PROCEDURE — 2500000004 HC RX 250 GENERAL PHARMACY W/ HCPCS (ALT 636 FOR OP/ED)

## 2024-10-24 PROCEDURE — 80069 RENAL FUNCTION PANEL: CPT

## 2024-10-24 PROCEDURE — 83735 ASSAY OF MAGNESIUM: CPT

## 2024-10-24 PROCEDURE — 99239 HOSP IP/OBS DSCHRG MGMT >30: CPT

## 2024-10-24 PROCEDURE — 85027 COMPLETE CBC AUTOMATED: CPT

## 2024-10-24 RX ORDER — AMOXICILLIN AND CLAVULANATE POTASSIUM 875; 125 MG/1; MG/1
1 TABLET, FILM COATED ORAL EVERY 12 HOURS SCHEDULED
Qty: 10 TABLET | Refills: 0 | Status: SHIPPED | OUTPATIENT
Start: 2024-10-25 | End: 2024-10-24

## 2024-10-24 RX ORDER — AMOXICILLIN AND CLAVULANATE POTASSIUM 875; 125 MG/1; MG/1
1 TABLET, FILM COATED ORAL EVERY 12 HOURS SCHEDULED
Qty: 10 TABLET | Refills: 0 | Status: SHIPPED | OUTPATIENT
Start: 2024-10-24 | End: 2024-10-24

## 2024-10-24 RX ORDER — LEVOFLOXACIN 500 MG/1
750 TABLET, FILM COATED ORAL DAILY
Qty: 15 TABLET | Refills: 0 | Status: CANCELLED | OUTPATIENT
Start: 2024-10-25 | End: 2024-11-04

## 2024-10-24 RX ORDER — AMOXICILLIN AND CLAVULANATE POTASSIUM 875; 125 MG/1; MG/1
1 TABLET, FILM COATED ORAL EVERY 12 HOURS SCHEDULED
Qty: 10 TABLET | Refills: 0 | Status: SHIPPED | OUTPATIENT
Start: 2024-10-24 | End: 2024-10-29

## 2024-10-24 ASSESSMENT — COGNITIVE AND FUNCTIONAL STATUS - GENERAL
WALKING IN HOSPITAL ROOM: A LITTLE
PERSONAL GROOMING: A LITTLE
HELP NEEDED FOR BATHING: A LITTLE
STANDING UP FROM CHAIR USING ARMS: A LITTLE
CLIMB 3 TO 5 STEPS WITH RAILING: A LITTLE
TOILETING: A LITTLE
DAILY ACTIVITIY SCORE: 19
DRESSING REGULAR UPPER BODY CLOTHING: A LITTLE
MOBILITY SCORE: 20
DRESSING REGULAR LOWER BODY CLOTHING: A LITTLE
MOVING TO AND FROM BED TO CHAIR: A LITTLE

## 2024-10-24 ASSESSMENT — PAIN SCALES - GENERAL: PAINLEVEL_OUTOF10: 0 - NO PAIN

## 2024-10-24 NOTE — DISCHARGE SUMMARY
Discharge Diagnosis  Community acquired bacterial pneumonia    Issues Requiring Follow-Up  Community Acquired Pneumonia  - START taking Augementin 875-125mg tablet once daily for 5 days  - Follow up with PCP in 1 week    Discharge Meds     Medication List      CONTINUE taking these medications     * albuterol 2.5 mg /3 mL (0.083 %) nebulizer solution; Take 3 mL (2.5   mg) by nebulization every 2 hours if needed for wheezing or shortness of   breath.   amLODIPine 5 mg tablet; Commonly known as: Norvasc; Take 1 tablet (5 mg)   by mouth once daily.   amoxicillin-pot clavulanate 875-125 mg tablet; Commonly known as:   Augmentin; Take 1 tablet by mouth every 12 hours for 5 days.   atorvastatin 40 mg tablet; Commonly known as: Lipitor; Take 1 tablet (40   mg) by mouth once daily at bedtime.   clopidogrel 75 mg tablet; Commonly known as: Plavix; Take 1 tablet (75   mg) by mouth once daily.   docusate sodium 100 mg capsule; Commonly known as: Colace   ipratropium-albuteroL 0.5-2.5 mg/3 mL nebulizer solution; Commonly known   as: Duo-Neb; Take 3 mL by nebulization 4 times a day as needed for   wheezing or shortness of breath.   metoprolol tartrate 25 mg tablet; Commonly known as: Lopressor; Take 1   tablet (25 mg) by mouth 2 times a day.   Saline NasaL 0.65 % nasal spray; Generic drug: sodium chloride;   Administer 1 spray into each nostril if needed for congestion.  * This list has 1 medication(s) that are the same as other medications   prescribed for you. Read the directions carefully, and ask your doctor or   other care provider to review them with you.     ASK your doctor about these medications     * albuterol 90 mcg/actuation inhaler; Commonly known as: Ventolin HFA;   Inhale 2 puffs every 4 hours if needed for wheezing or shortness of   breath.  * This list has 1 medication(s) that are the same as other medications   prescribed for you. Read the directions carefully, and ask your doctor or   other care provider to  review them with you.       Test Results Pending At Discharge  Pending Labs       Order Current Status    Blood Culture Preliminary result    Blood Culture Preliminary result            Hospital Course  Sheldon Arteaga is a 84 y.o. male with PMH of HTN, HLD, COPD, Afib (not on anticoagulation per patient preference), DVT, possible HFpEF (EF 60-65%,), CVA (2022), PAD s/p resection of fem-pop bypass with right CFA to left profunda bypass with reversed right femoral vein (5/2022) presenting for community acquired pneumonia. Of note, he had been recently admitted for pneumonia and discharged on 10/18 with a 5 day course of Augmentin. Patient was negative for covid, Influenza, legionella and strep pneumo antigens at that time.    ED course: No leukocytosis, and the CXR revealed small right lower lobe airspace disease. He was started on azithromycin and rocephin and received two 3mL Duo-nebs and one dose of Solumedrol 125 mg IV.     Hospital course: patient continued on azithromycin and rocephin. Started on prednisone 40mg for 5 days. Patient continues to have productive cough with white sputum. Patient continually improved throughout treatment course. Discharge on 10/24 with augmentin.     Pertinent Physical Exam At Time of Discharge  Physical Exam  Constitutional:       General: He is not in acute distress.     Appearance: Normal appearance. He is normal weight.   Cardiovascular:      Rate and Rhythm: Normal rate and regular rhythm.      Heart sounds: Normal heart sounds.   Pulmonary:      Effort: No respiratory distress.      Breath sounds: Wheezing present.      Comments: Patient on room air. No conversational dyspnea. Positive for productive cough. Sputum is white/clear in color. Wheezing heard on auscultation bilaterally.  Chest:      Chest wall: No tenderness.   Abdominal:      General: There is no distension.      Palpations: Abdomen is soft.      Tenderness: There is no abdominal tenderness.   Musculoskeletal:          General: No swelling or tenderness.      Right lower leg: No edema.      Left lower leg: No edema.   Skin:     General: Skin is warm and dry.   Neurological:      General: No focal deficit present.      Mental Status: He is alert and oriented to person, place, and time.   Psychiatric:         Mood and Affect: Mood normal.         Behavior: Behavior normal.     Outpatient Follow-Up  Future Appointments   Date Time Provider Department Center   10/10/2025 10:15 AM Parker Olmedo MD [facility]         Kristen Diallo DO

## 2024-10-24 NOTE — CARE PLAN
The patient's goals for the shift include    Patient willreport no SOB during shift    The clinical goals for the shift include pt cont to tolerate RA    Over the shift, the patient did report Sob with exertion. Patient stated that he is congested. O2 remained stable and patient remained safe.     Family

## 2024-10-24 NOTE — DISCHARGE INSTRUCTIONS
Your symptoms were due to pneumonia. You are now stable enough to be discharged home.    The following recommendations are made for you at time of hospital discharge:  - Please take your home medications as instructed prior to hospitalization.   - The following changes to your home medications have been made during your hospital stay:  --> START taking Augementin 875-125mg tablet once daily for 5 days  - Please follow-up with your primary care provider within 5-7 days from time of discharge. Please call your PCP's office to schedule an appointment. Likely will need to bring photo ID and insurance card to your appointment.   -   services has been requested to make an appointment for you however if you do not hear back from them within 2-3 days, please call [phone] to find out about appointments with  services.  - If you experience any worsening symptoms or any new acute concerns arise, please contact your primary care provider to discuss and possibly arrange an appointment. If you cannot get in touch with provider or severe symptoms are present, please return to nearest emergency room/urgent care for evaluation and treatment.

## 2024-10-25 ENCOUNTER — PATIENT OUTREACH (OUTPATIENT)
Dept: PRIMARY CARE | Facility: CLINIC | Age: 84
End: 2024-10-25
Payer: MEDICARE

## 2024-10-25 NOTE — PROGRESS NOTES
Discharge Facility:  [facility]  Discharge Diagnosis:   Community acquired bacterial pneumonia   Admission Date:   10/22/24  Discharge Date:  10/24/24     PCP Appointment Date:  dtr to call for appt   Specialist Appointment Date:  n/a   Hospital Encounter and Summary Linked: Yes    Issues Requiring Follow-Up  Community Acquired Pneumonia  - START taking Augementin 875-125mg tablet once daily for 5 days  - Follow up with PCP in 1 week       Medications  Medications reviewed with patient/caregiver?: Yes (CM spoke with DTR) (10/25/2024 12:14 PM)  Is the patient having any side effects they believe may be caused by any medication additions or changes?: No (10/25/2024 12:14 PM)  Does the patient have all medications ordered at discharge?: Yes (10/25/2024 12:14 PM)  Prescription Comments: New scripts given at discharge :Augementin 875-125mg tablet once daily for 5 days (10/25/2024 12:14 PM)  Is the patient taking all medications as directed (includes completed medication regime)?: Yes (10/25/2024 12:14 PM)  Care Management Interventions: Provided patient education (10/25/2024 12:14 PM)  Medication Comments: Pt denies problems obtaining or affording medication. No medication-related issues identified (10/25/2024 12:14 PM)    Appointments  Does the patient have a primary care provider?: Yes (10/25/2024 12:14 PM)  Care Management Interventions: Educated patient on importance of making appointment (10/25/2024 12:14 PM)    Self Management  What is the home health agency?: DENIES NEED (10/25/2024 12:14 PM)  What Durable Medical Equipment (DME) was ordered?: N/A (10/25/2024 12:14 PM)    Patient Teaching  Does the patient have access to their discharge instructions?: Yes (10/25/2024 12:14 PM)  Care Management Interventions: Reviewed instructions with patient (10/25/2024 12:14 PM)  What is the patient's perception of their health status since discharge?: Improving (10/25/2024 12:14 PM)  Is the patient/caregiver able  to teach back the hierarchy of who to call/visit for symptoms/problems? PCP, Specialist, Home Health nurse, Urgent Care, ED, 911: Yes (10/25/2024 12:14 PM)  Patient/Caregiver Education Comments: This CM spoke with pt's daughter via phone. Dtr reports pt doing well at home since discharge. New meds reviewed. Denies CP and SOB. CM provided dtr  my contact info for any additional concerns or questions. dtr will call office for follow up. declines to schedule  at this time. (10/25/2024 12:14 PM)

## 2024-10-25 NOTE — SIGNIFICANT EVENT
Follow Up Phone Call    Outgoing phone call    Spoke to: Sheldon Arteaga Relationship:self   Phone number: [phone]      Outcome: I left a message on answering machine   Chief Complaint   Patient presents with    Weakness, Gen     BIBS from home. Was recently discharged  from this facility and dx with PNA. Has been feeling weaker each day,appetite has been poor and coughing has increased          Diagnosis:Not applicable

## 2024-10-27 LAB
BACTERIA BLD CULT: NORMAL
BACTERIA BLD CULT: NORMAL

## 2024-10-28 NOTE — SIGNIFICANT EVENT
Follow Up Phone Call    Outgoing phone call    Spoke to: Sheldon Arteaga Relationship:familydaughter   Phone number: [phone]      Outcome: contacted patient/ family   Chief Complaint   Patient presents with    Weakness, Gen     BIBS from home. Was recently discharged  from this facility and dx with PNA. Has been feeling weaker each day,appetite has been poor and coughing has increased          Diagnosis:PNEUMONIA Follow up Call:    How is your breathing? better   How is your activity? same/at baseline   How is your cough?  usual amount   Mucus: mucus: increase in amount   How often Are you using a Quick Relief/ Rescue Inhaler / Nebulizer:   more than baseline               Encouraged coughing and deep breathing exercises.  Voiced understanding.  States he is feeling better. No further questions or concerns.

## 2024-11-01 DIAGNOSIS — I63.9 ACUTE ISCHEMIC STROKE (MULTI): Primary | ICD-10-CM

## 2024-11-01 DIAGNOSIS — J44.9 CHRONIC OBSTRUCTIVE PULMONARY DISEASE, UNSPECIFIED COPD TYPE (MULTI): ICD-10-CM

## 2024-11-02 LAB
ATRIAL RATE: 90 BPM
P AXIS: 55 DEGREES
P OFFSET: 189 MS
P ONSET: 157 MS
PR INTERVAL: 116 MS
Q ONSET: 215 MS
QRS COUNT: 15 BEATS
QRS DURATION: 104 MS
QT INTERVAL: 398 MS
QTC CALCULATION(BAZETT): 486 MS
QTC FREDERICIA: 455 MS
R AXIS: 68 DEGREES
T AXIS: 44 DEGREES
T OFFSET: 414 MS
VENTRICULAR RATE: 90 BPM

## 2024-11-04 ENCOUNTER — APPOINTMENT (OUTPATIENT)
Dept: PRIMARY CARE | Facility: CLINIC | Age: 84
End: 2024-11-04
Payer: MEDICARE

## 2024-11-04 VITALS
SYSTOLIC BLOOD PRESSURE: 108 MMHG | WEIGHT: 132.1 LBS | DIASTOLIC BLOOD PRESSURE: 60 MMHG | TEMPERATURE: 95.7 F | OXYGEN SATURATION: 96 % | HEART RATE: 63 BPM | BODY MASS INDEX: 21.98 KG/M2

## 2024-11-04 DIAGNOSIS — J18.9 RECURRENT PNEUMONIA: ICD-10-CM

## 2024-11-04 DIAGNOSIS — J45.909 ASTHMA, UNSPECIFIED ASTHMA SEVERITY, UNSPECIFIED WHETHER COMPLICATED, UNSPECIFIED WHETHER PERSISTENT (HHS-HCC): Primary | ICD-10-CM

## 2024-11-04 PROCEDURE — 3078F DIAST BP <80 MM HG: CPT

## 2024-11-04 PROCEDURE — 1111F DSCHRG MED/CURRENT MED MERGE: CPT

## 2024-11-04 PROCEDURE — 1157F ADVNC CARE PLAN IN RCRD: CPT

## 2024-11-04 PROCEDURE — 99495 TRANSJ CARE MGMT MOD F2F 14D: CPT

## 2024-11-04 PROCEDURE — 1159F MED LIST DOCD IN RCRD: CPT

## 2024-11-04 PROCEDURE — 3074F SYST BP LT 130 MM HG: CPT

## 2024-11-04 RX ORDER — ALBUTEROL SULFATE 0.83 MG/ML
2.5 SOLUTION RESPIRATORY (INHALATION) EVERY 2 HOUR PRN
Qty: 75 ML | Refills: 1 | Status: SHIPPED | OUTPATIENT
Start: 2024-11-04 | End: 2024-12-04

## 2024-11-04 RX ORDER — ALBUTEROL SULFATE 90 UG/1
2 INHALANT RESPIRATORY (INHALATION) EVERY 4 HOURS PRN
Qty: 8 G | Refills: 5 | Status: SHIPPED | OUTPATIENT
Start: 2024-11-04 | End: 2025-11-04

## 2024-11-04 NOTE — PATIENT INSTRUCTIONS
Pulm follow up referral made  Avoid antitussives for now    Thank you for coming in today, if any questions or concerns arise, please call my office.   Alvaro Cedeño, ARMEN-CNP

## 2024-11-04 NOTE — PROGRESS NOTES
"Patient: Sheldon Arteaga \"Lul"  : 1940  PCP: Alvaro Cedeño, APRN-CNP  MRN: [id number]  Program: No linked episodes     Sheldon Arteaga \"Lul" is a 84 y.o. male presenting today for follow-up after being discharged from the hospital 10 days ago. The main problem requiring admission was Community Acquired Pneumonia. The discharge summary and/or Transitional Care Management documentation was reviewed. Medication reconciliation was performed as indicated via the \"Jose as Reviewed\" timestamp.     Chief Complaint   Patient presents with    Hospital Follow-up     Admitted to [facility] 10/22-10/24 for pneumonia, feeling much better today.      Vitals:    24 1044   BP: 108/60   Pulse: 63   Temp: 35.4 °C (95.7 °F)   SpO2: 96%       Sheldon Arteaga \"Lul" was contacted by Transitional Care Management services two days after his discharge. This encounter and supporting documentation was reviewed.    The complexity of medical decision making for this patient's transitional care is moderate.    Review of Systems    Physical Exam  Vitals and nursing note reviewed.   Constitutional:       Appearance: Normal appearance.   Pulmonary:      Breath sounds: Examination of the right-lower field reveals rhonchi. Examination of the left-lower field reveals rhonchi. Rhonchi present.   Neurological:      Mental Status: He is alert.          Family History   Problem Relation Name Age of Onset    Heart attack Mother      Colon cancer Father      Heart attack Brother      Heart block Brother         No data recorded    Assessment/Plan   Problem List Items Addressed This Visit    None  Visit Diagnoses       Asthma, unspecified asthma severity, unspecified whether complicated, unspecified whether persistent ([facility])    -  Primary    Albuterol    Relevant Medications    albuterol 2.5 mg /3 mL (0.083 %) nebulizer solution    albuterol (Ventolin HFA) 90 mcg/actuation inhaler    Recurrent pneumonia        Relevant Orders    " Referral to Pulmonology            No follow-ups on file.

## 2024-11-09 ENCOUNTER — APPOINTMENT (OUTPATIENT)
Dept: RADIOLOGY | Facility: HOSPITAL | Age: 84
End: 2024-11-09
Payer: MEDICARE

## 2024-11-09 ENCOUNTER — APPOINTMENT (OUTPATIENT)
Dept: CARDIOLOGY | Facility: HOSPITAL | Age: 84
End: 2024-11-09
Payer: MEDICARE

## 2024-11-09 ENCOUNTER — HOSPITAL ENCOUNTER (EMERGENCY)
Facility: HOSPITAL | Age: 84
Discharge: HOME | End: 2024-11-09
Attending: STUDENT IN AN ORGANIZED HEALTH CARE EDUCATION/TRAINING PROGRAM
Payer: MEDICARE

## 2024-11-09 VITALS
TEMPERATURE: 96.8 F | SYSTOLIC BLOOD PRESSURE: 125 MMHG | DIASTOLIC BLOOD PRESSURE: 72 MMHG | BODY MASS INDEX: 17.88 KG/M2 | OXYGEN SATURATION: 93 % | HEIGHT: 72 IN | RESPIRATION RATE: 18 BRPM | HEART RATE: 84 BPM | WEIGHT: 132 LBS

## 2024-11-09 DIAGNOSIS — J41.0 SIMPLE CHRONIC BRONCHITIS (MULTI): Primary | ICD-10-CM

## 2024-11-09 DIAGNOSIS — I10 PRIMARY HYPERTENSION: ICD-10-CM

## 2024-11-09 DIAGNOSIS — R53.81 PHYSICAL DECONDITIONING: ICD-10-CM

## 2024-11-09 LAB
ALBUMIN SERPL BCP-MCNC: 4.1 G/DL (ref 3.4–5)
ALP SERPL-CCNC: 67 U/L (ref 33–136)
ALT SERPL W P-5'-P-CCNC: 14 U/L (ref 10–52)
ANION GAP BLDV CALCULATED.4IONS-SCNC: 7 MMOL/L (ref 10–25)
ANION GAP SERPL CALC-SCNC: 15 MMOL/L (ref 10–20)
AST SERPL W P-5'-P-CCNC: 24 U/L (ref 9–39)
BASE EXCESS BLDV CALC-SCNC: 1.5 MMOL/L (ref -2–3)
BASOPHILS # BLD AUTO: 0.06 X10*3/UL (ref 0–0.1)
BASOPHILS NFR BLD AUTO: 0.8 %
BILIRUB SERPL-MCNC: 0.8 MG/DL (ref 0–1.2)
BNP SERPL-MCNC: 43 PG/ML (ref 0–99)
BODY TEMPERATURE: ABNORMAL
BUN SERPL-MCNC: 9 MG/DL (ref 6–23)
CA-I BLDV-SCNC: 1.09 MMOL/L (ref 1.1–1.33)
CALCIUM SERPL-MCNC: 8.8 MG/DL (ref 8.6–10.3)
CARDIAC TROPONIN I PNL SERPL HS: 5 NG/L (ref 0–20)
CARDIAC TROPONIN I PNL SERPL HS: 6 NG/L (ref 0–20)
CHLORIDE BLDV-SCNC: 104 MMOL/L (ref 98–107)
CHLORIDE SERPL-SCNC: 100 MMOL/L (ref 98–107)
CO2 SERPL-SCNC: 23 MMOL/L (ref 21–32)
CREAT SERPL-MCNC: 0.63 MG/DL (ref 0.5–1.3)
EGFRCR SERPLBLD CKD-EPI 2021: >90 ML/MIN/1.73M*2
EOSINOPHIL # BLD AUTO: 0.88 X10*3/UL (ref 0–0.4)
EOSINOPHIL NFR BLD AUTO: 12 %
ERYTHROCYTE [DISTWIDTH] IN BLOOD BY AUTOMATED COUNT: 14.5 % (ref 11.5–14.5)
FLUAV RNA RESP QL NAA+PROBE: NOT DETECTED
FLUBV RNA RESP QL NAA+PROBE: NOT DETECTED
GLUCOSE BLDV-MCNC: 109 MG/DL (ref 74–99)
GLUCOSE SERPL-MCNC: 113 MG/DL (ref 74–99)
HCO3 BLDV-SCNC: 25.9 MMOL/L (ref 22–26)
HCT VFR BLD AUTO: 38.8 % (ref 41–52)
HCT VFR BLD EST: 40 % (ref 41–52)
HGB BLD-MCNC: 12.8 G/DL (ref 13.5–17.5)
HGB BLDV-MCNC: 13.2 G/DL (ref 13.5–17.5)
IMM GRANULOCYTES # BLD AUTO: 0.03 X10*3/UL (ref 0–0.5)
IMM GRANULOCYTES NFR BLD AUTO: 0.4 % (ref 0–0.9)
INHALED O2 CONCENTRATION: 21 %
INR PPP: 1.1 (ref 0.9–1.1)
LACTATE BLDV-SCNC: 1.1 MMOL/L (ref 0.4–2)
LYMPHOCYTES # BLD AUTO: 1.92 X10*3/UL (ref 0.8–3)
LYMPHOCYTES NFR BLD AUTO: 26.2 %
MCH RBC QN AUTO: 31 PG (ref 26–34)
MCHC RBC AUTO-ENTMCNC: 33 G/DL (ref 32–36)
MCV RBC AUTO: 94 FL (ref 80–100)
MONOCYTES # BLD AUTO: 0.55 X10*3/UL (ref 0.05–0.8)
MONOCYTES NFR BLD AUTO: 7.5 %
NEUTROPHILS # BLD AUTO: 3.89 X10*3/UL (ref 1.6–5.5)
NEUTROPHILS NFR BLD AUTO: 53.1 %
NRBC BLD-RTO: 0 /100 WBCS (ref 0–0)
OXYHGB MFR BLDV: 87.5 % (ref 45–75)
PCO2 BLDV: 39 MM HG (ref 41–51)
PH BLDV: 7.43 PH (ref 7.33–7.43)
PLATELET # BLD AUTO: 232 X10*3/UL (ref 150–450)
PO2 BLDV: 57 MM HG (ref 35–45)
POTASSIUM BLDV-SCNC: 3.6 MMOL/L (ref 3.5–5.3)
POTASSIUM SERPL-SCNC: 4.4 MMOL/L (ref 3.5–5.3)
PROT SERPL-MCNC: 7 G/DL (ref 6.4–8.2)
PROTHROMBIN TIME: 11.9 SECONDS (ref 9.8–12.8)
RBC # BLD AUTO: 4.13 X10*6/UL (ref 4.5–5.9)
SAO2 % BLDV: 90 % (ref 45–75)
SARS-COV-2 RNA RESP QL NAA+PROBE: NOT DETECTED
SODIUM BLDV-SCNC: 133 MMOL/L (ref 136–145)
SODIUM SERPL-SCNC: 134 MMOL/L (ref 136–145)
WBC # BLD AUTO: 7.3 X10*3/UL (ref 4.4–11.3)

## 2024-11-09 PROCEDURE — 93010 ELECTROCARDIOGRAM REPORT: CPT | Performed by: INTERNAL MEDICINE

## 2024-11-09 PROCEDURE — 2500000005 HC RX 250 GENERAL PHARMACY W/O HCPCS: Performed by: STUDENT IN AN ORGANIZED HEALTH CARE EDUCATION/TRAINING PROGRAM

## 2024-11-09 PROCEDURE — 94640 AIRWAY INHALATION TREATMENT: CPT

## 2024-11-09 PROCEDURE — 99285 EMERGENCY DEPT VISIT HI MDM: CPT | Mod: 25

## 2024-11-09 PROCEDURE — 2500000001 HC RX 250 WO HCPCS SELF ADMINISTERED DRUGS (ALT 637 FOR MEDICARE OP)

## 2024-11-09 PROCEDURE — 36415 COLL VENOUS BLD VENIPUNCTURE: CPT | Performed by: STUDENT IN AN ORGANIZED HEALTH CARE EDUCATION/TRAINING PROGRAM

## 2024-11-09 PROCEDURE — 71045 X-RAY EXAM CHEST 1 VIEW: CPT | Performed by: SURGERY

## 2024-11-09 PROCEDURE — 85025 COMPLETE CBC W/AUTO DIFF WBC: CPT | Performed by: STUDENT IN AN ORGANIZED HEALTH CARE EDUCATION/TRAINING PROGRAM

## 2024-11-09 PROCEDURE — 85610 PROTHROMBIN TIME: CPT | Performed by: STUDENT IN AN ORGANIZED HEALTH CARE EDUCATION/TRAINING PROGRAM

## 2024-11-09 PROCEDURE — 83880 ASSAY OF NATRIURETIC PEPTIDE: CPT | Performed by: STUDENT IN AN ORGANIZED HEALTH CARE EDUCATION/TRAINING PROGRAM

## 2024-11-09 PROCEDURE — 71250 CT THORAX DX C-: CPT | Performed by: RADIOLOGY

## 2024-11-09 PROCEDURE — 93005 ELECTROCARDIOGRAM TRACING: CPT

## 2024-11-09 PROCEDURE — 80053 COMPREHEN METABOLIC PANEL: CPT | Performed by: STUDENT IN AN ORGANIZED HEALTH CARE EDUCATION/TRAINING PROGRAM

## 2024-11-09 PROCEDURE — 2500000002 HC RX 250 W HCPCS SELF ADMINISTERED DRUGS (ALT 637 FOR MEDICARE OP, ALT 636 FOR OP/ED)

## 2024-11-09 PROCEDURE — 87636 SARSCOV2 & INF A&B AMP PRB: CPT | Performed by: STUDENT IN AN ORGANIZED HEALTH CARE EDUCATION/TRAINING PROGRAM

## 2024-11-09 PROCEDURE — 71250 CT THORAX DX C-: CPT

## 2024-11-09 PROCEDURE — 36415 COLL VENOUS BLD VENIPUNCTURE: CPT

## 2024-11-09 PROCEDURE — 84484 ASSAY OF TROPONIN QUANT: CPT | Performed by: STUDENT IN AN ORGANIZED HEALTH CARE EDUCATION/TRAINING PROGRAM

## 2024-11-09 PROCEDURE — 84132 ASSAY OF SERUM POTASSIUM: CPT

## 2024-11-09 PROCEDURE — 71045 X-RAY EXAM CHEST 1 VIEW: CPT

## 2024-11-09 RX ORDER — IPRATROPIUM BROMIDE AND ALBUTEROL SULFATE 2.5; .5 MG/3ML; MG/3ML
3 SOLUTION RESPIRATORY (INHALATION)
Status: DISCONTINUED | OUTPATIENT
Start: 2024-11-09 | End: 2024-11-09 | Stop reason: HOSPADM

## 2024-11-09 RX ORDER — BENZONATATE 100 MG/1
100 CAPSULE ORAL ONCE
Status: COMPLETED | OUTPATIENT
Start: 2024-11-09 | End: 2024-11-09

## 2024-11-09 RX ORDER — BENZONATATE 100 MG/1
100 CAPSULE ORAL EVERY 8 HOURS
Qty: 9 CAPSULE | Refills: 0 | Status: SHIPPED | OUTPATIENT
Start: 2024-11-09 | End: 2024-11-12

## 2024-11-09 RX ADMIN — Medication 2 L/MIN: at 06:52

## 2024-11-09 RX ADMIN — IPRATROPIUM BROMIDE AND ALBUTEROL SULFATE 3 ML: .5; 3 SOLUTION RESPIRATORY (INHALATION) at 09:28

## 2024-11-09 RX ADMIN — BENZONATATE 100 MG: 100 CAPSULE ORAL at 09:28

## 2024-11-09 ASSESSMENT — COLUMBIA-SUICIDE SEVERITY RATING SCALE - C-SSRS
1. IN THE PAST MONTH, HAVE YOU WISHED YOU WERE DEAD OR WISHED YOU COULD GO TO SLEEP AND NOT WAKE UP?: NO
6. HAVE YOU EVER DONE ANYTHING, STARTED TO DO ANYTHING, OR PREPARED TO DO ANYTHING TO END YOUR LIFE?: NO
2. HAVE YOU ACTUALLY HAD ANY THOUGHTS OF KILLING YOURSELF?: NO

## 2024-11-09 ASSESSMENT — LIFESTYLE VARIABLES
EVER FELT BAD OR GUILTY ABOUT YOUR DRINKING: NO
HAVE YOU EVER FELT YOU SHOULD CUT DOWN ON YOUR DRINKING: NO
HAVE PEOPLE ANNOYED YOU BY CRITICIZING YOUR DRINKING: NO
TOTAL SCORE: 0
EVER HAD A DRINK FIRST THING IN THE MORNING TO STEADY YOUR NERVES TO GET RID OF A HANGOVER: NO

## 2024-11-09 ASSESSMENT — PAIN - FUNCTIONAL ASSESSMENT: PAIN_FUNCTIONAL_ASSESSMENT: 0-10

## 2024-11-09 ASSESSMENT — PAIN SCALES - GENERAL: PAINLEVEL_OUTOF10: 0 - NO PAIN

## 2024-11-09 NOTE — ED PROVIDER NOTES
HPI   Chief Complaint   Patient presents with    Respiratory Distress    Pneumonia       Patient is an 84-year-old male with past medical history of hypertension, hyperlipidemia, COPD, A-fib (not on anticoagulation per patient preference), previous DVT, previous CVA, who presents to [facility] emergency department with chest pain, shortness of breath, and cough with yellow sputum production.  Patient states that he has been having the symptoms for 3 weeks and that they have not improved even after 2 admissions to the hospital with appropriate antibiotics.  His last discharge was on 10/24/2024 where he was prescribed 5-day course of Augmentin, DuoNebs, and albuterol inhaler.  He states that he is taking all medications as prescribed, but has not improved in symptomology.  He also states that he does not use oxygen at home but in EMS he dipped below 92% SpO2, so they placed him on oxygen.  He is currently 94% SpO2 on no oxygen.            Patient History   Past Medical History:   Diagnosis Date    Stroke (Multi)      Past Surgical History:   Procedure Laterality Date    CT ANGIO AORTA AND BILATERAL ILIOFEMORAL RUN OFF INCLUDING WITHOUT CONTRAST IF PERFORMED  4/10/2022    CT AORTA AND BILATERAL ILIOFEMORAL RUNOFF ANGIOGRAM W AND/OR WO IV CONTRAST 4/10/2022 [facility] EMERGENCY LEGACY    CT ANGIO AORTA AND BILATERAL ILIOFEMORAL RUN OFF INCLUDING WITHOUT CONTRAST IF PERFORMED  4/13/2022    CT AORTA AND BILATERAL ILIOFEMORAL RUNOFF ANGIOGRAM W AND/OR WO IV CONTRAST 4/13/2022 [facility] CLINICAL LEGACY    CT ANGIO AORTA AND BILATERAL ILIOFEMORAL RUN OFF INCLUDING WITHOUT CONTRAST IF PERFORMED  5/22/2022    CT AORTA AND BILATERAL ILIOFEMORAL RUNOFF ANGIOGRAM W AND/OR WO IV CONTRAST 5/22/2022 [facility] EMERGENCY LEGACY    CT ANGIO AORTA AND BILATERAL ILIOFEMORAL RUN OFF INCLUDING WITHOUT CONTRAST IF PERFORMED  4/2/2023    CT AORTA AND BILATERAL ILIOFEMORAL RUNOFF ANGIOGRAM W AND/OR WO IV CONTRAST [facility] CT    CT ANGIO NECK  4/19/2022    CT NECK  ANGIO W AND WO IV CONTRAST 4/19/2022 [facility] CLINICAL LEGACY    CT HEAD ANGIO W AND WO IV CONTRAST  4/19/2022    CT HEAD ANGIO W AND WO IV CONTRAST 4/19/2022 [facility] CLINICAL LEGACY    OTHER SURGICAL HISTORY  09/17/2019    [place] tooth extraction    OTHER SURGICAL HISTORY  09/17/2019    Tonsillectomy with adenoidectomy    OTHER SURGICAL HISTORY  01/17/2020    Lower leg fracture repair    OTHER SURGICAL HISTORY  01/17/2020    Cardiac catheterization    TOTAL HIP ARTHROPLASTY  08/31/2018    Hip Replacement     Family History   Problem Relation Name Age of Onset    Heart attack Mother      Colon cancer Father      Heart attack Brother      Heart block Brother       Social History     Tobacco Use    Smoking status: Every Day     Types: Cigarettes    Smokeless tobacco: Never   Vaping Use    Vaping status: Never Used   Substance Use Topics    Alcohol use: Never    Drug use: Never       Physical Exam   ED Triage Vitals [11/09/24 0531]   Temperature Heart Rate Respirations BP   36 °C (96.8 °F) 97 18 150/87      Pulse Ox Temp Source Heart Rate Source Patient Position   94 % Temporal Monitor Sitting      BP Location FiO2 (%)     Left arm --       Physical Exam  Constitutional:       General: He is not in acute distress.     Appearance: Normal appearance. He is normal weight. He is not ill-appearing.   HENT:      Head: Normocephalic and atraumatic.      Right Ear: External ear normal.      Left Ear: External ear normal.      Nose: Nose normal.      Mouth/Throat:      Mouth: Mucous membranes are moist.      Pharynx: No oropharyngeal exudate or posterior oropharyngeal erythema.   Eyes:      General:         Right eye: No discharge.         Left eye: No discharge.      Extraocular Movements: Extraocular movements intact.      Conjunctiva/sclera: Conjunctivae normal.   Cardiovascular:      Rate and Rhythm: Normal rate and regular rhythm.   Pulmonary:      Effort: No accessory muscle usage, prolonged expiration, respiratory distress or  retractions.      Breath sounds: Examination of the right-middle field reveals wheezing. Examination of the left-middle field reveals wheezing. Examination of the right-lower field reveals wheezing. Examination of the left-lower field reveals wheezing. Wheezing present.   Abdominal:      General: Abdomen is flat.      Palpations: Abdomen is soft.   Musculoskeletal:      Right lower leg: No edema.      Left lower leg: No edema.   Skin:     General: Skin is warm.      Capillary Refill: Capillary refill takes less than 2 seconds.   Neurological:      General: No focal deficit present.      Mental Status: He is alert and oriented to person, place, and time. Mental status is at baseline.   Psychiatric:         Mood and Affect: Mood normal.         Behavior: Behavior normal.           ED Course & MDM   ED Course as of 11/09/24 1033   Sat Nov 09, 2024   0704 Troponin I Series, High Sensitivity (0, 1 HR)  normal [MP]   0704 Sars-CoV-2 PCR  Negative for covid   [MP]   0704 Influenza A, and B PCR  Negative for flu [MP]   0704 Comprehensive Metabolic Panel(!)  No acute liver or kidney injury, no acute electrolyte disturbance [MP]   0705 CBC with Differential(!)  No leukocytosis or acute blood loss [MP]   0705 Brain Natriuretic Peptide  Normal, less likely for CHF exascerbation [MP]   0706 XR chest 1 view  No acute cardiopulmonary process.   COPD/emphysema present [MP]   0733 Troponin I Series, High Sensitivity (0, 1 HR)  Normal second trop [MP]   0734 BLOOD GAS VENOUS FULL PANEL(!)  Normal pH, O2 elevated, pCO2 decreased, patient was on supplemental oxygen recently [MP]   0908 CT chest wo IV contrast  No acute cardiopulmonary process.  Partially calcified right lower lobe nodule which has decreased in  size when compared to the previous study.  7 mm nodule in the right lower lobe which was obscured on the  previous examination by collapse/consolidation. A few additional tiny  nodules are seen measuring 3 mm or less in size    [MP]   0915 Walking SpO2 93% [MP]      ED Course User Index  [MP] Saul Sanders DO         Diagnoses as of 11/09/24 1033   Simple chronic bronchitis (Multi)   Physical deconditioning                 No data recorded     Meg Coma Scale Score: 15 (11/09/24 0544 : Nori Allegraglen Parnell)                           Medical Decision Making  Due to patient history and clinical exam, would consider pneumonia, viral upper respiratory infection, uncontrolled COPD, deconditioning or malignancy. Workup was negative for any pneumonia or viral upper respiratory infection.  CT imaging did show some nodules in the right lung, which require outpatient follow-up.  Patient had improvement in symptoms after DuoNebs.  Walking pulse ox was stable, so patient was discharged hemodynamically stable, with a diagnosis of COPD with simple chronic bronchitis and physical deconditioning due to his recent multiple hospitalizations.  He was given recommendations to follow-up with primary care for further management and to get involved with low intensity exercise to improve his physical conditioning.        Procedure  Procedures     Saul Sanders DO  Resident  11/09/24 1033

## 2024-11-09 NOTE — ED TRIAGE NOTES
Pt arrive with EMS from home with pneumonia. Pt states he has been on 2 rounds of ATB's but is still having difficulty breathing. He has a productive cough with yellow sputum.

## 2024-11-11 RX ORDER — METOPROLOL TARTRATE 25 MG/1
25 TABLET, FILM COATED ORAL 2 TIMES DAILY
Qty: 180 TABLET | Refills: 0 | Status: SHIPPED | OUTPATIENT
Start: 2024-11-11

## 2024-11-12 ENCOUNTER — APPOINTMENT (OUTPATIENT)
Dept: CARDIOLOGY | Facility: HOSPITAL | Age: 84
End: 2024-11-12
Payer: MEDICARE

## 2024-11-12 ENCOUNTER — APPOINTMENT (OUTPATIENT)
Dept: RADIOLOGY | Facility: HOSPITAL | Age: 84
End: 2024-11-12
Payer: MEDICARE

## 2024-11-12 ENCOUNTER — HOSPITAL ENCOUNTER (INPATIENT)
Facility: HOSPITAL | Age: 84
LOS: 2 days | Discharge: HOME | End: 2024-11-15
Attending: EMERGENCY MEDICINE | Admitting: INTERNAL MEDICINE
Payer: MEDICARE

## 2024-11-12 DIAGNOSIS — I48.20 CHRONIC ATRIAL FIBRILLATION (MULTI): ICD-10-CM

## 2024-11-12 DIAGNOSIS — J44.9 CHRONIC OBSTRUCTIVE PULMONARY DISEASE, UNSPECIFIED COPD TYPE (MULTI): Primary | ICD-10-CM

## 2024-11-12 DIAGNOSIS — R09.02 HYPOXIA: ICD-10-CM

## 2024-11-12 DIAGNOSIS — J96.01 ACUTE RESPIRATORY FAILURE WITH HYPOXIA (MULTI): ICD-10-CM

## 2024-11-12 DIAGNOSIS — R33.9 URINARY RETENTION: ICD-10-CM

## 2024-11-12 LAB
ALBUMIN SERPL BCP-MCNC: 4.5 G/DL (ref 3.4–5)
ALP SERPL-CCNC: 82 U/L (ref 33–136)
ALT SERPL W P-5'-P-CCNC: 13 U/L (ref 10–52)
ANION GAP SERPL CALC-SCNC: 18 MMOL/L (ref 10–20)
AST SERPL W P-5'-P-CCNC: 18 U/L (ref 9–39)
BASOPHILS # BLD AUTO: 0.04 X10*3/UL (ref 0–0.1)
BASOPHILS NFR BLD AUTO: 0.4 %
BILIRUB SERPL-MCNC: 0.7 MG/DL (ref 0–1.2)
BNP SERPL-MCNC: 17 PG/ML (ref 0–99)
BUN SERPL-MCNC: 16 MG/DL (ref 6–23)
CALCIUM SERPL-MCNC: 9.3 MG/DL (ref 8.6–10.3)
CARDIAC TROPONIN I PNL SERPL HS: 5 NG/L (ref 0–20)
CARDIAC TROPONIN I PNL SERPL HS: 5 NG/L (ref 0–20)
CHLORIDE SERPL-SCNC: 96 MMOL/L (ref 98–107)
CO2 SERPL-SCNC: 25 MMOL/L (ref 21–32)
CREAT SERPL-MCNC: 0.73 MG/DL (ref 0.5–1.3)
EGFRCR SERPLBLD CKD-EPI 2021: 90 ML/MIN/1.73M*2
EOSINOPHIL # BLD AUTO: 1.21 X10*3/UL (ref 0–0.4)
EOSINOPHIL NFR BLD AUTO: 10.6 %
ERYTHROCYTE [DISTWIDTH] IN BLOOD BY AUTOMATED COUNT: 14.6 % (ref 11.5–14.5)
FLUAV RNA RESP QL NAA+PROBE: NOT DETECTED
FLUBV RNA RESP QL NAA+PROBE: NOT DETECTED
GLUCOSE SERPL-MCNC: 133 MG/DL (ref 74–99)
HCT VFR BLD AUTO: 42.5 % (ref 41–52)
HGB BLD-MCNC: 13.6 G/DL (ref 13.5–17.5)
IMM GRANULOCYTES # BLD AUTO: 0.04 X10*3/UL (ref 0–0.5)
IMM GRANULOCYTES NFR BLD AUTO: 0.4 % (ref 0–0.9)
LACTATE SERPL-SCNC: 2 MMOL/L (ref 0.4–2)
LYMPHOCYTES # BLD AUTO: 2.08 X10*3/UL (ref 0.8–3)
LYMPHOCYTES NFR BLD AUTO: 18.3 %
MAGNESIUM SERPL-MCNC: 1.97 MG/DL (ref 1.6–2.4)
MCH RBC QN AUTO: 30.4 PG (ref 26–34)
MCHC RBC AUTO-ENTMCNC: 32 G/DL (ref 32–36)
MCV RBC AUTO: 95 FL (ref 80–100)
MONOCYTES # BLD AUTO: 0.67 X10*3/UL (ref 0.05–0.8)
MONOCYTES NFR BLD AUTO: 5.9 %
MRSA DNA SPEC QL NAA+PROBE: NOT DETECTED
NEUTROPHILS # BLD AUTO: 7.34 X10*3/UL (ref 1.6–5.5)
NEUTROPHILS NFR BLD AUTO: 64.4 %
NRBC BLD-RTO: 0 /100 WBCS (ref 0–0)
PLATELET # BLD AUTO: 210 X10*3/UL (ref 150–450)
POTASSIUM SERPL-SCNC: 4.1 MMOL/L (ref 3.5–5.3)
PROT SERPL-MCNC: 7.4 G/DL (ref 6.4–8.2)
RBC # BLD AUTO: 4.47 X10*6/UL (ref 4.5–5.9)
RSV RNA RESP QL NAA+PROBE: NOT DETECTED
SARS-COV-2 RNA RESP QL NAA+PROBE: NOT DETECTED
SODIUM SERPL-SCNC: 135 MMOL/L (ref 136–145)
WBC # BLD AUTO: 11.4 X10*3/UL (ref 4.4–11.3)

## 2024-11-12 PROCEDURE — 87636 SARSCOV2 & INF A&B AMP PRB: CPT | Performed by: EMERGENCY MEDICINE

## 2024-11-12 PROCEDURE — 87641 MR-STAPH DNA AMP PROBE: CPT | Performed by: EMERGENCY MEDICINE

## 2024-11-12 PROCEDURE — 80053 COMPREHEN METABOLIC PANEL: CPT | Performed by: EMERGENCY MEDICINE

## 2024-11-12 PROCEDURE — 96367 TX/PROPH/DG ADDL SEQ IV INF: CPT

## 2024-11-12 PROCEDURE — 2500000001 HC RX 250 WO HCPCS SELF ADMINISTERED DRUGS (ALT 637 FOR MEDICARE OP)

## 2024-11-12 PROCEDURE — 94664 DEMO&/EVAL PT USE INHALER: CPT

## 2024-11-12 PROCEDURE — 94640 AIRWAY INHALATION TREATMENT: CPT

## 2024-11-12 PROCEDURE — 94760 N-INVAS EAR/PLS OXIMETRY 1: CPT

## 2024-11-12 PROCEDURE — 36415 COLL VENOUS BLD VENIPUNCTURE: CPT | Performed by: EMERGENCY MEDICINE

## 2024-11-12 PROCEDURE — 2500000002 HC RX 250 W HCPCS SELF ADMINISTERED DRUGS (ALT 637 FOR MEDICARE OP, ALT 636 FOR OP/ED): Performed by: EMERGENCY MEDICINE

## 2024-11-12 PROCEDURE — 83735 ASSAY OF MAGNESIUM: CPT | Performed by: EMERGENCY MEDICINE

## 2024-11-12 PROCEDURE — 87634 RSV DNA/RNA AMP PROBE: CPT

## 2024-11-12 PROCEDURE — 71275 CT ANGIOGRAPHY CHEST: CPT

## 2024-11-12 PROCEDURE — 83605 ASSAY OF LACTIC ACID: CPT | Performed by: EMERGENCY MEDICINE

## 2024-11-12 PROCEDURE — 84484 ASSAY OF TROPONIN QUANT: CPT | Performed by: EMERGENCY MEDICINE

## 2024-11-12 PROCEDURE — 99223 1ST HOSP IP/OBS HIGH 75: CPT

## 2024-11-12 PROCEDURE — 71275 CT ANGIOGRAPHY CHEST: CPT | Performed by: RADIOLOGY

## 2024-11-12 PROCEDURE — 2500000005 HC RX 250 GENERAL PHARMACY W/O HCPCS

## 2024-11-12 PROCEDURE — 85025 COMPLETE CBC W/AUTO DIFF WBC: CPT | Performed by: EMERGENCY MEDICINE

## 2024-11-12 PROCEDURE — 99285 EMERGENCY DEPT VISIT HI MDM: CPT | Mod: 25

## 2024-11-12 PROCEDURE — 96365 THER/PROPH/DIAG IV INF INIT: CPT

## 2024-11-12 PROCEDURE — 2500000004 HC RX 250 GENERAL PHARMACY W/ HCPCS (ALT 636 FOR OP/ED)

## 2024-11-12 PROCEDURE — 93005 ELECTROCARDIOGRAM TRACING: CPT

## 2024-11-12 PROCEDURE — G0378 HOSPITAL OBSERVATION PER HR: HCPCS

## 2024-11-12 PROCEDURE — 71045 X-RAY EXAM CHEST 1 VIEW: CPT | Performed by: RADIOLOGY

## 2024-11-12 PROCEDURE — 2500000004 HC RX 250 GENERAL PHARMACY W/ HCPCS (ALT 636 FOR OP/ED): Performed by: EMERGENCY MEDICINE

## 2024-11-12 PROCEDURE — 71045 X-RAY EXAM CHEST 1 VIEW: CPT

## 2024-11-12 PROCEDURE — 94640 AIRWAY INHALATION TREATMENT: CPT | Mod: 25

## 2024-11-12 PROCEDURE — 2550000001 HC RX 255 CONTRASTS: Performed by: EMERGENCY MEDICINE

## 2024-11-12 PROCEDURE — 83880 ASSAY OF NATRIURETIC PEPTIDE: CPT | Performed by: EMERGENCY MEDICINE

## 2024-11-12 RX ORDER — CLOPIDOGREL BISULFATE 75 MG/1
75 TABLET ORAL DAILY
Status: DISCONTINUED | OUTPATIENT
Start: 2024-11-13 | End: 2024-11-15 | Stop reason: HOSPADM

## 2024-11-12 RX ORDER — GUAIFENESIN 600 MG/1
600 TABLET, EXTENDED RELEASE ORAL 2 TIMES DAILY
Status: DISCONTINUED | OUTPATIENT
Start: 2024-11-12 | End: 2024-11-13

## 2024-11-12 RX ORDER — PREDNISONE 20 MG/1
40 TABLET ORAL DAILY
Status: DISCONTINUED | OUTPATIENT
Start: 2024-11-12 | End: 2024-11-14

## 2024-11-12 RX ORDER — AMLODIPINE BESYLATE 5 MG/1
5 TABLET ORAL DAILY
Status: DISCONTINUED | OUTPATIENT
Start: 2024-11-13 | End: 2024-11-15 | Stop reason: HOSPADM

## 2024-11-12 RX ORDER — IPRATROPIUM BROMIDE AND ALBUTEROL SULFATE 2.5; .5 MG/3ML; MG/3ML
3 SOLUTION RESPIRATORY (INHALATION)
Status: DISCONTINUED | OUTPATIENT
Start: 2024-11-12 | End: 2024-11-13

## 2024-11-12 RX ORDER — ENOXAPARIN SODIUM 100 MG/ML
40 INJECTION SUBCUTANEOUS EVERY 24 HOURS
Status: DISCONTINUED | OUTPATIENT
Start: 2024-11-12 | End: 2024-11-13

## 2024-11-12 RX ORDER — AZITHROMYCIN 250 MG/1
500 TABLET, FILM COATED ORAL
Status: COMPLETED | OUTPATIENT
Start: 2024-11-13 | End: 2024-11-15

## 2024-11-12 RX ORDER — TALC
3 POWDER (GRAM) TOPICAL NIGHTLY
Status: DISCONTINUED | OUTPATIENT
Start: 2024-11-12 | End: 2024-11-15 | Stop reason: HOSPADM

## 2024-11-12 RX ORDER — VANCOMYCIN HYDROCHLORIDE 1 G/20ML
INJECTION, POWDER, LYOPHILIZED, FOR SOLUTION INTRAVENOUS DAILY PRN
Status: DISCONTINUED | OUTPATIENT
Start: 2024-11-12 | End: 2024-11-12 | Stop reason: ALTCHOICE

## 2024-11-12 RX ORDER — IPRATROPIUM BROMIDE AND ALBUTEROL SULFATE 2.5; .5 MG/3ML; MG/3ML
3 SOLUTION RESPIRATORY (INHALATION)
Status: DISCONTINUED | OUTPATIENT
Start: 2024-11-13 | End: 2024-11-13

## 2024-11-12 RX ORDER — METOPROLOL TARTRATE 25 MG/1
25 TABLET, FILM COATED ORAL 2 TIMES DAILY
Status: DISCONTINUED | OUTPATIENT
Start: 2024-11-12 | End: 2024-11-15 | Stop reason: HOSPADM

## 2024-11-12 RX ORDER — VANCOMYCIN HYDROCHLORIDE 1 G/200ML
1000 INJECTION, SOLUTION INTRAVENOUS ONCE
Status: COMPLETED | OUTPATIENT
Start: 2024-11-12 | End: 2024-11-12

## 2024-11-12 RX ORDER — ATORVASTATIN CALCIUM 40 MG/1
40 TABLET, FILM COATED ORAL NIGHTLY
Status: DISCONTINUED | OUTPATIENT
Start: 2024-11-12 | End: 2024-11-15 | Stop reason: HOSPADM

## 2024-11-12 RX ADMIN — IOHEXOL 75 ML: 350 INJECTION, SOLUTION INTRAVENOUS at 19:10

## 2024-11-12 RX ADMIN — IPRATROPIUM BROMIDE AND ALBUTEROL SULFATE 3 ML: .5; 3 SOLUTION RESPIRATORY (INHALATION) at 23:35

## 2024-11-12 RX ADMIN — METOPROLOL TARTRATE 25 MG: 25 TABLET, FILM COATED ORAL at 23:28

## 2024-11-12 RX ADMIN — ATORVASTATIN CALCIUM 40 MG: 40 TABLET, FILM COATED ORAL at 23:28

## 2024-11-12 RX ADMIN — PIPERACILLIN SODIUM AND TAZOBACTAM SODIUM 3.38 G: 3; .375 INJECTION, SOLUTION INTRAVENOUS at 20:53

## 2024-11-12 RX ADMIN — Medication 3 MG: at 23:12

## 2024-11-12 RX ADMIN — IPRATROPIUM BROMIDE AND ALBUTEROL SULFATE 3 ML: .5; 3 SOLUTION RESPIRATORY (INHALATION) at 17:26

## 2024-11-12 RX ADMIN — Medication 2 L/MIN: at 23:35

## 2024-11-12 RX ADMIN — GUAIFENESIN 600 MG: 600 TABLET ORAL at 23:12

## 2024-11-12 RX ADMIN — PREDNISONE 40 MG: 20 TABLET ORAL at 23:12

## 2024-11-12 RX ADMIN — VANCOMYCIN HYDROCHLORIDE 1000 MG: 1 INJECTION, SOLUTION INTRAVENOUS at 21:34

## 2024-11-12 SDOH — SOCIAL STABILITY: SOCIAL INSECURITY: WITHIN THE LAST YEAR, HAVE YOU BEEN AFRAID OF YOUR PARTNER OR EX-PARTNER?: NO

## 2024-11-12 SDOH — SOCIAL STABILITY: SOCIAL INSECURITY: ARE THERE ANY APPARENT SIGNS OF INJURIES/BEHAVIORS THAT COULD BE RELATED TO ABUSE/NEGLECT?: NO

## 2024-11-12 SDOH — SOCIAL STABILITY: SOCIAL INSECURITY: WITHIN THE LAST YEAR, HAVE YOU BEEN HUMILIATED OR EMOTIONALLY ABUSED IN OTHER WAYS BY YOUR PARTNER OR EX-PARTNER?: NO

## 2024-11-12 SDOH — SOCIAL STABILITY: SOCIAL INSECURITY: DO YOU FEEL ANYONE HAS EXPLOITED OR TAKEN ADVANTAGE OF YOU FINANCIALLY OR OF YOUR PERSONAL PROPERTY?: NO

## 2024-11-12 SDOH — SOCIAL STABILITY: SOCIAL INSECURITY: DO YOU FEEL UNSAFE GOING BACK TO THE PLACE WHERE YOU ARE LIVING?: NO

## 2024-11-12 SDOH — SOCIAL STABILITY: SOCIAL INSECURITY: HAS ANYONE EVER THREATENED TO HURT YOUR FAMILY OR YOUR PETS?: NO

## 2024-11-12 SDOH — HEALTH STABILITY: MENTAL HEALTH: HOW OFTEN DO YOU HAVE SIX OR MORE DRINKS ON ONE OCCASION?: NEVER

## 2024-11-12 SDOH — SOCIAL STABILITY: SOCIAL INSECURITY: ABUSE: ADULT

## 2024-11-12 SDOH — SOCIAL STABILITY: SOCIAL INSECURITY: DOES ANYONE TRY TO KEEP YOU FROM HAVING/CONTACTING OTHER FRIENDS OR DOING THINGS OUTSIDE YOUR HOME?: NO

## 2024-11-12 SDOH — ECONOMIC STABILITY: FOOD INSECURITY: WITHIN THE PAST 12 MONTHS, YOU WORRIED THAT YOUR FOOD WOULD RUN OUT BEFORE YOU GOT THE MONEY TO BUY MORE.: NEVER TRUE

## 2024-11-12 SDOH — HEALTH STABILITY: MENTAL HEALTH: HOW OFTEN DO YOU HAVE A DRINK CONTAINING ALCOHOL?: NEVER

## 2024-11-12 SDOH — ECONOMIC STABILITY: INCOME INSECURITY: IN THE PAST 12 MONTHS HAS THE ELECTRIC, GAS, OIL, OR WATER COMPANY THREATENED TO SHUT OFF SERVICES IN YOUR HOME?: NO

## 2024-11-12 SDOH — HEALTH STABILITY: MENTAL HEALTH: HOW MANY DRINKS CONTAINING ALCOHOL DO YOU HAVE ON A TYPICAL DAY WHEN YOU ARE DRINKING?: PATIENT DOES NOT DRINK

## 2024-11-12 SDOH — SOCIAL STABILITY: SOCIAL INSECURITY: HAVE YOU HAD ANY THOUGHTS OF HARMING ANYONE ELSE?: NO

## 2024-11-12 SDOH — ECONOMIC STABILITY: FOOD INSECURITY: WITHIN THE PAST 12 MONTHS, THE FOOD YOU BOUGHT JUST DIDN'T LAST AND YOU DIDN'T HAVE MONEY TO GET MORE.: NEVER TRUE

## 2024-11-12 SDOH — ECONOMIC STABILITY: TRANSPORTATION INSECURITY: IN THE PAST 12 MONTHS, HAS LACK OF TRANSPORTATION KEPT YOU FROM MEDICAL APPOINTMENTS OR FROM GETTING MEDICATIONS?: NO

## 2024-11-12 SDOH — SOCIAL STABILITY: SOCIAL INSECURITY: HAVE YOU HAD THOUGHTS OF HARMING ANYONE ELSE?: NO

## 2024-11-12 SDOH — SOCIAL STABILITY: SOCIAL INSECURITY: ARE YOU OR HAVE YOU BEEN THREATENED OR ABUSED PHYSICALLY, EMOTIONALLY, OR SEXUALLY BY ANYONE?: NO

## 2024-11-12 SDOH — SOCIAL STABILITY: SOCIAL INSECURITY: WERE YOU ABLE TO COMPLETE ALL THE BEHAVIORAL HEALTH SCREENINGS?: YES

## 2024-11-12 ASSESSMENT — ACTIVITIES OF DAILY LIVING (ADL)
FEEDING YOURSELF: INDEPENDENT
TOILETING: INDEPENDENT
JUDGMENT_ADEQUATE_SAFELY_COMPLETE_DAILY_ACTIVITIES: YES
WALKS IN HOME: INDEPENDENT
LACK_OF_TRANSPORTATION: NO
DRESSING YOURSELF: INDEPENDENT
ADEQUATE_TO_COMPLETE_ADL: YES
HEARING - LEFT EAR: FUNCTIONAL
HEARING - RIGHT EAR: FUNCTIONAL
GROOMING: INDEPENDENT
LACK_OF_TRANSPORTATION: NO
PATIENT'S MEMORY ADEQUATE TO SAFELY COMPLETE DAILY ACTIVITIES?: YES
ASSISTIVE_DEVICE: DENTURES LOWER;DENTURES UPPER
BATHING: INDEPENDENT

## 2024-11-12 ASSESSMENT — LIFESTYLE VARIABLES
SKIP TO QUESTIONS 9-10: 1
HAVE PEOPLE ANNOYED YOU BY CRITICIZING YOUR DRINKING: NO
HAVE YOU EVER FELT YOU SHOULD CUT DOWN ON YOUR DRINKING: NO
EVER HAD A DRINK FIRST THING IN THE MORNING TO STEADY YOUR NERVES TO GET RID OF A HANGOVER: NO
EVER FELT BAD OR GUILTY ABOUT YOUR DRINKING: NO
TOTAL SCORE: 0
AUDIT-C TOTAL SCORE: 0

## 2024-11-12 ASSESSMENT — PAIN - FUNCTIONAL ASSESSMENT
PAIN_FUNCTIONAL_ASSESSMENT: 0-10
PAIN_FUNCTIONAL_ASSESSMENT: 0-10

## 2024-11-12 ASSESSMENT — COGNITIVE AND FUNCTIONAL STATUS - GENERAL
TURNING FROM BACK TO SIDE WHILE IN FLAT BAD: A LITTLE
STANDING UP FROM CHAIR USING ARMS: A LITTLE
MOVING TO AND FROM BED TO CHAIR: A LITTLE
MOBILITY SCORE: 19
MOVING TO AND FROM BED TO CHAIR: A LITTLE
STANDING UP FROM CHAIR USING ARMS: A LITTLE
TOILETING: A LITTLE
CLIMB 3 TO 5 STEPS WITH RAILING: A LOT
WALKING IN HOSPITAL ROOM: A LITTLE
TOILETING: A LITTLE
MOVING FROM LYING ON BACK TO SITTING ON SIDE OF FLAT BED WITH BEDRAILS: A LITTLE
DAILY ACTIVITIY SCORE: 23
MOBILITY SCORE: 18
DAILY ACTIVITIY SCORE: 22
DRESSING REGULAR UPPER BODY CLOTHING: A LITTLE
WALKING IN HOSPITAL ROOM: A LITTLE
PATIENT BASELINE BEDBOUND: NO
CLIMB 3 TO 5 STEPS WITH RAILING: A LITTLE

## 2024-11-12 ASSESSMENT — PATIENT HEALTH QUESTIONNAIRE - PHQ9
SUM OF ALL RESPONSES TO PHQ9 QUESTIONS 1 & 2: 5
2. FEELING DOWN, DEPRESSED OR HOPELESS: NEARLY EVERY DAY
1. LITTLE INTEREST OR PLEASURE IN DOING THINGS: MORE THAN HALF THE DAYS

## 2024-11-12 ASSESSMENT — PAIN SCALES - GENERAL
PAINLEVEL_OUTOF10: 0 - NO PAIN
PAINLEVEL_OUTOF10: 0 - NO PAIN

## 2024-11-12 NOTE — ED TRIAGE NOTES
Patient from home having c/o shortness of breath that started today. Ems on scene stated patient pulse ox on room air 84%. Patient given 2 duonebs and 125mg solumedrol. Patient does not wear oxygen at home

## 2024-11-13 ENCOUNTER — APPOINTMENT (OUTPATIENT)
Dept: CARDIOLOGY | Facility: HOSPITAL | Age: 84
End: 2024-11-13
Payer: MEDICARE

## 2024-11-13 LAB
ALBUMIN SERPL BCP-MCNC: 3.8 G/DL (ref 3.4–5)
ANION GAP SERPL CALC-SCNC: 14 MMOL/L (ref 10–20)
ATRIAL RATE: 94 BPM
BACTERIA SPEC RESP CULT: ABNORMAL
BUN SERPL-MCNC: 24 MG/DL (ref 6–23)
CALCIUM SERPL-MCNC: 8.3 MG/DL (ref 8.6–10.3)
CHLORIDE SERPL-SCNC: 98 MMOL/L (ref 98–107)
CO2 SERPL-SCNC: 25 MMOL/L (ref 21–32)
CREAT SERPL-MCNC: 0.75 MG/DL (ref 0.5–1.3)
EGFRCR SERPLBLD CKD-EPI 2021: 89 ML/MIN/1.73M*2
ERYTHROCYTE [DISTWIDTH] IN BLOOD BY AUTOMATED COUNT: 14.5 % (ref 11.5–14.5)
GLUCOSE SERPL-MCNC: 143 MG/DL (ref 74–99)
GRAM STN SPEC: ABNORMAL
HCT VFR BLD AUTO: 35 % (ref 41–52)
HGB BLD-MCNC: 11.5 G/DL (ref 13.5–17.5)
MAGNESIUM SERPL-MCNC: 2.04 MG/DL (ref 1.6–2.4)
MCH RBC QN AUTO: 30.7 PG (ref 26–34)
MCHC RBC AUTO-ENTMCNC: 32.9 G/DL (ref 32–36)
MCV RBC AUTO: 93 FL (ref 80–100)
NRBC BLD-RTO: 0 /100 WBCS (ref 0–0)
P AXIS: 67 DEGREES
P OFFSET: 197 MS
P ONSET: 135 MS
PHOSPHATE SERPL-MCNC: 5 MG/DL (ref 2.5–4.9)
PLATELET # BLD AUTO: 198 X10*3/UL (ref 150–450)
POTASSIUM SERPL-SCNC: 4.4 MMOL/L (ref 3.5–5.3)
PR INTERVAL: 162 MS
Q ONSET: 216 MS
QRS COUNT: 15 BEATS
QRS DURATION: 98 MS
QT INTERVAL: 392 MS
QTC CALCULATION(BAZETT): 490 MS
QTC FREDERICIA: 455 MS
R AXIS: 71 DEGREES
RBC # BLD AUTO: 3.75 X10*6/UL (ref 4.5–5.9)
SODIUM SERPL-SCNC: 133 MMOL/L (ref 136–145)
T AXIS: 63 DEGREES
T OFFSET: 412 MS
VENTRICULAR RATE: 94 BPM
WBC # BLD AUTO: 5.5 X10*3/UL (ref 4.4–11.3)

## 2024-11-13 PROCEDURE — 87205 SMEAR GRAM STAIN: CPT | Mod: GEALAB

## 2024-11-13 PROCEDURE — 93005 ELECTROCARDIOGRAM TRACING: CPT

## 2024-11-13 PROCEDURE — 2500000002 HC RX 250 W HCPCS SELF ADMINISTERED DRUGS (ALT 637 FOR MEDICARE OP, ALT 636 FOR OP/ED): Performed by: INTERNAL MEDICINE

## 2024-11-13 PROCEDURE — 2500000005 HC RX 250 GENERAL PHARMACY W/O HCPCS

## 2024-11-13 PROCEDURE — 93010 ELECTROCARDIOGRAM REPORT: CPT | Performed by: INTERNAL MEDICINE

## 2024-11-13 PROCEDURE — 94760 N-INVAS EAR/PLS OXIMETRY 1: CPT

## 2024-11-13 PROCEDURE — 80069 RENAL FUNCTION PANEL: CPT

## 2024-11-13 PROCEDURE — 2500000001 HC RX 250 WO HCPCS SELF ADMINISTERED DRUGS (ALT 637 FOR MEDICARE OP)

## 2024-11-13 PROCEDURE — 94640 AIRWAY INHALATION TREATMENT: CPT

## 2024-11-13 PROCEDURE — 51701 INSERT BLADDER CATHETER: CPT

## 2024-11-13 PROCEDURE — 99223 1ST HOSP IP/OBS HIGH 75: CPT

## 2024-11-13 PROCEDURE — 85027 COMPLETE CBC AUTOMATED: CPT

## 2024-11-13 PROCEDURE — 2500000004 HC RX 250 GENERAL PHARMACY W/ HCPCS (ALT 636 FOR OP/ED)

## 2024-11-13 PROCEDURE — 9420000001 HC RT PATIENT EDUCATION 5 MIN

## 2024-11-13 PROCEDURE — 94762 N-INVAS EAR/PLS OXIMTRY CONT: CPT

## 2024-11-13 PROCEDURE — 1200000002 HC GENERAL ROOM WITH TELEMETRY DAILY

## 2024-11-13 PROCEDURE — 83735 ASSAY OF MAGNESIUM: CPT

## 2024-11-13 PROCEDURE — 94667 MNPJ CHEST WALL 1ST: CPT

## 2024-11-13 PROCEDURE — 2500000002 HC RX 250 W HCPCS SELF ADMINISTERED DRUGS (ALT 637 FOR MEDICARE OP, ALT 636 FOR OP/ED)

## 2024-11-13 PROCEDURE — 94668 MNPJ CHEST WALL SBSQ: CPT

## 2024-11-13 PROCEDURE — 36415 COLL VENOUS BLD VENIPUNCTURE: CPT

## 2024-11-13 RX ORDER — FLUTICASONE FUROATE AND VILANTEROL 200; 25 UG/1; UG/1
1 POWDER RESPIRATORY (INHALATION) DAILY
Status: DISCONTINUED | OUTPATIENT
Start: 2024-11-13 | End: 2024-11-15 | Stop reason: HOSPADM

## 2024-11-13 RX ORDER — IPRATROPIUM BROMIDE AND ALBUTEROL SULFATE 2.5; .5 MG/3ML; MG/3ML
3 SOLUTION RESPIRATORY (INHALATION) EVERY 2 HOUR PRN
Status: DISCONTINUED | OUTPATIENT
Start: 2024-11-13 | End: 2024-11-15 | Stop reason: HOSPADM

## 2024-11-13 RX ORDER — GUAIFENESIN 600 MG/1
1200 TABLET, EXTENDED RELEASE ORAL 2 TIMES DAILY
Status: DISCONTINUED | OUTPATIENT
Start: 2024-11-13 | End: 2024-11-15 | Stop reason: HOSPADM

## 2024-11-13 RX ORDER — IPRATROPIUM BROMIDE AND ALBUTEROL SULFATE 2.5; .5 MG/3ML; MG/3ML
3 SOLUTION RESPIRATORY (INHALATION)
Status: DISCONTINUED | OUTPATIENT
Start: 2024-11-13 | End: 2024-11-15 | Stop reason: HOSPADM

## 2024-11-13 RX ORDER — IPRATROPIUM BROMIDE AND ALBUTEROL SULFATE 2.5; .5 MG/3ML; MG/3ML
3 SOLUTION RESPIRATORY (INHALATION)
Status: DISCONTINUED | OUTPATIENT
Start: 2024-11-13 | End: 2024-11-13

## 2024-11-13 RX ORDER — IPRATROPIUM BROMIDE AND ALBUTEROL SULFATE 2.5; .5 MG/3ML; MG/3ML
3 SOLUTION RESPIRATORY (INHALATION) EVERY 2 HOUR PRN
Status: DISCONTINUED | OUTPATIENT
Start: 2024-11-13 | End: 2024-11-13

## 2024-11-13 RX ADMIN — Medication 2 L/MIN: at 20:48

## 2024-11-13 RX ADMIN — TIOTROPIUM BROMIDE INHALATION SPRAY 2 PUFF: 3.12 SPRAY, METERED RESPIRATORY (INHALATION) at 15:25

## 2024-11-13 RX ADMIN — ATORVASTATIN CALCIUM 40 MG: 40 TABLET, FILM COATED ORAL at 20:20

## 2024-11-13 RX ADMIN — IPRATROPIUM BROMIDE AND ALBUTEROL SULFATE 3 ML: 2.5; .5 SOLUTION RESPIRATORY (INHALATION) at 14:54

## 2024-11-13 RX ADMIN — IPRATROPIUM BROMIDE AND ALBUTEROL SULFATE 3 ML: .5; 3 SOLUTION RESPIRATORY (INHALATION) at 08:28

## 2024-11-13 RX ADMIN — AMLODIPINE BESYLATE 5 MG: 5 TABLET ORAL at 08:35

## 2024-11-13 RX ADMIN — Medication 2 L/MIN: at 14:54

## 2024-11-13 RX ADMIN — CLOPIDOGREL 75 MG: 75 TABLET ORAL at 08:35

## 2024-11-13 RX ADMIN — Medication 2 L/MIN: at 08:28

## 2024-11-13 RX ADMIN — GUAIFENESIN 600 MG: 600 TABLET ORAL at 08:35

## 2024-11-13 RX ADMIN — GUAIFENESIN 1200 MG: 600 TABLET ORAL at 15:25

## 2024-11-13 RX ADMIN — Medication 3 MG: at 20:19

## 2024-11-13 RX ADMIN — PREDNISONE 40 MG: 20 TABLET ORAL at 18:07

## 2024-11-13 RX ADMIN — GUAIFENESIN 1200 MG: 600 TABLET ORAL at 20:18

## 2024-11-13 RX ADMIN — AZITHROMYCIN 500 MG: 250 TABLET, FILM COATED ORAL at 08:35

## 2024-11-13 RX ADMIN — IPRATROPIUM BROMIDE AND ALBUTEROL SULFATE 3 ML: 2.5; .5 SOLUTION RESPIRATORY (INHALATION) at 20:48

## 2024-11-13 RX ADMIN — APIXABAN 5 MG: 5 TABLET, FILM COATED ORAL at 10:28

## 2024-11-13 RX ADMIN — APIXABAN 5 MG: 5 TABLET, FILM COATED ORAL at 21:13

## 2024-11-13 RX ADMIN — FLUTICASONE FUROATE AND VILANTEROL TRIFENATATE 1 PUFF: 200; 25 POWDER RESPIRATORY (INHALATION) at 15:25

## 2024-11-13 RX ADMIN — METOPROLOL TARTRATE 25 MG: 25 TABLET, FILM COATED ORAL at 20:19

## 2024-11-13 ASSESSMENT — COGNITIVE AND FUNCTIONAL STATUS - GENERAL
CLIMB 3 TO 5 STEPS WITH RAILING: A LITTLE
MOVING TO AND FROM BED TO CHAIR: A LITTLE
STANDING UP FROM CHAIR USING ARMS: A LITTLE
WALKING IN HOSPITAL ROOM: A LITTLE
TURNING FROM BACK TO SIDE WHILE IN FLAT BAD: A LITTLE
MOBILITY SCORE: 18
MOVING FROM LYING ON BACK TO SITTING ON SIDE OF FLAT BED WITH BEDRAILS: A LITTLE
DAILY ACTIVITIY SCORE: 23
DAILY ACTIVITIY SCORE: 23
CLIMB 3 TO 5 STEPS WITH RAILING: A LITTLE
TOILETING: A LITTLE
MOBILITY SCORE: 22
DRESSING REGULAR LOWER BODY CLOTHING: A LITTLE
WALKING IN HOSPITAL ROOM: A LITTLE

## 2024-11-13 ASSESSMENT — PAIN SCALES - GENERAL
PAINLEVEL_OUTOF10: 0 - NO PAIN
PAINLEVEL_OUTOF10: 0 - NO PAIN

## 2024-11-13 ASSESSMENT — PAIN - FUNCTIONAL ASSESSMENT
PAIN_FUNCTIONAL_ASSESSMENT: 0-10
PAIN_FUNCTIONAL_ASSESSMENT: 0-10

## 2024-11-13 ASSESSMENT — ACTIVITIES OF DAILY LIVING (ADL): LACK_OF_TRANSPORTATION: NO

## 2024-11-13 NOTE — PROGRESS NOTES
Patient: Sheldon Arteaga Age: 84 y.o.   Gender: male Room/bed: 220/220-A     Attending: Joselin Douglass MD  Code Status:  DNR and No Intubation    Overnight Events  None, admitted last night    Subjective  Patient seen and examined at bedside. Patient endorses cough with white/clear mucus. Patient states his breathing has improved on oxygen and breathing treatments. Patient had pulmonology outpatient appointment scheduled for tomorrow. Denies chest pain, abdominal pain, leg pain/swelling.     Objective:  Physical Exam  Constitutional:       General: He is not in acute distress.     Appearance: Normal appearance.   HENT:      Head: Normocephalic and atraumatic.   Eyes:      Extraocular Movements: Extraocular movements intact.   Cardiovascular:      Comments: Heart sounds difficult to appreciate 2/2 patient's diffuse wheezes and rhonchi with breathing  Pulmonary:      Breath sounds: Wheezing and rhonchi present.      Comments: Increased pulmonary effort. On 2L O2. Wheezes and Rhonchi heard diffusely over all lung fields on auscultation. Productive cough heard on exam  Abdominal:      General: There is no distension.      Palpations: Abdomen is soft.      Tenderness: There is no abdominal tenderness.   Musculoskeletal:         General: No swelling or tenderness.      Right lower leg: No edema.      Left lower leg: No edema.   Skin:     General: Skin is warm and dry.   Neurological:      General: No focal deficit present.      Mental Status: He is alert and oriented to person, place, and time. Mental status is at baseline.   Psychiatric:         Mood and Affect: Mood normal.         Behavior: Behavior normal.          Temp:  [35.9 °C (96.6 °F)-36.7 °C (98.1 °F)] 36.3 °C (97.3 °F)  Heart Rate:  [] 90  Resp:  [15-24] 18  BP: (101-146)/(53-82) 101/61  FiO2 (%):  [28 %] 28 %      Intake/Output Summary (Last 24 hours) at 11/13/2024 1427  Last data filed at 11/13/2024 1348  Gross per 24 hour   Intake 870 ml    Output 500 ml   Net 370 ml       Vitals:    11/12/24 1720   Weight: 59.9 kg (132 lb)       FiO2 (%):  [28 %] 28 %     I/Os    Intake/Output Summary (Last 24 hours) at 11/13/2024 1427  Last data filed at 11/13/2024 1348  Gross per 24 hour   Intake 870 ml   Output 500 ml   Net 370 ml       Labs:   CBC:  Recent Labs     11/13/24  0607 11/12/24  1722 11/09/24  0541   WBC 5.5 11.4* 7.3   HGB 11.5* 13.6 12.8*   HCT 35.0* 42.5 38.8*    210 232   MCV 93 95 94     CMP:  Recent Labs     11/13/24  0607 11/12/24  1722 11/09/24  0541   * 135* 134*   K 4.4 4.1 4.4   CL 98 96* 100   CO2 25 25 23   ANIONGAP 14 18 15   BUN 24* 16 9   CREATININE 0.75 0.73 0.63   EGFR 89 90 >90   GLUCOSE 143* 133* 113*     Recent Labs     11/13/24  0607 11/12/24  1722 11/09/24  0541 10/24/24  0606 10/23/24  0626   ALBUMIN 3.8 4.5 4.1   < > 3.4   ALKPHOS  --  82 67  --  55   ALT  --  13 14  --  9*   AST  --  18 24  --  13   BILITOT  --  0.7 0.8  --  0.4    < > = values in this interval not displayed.     Calcium/Phos:   Lab Results   Component Value Date    CALCIUM 8.3 (L) 11/13/2024    PHOS 5.0 (H) 11/13/2024      COAG:   Recent Labs     11/09/24  0541 10/17/24  1911 05/13/24  1958   INR 1.1 1.0 1.0     CRP:   Lab Results   Component Value Date    CRP 1.73 (H) 10/22/2024      [unfilled]   ENDO:  Recent Labs     04/20/22  0700 04/19/22  0515 10/22/20  0949 09/18/19  0851 08/31/18  0940   TSH 3.49  --  2.28 2.25 1.69   HGBA1C  --  5.5 6.1 6.1 5.9      CARDIAC:   Recent Labs     11/12/24  1824 11/12/24  1722 11/09/24  0700 11/09/24  0541 10/22/24  1537 10/22/24  1356 05/22/22  1109 04/14/22  0005   LDH  --   --   --   --   --   --   --  1,417*   TROPHS 5 5 6 5   < > 6   < >  --    BNP  --  17  --  43  --  54   < >  --     < > = values in this interval not displayed.     Recent Labs     07/31/23  1418 04/19/22  0515 04/14/22  0005 12/02/21  1042   CHOL 157 114  --  176   LDLF 79 57  --  93   HDL 53.5 16.2*  --  53.8   TRIG 121 204* 94  "145     No data recorded    Micro/ID:   No results found for the last 90 days.                   No lab exists for component: \"AGALPCRNB\"   .ID  Lab Results   Component Value Date    BLOODCULT No growth at 4 days -  FINAL REPORT 10/22/2024    BLOODCULT No growth at 4 days -  FINAL REPORT 10/22/2024       Images:  ECG 12 lead  Sinus rhythm with Premature supraventricular complexes  Cannot rule out Inferior infarct , age undetermined  Abnormal ECG  When compared with ECG of 12-NOV-2024 17:17, (unconfirmed)  Premature supraventricular complexes are now Present  ST no longer depressed in Inferior leads  T wave inversion no longer evident in Inferior leads  Electrocardiogram, 12-lead PRN ACS symptoms  Sinus rhythm with marked sinus arrhythmia  ST & T wave abnormality, consider inferior ischemia  Abnormal ECG  When compared with ECG of 22-OCT-2024 15:30,  Premature ventricular complexes are no longer Present  Nonspecific T wave abnormality no longer evident in Lateral leads     CXR 11/12:No evidence of acute cardiopulmonary process. Hyperinflation    CT PE 11/12:  No evidence of pulmonary emboli. 2. Bronchial wall thickening, suggestive of bronchitis. Mucoid impaction at the smaller airways at the bilateral lower lobes. 3. Emphysema. 4. Coronary artery calcifications. Dilated main pulmonary artery, please correlate for pulmonary arterial hypertension.     Medications:  Scheduled medications  amLODIPine, 5 mg, oral, Daily  apixaban, 5 mg, oral, q12h  atorvastatin, 40 mg, oral, Nightly  azithromycin, 500 mg, oral, q24h DOROTEO  clopidogrel, 75 mg, oral, Daily  [START ON 11/15/2024] influenza, 0.5 mL, intramuscular, During hospitalization  fluticasone furoate-vilanteroL, 1 puff, inhalation, Daily  guaiFENesin, 1,200 mg, oral, BID  ipratropium-albuteroL, 3 mL, nebulization, q6h  melatonin, 3 mg, oral, Nightly  metoprolol tartrate, 25 mg, oral, BID  [START ON 11/15/2024] pneumococcal polysaccharide, 0.5 mL, intramuscular, During " hospitalization  predniSONE, 40 mg, oral, Daily  tiotropium, 2 puff, inhalation, Daily      Continuous medications     PRN medications  PRN medications: oxygen, sodium chloride     Assessment and Plan:  Sheldon Arteaga is a 84 y.o. male with a PMHx of HTN, HLD, COPD (not on home O2, recently started albuterol inhalers), Afib (not on anticoagulation, per progress note during previous hospitalization pt tolerated Eliquis with plans to restart upon discharge, ultimately wasn't discharged on Eliquis), DVT, possible HFpEF (EF 60-65%,), CVA (2022), PAD s/p resection of fem-pop bypass with right CFA to left profunda bypass with reversed right femoral vein (5/2022) who presented to [facility] on (11/12/24) with a chief complaint of worsening productive cough and SOB, admitted for an acute COPD exacerbation.     Acute Medical Issues   #Acute hypoxic respiratory failure  #Acute COPD exacerbation  #Acute vs chronic Bronchitis   - Saturating on 2L, (Not on home O2 at baseline)  - patient originally had pulmonology appointment scheduled for tomorrow 11/14  Plan:   - Continue supplemental O2, wean as tolerated   - Continue Azithromycin 500 mg for 3 days   - Continue Prednisone 40mg   - Continue Guaifenesin   - Started Spiriva and Breo elipta   - Respiratory culture/smear pending    - IS+ Acapella   - Scheduled Duo nebs  - Pulmonology consulted, appreciate recs     #Urinary retention   Nurse bladder scanned pt, showed 750cc, urinated on his own again PVR 352cc   - Patient had straight catheter 375cc out  PLAN  - Continue bladder scans q4    #Hx of Pulmonary nodule   - Per chart review, patient was found to have right lower lobe lung nodule in 2012  - CTA chest 11/12:  Redemonstration of 1.0 cm nodule with central calcification at the medial aspect of right lower lobe.  - Per chart review, in may 2024, patient and family aware of pulmonary nodule and planned to just keep an eye on it. He was also recommended to follow  up with PCP/pulmonology at this time for further eval and management.      Chronic Medical Issues   #Atrial Fibrillation: Continue home Metoprolol tartrate  #HLD: continue home Atorvastatin  #HTN: continue home Amlodipine  #CVA: continue home Clopidogrel   #PAD: continue home Clopidogrel  #Afib: started Eliquis (of note patient has an unclear hx         F: PO intake & IVF PRN   E: Replete as needed  N: Regular diet  GI ppx: Protonix 40 mg daily  DVT ppx: eliquis  Antibiotics: Azithromycin  Tubes/Lines/Drains: PIV   Oxygenation: Supplemental O2      Code Status: DNR and No Intubation         Disposition: 84 y.o.male admitted for acute COPD exacerbation. Anticipate LOS < 48 hrs.       Kristen Diallo DO  PGY1

## 2024-11-13 NOTE — CONSULTS
Patients name:  Sheldon Arteaga        Room # 220-A  Do you have any home inhalers?   yes   Do you get relief when using it?   yes    [employer] education and have them teach back; pt declined  If using home inhaler do you rinse your mouth? yes  Any barriers?no  When were you diagnosed with COPD? 2024  Any previous PFTs? no   explain the importance of a PFT  Do you have a pulmonary Dr.?   Name: Dr. Thomas  Date of last appt: Would be a new patient has appointment tomorrow  Pulmonary cards given  Do you currently smoke or vape or have you ever?   no  Quit date or planning to quit? Quit Saturday 11-9-24  How long have you smoked for? 70 years.   Smoking education given and class information given   Get orders for nicotine supplement; pt declined  Do you have a Primary Dr.? yes  Name: Dr. Cedeño  Date of last appt:11-4-24  Do you have any home O2 or CPAP/BiPAP?  no  Last time 6mwt was done? none  Educate on acceptable O2 levels and the importance of monitoring O2 at home. Complete home O2 evaluation if needed.    This RT to see patient for COPD consult. The patient was given a COPD booklet with educational materials regarding pulmonary issues. Smoking cessation education reviewed, documentation given. Smoking history?Quit Saturday. I discussed various options for quitting smoking. I talked about different strategies that can be used to support changing habits and reduce the urges of withdraw symptoms.  [employer] support group discussed. Flyer given for next month's meeting. Patient diagnosed with COPD When?2024.  I educated patient about the disease process and how it affected the lungs making it difficult to breathe. We discussed current medications and if their current medications give them relief. PFTs? None. Pulmonary Dr? Yes. Primary Dr? Yes. O2 at home?no.  Last 6mwt? None. Patient given  pulmonary office phone number to make an appt. Patient was very receptive to all information given.     Nori  Demetrius, RRT

## 2024-11-13 NOTE — PROGRESS NOTES
Vancomycin Dosing by Pharmacy- INITIAL    Sheldon Arteaga is a 84 y.o. year old male who Pharmacy has been consulted for vancomycin dosing for pneumonia. Based on the patient's indication and renal status this patient will be dosed based on a goal AUC of 500-600.     Renal function is currently slightly elevated.    Visit Vitals  /71   Pulse (!) 107   Temp 36.1 °C (97 °F)   Resp 16        Lab Results   Component Value Date    CREATININE 0.73 2024    CREATININE 0.63 2024    CREATININE 0.65 10/24/2024    CREATININE 0.64 10/23/2024        Patient weight is as follows:   Vitals:    24 1720   Weight: 59.9 kg (132 lb)       Cultures:  No results found for the encounter in last 14 days.        No intake/output data recorded.  I/O during current shift:  No intake/output data recorded.    Temp (24hrs), Av.1 °C (97 °F), Min:36.1 °C (97 °F), Max:36.1 °C (97 °F)         Assessment/Plan     Patient will be given a loading dose of 1000 mg x1 dose in ED.  Follow for continued vancomycin needs, clinical response, and signs/symptoms of toxicity.       Ivy Tavares, PharmD

## 2024-11-13 NOTE — PROGRESS NOTES
11/13/24 1307   Discharge Planning   Living Arrangements Children  (Home with daughter)   Support Systems Children   Assistance Needed Alert and oriented x 3, Independent with ADL's, Drives, No DME(does have a cane, walker, rollator, electric w/c does not use at this time), Shower chair, Recently quit smoking(1.5 weeks ago)   Type of Residence Private residence   Do you have animals or pets at home? Yes   Type of Animals or Pets 3 cats   Who is requesting discharge planning? Provider   Home or Post Acute Services Other (Comment)  (Patient may need home O2 evaluation to determine if he rerquires home O2.)   Expected Discharge Disposition Home   Does the patient need discharge transport arranged? No   Financial Resource Strain   How hard is it for you to pay for the very basics like food, housing, medical care, and heating? Not very   Housing Stability   In the last 12 months, was there a time when you were not able to pay the mortgage or rent on time? N   In the past 12 months, how many times have you moved where you were living? 0   At any time in the past 12 months, were you homeless or living in a shelter (including now)? N   Transportation Needs   In the past 12 months, has lack of transportation kept you from medical appointments or from getting medications? no   In the past 12 months, has lack of transportation kept you from meetings, work, or from getting things needed for daily living? No   Patient Choice   Provider Choice list and CMS website (https://medicare.gov/care-compare#search) for post-acute Quality and Resource Measure Data were provided and reviewed with: Patient;Family   Patient / Family choosing to utilize agency / facility established prior to hospitalization No

## 2024-11-13 NOTE — CONSULTS
Nutrition Initial Assessment:   Nutrition Assessment    Reason for Assessment: Admission nursing screening (MST=2; Unintentional weight loss, Poor PO intake)    Patient is a 84 y.o. male presenting with AHRF in setting of COPD exacerbation & bronchitis.     PMH: HTN, HLD, COPD, Afib, DVT, Possible HFpEF, CVA (2022), PAD s/p resection of fem-pop bypass with right CFA to left profunda bypass with reversed right femoral vein (5/2022)     Nutrition History:  Food and Nutrient History: Pt resting in bed at time of visit. Reports usual appetite & PO intake prior to admission. Notes he consumes x3 meals/day at home. Has a larger breakfast & dinner but lunch is small. Pt states his daughter recently bought him protein shakes from [employer] but he is unsure of the name. Pt amenable to chocolate Ensure Plus HP TID this admission. Denies further needs.  Vitamin/Herbal Supplement Use: Denies  Food Allergies/Intolerances:  None  GI Symptoms: None  Oral Problems: None     Anthropometrics:  Height: 182.9 cm (6')   Weight: 59.9 kg (132 lb)   BMI (Calculated): 17.9  IBW/kg (Dietitian Calculated): 80.9 kg  Percent of IBW: 74 %     Weight History:   Wt Readings from Last 30 Encounters:   11/12/24 59.9 kg (132 lb) --> Admit wt   10/11/24 60.3 kg (133 lb)   05/13/24 61.1 kg (134 lb 11.2 oz)   01/24/24 63 kg (139 lb)   07/31/23 61.2 kg (135 lb)     Weight Change %:  Weight History / % Weight Change: Pt with stable weight over the past x1 year    Nutrition Focused Physical Exam Findings:  Subcutaneous Fat Loss:   Orbital Fat Pads: Severe (dark circles, hollowing and loose skin)  Buccal Fat Pads: Mild-Moderate (flat cheeks, minimal bounce)  Triceps: Severe (negligible fat tissue)  Muscle Wasting:  Temporalis: Mild-Moderate (slight depression)  Pectoralis (Clavicular Region): Severe (protruding prominent clavicle)  Deltoid/Trapezius: Severe (squared shoulders, acromion process prominent)  Interosseous: Severe (depressed area between thumb  and forefinger)  Edema:  Edema: none  Physical Findings:  Hair: Negative  Eyes: Negative  Mouth: Negative  Nails: Negative  Skin: Negative (Intact)    Nutrition Significant Labs:  BMP Trend:   Results from last 7 days   Lab Units 11/13/24  0607 11/12/24  1722 11/09/24  0541   GLUCOSE mg/dL 143* 133* 113*   CALCIUM mg/dL 8.3* 9.3 8.8   SODIUM mmol/L 133* 135* 134*   POTASSIUM mmol/L 4.4 4.1 4.4   CO2 mmol/L 25 25 23   CHLORIDE mmol/L 98 96* 100   BUN mg/dL 24* 16 9   CREATININE mg/dL 0.75 0.73 0.63      Nutrition Specific Medications:  Scheduled medications  azithromycin, 500 mg, oral, q24h DOROTEO  predniSONE, 40 mg, oral, Daily    I/O:   No recorded BM since admission.    Dietary Orders (From admission, onward)       Start     Ordered    11/12/24 2302  May Participate in Room Service With Assistance  ( ROOM SERVICE MAY PARTICIPATE WITH ASSISTANCE)  Once        Question:  .  Answer:  Yes    11/12/24 2301 11/12/24 2254  Adult diet Regular  Diet effective now        Question:  Diet type  Answer:  Regular    11/12/24 2253             Estimated Needs:   Total Energy Estimated Needs (kCal):  (4965-7402)  Method for Estimating Needs: 30-33 kcals/kg x admit wt (59.9 kg)  Total Protein Estimated Needs (g):  (75-90)  Method for Estimating Needs: 1.3-1.5 g/kg x admit wt (59.9 kg)  Total Fluid Estimated Needs (mL):  (8998-9642)  Method for Estimating Needs: 1mL/kcal or per team        Nutrition Diagnosis   Malnutrition Diagnosis  Patient has Malnutrition Diagnosis: Yes  Diagnosis Status: New  Malnutrition Diagnosis: Severe malnutrition related to chronic disease or condition  As Evidenced by: suspected <75% EENs for >1 month, underweight BMI of 17.9, & severe muscle wasting/fat loss noted on NFPE      Nutrition Interventions/Recommendations         Nutrition Prescription:  Continue liberalized regular diet as tolerated    Consider starting a daily senior MVI w/ minerals        Nutrition Interventions:   Interventions: Medical  food supplement  Medical Food Supplement: Commercial beverage  Goal: Ensure Plus HP TID (provides 350 kcals/20g protein each)    Nutrition Monitoring and Evaluation   Food/Nutrient Related History Monitoring  Monitoring and Evaluation Plan: Energy intake  Energy Intake: Estimated energy intake  Criteria: Meet >75% EENs    Time Spent (min): 60 minutes

## 2024-11-13 NOTE — CARE PLAN
The patient's goals for the shift include sleeping throughout the night     The clinical goals for the shift include Improved respiratory function    Patient rested comfortably throughout the night. After breathing treatments, steroids, and mucinex, the patient reported some improvement in his respiratory function.

## 2024-11-13 NOTE — PROGRESS NOTES
"Pharmacy Medication History Review    Sheldon Arteaga \"Dejon\" is a 84 y.o. male admitted for Hypoxia. Pharmacy reviewed the patient's [id number] medications and allergies for accuracy.    The list below reflectives the updated PTA list. Please review each medication in order reconciliation for additional clarification and justification.    Prior to Admission Medications   Prescriptions Last Dose Informant Patient Reported? Taking?   albuterol (Ventolin HFA) 90 mcg/actuation inhaler  Self, daughter No Yes   Sig: Inhale 2 puffs every 4 hours if needed for wheezing or shortness of breath.   albuterol 2.5 mg /3 mL (0.083 %) nebulizer solution   No Yes   Sig: Take 3 mL (2.5 mg) by nebulization every 2 hours if needed for wheezing or shortness of breath.   amLODIPine (Norvasc) 5 mg tablet  Self, daughter No Yes   Sig: Take 1 tablet (5 mg) by mouth once daily.   atorvastatin (Lipitor) 40 mg tablet   No Yes   Sig: Take 1 tablet (40 mg) by mouth once daily at bedtime.   benzonatate (Tessalon) 100 mg capsule  Self, daughter No No   Sig: Take 1 capsule (100 mg) by mouth every 8 hours for 3 days. Do not crush or chew.   clopidogrel (Plavix) 75 mg tablet  Self, daughter No Yes   Sig: Take 1 tablet (75 mg) by mouth once daily.   docusate sodium (Colace) 100 mg capsule  Self, daughter Yes No   Sig: Take 1 capsule (100 mg) by mouth once daily.   Patient taking differently: Take 1 capsule (100 mg) by mouth 2 times a day as needed.   ipratropium-albuteroL (Duo-Neb) 0.5-2.5 mg/3 mL nebulizer solution Not Taking Self, daughter No No   Sig: Take 3 mL by nebulization 4 times a day as needed for wheezing or shortness of breath.   Patient not taking: Reported on 11/13/2024   metoprolol tartrate (Lopressor) 25 mg tablet  Self, daughter No Yes   Sig: Take 1 tablet by mouth twice daily   sodium chloride (Saline NasaL) 0.65 % nasal spray Not Taking Self, daughter No No   Sig: Administer 1 spray into each nostril if needed for " congestion.   Patient not taking: Reported on 11/13/2024      Facility-Administered Medications: None        The list below reflectives the updated allergy list. Please review each documented allergy for additional clarification and justification.  Allergies  Reviewed by Tana Briceno RN on 11/12/2024        Severity Reactions Comments    Doxycycline Not Specified GI Upset     T-painol Extra Strength Not Specified Other     Levaquin [levofloxacin] Low Itching, Rash             Below are additional concerns with the patient's PTA list.  Initially spoke to patient regarding home meds. Patient states daughter helps manage his medications, so called daughter to confirm list.    Belkis Jerry

## 2024-11-13 NOTE — HOSPITAL COURSE
Sheldon Arteaga is a 84 y.o. male with a PMHx of HTN, HLD, COPD (not on home O2, recently started albuterol inhalers), Afib (not on anticoagulation, per progress note during previous hospitalization pt tolerated Eliquis with plans to restart upon discharge, ultimately wasn't discharged on Eliquis), DVT, possible HFpEF (EF 60-65%,), CVA (2022), PAD s/p resection of fem-pop bypass with right CFA to left profunda bypass with reversed right femoral vein (5/2022) who presented to [facility] on (11/12/24) with a chief complaint of worsening productive cough and SOB, admitted for an acute COPD exacerbation.    Of note - patient was admitted to hospital on 10/18 and 10/22 for CAP. Patient returned for worsening symptoms of cough and SOB on both admissions.     ED course: Vitals stable other than elevated 146/82 BP. WBC 11.4, Covid negative. CXR showed no evidence of acute cardiopulmonary process. Hyperinflation. CT PE showed no evidence of pulmonary emboli. 2. Bronchial wall thickening, suggestive of bronchitis. Mucoid impaction at the smaller airways at the bilateral lower lobes. 3. Emphysema. 4. Coronary artery calcifications. Dilated main pulmonary artery, please correlate for pulmonary arterial hypertension.  In ED, patient received Ipratropium-albuterol 3mL, Zosyn, Atorvastatin, Metoprolol Tartrate, Melatonin, guaifenesin, Prednisone. Patient was then transferred to the floor for further management      Hospital course: Patient on 2L O2, started on azithromycin, prednisone 40mg, guaifenesin. Started spiriva and breo elipta. Encouraged incentive spirometry and acapella. Scheduled duonebs. Pulmonology consulted and recommended SLP swallow eval to rule out aspiration. Passed swallow eval. Will need to follow up with pulm outpatient on dc. Patient had acute urinary retention and was straight catheterized during admission. Patient still retaining even after voiding trial. Placed sprague and will discharge with  sprague. Instructed to follow up with urology outpatient.

## 2024-11-13 NOTE — H&P
"  HPI    Sheldon Arteaga is a 84 y.o. male with a PMHx of HTN, HLD, COPD (not on home O2, recently started albuterol inhalers), Afib (not on anticoagulation, per progress note during previous hospitalization pt tolerated Eliquis with plans to restart upon discharge, ultimately wasn't discharged on Eliquis), DVT, possible HFpEF (EF 60-65%,), CVA (), PAD s/p resection of fem-pop bypass with right CFA to left profunda bypass with reversed right femoral vein (2022) who presented to [facility] on (24) with a chief complaint of worsening productive cough and SOB.     Of note, patient was recently hospitalized and treated for community acquired pneumonia on (10/18), (10/22-10/24); and discharged with 5 day course of Augmentin. Pt's daughters are present at bedside, and report patient has had worsening productive cough and SOB since the past month. Pt reports he was saturating at 84% on RA at home (Baseline: No O2 use), which prompted him to call EMS. Pt reports increased mucus production describing it as \"solid yellow\" from \"greyish\" in color. His daughters explain he was prescribed Augmentin for 5 days after both hospitalizations with no improvement in symptoms. Pt reports upcoming first time appointment with pulmonology on 24. Pt denies HA, fever/chills,chest pain/discomfort, orthopnea/PND, palpitations, abdominal pain, changes to weight, nausea, urination or bowel movement. Admits to lack of appetite, fatigue, congestion, vomiting explaining he's vomiting phlegm.     ED Course:  Vitals: T: 97F      BP: 146/82         HR: 99       RR: 16       SPO2: 93   Labs:  - CBC: WBC: 11.4, Eosinophils: 1.21  - RFP: Na: 135, Cl:96   - Blood glucose: 133  - Lactate: 2.0  - M.97  - Trop: 5, 5   - BNP: 17  - COVID negative   - EKG: Sinus rhythm with marked sinus arrhythmia. ST & T wave abnormality, consider inferior ischemia    Imaging:  - CXR:  No evidence of acute cardiopulmonary process. " Hyperinflation  - CT PE:  No evidence of pulmonary emboli. 2. Bronchial wall thickening, suggestive of bronchitis. Mucoid impaction at the smaller airways at the bilateral lower lobes. 3. Emphysema. 4. Coronary artery calcifications. Dilated main pulmonary artery, please correlate for pulmonary arterial hypertension.     Intervention: In ED, patient received Ipratropium-albuterol 3mL, Zosyn, Atorvastatin, Metoprolol Tartrate, Melatonin, guaifenesin, Prednisone . Patient was then transferred to the floor for further management    PMH: mentioned above in HPI  PSH: mentioned above in HPI  FH: noncontributory     Social Hx:  - EtOH: Denies  - Tobacco: Pt reports quitting smoking about a few weeks ago; states he smoked 1/2 PPD for ~70 years   - Illicit Drugs: Denies    Allergies: as documented in EMR  Meds: as documented in med rec    Past Medical History     Past Medical History:   Diagnosis Date    Stroke (Multi)       Surgical History     Past Surgical History:   Procedure Laterality Date    CT ANGIO AORTA AND BILATERAL ILIOFEMORAL RUN OFF INCLUDING WITHOUT CONTRAST IF PERFORMED  4/10/2022    CT AORTA AND BILATERAL ILIOFEMORAL RUNOFF ANGIOGRAM W AND/OR WO IV CONTRAST 4/10/2022 [facility]    CT ANGIO AORTA AND BILATERAL ILIOFEMORAL RUN OFF INCLUDING WITHOUT CONTRAST IF PERFORMED  4/13/2022    CT AORTA AND BILATERAL ILIOFEMORAL RUNOFF ANGIOGRAM W AND/OR WO IV CONTRAST 4/13/2022 [facility] CLINICAL LEGAC[facility]    CT ANGIO AORTA AND BILATERAL ILIOFEMORAL RUN OFF INCLUDING WITHOUT CONTRAST IF PERFORMED  5/22/2022    CT AORTA AND BILATERAL ILIOFEMORAL RUNOFF ANGIOGRAM W AND/OR WO IV CONTRAST 5/22/2022 [facility]    CT ANGIO AORTA AND BILATERAL ILIOFEMORAL RUN OFF INCLUDING WITHOUT CONTRAST IF PERFORMED  4/2/2023    CT AORTA AND BILATERAL ILIOFEMORAL RUNOFF ANGIOGRAM W AND/OR WO IV CONTRAST [facility] CT    CT ANGIO NECK  4/19/2022    CT NECK ANGIO W AND WO IV CONTRAST 4/19/2022 [facility] CLINICAL LEGAC[facility]    CT HEAD ANGIO W AND  WO IV CONTRAST  4/19/2022    CT HEAD ANGIO W AND WO IV CONTRAST 4/19/2022 [facility] CLINICAL LEGACY    OTHER SURGICAL HISTORY  09/17/2019    [place] tooth extraction    OTHER SURGICAL HISTORY  09/17/2019    Tonsillectomy with adenoidectomy    OTHER SURGICAL HISTORY  01/17/2020    Lower leg fracture repair    OTHER SURGICAL HISTORY  01/17/2020    Cardiac catheterization    TOTAL HIP ARTHROPLASTY  08/31/2018    Hip Replacement     Family History     Family History   Problem Relation Name Age of Onset    Heart attack Mother      Colon cancer Father      Heart attack Brother      Heart block Brother       Social History     Tobacco Use: High Risk (11/9/2024)    Patient History     Smoking Tobacco Use: Every Day     Smokeless Tobacco Use: Never     Passive Exposure: Not on file      Social History     Substance and Sexual Activity   Alcohol Use Never      Allergies     Allergies   Allergen Reactions    Doxycycline Confusion    T-Painol Extra Strength Other    Levaquin [Levofloxacin] Itching and Rash      Meds         Objective     Vitals  Visit Vitals  /71   Pulse (!) 107   Temp 36.1 °C (97 °F)   Resp 16   Ht 1.829 m (6')   Wt 59.9 kg (132 lb)   SpO2 95%   BMI 17.90 kg/m²   Smoking Status Every Day   BSA 1.74 m²        Physical Examination:  Constitutional: Appears ill, saturating on NC   HEENT: EOMI, clear sclera, moist mucous membranes  CV: Tachycardic.  No M/R/G  PULM: Wheezing and coughing present. Saturating on 2L.   ABDOMEN: Soft, NT/ND. No TTP. + BSx4.  SKIN: Normal Color, Warm, Dry  EXTREMITIES: Non-Tender, Full ROM, No Pedal Edema  NEURO: A&O x 4, nonfocal neurological exam.  PSYCH: Normal Mood & Behavior    I/Os  No intake or output data in the 24 hours ending 11/12/24 2124    Labs:   Results from last 72 hours   Lab Units 11/12/24  1722   SODIUM mmol/L 135*   POTASSIUM mmol/L 4.1   CHLORIDE mmol/L 96*   CO2 mmol/L 25   BUN mg/dL 16   CREATININE mg/dL 0.73   GLUCOSE mg/dL 133*   CALCIUM mg/dL 9.3   ANION GAP  mmol/L 18   EGFR mL/min/1.73m*2 90      Results from last 72 hours   Lab Units 11/12/24  1722   WBC AUTO x10*3/uL 11.4*   HEMOGLOBIN g/dL 13.6   HEMATOCRIT % 42.5   PLATELETS AUTO x10*3/uL 210   NEUTROS PCT AUTO % 64.4   LYMPHS PCT AUTO % 18.3   MONOS PCT AUTO % 5.9   EOS PCT AUTO % 10.6      Lab Results   Component Value Date    CALCIUM 9.3 11/12/2024    PHOS 3.4 10/24/2024      Lab Results   Component Value Date    CRP 1.73 (H) 10/22/2024     Lab Results   Component Value Date    BLOODCULT No growth at 4 days -  FINAL REPORT 10/22/2024    BLOODCULT No growth at 4 days -  FINAL REPORT 10/22/2024     Images    CT angio chest for pulmonary embolism    Result Date: 11/12/2024  1. No evidence of pulmonary emboli. 2. Bronchial wall thickening, suggestive of bronchitis. Mucoid impaction at the smaller airways at the bilateral lower lobes. 3. Emphysema. 4. Coronary artery calcifications. Dilated main pulmonary artery, please correlate for pulmonary arterial hypertension.     MACRO: None.   Signed by: Maureen Newell 11/12/2024 7:40 PM Dictation workstation:   [id number]    XR chest 1 view    Result Date: 11/12/2024  1.  No evidence of acute cardiopulmonary process. Hyperinflation.       MACRO: None   Signed by: Kenneth Ahumada 11/12/2024 6:13 PM Dictation workstation:   [id number]    CT chest wo IV contrast    Result Date: 11/9/2024  Partially calcified right lower lobe nodule which has decreased in size when compared to the previous study. 7 mm nodule in the right lower lobe which was obscured on the previous examination by collapse/consolidation. A few additional tiny nodules are seen measuring 3 mm or less in size. Continued surveillance as per Fleischner society guidelines.   MACRO: Incidental Finding:  Multiple solid non-calcified pulmonary nodules measuring up to 6-8 mm.  (**-YCF-**)   Instructions:  Consider follow up non contrast chest CT at 3-6 months, then consider CT chest at 18-24 months. (Topher Monroe et al.,  Guidelines for management of incidental pulmonary nodules detected on CT images: From the Fleischner Society 2017, Radiology. 2017 Jul;284 (1):228-243.) [email or web address]   Signed by: Chidi Armenta 11/9/2024 8:41 AM Dictation workstation:   [id number]    XR chest 1 view    Result Date: 11/9/2024  1.  No evidence of acute cardiopulmonary process. Emphysema/COPD.       MACRO: None   Signed by: Myke Hall 11/9/2024 6:40 AM Dictation workstation:   [id number]    XR chest 2 views    Result Date: 10/22/2024  Small right lower lobe airspace disease best seen on recent CT.   Signed by: Vinicius Lamas 10/22/2024 2:15 PM Dictation workstation:   [id number]    CT chest wo IV contrast    Result Date: 10/18/2024  Centrilobular and paraseptal emphysema noted. Subtle patchy airspace and ground-glass opacities in the lingula and right lower lobe concerning for atypical infectious/inflammatory process.   Main pulmonary artery is dilated up to 3.8 cm which can be seen with pulmonary arterial hypertension.   Additional findings as noted above.   MACRO: None   Signed by: Eva Lamb 10/18/2024 2:45 AM Dictation workstation:   [id number]    MR brain wo IV contrast    Result Date: 10/17/2024  1.  No evidence of new infarct, or other acute intracranial abnormality. 2. Geographic area of cystic encephalomalacia and gliosis in the left temporal lobe likely represent sequela of prior infarct/injury. Smaller areas of encephalomalacia also present in the bilateral cerebellar hemispheres, likely representing sequela of prior cerebellar infarcts. 3. Scattered foci of hyperintense FLAIR and T2 signal are present in the periventricular and subcortical white matter of bilateral cerebral hemispheres, nonspecific findings favored to represent chronic sequela of microvascular disease.   MACRO: None   Signed by: Castillo Beckham 10/17/2024 11:44 PM Dictation workstation:   [id number]    CT head wo IV contrast    Result Date:  10/17/2024  No acute intracranial abnormality.   Chronic ischemic changes as above.   MACRO: None   Signed by: Amy Cobos 10/17/2024 9:26 PM Dictation workstation:   [id number]    XR chest 1 view    Result Date: 10/17/2024  1.  Emphysematous changes the lungs without acute abnormality       MACRO: None   Signed by: Kyler Casas 10/17/2024 7:43 PM Dictation workstation:   [id number]    Assessment and Plan    Sheldon Arteaga is a 84 y.o. male with a PMHx of HTN, HLD, COPD (not on home O2, recently started albuterol inhalers), Afib (not on anticoagulation, per progress note during previous hospitalization pt tolerated Eliquis with plans to restart upon discharge, ultimately wasn't discharged on Eliquis), DVT, possible HFpEF (EF 60-65%,), CVA (2022), PAD s/p resection of fem-pop bypass with right CFA to left profunda bypass with reversed right femoral vein (5/2022) who presented to [facility] on (11/12/24) with a chief complaint of worsening productive cough and SOB, admitted for an acute COPD exacerbation.    Acute Medical Issues   #Acute hypoxic respiratory failure 2/2  #Acute COPD exacerbation  #Acute vs chronic Bronchitis   - Pt reports worsening productive cough and SOB  - Pt reports smoking for the past 70 years, quitting a few weeks ago   - Saturating on 2L, (Not on home O2 at baseline)  - WBC: 11.4  - PE: wheezes present bilaterally. Coughing present  - CXR:  No evidence of acute cardiopulmonary process. Hyperinflation  - CT PE:  No evidence of pulmonary emboli. 2. Bronchial wall thickening, suggestive of bronchitis. Mucoid impaction at the smaller airways at the bilateral lower lobes. 3. Emphysema. 4. Coronary artery calcifications. Dilated main pulmonary artery, please correlate for pulmonary arterial hypertension.   Plan:   - Continue supplemental O2, wean as tolerated   - Start Azithromycin 500 mg for 3 days   - Start Prednisone 40mg   - Consider adding LAMA/LABA  - Start Guaifenesin  600mg BID for productive cough  - Ordered Respiratory culture/smear     - IS+ Acapella   - Scheduled and PRN Duo nebs   - WCTM    Chronic Medical Issues   #Atrial Fibrillation: Continue home Metoprolol tartrate  #HLD: continue home Atorvastatin  #HTN: continue home Amlodipine  #CVA: continue home Clopidogrel   #PAD: continue home Clopidogrel  #Afib: not on anticoagulation, previous hospitalization tolerated Eliquis with plans to restart upon discharge      F: PO intake & IVF PRN   E: Replete as needed  N: Regular diet  GI ppx: Protonix 40 mg daily  DVT ppx: Lovenox Subq  Antibiotics: Azithromycin  Tubes/Lines/Drains: PIV   Oxygenation: Supplemental O2     Code Status: DNR and No Intubation       Disposition: 84 y.o.male admitted for acute COPD exacerbation. Anticipate LOS < 48 hrs.     Josiah Tijerina DO  Internal Medicine, PGY-1

## 2024-11-13 NOTE — PROGRESS NOTES
"Sheldon Arteaga \"Dejon\" is a 84 y.o. male with a PMHx of HTN, HLD, COPD (not on home O2, recently started albuterol inhalers), Afib (not on anticoagulation), DVT, possible HFpEF (EF 60-65%,), CVA (2022), PAD s/p resection and bypass (2022) on day 0 of admission presenting with acute on chronic COPD exacerbation.      Subjective   Pt was seen and evaluated in his bed. He was in no acute distress or pain.    Pt endorses a persistent productive cough that has been unchanged since his previous discharge on 10/24/24 for CAP/ COPD exacerbation. His mucus ranges from white to yellow. Additionally, he notes increased dyspnea when fully reclined, this has been present since his last discharge. He denies SOB at rest on 2L O2 nasal cannula (room air is his baseline). At home he ambulated unassisted, or with a cane. He typically has a BM every 3 days, he has yet to have one since admission. He urinated this morning without change from his baseline. Pt denies Chest pain, fever, chills, vomiting, nausea and palpations.        Objective     Last Recorded Vitals  /59 (BP Location: Right arm, Patient Position: Lying)   Pulse 99   Temp 36.2 °C (97.2 °F) (Temporal)   Resp 18   Wt 59.9 kg (132 lb)   SpO2 93%   Intake/Output last 3 Shifts:    Intake/Output Summary (Last 24 hours) at 11/13/2024 1054  Last data filed at 11/12/2024 2251  Gross per 24 hour   Intake 670 ml   Output --   Net 670 ml       Admission Weight  Weight: 59.9 kg (132 lb) (11/12/24 1720)    Daily Weight  11/12/24 : 59.9 kg (132 lb)    Image Results  Electrocardiogram, 12-lead PRN ACS symptoms  Sinus rhythm with marked sinus arrhythmia  ST & T wave abnormality, consider inferior ischemia  Abnormal ECG  When compared with ECG of 22-OCT-2024 15:30,  Premature ventricular complexes are no longer Present  Nonspecific T wave abnormality no longer evident in Lateral leads      Physical Exam  Constitutional:       General: He is not in acute " distress.  Cardiovascular:      Rate and Rhythm: Tachycardia present. Rhythm irregular.      Heart sounds: Normal heart sounds.      Comments: Heart was difficult to auscultate over the anterior lung field Rhonchi  Pulmonary:      Effort: Pulmonary effort is normal. No respiratory distress.      Breath sounds: Rhonchi present.   Neurological:      Mental Status: He is alert.   Psychiatric:         Mood and Affect: Mood normal.         Relevant Results  Results for orders placed or performed during the hospital encounter of 11/12/24 (from the past 24 hours)   Electrocardiogram, 12-lead PRN ACS symptoms   Result Value Ref Range    Ventricular Rate 97 BPM    Atrial Rate 97 BPM    VT Interval 130 ms    QRS Duration 100 ms    QT Interval 366 ms    QTC Calculation(Bazett) 464 ms    P Axis 70 degrees    R Axis 68 degrees    T Axis -53 degrees    QRS Count 16 beats    Q Onset 215 ms    P Onset 150 ms    P Offset 190 ms    T Offset 398 ms    QTC Fredericia 429 ms   CBC and Auto Differential   Result Value Ref Range    WBC 11.4 (H) 4.4 - 11.3 x10*3/uL    nRBC 0.0 0.0 - 0.0 /100 WBCs    RBC 4.47 (L) 4.50 - 5.90 x10*6/uL    Hemoglobin 13.6 13.5 - 17.5 g/dL    Hematocrit 42.5 41.0 - 52.0 %    MCV 95 80 - 100 fL    MCH 30.4 26.0 - 34.0 pg    MCHC 32.0 32.0 - 36.0 g/dL    RDW 14.6 (H) 11.5 - 14.5 %    Platelets 210 150 - 450 x10*3/uL    Neutrophils % 64.4 40.0 - 80.0 %    Immature Granulocytes %, Automated 0.4 0.0 - 0.9 %    Lymphocytes % 18.3 13.0 - 44.0 %    Monocytes % 5.9 2.0 - 10.0 %    Eosinophils % 10.6 0.0 - 6.0 %    Basophils % 0.4 0.0 - 2.0 %    Neutrophils Absolute 7.34 (H) 1.60 - 5.50 x10*3/uL    Immature Granulocytes Absolute, Automated 0.04 0.00 - 0.50 x10*3/uL    Lymphocytes Absolute 2.08 0.80 - 3.00 x10*3/uL    Monocytes Absolute 0.67 0.05 - 0.80 x10*3/uL    Eosinophils Absolute 1.21 (H) 0.00 - 0.40 x10*3/uL    Basophils Absolute 0.04 0.00 - 0.10 x10*3/uL   Magnesium   Result Value Ref Range    Magnesium 1.97 1.60  - 2.40 mg/dL   Comprehensive metabolic panel   Result Value Ref Range    Glucose 133 (H) 74 - 99 mg/dL    Sodium 135 (L) 136 - 145 mmol/L    Potassium 4.1 3.5 - 5.3 mmol/L    Chloride 96 (L) 98 - 107 mmol/L    Bicarbonate 25 21 - 32 mmol/L    Anion Gap 18 10 - 20 mmol/L    Urea Nitrogen 16 6 - 23 mg/dL    Creatinine 0.73 0.50 - 1.30 mg/dL    eGFR 90 >60 mL/min/1.73m*2    Calcium 9.3 8.6 - 10.3 mg/dL    Albumin 4.5 3.4 - 5.0 g/dL    Alkaline Phosphatase 82 33 - 136 U/L    Total Protein 7.4 6.4 - 8.2 g/dL    AST 18 9 - 39 U/L    Bilirubin, Total 0.7 0.0 - 1.2 mg/dL    ALT 13 10 - 52 U/L   Lactate   Result Value Ref Range    Lactate 2.0 0.4 - 2.0 mmol/L   B-Type Natriuretic Peptide   Result Value Ref Range    BNP 17 0 - 99 pg/mL   Troponin I, High Sensitivity, Initial   Result Value Ref Range    Troponin I, High Sensitivity 5 0 - 20 ng/L   Sars-CoV-2 PCR   Result Value Ref Range    Coronavirus 2019, PCR Not Detected Not Detected   Influenza A, and B PCR   Result Value Ref Range    Flu A Result Not Detected Not Detected    Flu B Result Not Detected Not Detected   RSV PCR   Result Value Ref Range    RSV PCR Not Detected Not Detected   Troponin, High Sensitivity, 1 Hour   Result Value Ref Range    Troponin I, High Sensitivity 5 0 - 20 ng/L   MRSA Surveillance for Vancomycin De-escalation, PCR    Specimen: Anterior Nares; Swab   Result Value Ref Range    MRSA PCR Not Detected Not Detected   Magnesium   Result Value Ref Range    Magnesium 2.04 1.60 - 2.40 mg/dL   Renal Function Panel   Result Value Ref Range    Glucose 143 (H) 74 - 99 mg/dL    Sodium 133 (L) 136 - 145 mmol/L    Potassium 4.4 3.5 - 5.3 mmol/L    Chloride 98 98 - 107 mmol/L    Bicarbonate 25 21 - 32 mmol/L    Anion Gap 14 10 - 20 mmol/L    Urea Nitrogen 24 (H) 6 - 23 mg/dL    Creatinine 0.75 0.50 - 1.30 mg/dL    eGFR 89 >60 mL/min/1.73m*2    Calcium 8.3 (L) 8.6 - 10.3 mg/dL    Phosphorus 5.0 (H) 2.5 - 4.9 mg/dL    Albumin 3.8 3.4 - 5.0 g/dL   CBC   Result  Value Ref Range    WBC 5.5 4.4 - 11.3 x10*3/uL    nRBC 0.0 0.0 - 0.0 /100 WBCs    RBC 3.75 (L) 4.50 - 5.90 x10*6/uL    Hemoglobin 11.5 (L) 13.5 - 17.5 g/dL    Hematocrit 35.0 (L) 41.0 - 52.0 %    MCV 93 80 - 100 fL    MCH 30.7 26.0 - 34.0 pg    MCHC 32.9 32.0 - 36.0 g/dL    RDW 14.5 11.5 - 14.5 %    Platelets 198 150 - 450 x10*3/uL      Scheduled medications  amLODIPine, 5 mg, oral, Daily  apixaban, 5 mg, oral, q12h  atorvastatin, 40 mg, oral, Nightly  azithromycin, 500 mg, oral, q24h DOROTEO  clopidogrel, 75 mg, oral, Daily  [START ON 11/15/2024] influenza, 0.5 mL, intramuscular, During hospitalization  guaiFENesin, 600 mg, oral, BID  ipratropium-albuteroL, 3 mL, nebulization, TID  melatonin, 3 mg, oral, Nightly  metoprolol tartrate, 25 mg, oral, BID  [START ON 11/15/2024] pneumococcal polysaccharide, 0.5 mL, intramuscular, During hospitalization  predniSONE, 40 mg, oral, Daily      Continuous medications     PRN medications  PRN medications: ipratropium-albuteroL, oxygen, sodium chloride    Assessment/Plan      Sheldon Arteaga is a 84 y.o. male with a PMHx of HTN, HLD, COPD (not on home O2, recently started albuterol inhalers), Afib (not on anticoagulation, per progress note during previous hospitalization pt tolerated Eliquis with plans to restart upon discharge, ultimately wasn't discharged on Eliquis), DVT, possible HFpEF (EF 60-65%,), CVA (2022), PAD s/p resection of fem-pop bypass with right CFA to left profunda bypass with reversed right femoral vein (5/2022) who presented to [facility] on (11/12/24) with a chief complaint of worsening productive cough and SOB, admitted for an acute COPD exacerbation.     Acute Medical Issues   #Acute hypoxic respiratory failure 2/2  #Acute COPD exacerbation  #Acute vs chronic Bronchitis   - Pt reports worsening productive cough and SOB from his previous discharge last month.  - Pt reports smoking for the past 70 years, quitting a few weeks ago   - Saturating on 2L,  (Not on home O2 at baseline)  - WBC: 11.4-->5.5 (11/13/24)  - PE: rhonchi present bilaterally. Productive cough present  - CXR:  No evidence of acute cardiopulmonary process. Hyperinflation  - CT PE:  No evidence of pulmonary emboli. 2. Bronchial wall thickening, suggestive of bronchitis. Mucoid impaction at the smaller airways at the bilateral lower lobes. 3. Emphysema. 4. Coronary artery calcifications. Dilated main pulmonary artery, please correlate for pulmonary arterial hypertension.   Plan:   - Continue supplemental O2, wean as tolerated   - Start Azithromycin 500 mg for 3 days   - Start Prednisone 40mg   - Consider adding LAMA/LABA  - stopped Guaifenesin  - Ordered Respiratory culture/smear     - IS+ Acapella   - Scheduled and PRN Duo nebs  - recommended that he use the valved spirometry device.       Chronic Medical Issues   #Atrial Fibrillation: Continue home Metoprolol tartrate  #HLD: continue home Atorvastatin  #HTN: continue home Amlodipine  #CVA: continue home Clopidogrel   #PAD: continue home Clopidogrel  #Afib: not on anticoagulation, previous hospitalization tolerated Eliquis with plans to restart upon discharge.        F: PO intake & IVF PRN   E: Replete as needed  N: Regular diet  GI ppx: Protonix 40 mg daily  DVT ppx: Eliquis  Antibiotics: Azithromycin  Tubes/Lines/Drains: PIV   Oxygenation: Supplemental O2      Code Status: DNR and No Intubation         Disposition: 84 y.o.male admitted for acute COPD exacerbation. Anticipate LOS < 48 hrs.                Assessment & Plan  Hypoxia    Acute respiratory failure with hypoxia (Multi)                  TRELL BALTAZAR         PGY-3 Attestation: I saw and evaluated the patient.  I personally obtained the key and critical portions of the history and physical exam or was physically present for key and critical portions performed by the student. I reviewed the student's documentation and discussed the patient with the student.  I agree with the  documentation as detailed in the note unless stated otherwise in this attestation.     Alfred Ayala MD  Internal Medicine, PGY-3  12/12/24 at 5:35 PM

## 2024-11-13 NOTE — CARE PLAN
The patient's goals for the shift include   Problem: Pain - Adult  Goal: Verbalizes/displays adequate comfort level or baseline comfort level  Outcome: Progressing     Problem: Safety - Adult  Goal: Free from fall injury  Outcome: Progressing     Problem: Discharge Planning  Goal: Discharge to home or other facility with appropriate resources  Outcome: Progressing     Problem: Chronic Conditions and Co-morbidities  Goal: Patient's chronic conditions and co-morbidity symptoms are monitored and maintained or improved  Outcome: Progressing     Problem: Respiratory  Goal: Clear secretions with interventions this shift  Outcome: Progressing  Goal: Minimize anxiety/maximize coping throughout shift  Outcome: Progressing  Goal: Minimal/no exertional discomfort or dyspnea this shift  Outcome: Progressing  Goal: No signs of respiratory distress (eg. Use of accessory muscles. Peds grunting)  Outcome: Progressing  Goal: Patent airway maintained this shift  Outcome: Progressing  Goal: Tolerate mechanical ventilation evidenced by VS/agitation level this shift  Outcome: Progressing  Goal: Tolerate pulmonary toileting this shift  Outcome: Progressing  Goal: Verbalize decreased shortness of breath this shift  Outcome: Progressing  Goal: Wean oxygen to maintain O2 saturation per order/standard this shift  Outcome: Progressing  Goal: Increase self care and/or family involvement in next 24 hours  Outcome: Progressing       The clinical goals for the shift include decreased cough

## 2024-11-14 LAB
ALBUMIN SERPL BCP-MCNC: 3.5 G/DL (ref 3.4–5)
ANION GAP SERPL CALC-SCNC: 11 MMOL/L (ref 10–20)
BUN SERPL-MCNC: 22 MG/DL (ref 6–23)
CALCIUM SERPL-MCNC: 8.1 MG/DL (ref 8.6–10.3)
CHLORIDE SERPL-SCNC: 99 MMOL/L (ref 98–107)
CO2 SERPL-SCNC: 26 MMOL/L (ref 21–32)
CREAT SERPL-MCNC: 0.67 MG/DL (ref 0.5–1.3)
EGFRCR SERPLBLD CKD-EPI 2021: >90 ML/MIN/1.73M*2
ERYTHROCYTE [DISTWIDTH] IN BLOOD BY AUTOMATED COUNT: 14.6 % (ref 11.5–14.5)
GLUCOSE SERPL-MCNC: 149 MG/DL (ref 74–99)
HCT VFR BLD AUTO: 34.1 % (ref 41–52)
HGB BLD-MCNC: 10.9 G/DL (ref 13.5–17.5)
MAGNESIUM SERPL-MCNC: 1.92 MG/DL (ref 1.6–2.4)
MCH RBC QN AUTO: 30.8 PG (ref 26–34)
MCHC RBC AUTO-ENTMCNC: 32 G/DL (ref 32–36)
MCV RBC AUTO: 96 FL (ref 80–100)
NRBC BLD-RTO: 0 /100 WBCS (ref 0–0)
PHOSPHATE SERPL-MCNC: 3.7 MG/DL (ref 2.5–4.9)
PLATELET # BLD AUTO: 227 X10*3/UL (ref 150–450)
POTASSIUM SERPL-SCNC: 4.4 MMOL/L (ref 3.5–5.3)
RBC # BLD AUTO: 3.54 X10*6/UL (ref 4.5–5.9)
SODIUM SERPL-SCNC: 132 MMOL/L (ref 136–145)
WBC # BLD AUTO: 8.9 X10*3/UL (ref 4.4–11.3)

## 2024-11-14 PROCEDURE — 1200000002 HC GENERAL ROOM WITH TELEMETRY DAILY

## 2024-11-14 PROCEDURE — 94760 N-INVAS EAR/PLS OXIMETRY 1: CPT

## 2024-11-14 PROCEDURE — 94668 MNPJ CHEST WALL SBSQ: CPT

## 2024-11-14 PROCEDURE — 99223 1ST HOSP IP/OBS HIGH 75: CPT | Performed by: INTERNAL MEDICINE

## 2024-11-14 PROCEDURE — 83735 ASSAY OF MAGNESIUM: CPT

## 2024-11-14 PROCEDURE — 2500000004 HC RX 250 GENERAL PHARMACY W/ HCPCS (ALT 636 FOR OP/ED): Performed by: INTERNAL MEDICINE

## 2024-11-14 PROCEDURE — 82947 ASSAY GLUCOSE BLOOD QUANT: CPT

## 2024-11-14 PROCEDURE — 2500000001 HC RX 250 WO HCPCS SELF ADMINISTERED DRUGS (ALT 637 FOR MEDICARE OP)

## 2024-11-14 PROCEDURE — 2500000002 HC RX 250 W HCPCS SELF ADMINISTERED DRUGS (ALT 637 FOR MEDICARE OP, ALT 636 FOR OP/ED)

## 2024-11-14 PROCEDURE — 92526 ORAL FUNCTION THERAPY: CPT | Mod: GN

## 2024-11-14 PROCEDURE — 36415 COLL VENOUS BLD VENIPUNCTURE: CPT

## 2024-11-14 PROCEDURE — 99232 SBSQ HOSP IP/OBS MODERATE 35: CPT

## 2024-11-14 PROCEDURE — 2500000005 HC RX 250 GENERAL PHARMACY W/O HCPCS

## 2024-11-14 PROCEDURE — 2500000004 HC RX 250 GENERAL PHARMACY W/ HCPCS (ALT 636 FOR OP/ED)

## 2024-11-14 PROCEDURE — 51701 INSERT BLADDER CATHETER: CPT

## 2024-11-14 PROCEDURE — 92610 EVALUATE SWALLOWING FUNCTION: CPT | Mod: GN

## 2024-11-14 PROCEDURE — 85027 COMPLETE CBC AUTOMATED: CPT

## 2024-11-14 PROCEDURE — 94640 AIRWAY INHALATION TREATMENT: CPT

## 2024-11-14 RX ORDER — PREDNISONE 20 MG/1
40 TABLET ORAL DAILY
Status: DISCONTINUED | OUTPATIENT
Start: 2024-11-14 | End: 2024-11-15 | Stop reason: HOSPADM

## 2024-11-14 RX ADMIN — TIOTROPIUM BROMIDE INHALATION SPRAY 2 PUFF: 3.12 SPRAY, METERED RESPIRATORY (INHALATION) at 08:45

## 2024-11-14 RX ADMIN — GUAIFENESIN 1200 MG: 600 TABLET ORAL at 08:45

## 2024-11-14 RX ADMIN — Medication 3 MG: at 20:49

## 2024-11-14 RX ADMIN — APIXABAN 5 MG: 5 TABLET, FILM COATED ORAL at 20:49

## 2024-11-14 RX ADMIN — ATORVASTATIN CALCIUM 40 MG: 40 TABLET, FILM COATED ORAL at 20:49

## 2024-11-14 RX ADMIN — Medication 2 L/MIN: at 08:23

## 2024-11-14 RX ADMIN — AZITHROMYCIN 500 MG: 250 TABLET, FILM COATED ORAL at 08:45

## 2024-11-14 RX ADMIN — APIXABAN 5 MG: 5 TABLET, FILM COATED ORAL at 08:45

## 2024-11-14 RX ADMIN — GUAIFENESIN 1200 MG: 600 TABLET ORAL at 20:49

## 2024-11-14 RX ADMIN — CLOPIDOGREL 75 MG: 75 TABLET ORAL at 08:45

## 2024-11-14 RX ADMIN — METOPROLOL TARTRATE 25 MG: 25 TABLET, FILM COATED ORAL at 20:49

## 2024-11-14 RX ADMIN — IPRATROPIUM BROMIDE AND ALBUTEROL SULFATE 3 ML: 2.5; .5 SOLUTION RESPIRATORY (INHALATION) at 19:31

## 2024-11-14 RX ADMIN — FLUTICASONE FUROATE AND VILANTEROL TRIFENATATE 1 PUFF: 200; 25 POWDER RESPIRATORY (INHALATION) at 08:45

## 2024-11-14 RX ADMIN — IPRATROPIUM BROMIDE AND ALBUTEROL SULFATE 3 ML: 2.5; .5 SOLUTION RESPIRATORY (INHALATION) at 08:23

## 2024-11-14 RX ADMIN — PREDNISONE 40 MG: 20 TABLET ORAL at 18:05

## 2024-11-14 ASSESSMENT — COGNITIVE AND FUNCTIONAL STATUS - GENERAL
WALKING IN HOSPITAL ROOM: A LITTLE
CLIMB 3 TO 5 STEPS WITH RAILING: A LITTLE
DAILY ACTIVITIY SCORE: 22
TOILETING: A LITTLE
MOBILITY SCORE: 22
HELP NEEDED FOR BATHING: A LITTLE

## 2024-11-14 ASSESSMENT — PAIN - FUNCTIONAL ASSESSMENT
PAIN_FUNCTIONAL_ASSESSMENT: 0-10
PAIN_FUNCTIONAL_ASSESSMENT: 0-10

## 2024-11-14 ASSESSMENT — PAIN SCALES - GENERAL
PAINLEVEL_OUTOF10: 0 - NO PAIN

## 2024-11-14 NOTE — CONSULTS
"Inpatient consult to Pulmonology  Consult performed by: Delaney Krueger MD  Consult ordered by: Joselin Douglass MD          Department of Medicine  Division of Pulmonary, Critical Care, and Sleep Medicine      History Of Present Illness  Sheldon Arteaga \"Dejon\" is a 84-year-old man with history of hypertension, dyslipidemia, COPD (no PFT in our system), A-fib, CVA, DVT admitted with acute hypoxic respiratory failure.  Patient with multiple admissions recently for respiratory failure, COPD exacerbation.  Presenting with worsening dyspnea, cough with initially white phlegm, now dark yellowish, chest tightness and wheezing.  No fever or chills, no chest pain, no hemoptysis.  All other systems reviewed and negative otherwise.    Used to smoke 15 to 20 cigarettes/day for about 65 years, quit 3 weeks ago.       Past Medical History:   Diagnosis Date    Stroke (Multi)        Past Surgical History:   Procedure Laterality Date    CT ANGIO AORTA AND BILATERAL ILIOFEMORAL RUN OFF INCLUDING WITHOUT CONTRAST IF PERFORMED  4/10/2022    CT AORTA AND BILATERAL ILIOFEMORAL RUNOFF ANGIOGRAM W AND/OR WO IV CONTRAST 4/10/2022 [facility] EMERGENCY LEGACY    CT ANGIO AORTA AND BILATERAL ILIOFEMORAL RUN OFF INCLUDING WITHOUT CONTRAST IF PERFORMED  4/13/2022    CT AORTA AND BILATERAL ILIOFEMORAL RUNOFF ANGIOGRAM W AND/OR WO IV CONTRAST 4/13/2022 [facility] CLINICAL LEGACY    CT ANGIO AORTA AND BILATERAL ILIOFEMORAL RUN OFF INCLUDING WITHOUT CONTRAST IF PERFORMED  5/22/2022    CT AORTA AND BILATERAL ILIOFEMORAL RUNOFF ANGIOGRAM W AND/OR WO IV CONTRAST 5/22/2022 [facility] EMERGENCY LEGACY    CT ANGIO AORTA AND BILATERAL ILIOFEMORAL RUN OFF INCLUDING WITHOUT CONTRAST IF PERFORMED  4/2/2023    CT AORTA AND BILATERAL ILIOFEMORAL RUNOFF ANGIOGRAM W AND/OR WO IV CONTRAST [facility] CT    CT ANGIO NECK  4/19/2022    CT NECK ANGIO W AND WO IV CONTRAST 4/19/2022 [facility] CLINICAL LEGACY    CT HEAD ANGIO W AND WO IV CONTRAST  4/19/2022    CT HEAD ANGIO W AND WO IV CONTRAST " 4/19/2022 [facility] CLINICAL LEGACY    OTHER SURGICAL HISTORY  09/17/2019    [place] tooth extraction    OTHER SURGICAL HISTORY  09/17/2019    Tonsillectomy with adenoidectomy    OTHER SURGICAL HISTORY  01/17/2020    Lower leg fracture repair    OTHER SURGICAL HISTORY  01/17/2020    Cardiac catheterization    TOTAL HIP ARTHROPLASTY  08/31/2018    Hip Replacement        Social History     Tobacco Use    Smoking status: Every Day     Types: Cigarettes    Smokeless tobacco: Never   Vaping Use    Vaping status: Never Used   Substance Use Topics    Alcohol use: Never    Drug use: Never       Family History   Problem Relation Name Age of Onset    Heart attack Mother      Colon cancer Father      Heart attack Brother      Heart block Brother             Allergies  Doxycycline, T-painol extra strength, and Levaquin [levofloxacin]    Review of Systems  All other systems reviewed and negative otherwise.     Physical exam  Constitutional: Normal appearance.  HEENT: Normocephalic and atraumatic.  Cardiovascular: Normal rate and regular rhythm.  Pulmonary: Coarse breath sounds bilaterally, no wheezing.  Musculoskeletal: No edema, no cyanosis.  Neurological: Awake, alert and oriented x3.  Psychiatric: Normal behavior, mood and affect.     Vital Signs  Visit Vitals  /59   Pulse 85   Temp 36.4 °C (97.5 °F)   Resp 24   Ht 1.829 m (6')   Wt 59.9 kg (132 lb)   SpO2 91%   BMI 17.90 kg/m²   Smoking Status Every Day   BSA 1.74 m²      Lab Results   Component Value Date    WBC 8.9 11/14/2024    HGB 10.9 (L) 11/14/2024    HCT 34.1 (L) 11/14/2024    MCV 96 11/14/2024     11/14/2024      Lab Results   Component Value Date    GLUCOSE 149 (H) 11/14/2024    CALCIUM 8.1 (L) 11/14/2024     (L) 11/14/2024    K 4.4 11/14/2024    CO2 26 11/14/2024    CL 99 11/14/2024    BUN 22 11/14/2024    CREATININE 0.67 11/14/2024      Lab Results   Component Value Date    ALT 13 11/12/2024    AST 18 11/12/2024    ALKPHOS 82 11/12/2024    BILITOT 0.7  11/12/2024        Oxygen Therapy  SpO2: 91 %  Medical Gas Therapy: Supplemental oxygen  Medical Gas Delivery Method: Nasal cannula  FiO2 (%): 28 %    Medications   Scheduled medications  amLODIPine, 5 mg, oral, Daily  apixaban, 5 mg, oral, q12h  atorvastatin, 40 mg, oral, Nightly  azithromycin, 500 mg, oral, q24h DOROTEO  clopidogrel, 75 mg, oral, Daily  [START ON 11/15/2024] influenza, 0.5 mL, intramuscular, During hospitalization  fluticasone furoate-vilanteroL, 1 puff, inhalation, Daily  guaiFENesin, 1,200 mg, oral, BID  ipratropium-albuteroL, 3 mL, nebulization, q6h  melatonin, 3 mg, oral, Nightly  metoprolol tartrate, 25 mg, oral, BID  [START ON 11/15/2024] pneumococcal polysaccharide, 0.5 mL, intramuscular, During hospitalization  predniSONE, 40 mg, oral, Daily  tiotropium, 2 puff, inhalation, Daily      Continuous medications     PRN medications  PRN medications: ipratropium-albuteroL, oxygen, sodium chloride     Chest Radiograph   CT angio chest for pulmonary embolism 11/12/2024    Narrative  Interpreted By:  Maureen Newell,  STUDY:  CT ANGIO CHEST FOR PULMONARY EMBOLISM;  11/12/2024 7:11 pm    INDICATION:  Signs/Symptoms:sob    COMPARISON:  Noncontrast CT chest 11/09/2024. CT angiogram chest 05/13/2024.    ACCESSION NUMBER(S):  [id number]    ORDERING CLINICIAN:  RICK BALDWIN    TECHNIQUE:  Helical data acquisition of the chest was obtained following the  uneventful administration of intravenous contrast material. Images  were reformatted in axial, coronal, and sagittal planes. MIP images  were created and reviewed.    FINDINGS:  POTENTIAL LIMITATIONS OF THE STUDY: Motion artifact.    HEART AND VESSELS:  No discrete filling defects within the main pulmonary artery or its  branches.    No thoracic aortic aneurysm. Atherosclerotic calcifications are noted  at the thoracic aorta. The phase of intravenous contrast is not  tailored to evaluate the thoracic aorta. The main pulmonary artery is  dilated.  The heart is not enlarged.  Coronary artery calcifications  are present. No evidence of pericardial effusion.    MEDIASTINUM AND RUBINA, LOWER NECK AND AXILLA:  The visualized thyroid gland is within normal limits.    No evidence of thoracic lymphadenopathy by CT criteria.    LUNGS AND AIRWAYS:  The trachea and central airways are patent. There is bronchial wall  thickening. There is mucoid impaction of the smaller airways at the  bilateral lower lobes.    The evaluation of the lungs is degraded by motion artifact. No  consolidation, pleural effusion or pneumothorax. There are bilateral  emphysematous changes. Redemonstration of 1.0 cm nodule with central  calcification at the medial aspect of right lower lobe.    UPPER ABDOMEN:  The visualized subdiaphragmatic structures demonstrate no acute  findings.    CHEST WALL AND OSSEOUS STRUCTURES:  Degenerative changes at the spine.    Impression  1. No evidence of pulmonary emboli.  2. Bronchial wall thickening, suggestive of bronchitis. Mucoid  impaction at the smaller airways at the bilateral lower lobes.  3. Emphysema.  4. Coronary artery calcifications. Dilated main pulmonary artery,  please correlate for pulmonary arterial hypertension.      MACRO:  None.    Signed by: Maureen Newell 11/12/2024 7:40 PM  Dictation workstation:   [id number]      CT chest wo IV contrast 11/09/2024    Narrative  Interpreted By:  Chidi Armenta,  STUDY:  CT CHEST WO IV CONTRAST;  11/9/2024 8:02 am    INDICATION:  Signs/Symptoms:chronic cough, sob.      COMPARISON:  10/18/2024    ACCESSION NUMBER(S):  [id number]    ORDERING CLINICIAN:  SANTINO LEÓN    TECHNIQUE:  Helical data acquisition of the chest was obtained without the use of  IV contrast. Images were reformatted in axial, coronal, and sagittal  planes.    FINDINGS:  POTENTIAL LIMITATIONS OF THE STUDY:   Lack of IV contrast    HEART AND VESSELS:    There are atherosclerotic calcifications of the aorta and its  branches. Aorta is  unchanged in course and caliber. Ascending aorta  remains ectatic, measuring up to 4.0 cm in greatest dimension, not  significantly changed when compared to the previous study.    The heart is unchanged in size. Prominence of the central pulmonary  arteries suggest pulmonary hypertension and is unchanged.    No pericardial effusion is seen.    MEDIASTINUM AND RUBINA, LOWER NECK AND AXILLA:  The visualized thyroid gland is within normal limits.    No evidence of thoracic lymphadenopathy by CT criteria.    Esophagus appears within normal limits as seen.    LUNGS AND AIRWAYS:  The trachea and central airways are patent. No endobronchial lesion.    There are emphysematous changes. There are scattered areas of  atelectasis/scarring, most conspicuous at the lung bases. No new  areas of consolidation. There is mild bronchiectasis and some mucous  plugging, most conspicuous in the lower lobes. Partially calcified  nodule in the right lower lobe the smaller on today's examination,  measuring 1.1 cm. On the previous study, it measured proximally 1.8  cm, image 249 of 369. 7 mm nodular opacity in the right lower lobe,  image 270, was obscured by collapse/consolidation on the previous  study. A few tiny additional nodules are seen measuring 3 mm or less  in size. No effusion. No pneumothorax. Small calcified pleural  plaques again noted on the left.    UPPER ABDOMEN:  The visualized subdiaphragmatic structures demonstrate no acute  abnormality.    CHEST WALL AND OSSEOUS STRUCTURES:  Degenerative changes. No acute process.    Impression  Partially calcified right lower lobe nodule which has decreased in  size when compared to the previous study.  7 mm nodule in the right lower lobe which was obscured on the  previous examination by collapse/consolidation. A few additional tiny  nodules are seen measuring 3 mm or less in size. Continued  surveillance as per Fleischner society guidelines.    MACRO:  Incidental Finding:  Multiple  solid non-calcified pulmonary nodules  measuring up to 6-8 mm.  (**-YCF-**)    Instructions:  Consider follow up non contrast chest CT at 3-6  months, then consider CT chest at 18-24 months. (Topher Monroe et  al., Guidelines for management of incidental pulmonary nodules  detected on CT images: From the Fleischner Society 2017, Radiology.  2017 Jul;284 (1):228-243.) [email or web address]    Signed by: Chidi Armenta 11/9/2024 8:41 AM  Dictation workstation:   [id number]      XR chest 1 view 11/12/2024    Narrative  Interpreted By:  Kenneth Ahumada,  STUDY:  XR CHEST 1 VIEW;  11/12/2024 5:30 pm    INDICATION:  Signs/Symptoms:sob.      COMPARISON:  11/09/2024    ACCESSION NUMBER(S):  [id number]    ORDERING CLINICIAN:  RICK BALDWIN    FINDINGS:  AP portable view of the chest is obtained.  Limited exam due to  portable nature. Magnified cardiac silhouette. Hyperinflation. Aortic  calcification. No infiltrates.    Impression  1.  No evidence of acute cardiopulmonary process. Hyperinflation.        MACRO:  None    Signed by: Kenneth Ahumada 11/12/2024 6:13 PM  Dictation workstation:   [id number]      XR chest 1 view 11/09/2024    Narrative  Interpreted By:  Myke Hall,  STUDY:  XR CHEST 1 VIEW;  11/9/2024 6:07 am    INDICATION:  Signs/Symptoms:Chest Pain.      COMPARISON:  10/20/2024.    ACCESSION NUMBER(S):  [id number]    ORDERING CLINICIAN:  SANTINO LEÓN    FINDINGS:  AP radiographs of the chest were provided.        CARDIOMEDIASTINAL SILHOUETTE:  Cardiac silhouette is normal in size. Tortuous atherosclerotic  thoracic aorta.    LUNGS:  Lungs are hyperinflated and hyperlucent compatible with  emphysema/COPD. Linear opacity in the left lower lung compatible with  atelectasis or scar. No focal consolidation, pleural effusion, or  pneumothorax.    ABDOMEN:  No remarkable upper abdominal findings.    BONES:  No acute osseous changes.    Impression  1.  No evidence of acute cardiopulmonary process.  "Emphysema/COPD.        MACRO:  None    Signed by: Myke Hall 11/9/2024 6:40 AM  Dictation workstation:   [id number]         Pulmonary Function Tests   Pulmonary Functions Testing Results:  No results found for: \"FEV1\", \"FVC\", \"VDB6UAP\", \"TLC\", \"DLCO\"         Assessment and Plan / Recommendations   Assessment/Plan     84-year-old man with history of hypertension, dyslipidemia, COPD (no PFT in our system), A-fib, CVA, DVT admitted with acute hypoxic respiratory failure    1.  Acute hypoxic respiratory failure due to COPD exacerbation  2.  COPD exacerbation-patient with frequent admissions/COPD exacerbations recently  3.bronchitis     CT with right lower lobe bronchial wall thickening and mucous plugging.    Feels better, on 2 L O2.   Continue current therapy  -Continue prednisone daily  -Continue azithromycin  -Continue Breo, nebs as needed  -Incentive spirometry, wean O2 as tolerated  -Recommend swallow evaluation to rule out aspiration, multiple CTs since May with abnormalities in the same area/right lower lobe concerning for recurrent aspiration  -Outpatient PFT, 6-minute walk test/further workup for COPD       Delaney Krueger MD    "

## 2024-11-14 NOTE — PROGRESS NOTES
"Speech-Language Pathology    Speech-Language Pathology Clinical Swallow Evaluation    Patient Name: Sheldon Arteaga \"Lul"  MRN: [id number]  : 1940  Today's Date: 24  Start Time: 1430  Stop Time: 1505  Time Calculation (min): 35 min      ASSESSMENT  Impressions:  No oral phase and no suspected pharyngeal phase dysphagia based on clinical swallow evaluation. Esophageal dysphagia vs reflux disease may explain his complaints. Hyper reactive airway may contribute to coughing and discomfort at times.     He demonstrated good comprehension of education and indicated intent to implement the recommendations to improve comfort with PO intake, reduce reflux and suppress coughing if possible.   Prognosis: Good    PLAN  Recommendations:  Is MBSS recommended? No; no pharyngeal dysphagia suspected.  Solid consistency: Regular (IDDSI level 7)  Liquid consistency: Thin (IDDSI 0)  Medication administration: Whole, in thin liquid  Compensatory swallow strategies:  Reflux precautions    Recommended frequency/duration:  Skilled SLP services recommended: One session : today  Discharge recommendation: Home with no further SLP     Strengths: Cognition, Family/caregiver support, Baseline functional status, and Low severity of deficits  Barriers to participation in tx: N/A    Goals:  In 1 day...  Pt will demonstrated comprehension of risk factors related to aspiration of refluxed material and verbalize comprehension of appropriate compensations for same.    GOAL START: 2024   GOAL END: 2024   Status: instruction and discussion. Good comprehension demonstrated by the end of the session.    Progress this date: Yes GOAL ACHIEVED    Pt will demonstrate comprehension of cough reduction techniques.   GOAL START: 2024   GOAL END: 2024   Status: instruction and discussion. Good comprehension demonstrated by the end of the session.    Progress this date: Yes GOAL ACHIEVED      SUBJECTIVE    PMHx relevant to " rehab:   The patient has a history of strong coughing which results in gagging at times.  He admits to reflux at times and anxiety when difficulty breathing arises.     Sheldon Arteaga is a 84 y.o. male with a PMHx of HTN, HLD, COPD (not on home O2, recently started albuterol inhalers), Afib (not on anticoagulation, per progress note during previous hospitalization pt tolerated Eliquis with plans to restart upon discharge, ultimately wasn't discharged on Eliquis), DVT, possible HFpEF (EF 60-65%,), CVA (2022), PAD s/p resection of fem-pop bypass with right CFA to left profunda bypass with reversed right femoral vein (5/2022) who presented to [facility] on (11/12/24) with a chief complaint of worsening productive cough and SOB, admitted for an acute COPD exacerbation.     Chief complaint: Pt was admitted on 11/12/24 due to Hypoxia.    Relevant imaging results:  Chest x ray 11/12/24    General Visit Information:  SLP Received On: 11/14/24  Patient Class: Inpatient  Living Environment: Home  Ordering Physician: Kristen Diallo DO  Reason for Referral: Concern for dysphagia. Pt with coughing and gagging at times.  Prior to Session Communication: Bedside nurse    Pt reported no awareness of swallowing difficulty.   [employer] Diet: Regular/thin  Current Diet : Regulr, thin    Status at time of evaluation:  Pain Assessment  Pain Assessment: 0-10  0-10 (Numeric) Pain Score: 0 - No pain    Pt was alert, pleasant, cooperative, and attentive for session.  Orientation: Ox4  Ability to follow functional commands: WFL  Nutritional status: Signs of possible malnutrition    Respiratory status: Room air  Baseline Vocal Quality: Normal  Volitional Cough: Strong  Volitional Swallow: Within Functional Limits  Patient positioning: Upright in bed      OBJECTIVE  Clinical swallow evaluation completed and consisted of interview, oral motor assessment, and PO trials (water, puree and solids).  ORAL PHASE: Dentition in good condition.  Oral mucosa were pink, moist, and free of obvious lesions. Lingual strength and ROM were good. Labial strength/ROM were good. Labial seal was adequate. Mastication of regular solids was good. A/P transit and oral clearance were good.  PHARYNGEAL PHASE: Laryngeal elevation was visualized or palpated with all trials, however adequacy of hyolaryngeal elevation/excursion cannot be determined at bedside. No immediate or delayed s/sx aspiration/penetration were observed with any consistencies.    Was 3oz challenge administered: Yes; pt drank 3oz of thin liquid in one attempt, without breaking, with no overt s/sx aspiration/penetration observed.      Treatment/Education:  Results and recommendations were relayed to: Patient and Family  Education provided: Yes   Learner: Patient   Barriers to learning: None   Method of teaching: Verbal, Written, and Demonstration   Topic: role of ST, results of assessment, risk for aspiration, recommended safe swallow strategies, and swallow anatomy/physiology, reflux precautions   Outcome of teaching: Pt/family demonstrated good understanding  Treatment provided: Yes

## 2024-11-14 NOTE — PROGRESS NOTES
Patient: Sheldon Arteaga Age: 84 y.o.   Gender: male Room/bed: 240/240-A     Attending: Joselin Douglass MD  Code Status:  DNR and No Intubation    Overnight Events  None    Subjective  Patient seen and examined at bedside. Patient states he feels better today. He still endorses cough with white/clear mucus. Patient states his breathing has improved. Denies chest pain, abdominal pain, leg pain/swelling.     Objective:  Physical Exam  Constitutional:       General: He is not in acute distress.     Appearance: Normal appearance.      Comments: Patient sitting upright in chair today   HENT:      Head: Normocephalic and atraumatic.   Eyes:      Extraocular Movements: Extraocular movements intact.   Cardiovascular:      Rate and Rhythm: Normal rate and regular rhythm.   Pulmonary:      Breath sounds: Wheezing and rhonchi present.      Comments: on 1LO2. Productive cough heard on exam. Lung sounds improved on auscultation compared to yesterday. Significantly less wheezing noted.   Abdominal:      General: There is no distension.      Palpations: Abdomen is soft.      Tenderness: There is no abdominal tenderness.   Musculoskeletal:         General: No swelling or tenderness.      Right lower leg: No edema.      Left lower leg: No edema.   Skin:     General: Skin is warm and dry.      Comments: Overall patient's skin appears well perfused and he appears less pale, improvement from yesterday.   Neurological:      General: No focal deficit present.      Mental Status: He is alert and oriented to person, place, and time. Mental status is at baseline.   Psychiatric:         Mood and Affect: Mood normal.         Behavior: Behavior normal.          Temp:  [36.3 °C (97.3 °F)-37.2 °C (99 °F)] 36.5 °C (97.7 °F)  Heart Rate:  [] 85  Resp:  [11-24] 24  BP: (101-112)/(58-76) 104/60  FiO2 (%):  [28 %] 28 %      Intake/Output Summary (Last 24 hours) at 11/14/2024 1139  Last data filed at 11/14/2024 0838  Gross per 24 hour    Intake 1160 ml   Output 1925 ml   Net -765 ml       Vitals:    11/12/24 1720   Weight: 59.9 kg (132 lb)       FiO2 (%):  [28 %] 28 %     I/Os    Intake/Output Summary (Last 24 hours) at 11/14/2024 1139  Last data filed at 11/14/2024 0838  Gross per 24 hour   Intake 1160 ml   Output 1925 ml   Net -765 ml       Labs:   CBC:  Recent Labs     11/14/24  0532 11/13/24  0607 11/12/24  1722   WBC 8.9 5.5 11.4*   HGB 10.9* 11.5* 13.6   HCT 34.1* 35.0* 42.5    198 210   MCV 96 93 95     CMP:  Recent Labs     11/14/24  0532 11/13/24  0607 11/12/24  1722   * 133* 135*   K 4.4 4.4 4.1   CL 99 98 96*   CO2 26 25 25   ANIONGAP 11 14 18   BUN 22 24* 16   CREATININE 0.67 0.75 0.73   EGFR >90 89 90   GLUCOSE 149* 143* 133*     Recent Labs     11/14/24  0532 11/13/24  0607 11/12/24  1722 11/09/24  0541 10/24/24  0606 10/23/24  0626   ALBUMIN 3.5 3.8 4.5 4.1   < > 3.4   ALKPHOS  --   --  82 67  --  55   ALT  --   --  13 14  --  9*   AST  --   --  18 24  --  13   BILITOT  --   --  0.7 0.8  --  0.4    < > = values in this interval not displayed.     Calcium/Phos:   Lab Results   Component Value Date    CALCIUM 8.1 (L) 11/14/2024    PHOS 3.7 11/14/2024      COAG:   Recent Labs     11/09/24  0541 10/17/24  1911 05/13/24 1958   INR 1.1 1.0 1.0     CRP:   Lab Results   Component Value Date    CRP 1.73 (H) 10/22/2024      [unfilled]   ENDO:  Recent Labs     04/20/22  0700 04/19/22  0515 10/22/20  0949 09/18/19  0851 08/31/18  0940   TSH 3.49  --  2.28 2.25 1.69   HGBA1C  --  5.5 6.1 6.1 5.9      CARDIAC:   Recent Labs     11/12/24  1824 11/12/24  1722 11/09/24  0700 11/09/24  0541 10/22/24  1537 10/22/24  1356 05/22/22  1109 04/14/22  0005   LDH  --   --   --   --   --   --   --  1,417*   TROPHS 5 5 6 5   < > 6   < >  --    BNP  --  17  --  43  --  54   < >  --     < > = values in this interval not displayed.     Recent Labs     07/31/23  1418 04/19/22  0515 04/14/22  0005 12/02/21  1042   CHOL 157 114  --  176   LDLF 79  "57  --  93   HDL 53.5 16.2*  --  53.8   TRIG 121 204* 94 145     No data recorded    Micro/ID:   No results found for the last 90 days.                   No lab exists for component: \"AGALPCRNB\"   .ID  Lab Results   Component Value Date    BLOODCULT No growth at 4 days -  FINAL REPORT 10/22/2024    BLOODCULT No growth at 4 days -  FINAL REPORT 10/22/2024       Images:  ECG 12 lead  Sinus rhythm with Premature supraventricular complexes  Cannot rule out Inferior infarct , age undetermined  Abnormal ECG  When compared with ECG of 12-NOV-2024 17:17, (unconfirmed)  Premature supraventricular complexes are now Present  ST no longer depressed in Inferior leads  T wave inversion no longer evident in Inferior leads  Electrocardiogram, 12-lead PRN ACS symptoms  Sinus rhythm with marked sinus arrhythmia  ST & T wave abnormality, consider inferior ischemia  Abnormal ECG  When compared with ECG of 22-OCT-2024 15:30,  Premature ventricular complexes are no longer Present  Nonspecific T wave abnormality no longer evident in Lateral leads     CXR 11/12:No evidence of acute cardiopulmonary process. Hyperinflation    CT PE 11/12:  No evidence of pulmonary emboli. 2. Bronchial wall thickening, suggestive of bronchitis. Mucoid impaction at the smaller airways at the bilateral lower lobes. 3. Emphysema. 4. Coronary artery calcifications. Dilated main pulmonary artery, please correlate for pulmonary arterial hypertension.     Medications:  Scheduled medications  amLODIPine, 5 mg, oral, Daily  apixaban, 5 mg, oral, q12h  atorvastatin, 40 mg, oral, Nightly  azithromycin, 500 mg, oral, q24h DOROTEO  clopidogrel, 75 mg, oral, Daily  [START ON 11/15/2024] influenza, 0.5 mL, intramuscular, During hospitalization  fluticasone furoate-vilanteroL, 1 puff, inhalation, Daily  guaiFENesin, 1,200 mg, oral, BID  ipratropium-albuteroL, 3 mL, nebulization, q6h  melatonin, 3 mg, oral, Nightly  metoprolol tartrate, 25 mg, oral, BID  [START ON 11/15/2024] " pneumococcal polysaccharide, 0.5 mL, intramuscular, During hospitalization  predniSONE, 40 mg, oral, Daily  tiotropium, 2 puff, inhalation, Daily      Continuous medications     PRN medications  PRN medications: ipratropium-albuteroL, oxygen, sodium chloride     Assessment and Plan:  Sheldon Arteaga is a 84 y.o. male with a PMHx of HTN, HLD, COPD (not on home O2, recently started albuterol inhalers), Afib (not on anticoagulation, per progress note during previous hospitalization pt tolerated Eliquis with plans to restart upon discharge, ultimately wasn't discharged on Eliquis), DVT, possible HFpEF (EF 60-65%,), CVA (2022), PAD s/p resection of fem-pop bypass with right CFA to left profunda bypass with reversed right femoral vein (5/2022) who presented to [facility] on (11/12/24) with a chief complaint of worsening productive cough and SOB, admitted for an acute COPD exacerbation.     Acute Medical Issues   #Acute hypoxic respiratory failure  #Acute COPD exacerbation  #Acute vs chronic Bronchitis   - Saturating on 1L, (Not on home O2 at baseline)  Plan:   - Continue supplemental O2, wean as tolerated   - Continue Azithromycin 500 mg for 3 days   - Continue Prednisone 40mg   - Continue Guaifenesin   - Started Spiriva and Breo elipta, Scheduled Duo nebs  - Respiratory culture/smear pending    - IS+ Acapella   - Per pulmonology recs, swallow eval to r/o aspiration given multiple CTs since may showing abnormalities in right lower lobe concerning for recurrent aspiration. Further recommendation of outpatient workup for COPD   - SLP consulted for swallow eval  - will need home O2 eval prior to discharge    #Hx of pulmonary nodule right lower lobe  - Per chart review, patient was found to have right lower lobe lung nodule in 2012  - CTA chest 11/12:  Redemonstration of 1.0 cm nodule with central calcification at the medial aspect of right lower lobe.  - Per chart review, in may 2024, patient and family aware of  pulmonary nodule and planned to just keep an eye on it. He was also recommended to follow up with PCP/pulmonology at this time for further eval and management.  - would recommend follow up with pulmonology outpatient    #Tobacco use disorder  - Patient has been a long time smoker but recently quit a couple of weeks ago  - Encouraged continued smoking cessation     Resolved issues  #Urinary retention   - Patient had straight catheter 375cc out 11/13  - patient able to urinate on his own with no retention    Chronic Medical Issues   #Atrial Fibrillation: Continue home Metoprolol tartrate  #HLD: continue home Atorvastatin  #HTN: continue home Amlodipine  #CVA: continue home Clopidogrel   #PAD: continue home Clopidogrel  #Afib: started Eliquis (of note patient has an unclear hx         F: PO intake & IVF PRN   E: Replete as needed  N: Regular diet  GI ppx: Protonix 40 mg daily  DVT ppx: eliquis  Antibiotics: Azithromycin  Tubes/Lines/Drains: PIV   Oxygenation: Supplemental O2      Code Status: DNR and No Intubation         Disposition: 84 y.o.male admitted for acute COPD exacerbation. Anticipate LOS < 48 hrs.       Kristen Diallo DO  PGY1

## 2024-11-15 VITALS
WEIGHT: 132 LBS | HEIGHT: 72 IN | OXYGEN SATURATION: 94 % | DIASTOLIC BLOOD PRESSURE: 55 MMHG | TEMPERATURE: 98.3 F | BODY MASS INDEX: 17.88 KG/M2 | HEART RATE: 70 BPM | RESPIRATION RATE: 20 BRPM | SYSTOLIC BLOOD PRESSURE: 113 MMHG

## 2024-11-15 LAB
ALBUMIN SERPL BCP-MCNC: 3.6 G/DL (ref 3.4–5)
ANION GAP SERPL CALC-SCNC: 13 MMOL/L (ref 10–20)
ATRIAL RATE: 97 BPM
BUN SERPL-MCNC: 20 MG/DL (ref 6–23)
CALCIUM SERPL-MCNC: 8.3 MG/DL (ref 8.6–10.3)
CHLORIDE SERPL-SCNC: 100 MMOL/L (ref 98–107)
CO2 SERPL-SCNC: 27 MMOL/L (ref 21–32)
CREAT SERPL-MCNC: 0.58 MG/DL (ref 0.5–1.3)
EGFRCR SERPLBLD CKD-EPI 2021: >90 ML/MIN/1.73M*2
ERYTHROCYTE [DISTWIDTH] IN BLOOD BY AUTOMATED COUNT: 14.5 % (ref 11.5–14.5)
GLUCOSE SERPL-MCNC: 128 MG/DL (ref 74–99)
HCT VFR BLD AUTO: 34 % (ref 41–52)
HGB BLD-MCNC: 10.9 G/DL (ref 13.5–17.5)
MAGNESIUM SERPL-MCNC: 2.01 MG/DL (ref 1.6–2.4)
MCH RBC QN AUTO: 30.5 PG (ref 26–34)
MCHC RBC AUTO-ENTMCNC: 32.1 G/DL (ref 32–36)
MCV RBC AUTO: 95 FL (ref 80–100)
NRBC BLD-RTO: 0 /100 WBCS (ref 0–0)
P AXIS: 70 DEGREES
P OFFSET: 190 MS
P ONSET: 150 MS
PHOSPHATE SERPL-MCNC: 2.7 MG/DL (ref 2.5–4.9)
PLATELET # BLD AUTO: 250 X10*3/UL (ref 150–450)
POTASSIUM SERPL-SCNC: 4.5 MMOL/L (ref 3.5–5.3)
PR INTERVAL: 130 MS
Q ONSET: 215 MS
QRS COUNT: 16 BEATS
QRS DURATION: 100 MS
QT INTERVAL: 366 MS
QTC CALCULATION(BAZETT): 464 MS
QTC FREDERICIA: 429 MS
R AXIS: 68 DEGREES
RBC # BLD AUTO: 3.57 X10*6/UL (ref 4.5–5.9)
SODIUM SERPL-SCNC: 135 MMOL/L (ref 136–145)
T AXIS: -53 DEGREES
T OFFSET: 398 MS
VENTRICULAR RATE: 97 BPM
WBC # BLD AUTO: 6.5 X10*3/UL (ref 4.4–11.3)

## 2024-11-15 PROCEDURE — 51701 INSERT BLADDER CATHETER: CPT

## 2024-11-15 PROCEDURE — 83735 ASSAY OF MAGNESIUM: CPT

## 2024-11-15 PROCEDURE — 80069 RENAL FUNCTION PANEL: CPT

## 2024-11-15 PROCEDURE — 2500000004 HC RX 250 GENERAL PHARMACY W/ HCPCS (ALT 636 FOR OP/ED)

## 2024-11-15 PROCEDURE — 99238 HOSP IP/OBS DSCHRG MGMT 30/<: CPT

## 2024-11-15 PROCEDURE — 99233 SBSQ HOSP IP/OBS HIGH 50: CPT

## 2024-11-15 PROCEDURE — 2500000002 HC RX 250 W HCPCS SELF ADMINISTERED DRUGS (ALT 637 FOR MEDICARE OP, ALT 636 FOR OP/ED)

## 2024-11-15 PROCEDURE — 85027 COMPLETE CBC AUTOMATED: CPT

## 2024-11-15 PROCEDURE — 2500000001 HC RX 250 WO HCPCS SELF ADMINISTERED DRUGS (ALT 637 FOR MEDICARE OP)

## 2024-11-15 PROCEDURE — 36415 COLL VENOUS BLD VENIPUNCTURE: CPT

## 2024-11-15 PROCEDURE — 94640 AIRWAY INHALATION TREATMENT: CPT

## 2024-11-15 PROCEDURE — 94761 N-INVAS EAR/PLS OXIMETRY MLT: CPT

## 2024-11-15 RX ORDER — GUAIFENESIN 1200 MG/1
1200 TABLET, EXTENDED RELEASE ORAL 2 TIMES DAILY
Qty: 14 TABLET | Refills: 0 | Status: SHIPPED | OUTPATIENT
Start: 2024-11-15 | End: 2024-11-22

## 2024-11-15 RX ORDER — FLUTICASONE PROPIONATE AND SALMETEROL 250; 50 UG/1; UG/1
1 POWDER RESPIRATORY (INHALATION)
Qty: 60 EACH | Refills: 0 | Status: SHIPPED | OUTPATIENT
Start: 2024-11-15 | End: 2024-11-15 | Stop reason: HOSPADM

## 2024-11-15 RX ORDER — IPRATROPIUM BROMIDE AND ALBUTEROL SULFATE 2.5; .5 MG/3ML; MG/3ML
3 SOLUTION RESPIRATORY (INHALATION)
Qty: 360 ML | Refills: 0 | Status: SHIPPED | OUTPATIENT
Start: 2024-11-15 | End: 2024-12-18

## 2024-11-15 RX ORDER — PREDNISONE 20 MG/1
40 TABLET ORAL DAILY
Qty: 8 TABLET | Refills: 0 | Status: SHIPPED | OUTPATIENT
Start: 2024-11-15 | End: 2024-11-19

## 2024-11-15 RX ORDER — TAMSULOSIN HYDROCHLORIDE 0.4 MG/1
0.4 CAPSULE ORAL DAILY
Qty: 30 CAPSULE | Refills: 0 | Status: SHIPPED | OUTPATIENT
Start: 2024-11-16 | End: 2024-11-26

## 2024-11-15 RX ORDER — UMECLIDINIUM BROMIDE AND VILANTEROL TRIFENATATE 62.5; 25 UG/1; UG/1
1 POWDER RESPIRATORY (INHALATION) DAILY
Qty: 30 EACH | Refills: 0 | Status: SHIPPED | OUTPATIENT
Start: 2024-11-15 | End: 2024-12-15

## 2024-11-15 RX ORDER — TAMSULOSIN HYDROCHLORIDE 0.4 MG/1
0.4 CAPSULE ORAL DAILY
Status: DISCONTINUED | OUTPATIENT
Start: 2024-11-15 | End: 2024-11-15 | Stop reason: HOSPADM

## 2024-11-15 RX ORDER — IPRATROPIUM BROMIDE AND ALBUTEROL SULFATE 2.5; .5 MG/3ML; MG/3ML
3 SOLUTION RESPIRATORY (INHALATION) EVERY 2 HOUR PRN
Qty: 180 ML | Refills: 11 | Status: SHIPPED | OUTPATIENT
Start: 2024-11-15

## 2024-11-15 RX ADMIN — APIXABAN 5 MG: 5 TABLET, FILM COATED ORAL at 08:30

## 2024-11-15 RX ADMIN — GUAIFENESIN 1200 MG: 600 TABLET ORAL at 08:27

## 2024-11-15 RX ADMIN — TIOTROPIUM BROMIDE INHALATION SPRAY 2 PUFF: 3.12 SPRAY, METERED RESPIRATORY (INHALATION) at 08:27

## 2024-11-15 RX ADMIN — IPRATROPIUM BROMIDE AND ALBUTEROL SULFATE 3 ML: 2.5; .5 SOLUTION RESPIRATORY (INHALATION) at 07:34

## 2024-11-15 RX ADMIN — METOPROLOL TARTRATE 25 MG: 25 TABLET, FILM COATED ORAL at 08:27

## 2024-11-15 RX ADMIN — FLUTICASONE FUROATE AND VILANTEROL TRIFENATATE 1 PUFF: 200; 25 POWDER RESPIRATORY (INHALATION) at 08:27

## 2024-11-15 RX ADMIN — AMLODIPINE BESYLATE 5 MG: 5 TABLET ORAL at 08:27

## 2024-11-15 RX ADMIN — AZITHROMYCIN 500 MG: 250 TABLET, FILM COATED ORAL at 08:27

## 2024-11-15 RX ADMIN — CLOPIDOGREL 75 MG: 75 TABLET ORAL at 08:27

## 2024-11-15 RX ADMIN — TAMSULOSIN HYDROCHLORIDE 0.4 MG: 0.4 CAPSULE ORAL at 08:27

## 2024-11-15 RX ADMIN — IPRATROPIUM BROMIDE AND ALBUTEROL SULFATE 3 ML: 2.5; .5 SOLUTION RESPIRATORY (INHALATION) at 12:37

## 2024-11-15 ASSESSMENT — COGNITIVE AND FUNCTIONAL STATUS - GENERAL
WALKING IN HOSPITAL ROOM: A LITTLE
DAILY ACTIVITIY SCORE: 24
MOBILITY SCORE: 22
CLIMB 3 TO 5 STEPS WITH RAILING: A LITTLE

## 2024-11-15 ASSESSMENT — PAIN SCALES - GENERAL: PAINLEVEL_OUTOF10: 0 - NO PAIN

## 2024-11-15 NOTE — PROGRESS NOTES
11/15/24 1008   Discharge Planning   Living Arrangements Children  (Home with daughter)   Support Systems Children   Assistance Needed Alert and oriented x 3, Independent with ADL's, Drives, No DME(does have a cane, walker, rollator, electric w/c does not use at this time), Shower chair, Recently quit smoking(1.5 weeks ago)   Type of Residence Private residence   Number of Stairs to Enter Residence 0   Number of Stairs Within Residence 0   Do you have animals or pets at home? Yes   Type of Animals or Pets 3 cats   Who is requesting discharge planning? Provider   Home or Post Acute Services None   Expected Discharge Disposition Home  (Home, no needs-denies need for HHC, currently on room air.)   Does the patient need discharge transport arranged? No     11/15/24 at 1211: Confirmed patient does not want HHC even if celestine shah, states daughter that lives with him is an EMT and he can manage it on his own.

## 2024-11-15 NOTE — CONSULTS
Reason For Consult  Walking Desaturation Study    Mr. Arteaga was found on room air with a pulse of 78 b/m and a saturation of 94%    Patient was walked 250+steps with a pulse of 104 b/m and saturation of 91%    Sheldon was returned to room and sitting at the edge of bed x 3 minutes with a pulse rate of 101 and saturation of 94%    Evette Gayle, RRT

## 2024-11-15 NOTE — PROGRESS NOTES
"Sheldon Arteaga \"Dejon\" is a 84 y.o. male on day 2 of admission presenting with Hypoxia.    Subjective   Patient was found sitting up in bed. Denies any shortness of breath or chest pain. Patient is concerned about whether his oxygen goes down when he gets out of bed and whether he will need oxygen going home. Denies any other complaints or concerns today.       Objective     Physical Exam  Constitutional:       General: He is not in acute distress.     Appearance: Normal appearance. He is normal weight. He is not ill-appearing.   HENT:      Head: Normocephalic and atraumatic.      Mouth/Throat:      Mouth: Mucous membranes are moist.      Pharynx: Oropharynx is clear.   Eyes:      Extraocular Movements: Extraocular movements intact.      Conjunctiva/sclera: Conjunctivae normal.   Cardiovascular:      Rate and Rhythm: Normal rate and regular rhythm.      Heart sounds: Normal heart sounds.   Pulmonary:      Effort: Pulmonary effort is normal. No respiratory distress.      Breath sounds: Normal breath sounds. No wheezing or rales.   Musculoskeletal:      Right lower leg: No edema.      Left lower leg: No edema.   Skin:     General: Skin is warm and dry.      Capillary Refill: Capillary refill takes less than 2 seconds.   Neurological:      General: No focal deficit present.      Mental Status: He is alert and oriented to person, place, and time. Mental status is at baseline.   Psychiatric:         Mood and Affect: Mood normal.         Behavior: Behavior normal.         Thought Content: Thought content normal.         Judgment: Judgment normal.         Last Recorded Vitals  Blood pressure 130/77, pulse 86, temperature 36.4 °C (97.5 °F), temperature source Temporal, resp. rate 17, height 1.829 m (6'), weight 59.9 kg (132 lb), SpO2 95%.  Intake/Output last 3 Shifts:  I/O last 3 completed shifts:  In: 1800 (30.1 mL/kg) [P.O.:1800]  Out: 3500 (58.5 mL/kg) [Urine:3500 (1.6 mL/kg/hr)]  Weight: 59.9 kg     Relevant " Results    Results for orders placed or performed during the hospital encounter of 11/12/24 (from the past 24 hours)   Magnesium   Result Value Ref Range    Magnesium 2.01 1.60 - 2.40 mg/dL   Renal Function Panel   Result Value Ref Range    Glucose 128 (H) 74 - 99 mg/dL    Sodium 135 (L) 136 - 145 mmol/L    Potassium 4.5 3.5 - 5.3 mmol/L    Chloride 100 98 - 107 mmol/L    Bicarbonate 27 21 - 32 mmol/L    Anion Gap 13 10 - 20 mmol/L    Urea Nitrogen 20 6 - 23 mg/dL    Creatinine 0.58 0.50 - 1.30 mg/dL    eGFR >90 >60 mL/min/1.73m*2    Calcium 8.3 (L) 8.6 - 10.3 mg/dL    Phosphorus 2.7 2.5 - 4.9 mg/dL    Albumin 3.6 3.4 - 5.0 g/dL   CBC   Result Value Ref Range    WBC 6.5 4.4 - 11.3 x10*3/uL    nRBC 0.0 0.0 - 0.0 /100 WBCs    RBC 3.57 (L) 4.50 - 5.90 x10*6/uL    Hemoglobin 10.9 (L) 13.5 - 17.5 g/dL    Hematocrit 34.0 (L) 41.0 - 52.0 %    MCV 95 80 - 100 fL    MCH 30.5 26.0 - 34.0 pg    MCHC 32.1 32.0 - 36.0 g/dL    RDW 14.5 11.5 - 14.5 %    Platelets 250 150 - 450 x10*3/uL      ECG 12 lead    Result Date: 11/13/2024  Sinus rhythm with Premature supraventricular complexes Cannot rule out Inferior infarct , age undetermined Abnormal ECG When compared with ECG of 12-NOV-2024 17:17, (unconfirmed) Premature supraventricular complexes are now Present ST no longer depressed in Inferior leads T wave inversion no longer evident in Inferior leads    Electrocardiogram, 12-lead PRN ACS symptoms    Result Date: 11/13/2024  Sinus rhythm with marked sinus arrhythmia ST & T wave abnormality, consider inferior ischemia Abnormal ECG When compared with ECG of 22-OCT-2024 15:30, Premature ventricular complexes are no longer Present Nonspecific T wave abnormality no longer evident in Lateral leads    CT angio chest for pulmonary embolism    Result Date: 11/12/2024  Interpreted By:  Maureen Newell, STUDY: CT ANGIO CHEST FOR PULMONARY EMBOLISM;  11/12/2024 7:11 pm   INDICATION: Signs/Symptoms:sob   COMPARISON: Noncontrast CT chest  11/09/2024. CT angiogram chest 05/13/2024.   ACCESSION NUMBER(S): [id number]   ORDERING CLINICIAN: RICK BALDWIN   TECHNIQUE: Helical data acquisition of the chest was obtained following the uneventful administration of intravenous contrast material. Images were reformatted in axial, coronal, and sagittal planes. MIP images were created and reviewed.   FINDINGS: POTENTIAL LIMITATIONS OF THE STUDY: Motion artifact.   HEART AND VESSELS: No discrete filling defects within the main pulmonary artery or its branches.   No thoracic aortic aneurysm. Atherosclerotic calcifications are noted at the thoracic aorta. The phase of intravenous contrast is not tailored to evaluate the thoracic aorta. The main pulmonary artery is dilated. The heart is not enlarged.  Coronary artery calcifications are present. No evidence of pericardial effusion.   MEDIASTINUM AND RUBINA, LOWER NECK AND AXILLA: The visualized thyroid gland is within normal limits.   No evidence of thoracic lymphadenopathy by CT criteria.   LUNGS AND AIRWAYS: The trachea and central airways are patent. There is bronchial wall thickening. There is mucoid impaction of the smaller airways at the bilateral lower lobes.   The evaluation of the lungs is degraded by motion artifact. No consolidation, pleural effusion or pneumothorax. There are bilateral emphysematous changes. Redemonstration of 1.0 cm nodule with central calcification at the medial aspect of right lower lobe.   UPPER ABDOMEN: The visualized subdiaphragmatic structures demonstrate no acute findings.   CHEST WALL AND OSSEOUS STRUCTURES: Degenerative changes at the spine.       1. No evidence of pulmonary emboli. 2. Bronchial wall thickening, suggestive of bronchitis. Mucoid impaction at the smaller airways at the bilateral lower lobes. 3. Emphysema. 4. Coronary artery calcifications. Dilated main pulmonary artery, please correlate for pulmonary arterial hypertension.     MACRO: None.   Signed by:  Maureen Newell 11/12/2024 7:40 PM Dictation workstation:   [id number]    XR chest 1 view    Result Date: 11/12/2024  Interpreted By:  Kenneth Ahumada, STUDY: XR CHEST 1 VIEW;  11/12/2024 5:30 pm   INDICATION: Signs/Symptoms:sob.     COMPARISON: 11/09/2024   ACCESSION NUMBER(S): [id number]   ORDERING CLINICIAN: RICK BALDWIN   FINDINGS: AP portable view of the chest is obtained.  Limited exam due to portable nature. Magnified cardiac silhouette. Hyperinflation. Aortic calcification. No infiltrates.       1.  No evidence of acute cardiopulmonary process. Hyperinflation.       MACRO: None   Signed by: Kenneth Ahumada 11/12/2024 6:13 PM Dictation workstation:   [id number]    CT chest wo IV contrast    Result Date: 11/9/2024  Interpreted By:  Chidi Armenta, STUDY: CT CHEST WO IV CONTRAST;  11/9/2024 8:02 am   INDICATION: Signs/Symptoms:chronic cough, sob.     COMPARISON: 10/18/2024   ACCESSION NUMBER(S): [id number]   ORDERING CLINICIAN: SANTINO LEÓN   TECHNIQUE: Helical data acquisition of the chest was obtained without the use of IV contrast. Images were reformatted in axial, coronal, and sagittal planes.   FINDINGS: POTENTIAL LIMITATIONS OF THE STUDY:   Lack of IV contrast   HEART AND VESSELS:   There are atherosclerotic calcifications of the aorta and its branches. Aorta is unchanged in course and caliber. Ascending aorta remains ectatic, measuring up to 4.0 cm in greatest dimension, not significantly changed when compared to the previous study.   The heart is unchanged in size. Prominence of the central pulmonary arteries suggest pulmonary hypertension and is unchanged.   No pericardial effusion is seen.   MEDIASTINUM AND RUBINA, LOWER NECK AND AXILLA: The visualized thyroid gland is within normal limits.   No evidence of thoracic lymphadenopathy by CT criteria.   Esophagus appears within normal limits as seen.   LUNGS AND AIRWAYS: The trachea and central airways are patent. No endobronchial lesion.    There are emphysematous changes. There are scattered areas of atelectasis/scarring, most conspicuous at the lung bases. No new areas of consolidation. There is mild bronchiectasis and some mucous plugging, most conspicuous in the lower lobes. Partially calcified nodule in the right lower lobe the smaller on today's examination, measuring 1.1 cm. On the previous study, it measured proximally 1.8 cm, image 249 of 369. 7 mm nodular opacity in the right lower lobe, image 270, was obscured by collapse/consolidation on the previous study. A few tiny additional nodules are seen measuring 3 mm or less in size. No effusion. No pneumothorax. Small calcified pleural plaques again noted on the left.   UPPER ABDOMEN: The visualized subdiaphragmatic structures demonstrate no acute abnormality.   CHEST WALL AND OSSEOUS STRUCTURES: Degenerative changes. No acute process.       Partially calcified right lower lobe nodule which has decreased in size when compared to the previous study. 7 mm nodule in the right lower lobe which was obscured on the previous examination by collapse/consolidation. A few additional tiny nodules are seen measuring 3 mm or less in size. Continued surveillance as per Fleischner society guidelines.   MACRO: Incidental Finding:  Multiple solid non-calcified pulmonary nodules measuring up to 6-8 mm.  (**-YCF-**)   Instructions:  Consider follow up non contrast chest CT at 3-6 months, then consider CT chest at 18-24 months. (Topher Monroe et al., Guidelines for management of incidental pulmonary nodules detected on CT images: From the Fleischner Society 2017, Radiology. 2017 Jul;284 (1):228-243.) [email or web address]   Signed by: Chidi Armenta 11/9/2024 8:41 AM Dictation workstation:   [id number]    XR chest 1 view    Result Date: 11/9/2024  Interpreted By:  Myke Hall, STUDY: XR CHEST 1 VIEW;  11/9/2024 6:07 am   INDICATION: Signs/Symptoms:Chest Pain.     COMPARISON: 10/20/2024.   ACCESSION NUMBER(S):  [id number]   ORDERING CLINICIAN: SANTINO LEÓN   FINDINGS: AP radiographs of the chest were provided.       CARDIOMEDIASTINAL SILHOUETTE: Cardiac silhouette is normal in size. Tortuous atherosclerotic thoracic aorta.   LUNGS: Lungs are hyperinflated and hyperlucent compatible with emphysema/COPD. Linear opacity in the left lower lung compatible with atelectasis or scar. No focal consolidation, pleural effusion, or pneumothorax.   ABDOMEN: No remarkable upper abdominal findings.   BONES: No acute osseous changes.       1.  No evidence of acute cardiopulmonary process. Emphysema/COPD.       MACRO: None   Signed by: Myke Hall 11/9/2024 6:40 AM Dictation workstation:   [id number]    ECG 12 lead    Result Date: 11/2/2024  Sinus rhythm with sinus arrhythmia with occasional Premature ventricular complexes Cannot rule out Inferior infarct , age undetermined Abnormal ECG When compared with ECG of 13-MAY-2024 19:09, Premature ventricular complexes are now Present Premature atrial complexes are no longer Present Nonspecific T wave abnormality now evident in Inferior leads See ED provider note for full interpretation and clinical correlation Confirmed by Yoli Navarro ([id number]) on 11/2/2024 1:32:49 PM    XR chest 2 views    Result Date: 10/22/2024  Interpreted By:  Vinicius Lamas, STUDY: XR CHEST 2 VIEWS   INDICATION: Signs/Symptoms:cough and sob.   COMPARISON: October 17 October 18 CT   ACCESSION NUMBER(S): [id number]   ORDERING CLINICIAN: VERONIQUE SANCHES   FINDINGS: Cardiomegaly with tortuous aorta unchanged.   No effusion, edema, or pneumothorax. Patchy right basilar airspace opacity seen on prior CT best visualized right lower lobe.       Small right lower lobe airspace disease best seen on recent CT.   Signed by: Vinicius Lamas 10/22/2024 2:15 PM Dictation workstation:   [id number]    CT chest wo IV contrast    Result Date: 10/18/2024  Interpreted By:  Eva Lamb, STUDY: CT CHEST WO IV CONTRAST;  10/18/2024 2:30  am   INDICATION: Signs/Symptoms:Rales on physical exam, weight loss, Hx of lung nodules, and mediastinal lymph node enlargement.   COMPARISON: CT scan of the chest 05/13/2024.   ACCESSION NUMBER(S): [id number]   ORDERING CLINICIAN: KASSIDY STONE   TECHNIQUE: Axial CT images of the chest obtained without contrast. Coronal sagittal reformats were obtained.   FINDINGS: VESSELS: Ectatic ascending thoracic aorta measures 3.8 cm. Atherosclerotic calcifications in the aorta and arch vessels. Lack of contrast limits evaluation of the vasculature. Main pulmonary artery is dilated up to 3.8 cm which can be seen with pulmonary arterial hypertension.   HEART: Normal size. No pericardial effusion. Aortic root, mitral annular and marked coronary artery calcifications noted.   MEDIASTINUM AND RUBINA: No axillary adenopathy. Nonenlarged mediastinal lymph nodes noted. Lack of contrast limits evaluation of the rubina.   LUNG, PLEURA, AND LARGE AIRWAYS: Centrilobular and paraseptal emphysema noted. Subtle patchy airspace and ground-glass opacities noted in the lingula and right lower lobe concerning for atypical infectious/inflammatory process. No effusion or pneumothorax. Stable calcified left pleural plaques. Bronchiectatic changes noted in the lung bases.   CHEST WALL AND LOWER NECK: Small to moderate-size hiatal hernia.   UPPER ABDOMEN: Small accessory splenule noted. Moderate fatty atrophy of the pancreas.   BONES: Multilevel degenerative changes of the spine.       Centrilobular and paraseptal emphysema noted. Subtle patchy airspace and ground-glass opacities in the lingula and right lower lobe concerning for atypical infectious/inflammatory process.   Main pulmonary artery is dilated up to 3.8 cm which can be seen with pulmonary arterial hypertension.   Additional findings as noted above.   MACRO: None   Signed by: Eva Lamb 10/18/2024 2:45 AM Dictation workstation:   [id number]    MR brain wo IV contrast    Result Date:  10/17/2024  Interpreted By:  Castillo Beckham, STUDY: MR BRAIN WO IV CONTRAST;  10/17/2024 11:26 pm   INDICATION: Signs/Symptoms:Balance and gait problems starting today.  Hx prior strokes.     COMPARISON: MRI of the brain dated 04/19/2022; CT head dated 10/17/2024.   ACCESSION NUMBER(S): [id number]   ORDERING CLINICIAN: CITLALI ANN   TECHNIQUE: Axial T2, FLAIR, DWI, gradient echo T2 and sagittal and coronal T1 weighted images of brain were acquired.   FINDINGS: No diffusion restriction abnormality is present to suggest an acute infarct.   There is no evidence of recent intracranial hemorrhage. No mass effect or midline shift is present. No hemosiderin staining is present.   Geographic area of cystic encephalomalacia and gliosis is present in the left temporal lobe, likely representing sequela of prior infarct/injury. There is mild associated ex vacuo dilatation of the left temporal horn. Small areas of encephalomalacia are present in the bilateral cerebellar hemispheres, likely representing sequela of prior cerebellar infarcts.   Moderate brain parenchymal volume loss is present without abnormal ventricular dilatation. Basal cisterns are patent. No extra-axial fluid collections are identified.   Scattered foci of hyperintense FLAIR and T2 signal are present in the periventricular and subcortical white matter of bilateral cerebral hemispheres, nonspecific findings favored to represent chronic sequela of microvascular disease.   Visualized large arterial flow voids, including intracranial carotid and basilar arteries are preserved.   No abnormal fluid signal is present in the paranasal sinuses or mastoid air cells. Patient is status post cataract extraction.       1.  No evidence of new infarct, or other acute intracranial abnormality. 2. Geographic area of cystic encephalomalacia and gliosis in the left temporal lobe likely represent sequela of prior infarct/injury. Smaller areas of encephalomalacia also  present in the bilateral cerebellar hemispheres, likely representing sequela of prior cerebellar infarcts. 3. Scattered foci of hyperintense FLAIR and T2 signal are present in the periventricular and subcortical white matter of bilateral cerebral hemispheres, nonspecific findings favored to represent chronic sequela of microvascular disease.   MACRO: None   Signed by: Castillo Beckham 10/17/2024 11:44 PM Dictation workstation:   [id number]    CT head wo IV contrast    Result Date: 10/17/2024  Interpreted By:  Amy Cobos, STUDY: CT HEAD WO IV CONTRAST;  10/17/2024 8:53 pm   INDICATION: Signs/Symptoms:weakness, loss of balance.   COMPARISON: 05/13/2024   ACCESSION NUMBER(S): [id number]   ORDERING CLINICIAN: ULI LEONARD   TECHNIQUE: Axial noncontrast CT images of the head.   FINDINGS: BRAIN PARENCHYMA: Stable small area of encephalomalacia in the left upper lobe with ex vacuo dilatation of the temporal horn. Small chronic infarct in the left cerebellar hemisphere. Gray-white matter interfaces are otherwisepreserved. No mass effect or midline shift.   HEMORRHAGE: No acute intracranial hemorrhage. VENTRICLES and EXTRA-AXIAL SPACES: The ventricles and sulci are within normal limits in size for brain volume. No abnormal extraaxial fluid collection. EXTRACRANIAL SOFT TISSUES: Within normal limits. PARANASAL SINUSES/MASTOIDS:  The visualized paranasal sinuses and mastoid air cells are aerated. CALVARIUM: No depressed skull fracture. No destructive osseous lesion.   OTHER FINDINGS: None.       No acute intracranial abnormality.   Chronic ischemic changes as above.   MACRO: None   Signed by: Amy Cobos 10/17/2024 9:26 PM Dictation workstation:   [id number]    XR chest 1 view    Result Date: 10/17/2024  Interpreted By:  Kyler Casas, STUDY: XR CHEST 1 VIEW;  10/17/2024 7:12 pm   INDICATION: Signs/Symptoms:weakness, loss of balance.     COMPARISON: 05/10/2024   ACCESSION NUMBER(S): [id number]   ORDERING  CLINICIAN: ULI LEONARD   FINDINGS:         CARDIOMEDIASTINAL SILHOUETTE: Cardiomediastinal silhouette is normal in size and configuration.   LUNGS: Lungs are hyperinflated. No consolidation or effusion seen   ABDOMEN: No remarkable upper abdominal findings.   BONES: No acute osseous changes.       1.  Emphysematous changes the lungs without acute abnormality       MACRO: None   Signed by: Kyler aCsas 10/17/2024 7:43 PM Dictation workstation:   [id number]      No current facility-administered medications on file prior to encounter.     Current Outpatient Medications on File Prior to Encounter   Medication Sig Dispense Refill    albuterol (Ventolin HFA) 90 mcg/actuation inhaler Inhale 2 puffs every 4 hours if needed for wheezing or shortness of breath. 8 g 5    albuterol 2.5 mg /3 mL (0.083 %) nebulizer solution Take 3 mL (2.5 mg) by nebulization every 2 hours if needed for wheezing or shortness of breath. 75 mL 1    amLODIPine (Norvasc) 5 mg tablet Take 1 tablet (5 mg) by mouth once daily. 90 tablet 1    atorvastatin (Lipitor) 40 mg tablet Take 1 tablet (40 mg) by mouth once daily at bedtime. 90 tablet 3    clopidogrel (Plavix) 75 mg tablet Take 1 tablet (75 mg) by mouth once daily. 90 tablet 0    metoprolol tartrate (Lopressor) 25 mg tablet Take 1 tablet by mouth twice daily 180 tablet 0    [] benzonatate (Tessalon) 100 mg capsule Take 1 capsule (100 mg) by mouth every 8 hours for 3 days. Do not crush or chew. 9 capsule 0    docusate sodium (Colace) 100 mg capsule Take 1 capsule (100 mg) by mouth once daily. (Patient taking differently: Take 1 capsule (100 mg) by mouth 2 times a day as needed.)      ipratropium-albuteroL (Duo-Neb) 0.5-2.5 mg/3 mL nebulizer solution Take 3 mL by nebulization 4 times a day as needed for wheezing or shortness of breath. (Patient not taking: Reported on 2024) 90 mL 0    sodium chloride (Saline NasaL) 0.65 % nasal spray Administer 1 spray into each nostril if needed  for congestion. (Patient not taking: Reported on 11/13/2024) 50 mL 1    [DISCONTINUED] metoprolol tartrate (Lopressor) 25 mg tablet Take 1 tablet (25 mg) by mouth 2 times a day. 180 tablet 0                      Assessment/Plan   Assessment & Plan  Hypoxia    Acute respiratory failure with hypoxia (Multi)    Pt is an 83 yo M wither pertinent PMH of COPD ( no PFT), HTN, HLD, A-fib, CVA, and DVT who is admitted for acute hypoxic respiratory failure.     #Acute hypoxic Respiratory Failure 2/2 COPD exacerbation  # Bronchitis    - CT showed right lobe bronchial wall thickening and mucous plugging  - Continue prednisone daily with taper upon discharge  - Azithromycin course completed  - Continue Breo, nebs  - Continues IS, wean O2 as tolerated  - Swallow evaluation showed not aspiration  - Will need home O2 eval prior to discharge  - Upon discharge will need Outpatient follow up visit with pulmonology in one month and PFT, 6 minute walk test for further work up for COPD  - Recommend Anoro inhaler or another LABA + LAMA inhaler upon discharge         Patient is staffed with Dr. Krueger,  Tennille Washington DO, PGY-3  [facility]

## 2024-11-15 NOTE — CARE PLAN
Problem: Pain - Adult  Goal: Verbalizes/displays adequate comfort level or baseline comfort level  Outcome: Progressing     Problem: Safety - Adult  Goal: Free from fall injury  Outcome: Progressing     Problem: Respiratory  Goal: No signs of respiratory distress (eg. Use of accessory muscles. Peds grunting)  Outcome: Progressing   The patient's goals for the shift include      The clinical goals for the shift include patient will remain on room air this shift    Patient currently on room air. No complaints at this time

## 2024-11-15 NOTE — DISCHARGE SUMMARY
Discharge Diagnosis  Acute hypoxic respiratory failure 2/2 COPD exacerbation    Issues Requiring Follow-Up  #Acute hypoxic respiratory failure  #Acute COPD exacerbation  #Acute vs chronic Bronchitis   - Patient transitioned to room air  - Continue Prednisone 40mg daily for 4 more days  - Continue Guaifenesin   - Start Anoro Ellipta inhaler at home  - Follow up outpatient with pulmonology   - Respiratory therapy did home O2 eval on discharge and patient does not need home O2     #Acute urinary retention  - Patient had urinary retention during admission, voided on his own but was still retaining  - Patient had sprague placed  - discharge home on sprague  - continue tamsulosin   - Follow up with urology, referral sent    #Hx of afib  - start eliquis at home  - follow up with cardiology, referral sent      Discharge Meds     Medication List      START taking these medications     apixaban 5 mg tablet; Commonly known as: Eliquis; Take 1 tablet (5 mg)   by mouth every 12 hours.   fluticasone propion-salmeteroL 250-50 mcg/dose diskus inhaler; Commonly   known as: Advair Diskus; Inhale 1 puff 2 times a day. Rinse mouth with   water after use to reduce aftertaste and incidence of candidiasis. Do not   swallow.   guaiFENesin 1,200 mg tablet extended release 12hr; Commonly known as:   Mucinex; Take 1 tablet (1,200 mg) by mouth 2 times a day for 7 days. Do   not crush, chew, or split.   predniSONE 20 mg tablet; Commonly known as: Deltasone; Take 2 tablets   (40 mg) by mouth once daily for 4 doses.   tamsulosin 0.4 mg 24 hr capsule; Commonly known as: Flomax; Take 1   capsule (0.4 mg) by mouth once daily.; Start taking on: November 16, 2024     CHANGE how you take these medications     docusate sodium 100 mg capsule; Commonly known as: Colace   * ipratropium-albuteroL 0.5-2.5 mg/3 mL nebulizer solution; Commonly   known as: Duo-Neb; Take 3 mL by nebulization every 6 hours.; What changed:   when to take this, reasons to take this   *  ipratropium-albuteroL 0.5-2.5 mg/3 mL nebulizer solution; Commonly   known as: Duo-Neb; Take 3 mL by nebulization every 2 hours if needed for   wheezing or shortness of breath (pt request).; What changed: You were   already taking a medication with the same name, and this prescription was   added. Make sure you understand how and when to take each.  * This list has 2 medication(s) that are the same as other medications   prescribed for you. Read the directions carefully, and ask your doctor or   other care provider to review them with you.     CONTINUE taking these medications     * albuterol 2.5 mg /3 mL (0.083 %) nebulizer solution; Take 3 mL (2.5   mg) by nebulization every 2 hours if needed for wheezing or shortness of   breath.   * albuterol 90 mcg/actuation inhaler; Commonly known as: Ventolin HFA;   Inhale 2 puffs every 4 hours if needed for wheezing or shortness of   breath.   amLODIPine 5 mg tablet; Commonly known as: Norvasc; Take 1 tablet (5 mg)   by mouth once daily.   atorvastatin 40 mg tablet; Commonly known as: Lipitor; Take 1 tablet (40   mg) by mouth once daily at bedtime.   clopidogrel 75 mg tablet; Commonly known as: Plavix; Take 1 tablet (75   mg) by mouth once daily.   metoprolol tartrate 25 mg tablet; Commonly known as: Lopressor; Take 1   tablet by mouth twice daily  * This list has 2 medication(s) that are the same as other medications   prescribed for you. Read the directions carefully, and ask your doctor or   other care provider to review them with you.     STOP taking these medications     benzonatate 100 mg capsule; Commonly known as: Tessalon   Saline NasaL 0.65 % nasal spray; Generic drug: sodium chloride       Test Results Pending At Discharge  Pending Labs       No current pending labs.            Hospital Course  Sheldon Arteaga is a 84 y.o. male with a PMHx of HTN, HLD, COPD (not on home O2, recently started albuterol inhalers), Afib (not on anticoagulation, per progress note  during previous hospitalization pt tolerated Eliquis with plans to restart upon discharge, ultimately wasn't discharged on Eliquis), DVT, possible HFpEF (EF 60-65%,), CVA (2022), PAD s/p resection of fem-pop bypass with right CFA to left profunda bypass with reversed right femoral vein (5/2022) who presented to [facility] on (11/12/24) with a chief complaint of worsening productive cough and SOB, admitted for an acute COPD exacerbation.    Of note - patient was admitted to hospital on 10/18 and 10/22 for CAP. Patient returned for worsening symptoms of cough and SOB on both admissions.     ED course: Vitals stable other than elevated 146/82 BP. WBC 11.4, Covid negative. CXR showed no evidence of acute cardiopulmonary process. Hyperinflation. CT PE showed no evidence of pulmonary emboli. 2. Bronchial wall thickening, suggestive of bronchitis. Mucoid impaction at the smaller airways at the bilateral lower lobes. 3. Emphysema. 4. Coronary artery calcifications. Dilated main pulmonary artery, please correlate for pulmonary arterial hypertension.  In ED, patient received Ipratropium-albuterol 3mL, Zosyn, Atorvastatin, Metoprolol Tartrate, Melatonin, guaifenesin, Prednisone. Patient was then transferred to the floor for further management      Hospital course: Patient on 2L O2, started on azithromycin, prednisone 40mg, guaifenesin. Started spiriva and breo elipta. Encouraged incentive spirometry and acapella. Scheduled duonebs. Pulmonology consulted and recommended SLP swallow eval to rule out aspiration. Passed swallow eval. Will need to follow up with pulm outpatient on dc. Patient had acute urinary retention and was straight catheterized during admission. Patient still retaining even after voiding trial. Placed sprague and will discharge with sprague. Instructed to follow up with urology outpatient.     Pertinent Physical Exam At Time of Discharge  Physical Exam  Constitutional:       General: He is not in acute  distress.     Appearance: Normal appearance.   HENT:      Head: Normocephalic and atraumatic.   Eyes:      Extraocular Movements: Extraocular movements intact.   Cardiovascular:      Rate and Rhythm: Normal rate and regular rhythm.      Heart sounds: Normal heart sounds.   Pulmonary:      Effort: Pulmonary effort is normal. No respiratory distress.      Breath sounds: Normal breath sounds. No wheezing.      Comments: Patient on room air. No increased work of breathing at rest, no conversational dyspnea. Lung sounds clear to auscultation  Abdominal:      General: Abdomen is flat. There is no distension.      Palpations: Abdomen is soft.      Tenderness: There is no abdominal tenderness.   Musculoskeletal:         General: No swelling or tenderness.      Right lower leg: No edema.      Left lower leg: No edema.   Skin:     General: Skin is warm and dry.   Neurological:      Mental Status: He is alert and oriented to person, place, and time. Mental status is at baseline.   Psychiatric:         Mood and Affect: Mood normal.         Behavior: Behavior normal.         Thought Content: Thought content normal.         Outpatient Follow-Up  Future Appointments   Date Time Provider Department Center   10/10/2025 10:15 AM MD RAYMOND Tenorio DO

## 2024-11-15 NOTE — DISCHARGE INSTRUCTIONS
Your symptoms were due to COPD exacerbation. You are now stable enough to be discharged home.    The following recommendations are made for you at time of hospital discharge:  - Please take your home medications as instructed prior to hospitalization.     START these medications:  --> Eliquis 5mg tablet by mouth every 12 hours  --> Anoro Ellipta inhaler 1 puff one time daily, rinse mouth with water after use to reduce aftertaste and fungal infection of mouth  --> Mucinex 1 tablet by mouth 2 times a day  --> Prednisone 40mg once daily for 4 more days  --> Tamsulosin (Flomax) 0.4 mg by mouth once daily    FOLLOW UP with the providers listed below:  - Please follow-up with your primary care provider within 5-7 days from time of discharge. Please call your PCP's office to schedule an appointment.   - Follow up with pulmonology, you were seen by Dr. Krueger (Pulmonologist) during your hospitalization  - Follow up with urology for your urinary rentention/sprague  - Follow up with cardiology for your history of atrial fibrillation and coronary artery disease    - If you experience any worsening symptoms or any new acute concerns arise, please contact your primary care provider to discuss and possibly arrange an appointment. If you cannot get in touch with provider or severe symptoms are present, please return to nearest emergency room/urgent care for evaluation and treatment.

## 2024-11-16 NOTE — ED PROVIDER NOTES
HPI   No chief complaint on file.      84-year-old male from home for chief complaint of shortness of breath.  Patient states this started few days ago.  He does not wear home oxygen.  He is found to be hypoxic by EMS in the 80s.  He arrives on a few liters.  He was recently hospitalized with community-acquired pneumonia discharged on October 18 with 5 days of Augmentin.    Past medical history was reviewed              Patient History   Past Medical History:   Diagnosis Date    Stroke (Multi)      Past Surgical History:   Procedure Laterality Date    CT ANGIO AORTA AND BILATERAL ILIOFEMORAL RUN OFF INCLUDING WITHOUT CONTRAST IF PERFORMED  4/10/2022    CT AORTA AND BILATERAL ILIOFEMORAL RUNOFF ANGIOGRAM W AND/OR WO IV CONTRAST 4/10/2022 [facility] EMERGENCY LEGACY    CT ANGIO AORTA AND BILATERAL ILIOFEMORAL RUN OFF INCLUDING WITHOUT CONTRAST IF PERFORMED  4/13/2022    CT AORTA AND BILATERAL ILIOFEMORAL RUNOFF ANGIOGRAM W AND/OR WO IV CONTRAST 4/13/2022 [facility] CLINICAL LEGACY    CT ANGIO AORTA AND BILATERAL ILIOFEMORAL RUN OFF INCLUDING WITHOUT CONTRAST IF PERFORMED  5/22/2022    CT AORTA AND BILATERAL ILIOFEMORAL RUNOFF ANGIOGRAM W AND/OR WO IV CONTRAST 5/22/2022 [facility] EMERGENCY LEGACY    CT ANGIO AORTA AND BILATERAL ILIOFEMORAL RUN OFF INCLUDING WITHOUT CONTRAST IF PERFORMED  4/2/2023    CT AORTA AND BILATERAL ILIOFEMORAL RUNOFF ANGIOGRAM W AND/OR WO IV CONTRAST [facility] CT    CT ANGIO NECK  4/19/2022    CT NECK ANGIO W AND WO IV CONTRAST 4/19/2022 [facility] CLINICAL LEGACY    CT HEAD ANGIO W AND WO IV CONTRAST  4/19/2022    CT HEAD ANGIO W AND WO IV CONTRAST 4/19/2022 [facility] CLINICAL LEGACY    OTHER SURGICAL HISTORY  09/17/2019    [place] tooth extraction    OTHER SURGICAL HISTORY  09/17/2019    Tonsillectomy with adenoidectomy    OTHER SURGICAL HISTORY  01/17/2020    Lower leg fracture repair    OTHER SURGICAL HISTORY  01/17/2020    Cardiac catheterization    TOTAL HIP ARTHROPLASTY  08/31/2018    Hip Replacement     Family History    Problem Relation Name Age of Onset    Heart attack Mother      Colon cancer Father      Heart attack Brother      Heart block Brother       Social History     Tobacco Use    Smoking status: Every Day     Types: Cigarettes    Smokeless tobacco: Never   Vaping Use    Vaping status: Never Used   Substance Use Topics    Alcohol use: Never    Drug use: Never       Physical Exam   ED Triage Vitals   Temp Heart Rate Resp BP   11/12/24 1721 11/12/24 1721 11/12/24 1721 11/12/24 1721   36.1 °C (97 °F) 99 16 146/82      SpO2 Temp Source Heart Rate Source Patient Position   11/12/24 1721 11/12/24 2252 11/12/24 2252 11/13/24 0400   (!) 93 % Temporal Monitor Lying      BP Location FiO2 (%)     11/13/24 0400 11/13/24 0828     Right arm 28 %       Physical Exam  Vitals and nursing note reviewed.   Constitutional:       Appearance: Normal appearance.   HENT:      Head: Normocephalic and atraumatic.      Nose: Nose normal.      Mouth/Throat:      Mouth: Mucous membranes are moist.   Eyes:      Extraocular Movements: Extraocular movements intact.      Pupils: Pupils are equal, round, and reactive to light.   Cardiovascular:      Rate and Rhythm: Regular rhythm. Tachycardia present.   Pulmonary:      Effort: Pulmonary effort is normal.      Breath sounds: Wheezing and rales present.   Abdominal:      General: Abdomen is flat.      Palpations: Abdomen is soft.   Musculoskeletal:         General: Normal range of motion.      Cervical back: Normal range of motion.   Skin:     General: Skin is warm and dry.      Capillary Refill: Capillary refill takes less than 2 seconds.   Neurological:      General: No focal deficit present.      Mental Status: He is alert.   Psychiatric:         Mood and Affect: Mood normal.           ED Course & MDM   ED Course as of 11/15/24 2347   Tue Nov 12, 2024 2013 Patient was worked up found to be hypoxic has to be placed on a few liters otherwise he drops to 84%.  CT PE study is negative for PE but shows  bronchitis.  I cannot get this patient off of supplemental oxygen at this time [TP]   2052 Pt received a total of 3 duonebs and solumedrol.   On repeat eval feels a little better [TP]      ED Course User Index  [TP] Trupti Angelo DO         Diagnoses as of 11/15/24 2347   Hypoxia   Acute respiratory failure with hypoxia (Multi)                 No data recorded     Meg Coma Scale Score: 15 (11/13/24 1700 : Nicole Francois RN)                           Medical Decision Making  Medical Decision Making: Patient was worked up lab work at this time shows a leukocytosis 11.4 chest x-ray shows no acute process but he is hypoxic when I take the oxygen off of him.  He drops down to 84%.  2 L was placed back on.  He is 92%.  CT of the chest shows bronchial wall thickening suggestive of bronchitis and mucoid impaction of the small airways.  IV antibiotics were started at this time cannot get this patient off of oxygen he will be hospitalized for further management and treatment  [unfilled]   Differential includes COVID flu pneumonia sepsis  Consider CT PE study but patient is already on Eliquis  MONICA Lopez.O.  Emergency Medicine          Procedure  Procedures     Trupti Angelo DO  11/15/24 1303

## 2024-11-18 ENCOUNTER — PATIENT OUTREACH (OUTPATIENT)
Dept: PRIMARY CARE | Facility: CLINIC | Age: 84
End: 2024-11-18
Payer: MEDICARE

## 2024-11-18 NOTE — PROGRESS NOTES
Discharge Facility:[facility]  Discharge Diagnosis:Acute Hypoxic RF anf COPD Exacerbation   Admission Date:11/12/24  Discharge Date: 11/15/24    PCP Appointment Date:Task to office  Specialist Appointment Date: Pulmonology, Cardiology and Urology  Hospital Encounter and Summary Linked: Yes  See discharge assessment below for further details  Engagement  Call Start Time: 0904 (I spoke with patient daughter) (11/18/2024  9:04 AM)    Medications  Medications reviewed with patient/caregiver?: Yes (11/18/2024  9:04 AM)  Is the patient having any side effects they believe may be caused by any medication additions or changes?: No (11/18/2024  9:04 AM)  Does the patient have all medications ordered at discharge?: Yes (11/18/2024  9:04 AM)  Prescription Comments: Patient daughter stated he has all of his neds (11/18/2024  9:04 AM)  Is the patient taking all medications as directed (includes completed medication regime)?: Yes (11/18/2024  9:04 AM)  Medication Comments: START taking: Anoro Ellipta (umeclidinium-vilanteroL) apixaban (Eliquis) guaiFENesin (Mucinex) predniSONE (Deltasone) tamsulosin (Flomax) Start taking on: November 16, 2024 CHANGE how you take: docusate sodium (Colace) ipratropium-albuteroL (Duo-Neb) STOP taking: benzonatate 100 mg capsule (Tessalon) Saline NasaL 0.65 % nasal spray (sodium chloride) (11/18/2024  9:04 AM)    Appointments  Does the patient have a primary care provider?: Yes (11/18/2024  9:04 AM)  Care Management Interventions: Educated patient on importance of making appointment; Advised patient to make appointment (11/18/2024  9:04 AM)  Has the patient kept scheduled appointments due by today?: Yes (11/18/2024  9:04 AM)  Care Management Interventions: Advised to schedule with specialist (Pulmonology, Cardiology and Urology) (11/18/2024  9:04 AM)    Patient Teaching  Does the patient have access to their discharge instructions?: Yes (11/18/2024  9:04 AM)  Care Management Interventions: Reviewed  instructions with patient (11/18/2024  9:04 AM)  What is the patient's perception of their health status since discharge?: Improving (11/18/2024  9:04 AM)  Is the patient/caregiver able to teach back the hierarchy of who to call/visit for symptoms/problems? PCP, Specialist, Home Health nurse, Urgent Care, ED, 911: Yes (11/18/2024  9:04 AM)    Wrap Up  Wrap Up Additional Comments: This CM spoke with pt daughter via phone. Pt daughter reports he is  doing well at home since discharge. New meds reviewed. Pt denies CP and SOB. Patient denies any further discharge questions/needs at this time. Emphasized that Follow up is needed after discharge to review the hospital recommendations, assess your response to your treatment.  Pt aware of my availability for non-emergent concerns. Contact info provided to patient (11/18/2024  9:04 AM)      Daily Tucker LPN

## 2024-11-18 NOTE — SIGNIFICANT EVENT
Follow Up Phone Call    Outgoing phone call    Spoke to: Sheldon Arteaga Relationship:spouse   Phone number: [phone]      Outcome: contacted patient/ family   No chief complaint on file.         Diagnosis:Not applicable    States he is feeling better. Clarity regarding anticoagulants was discussed with discharging team. Physician will call patient with new dosage of Eliquis. Patient aware. No further questions or concerns.

## 2024-11-25 DIAGNOSIS — I10 PRIMARY HYPERTENSION: ICD-10-CM

## 2024-11-25 RX ORDER — AMLODIPINE BESYLATE 5 MG/1
5 TABLET ORAL DAILY
Qty: 90 TABLET | Refills: 1 | Status: SHIPPED | OUTPATIENT
Start: 2024-11-25

## 2024-11-25 NOTE — PROGRESS NOTES
"  [facility]  Outpatient Clinic Note    Patient Name:  Sheldon Arteaga  MRN:  [id number]  :  1940    Referring Provider: No ref. provider found  Date of Service: 2024   Visit type: New patient visit     problem list/Chief complaint:  Urinary retention - Taking Tamsulosin 0.4 mg daily      HISTORY OF PRESENT ILLNESS:  Sheldon Arteaga \"Dejon\" is a 84 y.o. male with past medical history of HTN, HLD, COPD, Afib, DVT, possible HFpEF, CVA, PAD s/p resection of fem-pop bypass, who presents for initial Urology visit. I performed a detailed review of the medical chart records lab testing and imaging. Patient was hospitalized for acute COPD exacerbation from -11/15/24 and had sprague catheter placed for urinary retention, started on flomax and was discharged home with referral to Urology.  Patient presents with his daughter. He denies hematuria, no fever or chills. TOV failed,  ml.  Sprague catheter replaced. Patient to increase Tamsulosin to 0.4 mg BID.    PAST MEDICAL HISTORY:  Past Medical History:   Diagnosis Date    Stroke (Multi)        PAST SURGICAL HISTORY:  Past Surgical History:   Procedure Laterality Date    CT ANGIO AORTA AND BILATERAL ILIOFEMORAL RUN OFF INCLUDING WITHOUT CONTRAST IF PERFORMED  4/10/2022    CT AORTA AND BILATERAL ILIOFEMORAL RUNOFF ANGIOGRAM W AND/OR WO IV CONTRAST 4/10/2022 [facility] EMERGENCY LEGACY    CT ANGIO AORTA AND BILATERAL ILIOFEMORAL RUN OFF INCLUDING WITHOUT CONTRAST IF PERFORMED  2022    CT AORTA AND BILATERAL ILIOFEMORAL RUNOFF ANGIOGRAM W AND/OR WO IV CONTRAST 2022 [facility] CLINICAL LEGACY    CT ANGIO AORTA AND BILATERAL ILIOFEMORAL RUN OFF INCLUDING WITHOUT CONTRAST IF PERFORMED  2022    CT AORTA AND BILATERAL ILIOFEMORAL RUNOFF ANGIOGRAM W AND/OR WO IV CONTRAST 2022 [facility] EMERGENCY LEGACY    CT ANGIO AORTA AND BILATERAL ILIOFEMORAL RUN OFF INCLUDING WITHOUT CONTRAST IF PERFORMED  2023    CT AORTA AND BILATERAL ILIOFEMORAL RUNOFF " ANGIOGRAM W AND/OR WO IV CONTRAST [facility] CT    CT ANGIO NECK  4/19/2022    CT NECK ANGIO W AND WO IV CONTRAST 4/19/2022 [facility] CLINICAL LEGACY    CT HEAD ANGIO W AND WO IV CONTRAST  4/19/2022    CT HEAD ANGIO W AND WO IV CONTRAST 4/19/2022 [facility] CLINICAL LEGACY    OTHER SURGICAL HISTORY  09/17/2019    [place] tooth extraction    OTHER SURGICAL HISTORY  09/17/2019    Tonsillectomy with adenoidectomy    OTHER SURGICAL HISTORY  01/17/2020    Lower leg fracture repair    OTHER SURGICAL HISTORY  01/17/2020    Cardiac catheterization    TOTAL HIP ARTHROPLASTY  08/31/2018    Hip Replacement       ALLERGIES:  Allergies   Allergen Reactions    Doxycycline GI Upset    T-Painol Extra Strength Other    Levaquin [Levofloxacin] Itching and Rash       MEDICATIONS:  Current Outpatient Medications   Medication Instructions    albuterol (Ventolin HFA) 90 mcg/actuation inhaler 2 puffs, inhalation, Every 4 hours PRN    albuterol 2.5 mg, nebulization, Every 2 hour PRN    amLODIPine (NORVASC) 5 mg, oral, Daily    apixaban (ELIQUIS) 2.5 mg, oral, 2 times daily    atorvastatin (LIPITOR) 40 mg, oral, Nightly    clopidogrel (PLAVIX) 75 mg, oral, Daily    docusate sodium (COLACE) 100 mg, oral, Daily    ipratropium-albuteroL (Duo-Neb) 0.5-2.5 mg/3 mL nebulizer solution 3 mL, nebulization, Every 6 hours RT    ipratropium-albuteroL (Duo-Neb) 0.5-2.5 mg/3 mL nebulizer solution 3 mL, nebulization, Every 2 hour PRN    metoprolol tartrate (LOPRESSOR) 25 mg, oral, 2 times daily    tamsulosin (FLOMAX) 0.8 mg, oral, Daily    umeclidinium-vilanteroL (Anoro Ellipta) 62.5-25 mcg/actuation blister with device 1 puff, inhalation, Daily        SOCIAL HISTORY:  Social History     Tobacco Use    Smoking status: Every Day     Types: Cigarettes    Smokeless tobacco: Never   Vaping Use    Vaping status: Never Used   Substance Use Topics    Alcohol use: Never    Drug use: Never        FAMILY HISTORY:  Family History   Problem Relation Name Age of Onset    Heart attack  Mother      Colon cancer Father      Heart attack Brother      Heart block Brother          REVIEW OF SYSTEMS:  10-pt ROS reviewed and negative except as mentioned above.    Vital signs:  There were no vitals taken for this visit.    PHYSICAL EXAMINATION:  General: Appears comfortable and in no apparent distress.  Head: Normocephalic, atraumatic  Eyes: Non-injected conjunctiva, sclera clear, no proptosis  Lungs: Breathing is easy, non-labored. Speaking in clear and complete sentences. Normal diaphragmatic movement.  Cardiovascular: no peripheral edema, cyanosis or pallor.   Abdomen: soft, non-distended, non-tender  : Bladder: non tender, not distended  MSK: Ambulatory with steady gait, unassisted  Skin: No visible rashes or lesions  Neurologic: Alert, oriented to person, place, and time  Psychiatric: mood and affect appropriate    LABORATORY DATA:    Lab Results   Component Value Date    WBC 6.5 11/15/2024    HGB 10.9 (L) 11/15/2024    HCT 34.0 (L) 11/15/2024    MCV 95 11/15/2024     11/15/2024     Lab Results   Component Value Date    GLUCOSE 128 (H) 11/15/2024    CALCIUM 8.3 (L) 11/15/2024     (L) 11/15/2024    K 4.5 11/15/2024    CO2 27 11/15/2024     11/15/2024    BUN 20 11/15/2024    CREATININE 0.58 11/15/2024       ASSESSMENT:  Sheldon Arteaga is a 84 y.o. male with urinary retention s/p sprague catheter who presents for initial urology visit after hospital discharge for TOV.    1.Today we discussed BPH. BPH is the benign growth of prostate tissue that may cause bothersome urinary symptoms. The mechanism of action as well as the risks, benefits, common side effects, and alternatives to all prescribed medications were discussed with the patient at length. The patient had the opportunity to ask questions and all questions were answered. I primarily discussed alpha blockers, 5ARIs, and PDE5i. Additionally, if you continue to experience bothersome symptoms despite medication, there are  minimally invasive procedures to alleviate these symptoms.    We discussed urinary retention in great detail. We discussed management of urinary retention to include indwelling catheter or CIC. We discussed risks of unmanaged urinary retention including irreversible kidney injury and increased risk of UTI. Patient prefers to have sprague catheter replaced.    Patient was prepped in sterile fashion and 16 fr coude catheter was inserted with sterile technique without complication. There was return of clear yellow urine. Patient tolerated well.     PLAN:  - TOV today failed  -  ml  - Sprague catheter replaced with no complications  -Discussed the risks and benefit of continuing Tamsulosin, medication is working well for patient with no side effects. Discussed other management options. The patient and I agreed to increase Tamsulosin to 0.4 mg BID.  -Follow up in 1 week for TOV, or sooner if needed    All questions and concerns were addressed. Patient verbalizes understanding and has no other questions at this time.     E&M visit today is associated with current or anticipated ongoing medical care services related to a patient's single, serious condition or a complex condition.    ARMEN Juan-CNP  [facility] [place]  11/26/2024 1:56 PM

## 2024-11-26 ENCOUNTER — OFFICE VISIT (OUTPATIENT)
Dept: UROLOGY | Facility: HOSPITAL | Age: 84
End: 2024-11-26
Payer: MEDICARE

## 2024-11-26 DIAGNOSIS — R33.9 URINARY RETENTION: Primary | ICD-10-CM

## 2024-11-26 PROCEDURE — 1160F RVW MEDS BY RX/DR IN RCRD: CPT | Performed by: NURSE PRACTITIONER

## 2024-11-26 PROCEDURE — 51798 US URINE CAPACITY MEASURE: CPT | Performed by: NURSE PRACTITIONER

## 2024-11-26 PROCEDURE — 1159F MED LIST DOCD IN RCRD: CPT | Performed by: NURSE PRACTITIONER

## 2024-11-26 PROCEDURE — 99214 OFFICE O/P EST MOD 30 MIN: CPT | Performed by: NURSE PRACTITIONER

## 2024-11-26 PROCEDURE — G2211 COMPLEX E/M VISIT ADD ON: HCPCS | Performed by: NURSE PRACTITIONER

## 2024-11-26 PROCEDURE — 1126F AMNT PAIN NOTED NONE PRSNT: CPT | Performed by: NURSE PRACTITIONER

## 2024-11-26 PROCEDURE — 99204 OFFICE O/P NEW MOD 45 MIN: CPT | Performed by: NURSE PRACTITIONER

## 2024-11-26 PROCEDURE — 1111F DSCHRG MED/CURRENT MED MERGE: CPT | Performed by: NURSE PRACTITIONER

## 2024-11-26 PROCEDURE — 1157F ADVNC CARE PLAN IN RCRD: CPT | Performed by: NURSE PRACTITIONER

## 2024-11-26 RX ORDER — TAMSULOSIN HYDROCHLORIDE 0.4 MG/1
0.8 CAPSULE ORAL DAILY
Qty: 60 CAPSULE | Refills: 3 | Status: SHIPPED | OUTPATIENT
Start: 2024-11-26 | End: 2025-11-26

## 2024-11-26 ASSESSMENT — PAIN SCALES - GENERAL: PAINLEVEL_OUTOF10: 0-NO PAIN

## 2024-11-26 NOTE — PROGRESS NOTES
Pt is here today for a trial of void, name and date of birth was verified. Procedure was explained to pt, and understood. Pt tolerated 300 cc of sterile saline infused through urinary catheter without difficulty. Urinary catheter was removed and patient voided 130cc. A PVR scan was done and showed 292mL of urine left in the bladder.   It was explained to pt that if unable to urinate to go to the emergency room. Pt was told and understood to drink plenty of fluids to keep the bladder stimulated. If there were any questions or concerns pt understood he is to call the office.

## 2024-11-27 ENCOUNTER — APPOINTMENT (OUTPATIENT)
Dept: UROLOGY | Facility: HOSPITAL | Age: 84
End: 2024-11-27
Payer: MEDICARE

## 2024-11-29 ENCOUNTER — PATIENT OUTREACH (OUTPATIENT)
Dept: PRIMARY CARE | Facility: CLINIC | Age: 84
End: 2024-11-29
Payer: MEDICARE

## 2024-11-29 NOTE — PROGRESS NOTES
Call regarding appt. with PCP on or after hospitalization.  No PCP appt made after hospital discharge.At time of outreach call the patient feels as if their condition has improved since last visit.  No questions or concerns at this time.

## 2024-12-01 NOTE — PROGRESS NOTES
[facility] [place]  Outpatient Clinic Note    Patient Name:  Sheldon Arteaga  MRN:  [id number]  :  1940  Date of Service: 2024     Visit type: Follow up visit    HPI    Interval History:  Sheldon Arteaga is a 84 y.o. male who is being seen today for  problems listed below.     Problem list/Chief complaints:  Urinary retention - Taking Tamsulosin 0.4 mg daily    24: NPV. Patient was hospitalized for acute COPD exacerbation from -11/15/24 and had sprague catheter placed for urinary retention, started on flomax and was discharged home with referral to Urology.  Patient presents with his daughter. He denies hematuria, no fever or chills. TOV failed,  ml.  Sprague catheter replaced. Patient to increase Tamsulosin to 0.4 mg BID.    Past Medical History:   Diagnosis Date    Stroke (Multi)        Past Surgical History:   Procedure Laterality Date    CT ANGIO AORTA AND BILATERAL ILIOFEMORAL RUN OFF INCLUDING WITHOUT CONTRAST IF PERFORMED  4/10/2022    CT AORTA AND BILATERAL ILIOFEMORAL RUNOFF ANGIOGRAM W AND/OR WO IV CONTRAST 4/10/2022 [facility] EMERGENCY LEGACY    CT ANGIO AORTA AND BILATERAL ILIOFEMORAL RUN OFF INCLUDING WITHOUT CONTRAST IF PERFORMED  2022    CT AORTA AND BILATERAL ILIOFEMORAL RUNOFF ANGIOGRAM W AND/OR WO IV CONTRAST 2022 [facility] CLINICAL LEGACY    CT ANGIO AORTA AND BILATERAL ILIOFEMORAL RUN OFF INCLUDING WITHOUT CONTRAST IF PERFORMED  2022    CT AORTA AND BILATERAL ILIOFEMORAL RUNOFF ANGIOGRAM W AND/OR WO IV CONTRAST 2022 [facility] EMERGENCY LEGACY    CT ANGIO AORTA AND BILATERAL ILIOFEMORAL RUN OFF INCLUDING WITHOUT CONTRAST IF PERFORMED  2023    CT AORTA AND BILATERAL ILIOFEMORAL RUNOFF ANGIOGRAM W AND/OR WO IV CONTRAST [facility] CT    CT ANGIO NECK  2022    CT NECK ANGIO W AND WO IV CONTRAST 2022 [facility] CLINICAL LEGACY    CT HEAD ANGIO W AND WO IV CONTRAST  2022    CT HEAD ANGIO W AND WO IV CONTRAST 2022 [facility] CLINICAL LEGACY    OTHER SURGICAL  HISTORY  09/17/2019    [place] tooth extraction    OTHER SURGICAL HISTORY  09/17/2019    Tonsillectomy with adenoidectomy    OTHER SURGICAL HISTORY  01/17/2020    Lower leg fracture repair    OTHER SURGICAL HISTORY  01/17/2020    Cardiac catheterization    TOTAL HIP ARTHROPLASTY  08/31/2018    Hip Replacement       Social History     Socioeconomic History    Marital status:      Spouse name: Not on file    Number of children: Not on file    Years of education: Not on file    Highest education level: Not on file   Occupational History    Not on file   Tobacco Use    Smoking status: Every Day     Types: Cigarettes    Smokeless tobacco: Never   Vaping Use    Vaping status: Never Used   Substance and Sexual Activity    Alcohol use: Never    Drug use: Never    Sexual activity: Not Currently   Other Topics Concern    Not on file   Social History Narrative    Not on file     Social Drivers of Health     Financial Resource Strain: Low Risk  (11/13/2024)    Overall Financial Resource Strain (CARDIA)     Difficulty of Paying Living Expenses: Not very hard   Food Insecurity: No Food Insecurity (11/12/2024)    Hunger Vital Sign     Worried About Running Out of Food in the Last Year: Never true     Ran Out of Food in the Last Year: Never true   Transportation Needs: No Transportation Needs (11/13/2024)    PRAPARE - Transportation     Lack of Transportation (Medical): No     Lack of Transportation (Non-Medical): No   Physical Activity: Not on file   Stress: Not on file   Social Connections: Not on file   Intimate Partner Violence: Not At Risk (11/12/2024)    Humiliation, Afraid, Rape, and Kick questionnaire     Fear of Current or Ex-Partner: No     Emotionally Abused: No     Physically Abused: No     Sexually Abused: No   Housing Stability: Low Risk  (11/13/2024)    Housing Stability Vital Sign     Unable to Pay for Housing in the Last Year: No     Number of Times Moved in the Last Year: 0     Homeless in the Last Year: No        Allergies   Allergen Reactions    Doxycycline GI Upset    T-Painol Extra Strength Other    Levaquin [Levofloxacin] Itching and Rash          Current Outpatient Medications:     albuterol (Ventolin HFA) 90 mcg/actuation inhaler, Inhale 2 puffs every 4 hours if needed for wheezing or shortness of breath., Disp: 8 g, Rfl: 5    albuterol 2.5 mg /3 mL (0.083 %) nebulizer solution, Take 3 mL (2.5 mg) by nebulization every 2 hours if needed for wheezing or shortness of breath., Disp: 75 mL, Rfl: 1    amLODIPine (Norvasc) 5 mg tablet, Take 1 tablet (5 mg) by mouth once daily., Disp: 90 tablet, Rfl: 1    apixaban (Eliquis) 5 mg tablet, Take 0.5 tablets (2.5 mg) by mouth 2 times a day., Disp: 30 tablet, Rfl: 1    atorvastatin (Lipitor) 40 mg tablet, Take 1 tablet (40 mg) by mouth once daily at bedtime., Disp: 90 tablet, Rfl: 3    clopidogrel (Plavix) 75 mg tablet, Take 1 tablet (75 mg) by mouth once daily., Disp: 90 tablet, Rfl: 0    docusate sodium (Colace) 100 mg capsule, Take 1 capsule (100 mg) by mouth once daily., Disp: , Rfl:     ipratropium-albuteroL (Duo-Neb) 0.5-2.5 mg/3 mL nebulizer solution, Take 3 mL by nebulization every 6 hours., Disp: 360 mL, Rfl: 0    ipratropium-albuteroL (Duo-Neb) 0.5-2.5 mg/3 mL nebulizer solution, Take 3 mL by nebulization every 2 hours if needed for wheezing or shortness of breath (pt request)., Disp: 180 mL, Rfl: 11    metoprolol tartrate (Lopressor) 25 mg tablet, Take 1 tablet by mouth twice daily, Disp: 180 tablet, Rfl: 0    tamsulosin (Flomax) 0.4 mg 24 hr capsule, Take 2 capsules (0.8 mg) by mouth once daily., Disp: 60 capsule, Rfl: 3    umeclidinium-vilanteroL (Anoro Ellipta) 62.5-25 mcg/actuation blister with device, Inhale 1 puff once daily., Disp: 30 each, Rfl: 0     Review of system:  All other systems have been reviewed and are negative for complaints      Last recorded vitals:  There were no vitals taken for this visit.    Physical Exam:  ***        Labs  Admission on  11/12/2024, Discharged on 11/15/2024   Component Date Value    WBC 11/12/2024 11.4 (H)     nRBC 11/12/2024 0.0     RBC 11/12/2024 4.47 (L)     Hemoglobin 11/12/2024 13.6     Hematocrit 11/12/2024 42.5     MCV 11/12/2024 95     MCH 11/12/2024 30.4     MCHC 11/12/2024 32.0     RDW 11/12/2024 14.6 (H)     Platelets 11/12/2024 210     Neutrophils % 11/12/2024 64.4     Immature Granulocytes %,* 11/12/2024 0.4     Lymphocytes % 11/12/2024 18.3     Monocytes % 11/12/2024 5.9     Eosinophils % 11/12/2024 10.6     Basophils % 11/12/2024 0.4     Neutrophils Absolute 11/12/2024 7.34 (H)     Immature Granulocytes Ab* 11/12/2024 0.04     Lymphocytes Absolute 11/12/2024 2.08     Monocytes Absolute 11/12/2024 0.67     Eosinophils Absolute 11/12/2024 1.21 (H)     Basophils Absolute 11/12/2024 0.04     Magnesium 11/12/2024 1.97     Glucose 11/12/2024 133 (H)     Sodium 11/12/2024 135 (L)     Potassium 11/12/2024 4.1     Chloride 11/12/2024 96 (L)     Bicarbonate 11/12/2024 25     Anion Gap 11/12/2024 18     Urea Nitrogen 11/12/2024 16     Creatinine 11/12/2024 0.73     eGFR 11/12/2024 90     Calcium 11/12/2024 9.3     Albumin 11/12/2024 4.5     Alkaline Phosphatase 11/12/2024 82     Total Protein 11/12/2024 7.4     AST 11/12/2024 18     Bilirubin, Total 11/12/2024 0.7     ALT 11/12/2024 13     Lactate 11/12/2024 2.0     BNP 11/12/2024 17     Coronavirus 2019, PCR 11/12/2024 Not Detected     Flu A Result 11/12/2024 Not Detected     Flu B Result 11/12/2024 Not Detected     Troponin I, High Sensiti* 11/12/2024 5     Troponin I, High Sensiti* 11/12/2024 5     MRSA PCR 11/12/2024 Not Detected     RSV PCR 11/12/2024 Not Detected     Magnesium 11/13/2024 2.04     Glucose 11/13/2024 143 (H)     Sodium 11/13/2024 133 (L)     Potassium 11/13/2024 4.4     Chloride 11/13/2024 98     Bicarbonate 11/13/2024 25     Anion Gap 11/13/2024 14     Urea Nitrogen 11/13/2024 24 (H)     Creatinine 11/13/2024 0.75     eGFR 11/13/2024 89     Calcium  11/13/2024 8.3 (L)     Phosphorus 11/13/2024 5.0 (H)     Albumin 11/13/2024 3.8     WBC 11/13/2024 5.5     nRBC 11/13/2024 0.0     RBC 11/13/2024 3.75 (L)     Hemoglobin 11/13/2024 11.5 (L)     Hematocrit 11/13/2024 35.0 (L)     MCV 11/13/2024 93     MCH 11/13/2024 30.7     MCHC 11/13/2024 32.9     RDW 11/13/2024 14.5     Platelets 11/13/2024 198     Respiratory Culture/Smear 11/13/2024 Culture not performed. See Gram stain findings. Recollect if clinically indicated. (A)     Gram Stain 11/13/2024 Gram stain indicates specimen contains significant salivary contamination. (A)     Ventricular Rate 11/12/2024 97     Atrial Rate 11/12/2024 97     TN Interval 11/12/2024 130     QRS Duration 11/12/2024 100     QT Interval 11/12/2024 366     QTC Calculation(Bazett) 11/12/2024 464     P Axis 11/12/2024 70     R Hamlet 11/12/2024 68     T Hamlet 11/12/2024 -53     QRS Count 11/12/2024 16     Q Onset 11/12/2024 215     P Onset 11/12/2024 150     P Offset 11/12/2024 190     T Offset 11/12/2024 398     QTC Fredericia 11/12/2024 429     Ventricular Rate 11/13/2024 94     Atrial Rate 11/13/2024 94     TN Interval 11/13/2024 162     QRS Duration 11/13/2024 98     QT Interval 11/13/2024 392     QTC Calculation(Bazett) 11/13/2024 490     P Axis 11/13/2024 67     R Hamlet 11/13/2024 71     T Axis 11/13/2024 63     QRS Count 11/13/2024 15     Q Onset 11/13/2024 216     P Onset 11/13/2024 135     P Offset 11/13/2024 197     T Offset 11/13/2024 412     QTC Fredericia 11/13/2024 455     Magnesium 11/14/2024 1.92     Glucose 11/14/2024 149 (H)     Sodium 11/14/2024 132 (L)     Potassium 11/14/2024 4.4     Chloride 11/14/2024 99     Bicarbonate 11/14/2024 26     Anion Gap 11/14/2024 11     Urea Nitrogen 11/14/2024 22     Creatinine 11/14/2024 0.67     eGFR 11/14/2024 >90     Calcium 11/14/2024 8.1 (L)     Phosphorus 11/14/2024 3.7     Albumin 11/14/2024 3.5     WBC 11/14/2024 8.9     nRBC 11/14/2024 0.0     RBC 11/14/2024 3.54 (L)     Hemoglobin  11/14/2024 10.9 (L)     Hematocrit 11/14/2024 34.1 (L)     MCV 11/14/2024 96     MCH 11/14/2024 30.8     MCHC 11/14/2024 32.0     RDW 11/14/2024 14.6 (H)     Platelets 11/14/2024 227     Magnesium 11/15/2024 2.01     Glucose 11/15/2024 128 (H)     Sodium 11/15/2024 135 (L)     Potassium 11/15/2024 4.5     Chloride 11/15/2024 100     Bicarbonate 11/15/2024 27     Anion Gap 11/15/2024 13     Urea Nitrogen 11/15/2024 20     Creatinine 11/15/2024 0.58     eGFR 11/15/2024 >90     Calcium 11/15/2024 8.3 (L)     Phosphorus 11/15/2024 2.7     Albumin 11/15/2024 3.6     WBC 11/15/2024 6.5     nRBC 11/15/2024 0.0     RBC 11/15/2024 3.57 (L)     Hemoglobin 11/15/2024 10.9 (L)     Hematocrit 11/15/2024 34.0 (L)     MCV 11/15/2024 95     MCH 11/15/2024 30.5     MCHC 11/15/2024 32.1     RDW 11/15/2024 14.5     Platelets 11/15/2024 250        Assessment and Plan:  Sheldon Arteaga is a 84 y.o. male with history of HTN, HLD, COPD, Afib, DVT, possible HFpEF, CVA, PAD s/p resection of fem-pop bypass, urinary retention, who presents for follow up and TOV.     Plan:      Follow-up ***, or sooner if needed, to reassess symptoms and for medication refill.    All questions and concerns were addressed. Patient verbalizes understanding and has no other questions at this time.     Some elements copied from my note on 11/26/24, the elements have been updated and all reflect current decision making from today, 12/01/24     ARMEN Juan-CNP   [facility] [place]  12/01/24 6:55 PM

## 2024-12-03 LAB
ATRIAL RATE: 94 BPM
P AXIS: 67 DEGREES
P OFFSET: 197 MS
P ONSET: 135 MS
PR INTERVAL: 162 MS
Q ONSET: 216 MS
QRS COUNT: 15 BEATS
QRS DURATION: 98 MS
QT INTERVAL: 392 MS
QTC CALCULATION(BAZETT): 490 MS
QTC FREDERICIA: 455 MS
R AXIS: 71 DEGREES
T AXIS: 63 DEGREES
T OFFSET: 412 MS
VENTRICULAR RATE: 94 BPM

## 2024-12-03 NOTE — DOCUMENTATION CLARIFICATION NOTE
"    PATIENT:               BERNA ROSE  ACCT #:                  [id number]  MRN:                       [id number]  :                       1940  ADMIT DATE:       2024 5:10 PM  DISCH DATE:        11/15/2024 3:05 PM  RESPONDING PROVIDER #:        [id number]          PROVIDER RESPONSE TEXT:    I agree with dietician diagnosis of Severe malnutrition on     CDI QUERY TEXT:    Clarification        Instruction:    Based on your assessment of the patient and the clinical information, please provide the requested documentation by clicking on the appropriate radio button and enter any additional information if prompted.    Question: Please further clarify this patient nutritional status as    When answering this query, please exercise your independent professional judgment. The fact that a question is being asked, does not imply that any particular answer is desired or expected.    The patient's clinical indicators include:  Clinical Information:   84 y.o. male with a PMHx of HTN, HLD, COPD, Afib with a chief complaint of worsening productive cough and SOB.    Clinical Indicators:  Admitting VS: T: 97F   BP: 146/82   HR: 99    RR: 16    SPO2: 93   BMI 17.9    RD consult noted \"Severe malnutrition related to chronic disease or condition as evidenced by: suspected <75% EENs for >1 month, underweight BMI of 17.9, & severe muscle wasting/fat loss noted on NFPE.\"    Treatment: Ensure Plus HP TID, liberalize regular diet as tolerated, senior multivitamin    Risk Factors: COPD exac, respiratory failure, CHF  Options provided:  -- I agree with dietician diagnosis of Severe malnutrition on   -- Other - I will add my own diagnosis  -- Refer to Clinical Documentation Reviewer    Query created by: Nicole Pandya on 2024 4:54 PM      Electronically signed by:  ZEYNEP NUÑEZ DO 2024 9:46 PM          "

## 2024-12-04 ENCOUNTER — APPOINTMENT (OUTPATIENT)
Dept: UROLOGY | Facility: HOSPITAL | Age: 84
End: 2024-12-04
Payer: MEDICARE

## 2024-12-09 ENCOUNTER — OFFICE VISIT (OUTPATIENT)
Dept: UROLOGY | Facility: HOSPITAL | Age: 84
End: 2024-12-09
Payer: MEDICARE

## 2024-12-09 VITALS — TEMPERATURE: 97 F

## 2024-12-09 DIAGNOSIS — R33.9 URINARY RETENTION: ICD-10-CM

## 2024-12-09 PROCEDURE — 1159F MED LIST DOCD IN RCRD: CPT | Performed by: NURSE PRACTITIONER

## 2024-12-09 PROCEDURE — 99214 OFFICE O/P EST MOD 30 MIN: CPT | Performed by: NURSE PRACTITIONER

## 2024-12-09 PROCEDURE — 1160F RVW MEDS BY RX/DR IN RCRD: CPT | Performed by: NURSE PRACTITIONER

## 2024-12-09 PROCEDURE — 51798 US URINE CAPACITY MEASURE: CPT | Performed by: NURSE PRACTITIONER

## 2024-12-09 PROCEDURE — 1126F AMNT PAIN NOTED NONE PRSNT: CPT | Performed by: NURSE PRACTITIONER

## 2024-12-09 PROCEDURE — G2211 COMPLEX E/M VISIT ADD ON: HCPCS | Performed by: NURSE PRACTITIONER

## 2024-12-09 PROCEDURE — 1157F ADVNC CARE PLAN IN RCRD: CPT | Performed by: NURSE PRACTITIONER

## 2024-12-09 PROCEDURE — 1111F DSCHRG MED/CURRENT MED MERGE: CPT | Performed by: NURSE PRACTITIONER

## 2024-12-09 ASSESSMENT — PAIN SCALES - GENERAL: PAINLEVEL_OUTOF10: 0-NO PAIN

## 2024-12-09 NOTE — PROGRESS NOTES
Trial of Void:    Patient here today for trial void. Patient verifed by name/.   Patient tolerated 600cc of sterile water.  Catheter was removed and patient voided 600cc.   Patient instructed to give the office a call with and issues or concerns, patient also instructed go to emergency room after office hours if unable to void.    ADILIA Vela

## 2024-12-09 NOTE — PROGRESS NOTES
[facility]  Outpatient Clinic Note    Patient Name:  Sheldon Arteaga  MRN:  [id number]  :  1940  Date of Service: 2024     Visit type: Follow up visit    HPI    Interval History:  Sheldon Arteaga is a 84 y.o. male who is being seen today for  problems listed below.     Problem list/Chief complaints:  Urinary retention - Taking Tamsulosin 0.4 mg BID    24: NPV. Patient was hospitalized for acute COPD exacerbation from -11/15/24 and had sprague catheter placed for urinary retention, started on flomax and was discharged home with referral to Urology.  Patient presents with his daughter. He denies hematuria, no fever or chills. TOV failed,  ml.  Sprague catheter replaced. Patient to increase Tamsulosin to 0.4 mg BID.    24: Patient presents for TOV. He is accompanied by his wife. He is taking Tamsulosin BID as prescribed. He is having constipation issues, discussed taking miralax and increasing fluid intake. TOV passed, PVR 23 ml.      Past Medical History:   Diagnosis Date    Stroke (Multi)        Past Surgical History:   Procedure Laterality Date    CT ANGIO AORTA AND BILATERAL ILIOFEMORAL RUN OFF INCLUDING WITHOUT CONTRAST IF PERFORMED  4/10/2022    CT AORTA AND BILATERAL ILIOFEMORAL RUNOFF ANGIOGRAM W AND/OR WO IV CONTRAST 4/10/2022 [facility] EMERGENCY LEGACY    CT ANGIO AORTA AND BILATERAL ILIOFEMORAL RUN OFF INCLUDING WITHOUT CONTRAST IF PERFORMED  2022    CT AORTA AND BILATERAL ILIOFEMORAL RUNOFF ANGIOGRAM W AND/OR WO IV CONTRAST 2022 [facility] CLINICAL LEGACY    CT ANGIO AORTA AND BILATERAL ILIOFEMORAL RUN OFF INCLUDING WITHOUT CONTRAST IF PERFORMED  2022    CT AORTA AND BILATERAL ILIOFEMORAL RUNOFF ANGIOGRAM W AND/OR WO IV CONTRAST 2022 [facility] EMERGENCY LEGACY    CT ANGIO AORTA AND BILATERAL ILIOFEMORAL RUN OFF INCLUDING WITHOUT CONTRAST IF PERFORMED  2023    CT AORTA AND BILATERAL ILIOFEMORAL RUNOFF ANGIOGRAM W AND/OR WO IV CONTRAST [facility] CT    CT ANGIO  NECK  4/19/2022    CT NECK ANGIO W AND WO IV CONTRAST 4/19/2022 [facility] CLINICAL LEGACY    CT HEAD ANGIO W AND WO IV CONTRAST  4/19/2022    CT HEAD ANGIO W AND WO IV CONTRAST 4/19/2022 [facility] CLINICAL LEGACY    OTHER SURGICAL HISTORY  09/17/2019    [place] tooth extraction    OTHER SURGICAL HISTORY  09/17/2019    Tonsillectomy with adenoidectomy    OTHER SURGICAL HISTORY  01/17/2020    Lower leg fracture repair    OTHER SURGICAL HISTORY  01/17/2020    Cardiac catheterization    TOTAL HIP ARTHROPLASTY  08/31/2018    Hip Replacement       Social History     Socioeconomic History    Marital status:      Spouse name: Not on file    Number of children: Not on file    Years of education: Not on file    Highest education level: Not on file   Occupational History    Not on file   Tobacco Use    Smoking status: Every Day     Types: Cigarettes    Smokeless tobacco: Never   Vaping Use    Vaping status: Never Used   Substance and Sexual Activity    Alcohol use: Never    Drug use: Never    Sexual activity: Not Currently   Other Topics Concern    Not on file   Social History Narrative    Not on file     Social Drivers of Health     Financial Resource Strain: Low Risk  (11/13/2024)    Overall Financial Resource Strain (CARDIA)     Difficulty of Paying Living Expenses: Not very hard   Food Insecurity: No Food Insecurity (11/12/2024)    Hunger Vital Sign     Worried About Running Out of Food in the Last Year: Never true     Ran Out of Food in the Last Year: Never true   Transportation Needs: No Transportation Needs (11/13/2024)    PRAPARE - Transportation     Lack of Transportation (Medical): No     Lack of Transportation (Non-Medical): No   Physical Activity: Not on file   Stress: Not on file   Social Connections: Not on file   Intimate Partner Violence: Not At Risk (11/12/2024)    Humiliation, Afraid, Rape, and Kick questionnaire     Fear of Current or Ex-Partner: No     Emotionally Abused: No     Physically Abused: No      Sexually Abused: No   Housing Stability: Low Risk  (11/13/2024)    Housing Stability Vital Sign     Unable to Pay for Housing in the Last Year: No     Number of Times Moved in the Last Year: 0     Homeless in the Last Year: No       Allergies   Allergen Reactions    Doxycycline GI Upset    T-Painol Extra Strength Other    Levaquin [Levofloxacin] Itching and Rash          Current Outpatient Medications:     albuterol (Ventolin HFA) 90 mcg/actuation inhaler, Inhale 2 puffs every 4 hours if needed for wheezing or shortness of breath., Disp: 8 g, Rfl: 5    albuterol 2.5 mg /3 mL (0.083 %) nebulizer solution, Take 3 mL (2.5 mg) by nebulization every 2 hours if needed for wheezing or shortness of breath., Disp: 75 mL, Rfl: 1    amLODIPine (Norvasc) 5 mg tablet, Take 1 tablet (5 mg) by mouth once daily., Disp: 90 tablet, Rfl: 1    apixaban (Eliquis) 5 mg tablet, Take 0.5 tablets (2.5 mg) by mouth 2 times a day., Disp: 30 tablet, Rfl: 1    atorvastatin (Lipitor) 40 mg tablet, Take 1 tablet (40 mg) by mouth once daily at bedtime., Disp: 90 tablet, Rfl: 3    clopidogrel (Plavix) 75 mg tablet, Take 1 tablet (75 mg) by mouth once daily., Disp: 90 tablet, Rfl: 0    docusate sodium (Colace) 100 mg capsule, Take 1 capsule (100 mg) by mouth once daily., Disp: , Rfl:     ipratropium-albuteroL (Duo-Neb) 0.5-2.5 mg/3 mL nebulizer solution, Take 3 mL by nebulization every 6 hours., Disp: 360 mL, Rfl: 0    ipratropium-albuteroL (Duo-Neb) 0.5-2.5 mg/3 mL nebulizer solution, Take 3 mL by nebulization every 2 hours if needed for wheezing or shortness of breath (pt request)., Disp: 180 mL, Rfl: 11    metoprolol tartrate (Lopressor) 25 mg tablet, Take 1 tablet by mouth twice daily, Disp: 180 tablet, Rfl: 0    tamsulosin (Flomax) 0.4 mg 24 hr capsule, Take 2 capsules (0.8 mg) by mouth once daily., Disp: 60 capsule, Rfl: 3    umeclidinium-vilanteroL (Anoro Ellipta) 62.5-25 mcg/actuation blister with device, Inhale 1 puff once daily., Disp: 30  each, Rfl: 0     Review of system:  All other systems have been reviewed and are negative for complaints      Last recorded vitals:  Temperature 36.1 °C (97 °F).    Physical Exam:  General: Appears comfortable and in no apparent distress.  Head: Normocephalic, atraumatic  Eyes: Non-injected conjunctiva, sclera clear, no proptosis  Lungs: Breathing is easy, non-labored. Speaking in clear and complete sentences. Normal diaphragmatic movement.  Cardiovascular: no peripheral edema, cyanosis or pallor.   Abdomen: soft, non-distended, non-tender  : Bladder: non tender, not distended  MSK: Ambulatory with steady gait, unassisted  Skin: No visible rashes or lesions  Neurologic: Alert, oriented to person, place, and time  Psychiatric: mood and affect appropriate          Labs  Admission on 11/12/2024, Discharged on 11/15/2024   Component Date Value    WBC 11/12/2024 11.4 (H)     nRBC 11/12/2024 0.0     RBC 11/12/2024 4.47 (L)     Hemoglobin 11/12/2024 13.6     Hematocrit 11/12/2024 42.5     MCV 11/12/2024 95     MCH 11/12/2024 30.4     MCHC 11/12/2024 32.0     RDW 11/12/2024 14.6 (H)     Platelets 11/12/2024 210     Neutrophils % 11/12/2024 64.4     Immature Granulocytes %,* 11/12/2024 0.4     Lymphocytes % 11/12/2024 18.3     Monocytes % 11/12/2024 5.9     Eosinophils % 11/12/2024 10.6     Basophils % 11/12/2024 0.4     Neutrophils Absolute 11/12/2024 7.34 (H)     Immature Granulocytes Ab* 11/12/2024 0.04     Lymphocytes Absolute 11/12/2024 2.08     Monocytes Absolute 11/12/2024 0.67     Eosinophils Absolute 11/12/2024 1.21 (H)     Basophils Absolute 11/12/2024 0.04     Magnesium 11/12/2024 1.97     Glucose 11/12/2024 133 (H)     Sodium 11/12/2024 135 (L)     Potassium 11/12/2024 4.1     Chloride 11/12/2024 96 (L)     Bicarbonate 11/12/2024 25     Anion Gap 11/12/2024 18     Urea Nitrogen 11/12/2024 16     Creatinine 11/12/2024 0.73     eGFR 11/12/2024 90     Calcium 11/12/2024 9.3     Albumin 11/12/2024 4.5     Alkaline  Phosphatase 11/12/2024 82     Total Protein 11/12/2024 7.4     AST 11/12/2024 18     Bilirubin, Total 11/12/2024 0.7     ALT 11/12/2024 13     Lactate 11/12/2024 2.0     BNP 11/12/2024 17     Coronavirus 2019, PCR 11/12/2024 Not Detected     Flu A Result 11/12/2024 Not Detected     Flu B Result 11/12/2024 Not Detected     Troponin I, High Sensiti* 11/12/2024 5     Troponin I, High Sensiti* 11/12/2024 5     MRSA PCR 11/12/2024 Not Detected     RSV PCR 11/12/2024 Not Detected     Magnesium 11/13/2024 2.04     Glucose 11/13/2024 143 (H)     Sodium 11/13/2024 133 (L)     Potassium 11/13/2024 4.4     Chloride 11/13/2024 98     Bicarbonate 11/13/2024 25     Anion Gap 11/13/2024 14     Urea Nitrogen 11/13/2024 24 (H)     Creatinine 11/13/2024 0.75     eGFR 11/13/2024 89     Calcium 11/13/2024 8.3 (L)     Phosphorus 11/13/2024 5.0 (H)     Albumin 11/13/2024 3.8     WBC 11/13/2024 5.5     nRBC 11/13/2024 0.0     RBC 11/13/2024 3.75 (L)     Hemoglobin 11/13/2024 11.5 (L)     Hematocrit 11/13/2024 35.0 (L)     MCV 11/13/2024 93     MCH 11/13/2024 30.7     MCHC 11/13/2024 32.9     RDW 11/13/2024 14.5     Platelets 11/13/2024 198     Respiratory Culture/Smear 11/13/2024 Culture not performed. See Gram stain findings. Recollect if clinically indicated. (A)     Gram Stain 11/13/2024 Gram stain indicates specimen contains significant salivary contamination. (A)     Ventricular Rate 11/12/2024 97     Atrial Rate 11/12/2024 97     NM Interval 11/12/2024 130     QRS Duration 11/12/2024 100     QT Interval 11/12/2024 366     QTC Calculation(Bazett) 11/12/2024 464     P Axis 11/12/2024 70     R Warren 11/12/2024 68     T Warren 11/12/2024 -53     QRS Count 11/12/2024 16     Q Onset 11/12/2024 215     P Onset 11/12/2024 150     P Offset 11/12/2024 190     T Offset 11/12/2024 398     QTC Fredericia 11/12/2024 429     Ventricular Rate 11/13/2024 94     Atrial Rate 11/13/2024 94     NM Interval 11/13/2024 162     QRS Duration 11/13/2024 98      QT Interval 11/13/2024 392     QTC Calculation(Bazett) 11/13/2024 490     P Axis 11/13/2024 67     R Clark 11/13/2024 71     T Axis 11/13/2024 63     QRS Count 11/13/2024 15     Q Onset 11/13/2024 216     P Onset 11/13/2024 135     P Offset 11/13/2024 197     T Offset 11/13/2024 412     QTC Fredericia 11/13/2024 455     Magnesium 11/14/2024 1.92     Glucose 11/14/2024 149 (H)     Sodium 11/14/2024 132 (L)     Potassium 11/14/2024 4.4     Chloride 11/14/2024 99     Bicarbonate 11/14/2024 26     Anion Gap 11/14/2024 11     Urea Nitrogen 11/14/2024 22     Creatinine 11/14/2024 0.67     eGFR 11/14/2024 >90     Calcium 11/14/2024 8.1 (L)     Phosphorus 11/14/2024 3.7     Albumin 11/14/2024 3.5     WBC 11/14/2024 8.9     nRBC 11/14/2024 0.0     RBC 11/14/2024 3.54 (L)     Hemoglobin 11/14/2024 10.9 (L)     Hematocrit 11/14/2024 34.1 (L)     MCV 11/14/2024 96     MCH 11/14/2024 30.8     MCHC 11/14/2024 32.0     RDW 11/14/2024 14.6 (H)     Platelets 11/14/2024 227     Magnesium 11/15/2024 2.01     Glucose 11/15/2024 128 (H)     Sodium 11/15/2024 135 (L)     Potassium 11/15/2024 4.5     Chloride 11/15/2024 100     Bicarbonate 11/15/2024 27     Anion Gap 11/15/2024 13     Urea Nitrogen 11/15/2024 20     Creatinine 11/15/2024 0.58     eGFR 11/15/2024 >90     Calcium 11/15/2024 8.3 (L)     Phosphorus 11/15/2024 2.7     Albumin 11/15/2024 3.6     WBC 11/15/2024 6.5     nRBC 11/15/2024 0.0     RBC 11/15/2024 3.57 (L)     Hemoglobin 11/15/2024 10.9 (L)     Hematocrit 11/15/2024 34.0 (L)     MCV 11/15/2024 95     MCH 11/15/2024 30.5     MCHC 11/15/2024 32.1     RDW 11/15/2024 14.5     Platelets 11/15/2024 250        Assessment and Plan:  Sheldon Arteaga is a 84 y.o. male with history of HTN, HLD, COPD, Afib, DVT, possible HFpEF, CVA, PAD s/p resection of fem-pop bypass, urinary retention, who presents for follow up and TOV.     1.Today we discussed BPH. BPH is the benign growth of prostate tissue that may cause bothersome urinary  symptoms. The mechanism of action as well as the risks, benefits, common side effects, and alternatives to all prescribed medications were discussed with the patient at length. The patient had the opportunity to ask questions and all questions were answered. I primarily discussed alpha blockers, 5ARIs, and PDE5i. Additionally, if you continue to experience bothersome symptoms despite medication, there are minimally invasive procedures to alleviate these symptoms.    Plan:  - TOV today passed  - PVR 23 ml  - Patient encouraged to drink plenty of fluids to maintain hydration and clear urine output  - Discussed that they may have some irritative voiding symptoms over the next few days: burning, frequency, urgency  - Activity restrictions reviewed, discussed post op expectations  - Patient instructed to go to ED if unable to void in 4-6 hr or unable to void with urge  -Discussed the risks and benefit of continuing Tamsulosin, medication is working well for patient with no side effects. Discussed other management options. The patient and I agreed to continue with medication.  -Patient to take miralax for constipation  -Follow-up in 6 months, or sooner if needed, to reassess symptoms and for medication refill.    All questions and concerns were addressed. Patient verbalizes understanding and has no other questions at this time.     Some elements copied from my note on 11/26/24, the elements have been updated and all reflect current decision making from today, 12/09/24    E&M visit today is associated with current or anticipated ongoing medical care services related to a patient's single, serious condition or a complex condition.    ARMEN Juan-CNP   [facility]  12/09/24 10:20 AM

## 2024-12-10 LAB
ATRIAL RATE: 91 BPM
P AXIS: 71 DEGREES
P OFFSET: 207 MS
P ONSET: 147 MS
PR INTERVAL: 140 MS
Q ONSET: 217 MS
QRS COUNT: 15 BEATS
QRS DURATION: 98 MS
QT INTERVAL: 376 MS
QTC CALCULATION(BAZETT): 462 MS
QTC FREDERICIA: 432 MS
R AXIS: 73 DEGREES
T AXIS: 59 DEGREES
T OFFSET: 405 MS
VENTRICULAR RATE: 91 BPM

## 2024-12-12 LAB
ATRIAL RATE: 85 BPM
P AXIS: 69 DEGREES
P OFFSET: 208 MS
P ONSET: 150 MS
PR INTERVAL: 136 MS
Q ONSET: 218 MS
QRS COUNT: 14 BEATS
QRS DURATION: 96 MS
QT INTERVAL: 376 MS
QTC CALCULATION(BAZETT): 447 MS
QTC FREDERICIA: 422 MS
R AXIS: 57 DEGREES
T AXIS: 76 DEGREES
T OFFSET: 406 MS
VENTRICULAR RATE: 85 BPM

## 2024-12-18 ENCOUNTER — PATIENT OUTREACH (OUTPATIENT)
Dept: PRIMARY CARE | Facility: CLINIC | Age: 84
End: 2024-12-18

## 2024-12-18 ENCOUNTER — APPOINTMENT (OUTPATIENT)
Dept: PRIMARY CARE | Facility: CLINIC | Age: 84
End: 2024-12-18
Payer: MEDICARE

## 2024-12-18 VITALS
DIASTOLIC BLOOD PRESSURE: 54 MMHG | SYSTOLIC BLOOD PRESSURE: 90 MMHG | HEART RATE: 75 BPM | OXYGEN SATURATION: 90 % | WEIGHT: 137 LBS | BODY MASS INDEX: 18.58 KG/M2 | TEMPERATURE: 96.4 F

## 2024-12-18 DIAGNOSIS — L97.311 NON-PRESSURE CHRONIC ULCER OF RIGHT ANKLE LIMITED TO BREAKDOWN OF SKIN: ICD-10-CM

## 2024-12-18 DIAGNOSIS — G91.2 NPH (NORMAL PRESSURE HYDROCEPHALUS) (MULTI): ICD-10-CM

## 2024-12-18 DIAGNOSIS — R33.9 URINARY RETENTION: ICD-10-CM

## 2024-12-18 DIAGNOSIS — J18.9 COMMUNITY ACQUIRED PNEUMONIA, UNSPECIFIED LATERALITY: Primary | ICD-10-CM

## 2024-12-18 DIAGNOSIS — I10 PRIMARY HYPERTENSION: ICD-10-CM

## 2024-12-18 PROCEDURE — 1159F MED LIST DOCD IN RCRD: CPT

## 2024-12-18 PROCEDURE — 1123F ACP DISCUSS/DSCN MKR DOCD: CPT

## 2024-12-18 PROCEDURE — 99214 OFFICE O/P EST MOD 30 MIN: CPT

## 2024-12-18 PROCEDURE — G2211 COMPLEX E/M VISIT ADD ON: HCPCS

## 2024-12-18 PROCEDURE — 1157F ADVNC CARE PLAN IN RCRD: CPT

## 2024-12-18 PROCEDURE — 3078F DIAST BP <80 MM HG: CPT

## 2024-12-18 PROCEDURE — 3074F SYST BP LT 130 MM HG: CPT

## 2024-12-18 RX ORDER — AMLODIPINE BESYLATE 5 MG/1
2.5 TABLET ORAL DAILY
Qty: 90 TABLET | Refills: 1 | Status: SHIPPED | OUTPATIENT
Start: 2024-12-18

## 2024-12-18 RX ORDER — BENZONATATE 100 MG/1
100 CAPSULE ORAL 3 TIMES DAILY PRN
Qty: 90 CAPSULE | Refills: 0 | Status: SHIPPED | OUTPATIENT
Start: 2024-12-18 | End: 2025-01-17

## 2024-12-18 ASSESSMENT — ENCOUNTER SYMPTOMS
ARTHRALGIAS: 1
NEUROLOGICAL NEGATIVE: 1
EYES NEGATIVE: 1
ALLERGIC/IMMUNOLOGIC NEGATIVE: 1
FATIGUE: 1
COUGH: 1
HEMATOLOGIC/LYMPHATIC NEGATIVE: 1
SHORTNESS OF BREATH: 1
GASTROINTESTINAL NEGATIVE: 1
ENDOCRINE NEGATIVE: 1
CARDIOVASCULAR NEGATIVE: 1
PSYCHIATRIC NEGATIVE: 1

## 2024-12-18 NOTE — PATIENT INSTRUCTIONS
Continue to see the specialist  Cut the amlodipine in half, see me for bp check in 1 month    Thank you for coming in today, if any questions or concerns arise, please call my office.   Alvaro Cedeño, ARMEN-CNP

## 2024-12-18 NOTE — PROGRESS NOTES
"Subjective   Patient ID: Sheldon Arteaga \"uLl" is a 84 y.o. male who presents for Hospital Follow-up (Too late for JOS[facility]//Accompanied by daughter) and Med Refill (Would like a refill on tessalon perles).  Subjective  Dejon Arteaga is a 84 y.o. male who presents for follow up on pneumonia. Symptoms include: shortness of breath on exertion, cough, and sputum production. Symptoms began a few days ago, and have been gradually improving since that time. Patient denies dyspnea or weight loss. Treatment thus far includes: see med rec from previous admission. Past pulmonary history is significant for occasional episodes of bronchitis, frequent episodes of bronchitis, chronic bronchitis, COPD, and emphysema.    Objective  Oxygen saturation 90% on room air  [unfilled]     Assessment/Plan  Community acquired pneumonia.    The diagnosis was discussed along with the usual course and treatment.          Vitals:    12/18/24 1132   BP: 90/54   Pulse: 75   Temp: 35.8 °C (96.4 °F)   SpO2: 90%       Review of Systems   Constitutional:  Positive for fatigue.   HENT:  Positive for congestion.    Eyes: Negative.    Respiratory:  Positive for cough and shortness of breath.    Cardiovascular: Negative.    Gastrointestinal: Negative.    Endocrine: Negative.    Genitourinary: Negative.    Musculoskeletal:  Positive for arthralgias.   Skin: Negative.    Allergic/Immunologic: Negative.    Neurological: Negative.    Hematological: Negative.    Psychiatric/Behavioral: Negative.         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     Alvaro AYERS, CNP

## 2025-01-17 ENCOUNTER — APPOINTMENT (OUTPATIENT)
Dept: CARDIOLOGY | Facility: HOSPITAL | Age: 85
End: 2025-01-17
Payer: MEDICARE

## 2025-01-17 ENCOUNTER — APPOINTMENT (OUTPATIENT)
Dept: PRIMARY CARE | Facility: CLINIC | Age: 85
End: 2025-01-17
Payer: MEDICARE

## 2025-01-17 ENCOUNTER — HOSPITAL ENCOUNTER (OUTPATIENT)
Facility: HOSPITAL | Age: 85
Setting detail: OBSERVATION
End: 2025-01-17
Attending: EMERGENCY MEDICINE | Admitting: INTERNAL MEDICINE
Payer: MEDICARE

## 2025-01-17 ENCOUNTER — APPOINTMENT (OUTPATIENT)
Dept: RADIOLOGY | Facility: HOSPITAL | Age: 85
End: 2025-01-17
Payer: MEDICARE

## 2025-01-17 VITALS
TEMPERATURE: 97.3 F | HEART RATE: 62 BPM | BODY MASS INDEX: 17.89 KG/M2 | WEIGHT: 131.9 LBS | SYSTOLIC BLOOD PRESSURE: 126 MMHG | DIASTOLIC BLOOD PRESSURE: 80 MMHG | OXYGEN SATURATION: 91 %

## 2025-01-17 DIAGNOSIS — J44.9 CHRONIC OBSTRUCTIVE PULMONARY DISEASE, UNSPECIFIED COPD TYPE (MULTI): ICD-10-CM

## 2025-01-17 DIAGNOSIS — J18.9 PNEUMONIA OF LEFT LOWER LOBE DUE TO INFECTIOUS ORGANISM: Primary | ICD-10-CM

## 2025-01-17 DIAGNOSIS — J43.9 ACUTE EXACERBATION OF EMPHYSEMA (MULTI): ICD-10-CM

## 2025-01-17 DIAGNOSIS — J18.9 COMMUNITY ACQUIRED PNEUMONIA, UNSPECIFIED LATERALITY: ICD-10-CM

## 2025-01-17 DIAGNOSIS — J16.0 CAP (COMMUNITY ACQUIRED PNEUMONIA) DUE TO CHLAMYDIA SPECIES: ICD-10-CM

## 2025-01-17 DIAGNOSIS — J41.1 MUCOPURULENT CHRONIC BRONCHITIS (MULTI): ICD-10-CM

## 2025-01-17 DIAGNOSIS — J41.8 MIXED SIMPLE AND MUCOPURULENT CHRONIC BRONCHITIS (MULTI): ICD-10-CM

## 2025-01-17 DIAGNOSIS — J18.9 COMMUNITY ACQUIRED PNEUMONIA, UNSPECIFIED LATERALITY: Primary | ICD-10-CM

## 2025-01-17 PROBLEM — N40.0 BENIGN PROSTATIC HYPERPLASIA WITHOUT LOWER URINARY TRACT SYMPTOMS: Status: ACTIVE | Noted: 2025-01-17

## 2025-01-17 LAB
ALBUMIN SERPL BCP-MCNC: 3.6 G/DL (ref 3.4–5)
ALP SERPL-CCNC: 54 U/L (ref 33–136)
ALT SERPL W P-5'-P-CCNC: 9 U/L (ref 10–52)
ANION GAP BLDV CALCULATED.4IONS-SCNC: 9 MMOL/L (ref 10–25)
ANION GAP SERPL CALC-SCNC: 13 MMOL/L (ref 10–20)
AST SERPL W P-5'-P-CCNC: 18 U/L (ref 9–39)
ATRIAL RATE: 85 BPM
BASE EXCESS BLDV CALC-SCNC: 3 MMOL/L (ref -2–3)
BASOPHILS # BLD AUTO: 0.03 X10*3/UL (ref 0–0.1)
BASOPHILS NFR BLD AUTO: 0.3 %
BILIRUB SERPL-MCNC: 0.4 MG/DL (ref 0–1.2)
BODY TEMPERATURE: ABNORMAL
BUN SERPL-MCNC: 17 MG/DL (ref 6–23)
CA-I BLDV-SCNC: 1.17 MMOL/L (ref 1.1–1.33)
CALCIUM SERPL-MCNC: 8.4 MG/DL (ref 8.6–10.3)
CARDIAC TROPONIN I PNL SERPL HS: 6 NG/L (ref 0–20)
CHLORIDE BLDV-SCNC: 104 MMOL/L (ref 98–107)
CHLORIDE SERPL-SCNC: 104 MMOL/L (ref 98–107)
CO2 SERPL-SCNC: 25 MMOL/L (ref 21–32)
CREAT SERPL-MCNC: 0.67 MG/DL (ref 0.5–1.3)
EGFRCR SERPLBLD CKD-EPI 2021: >90 ML/MIN/1.73M*2
EOSINOPHIL # BLD AUTO: 0.15 X10*3/UL (ref 0–0.4)
EOSINOPHIL NFR BLD AUTO: 1.7 %
ERYTHROCYTE [DISTWIDTH] IN BLOOD BY AUTOMATED COUNT: 14.1 % (ref 11.5–14.5)
FLUAV RNA RESP QL NAA+PROBE: NOT DETECTED
FLUBV RNA RESP QL NAA+PROBE: NOT DETECTED
GLUCOSE BLDV-MCNC: 125 MG/DL (ref 74–99)
GLUCOSE SERPL-MCNC: 127 MG/DL (ref 74–99)
HCO3 BLDV-SCNC: 28.5 MMOL/L (ref 22–26)
HCT VFR BLD AUTO: 34.7 % (ref 41–52)
HCT VFR BLD EST: 28 % (ref 41–52)
HGB BLD-MCNC: 11.1 G/DL (ref 13.5–17.5)
HGB BLDV-MCNC: 9.4 G/DL (ref 13.5–17.5)
IMM GRANULOCYTES # BLD AUTO: 0.06 X10*3/UL (ref 0–0.5)
IMM GRANULOCYTES NFR BLD AUTO: 0.7 % (ref 0–0.9)
INHALED O2 CONCENTRATION: 21 %
LACTATE BLDV-SCNC: 2.3 MMOL/L (ref 0.4–2)
LACTATE SERPL-SCNC: 1.9 MMOL/L (ref 0.4–2)
LYMPHOCYTES # BLD AUTO: 1.02 X10*3/UL (ref 0.8–3)
LYMPHOCYTES NFR BLD AUTO: 11.5 %
MAGNESIUM SERPL-MCNC: 1.98 MG/DL (ref 1.6–2.4)
MCH RBC QN AUTO: 30.6 PG (ref 26–34)
MCHC RBC AUTO-ENTMCNC: 32 G/DL (ref 32–36)
MCV RBC AUTO: 96 FL (ref 80–100)
MONOCYTES # BLD AUTO: 0.79 X10*3/UL (ref 0.05–0.8)
MONOCYTES NFR BLD AUTO: 8.9 %
NEUTROPHILS # BLD AUTO: 6.83 X10*3/UL (ref 1.6–5.5)
NEUTROPHILS NFR BLD AUTO: 76.9 %
NRBC BLD-RTO: 0 /100 WBCS (ref 0–0)
OXYHGB MFR BLDV: 58.9 % (ref 45–75)
P AXIS: 68 DEGREES
P OFFSET: 201 MS
P ONSET: 140 MS
PCO2 BLDV: 47 MM HG (ref 41–51)
PH BLDV: 7.39 PH (ref 7.33–7.43)
PLATELET # BLD AUTO: 233 X10*3/UL (ref 150–450)
PO2 BLDV: 36 MM HG (ref 35–45)
POTASSIUM BLDV-SCNC: 4.1 MMOL/L (ref 3.5–5.3)
POTASSIUM SERPL-SCNC: 4 MMOL/L (ref 3.5–5.3)
PR INTERVAL: 152 MS
PROT SERPL-MCNC: 6.5 G/DL (ref 6.4–8.2)
Q ONSET: 216 MS
QRS COUNT: 14 BEATS
QRS DURATION: 94 MS
QT INTERVAL: 384 MS
QTC CALCULATION(BAZETT): 456 MS
QTC FREDERICIA: 431 MS
R AXIS: 75 DEGREES
RBC # BLD AUTO: 3.63 X10*6/UL (ref 4.5–5.9)
SAO2 % BLDV: 61 % (ref 45–75)
SARS-COV-2 RNA RESP QL NAA+PROBE: NOT DETECTED
SODIUM BLDV-SCNC: 137 MMOL/L (ref 136–145)
SODIUM SERPL-SCNC: 138 MMOL/L (ref 136–145)
T AXIS: 81 DEGREES
T OFFSET: 408 MS
VENTRICULAR RATE: 85 BPM
WBC # BLD AUTO: 8.9 X10*3/UL (ref 4.4–11.3)

## 2025-01-17 PROCEDURE — 2500000001 HC RX 250 WO HCPCS SELF ADMINISTERED DRUGS (ALT 637 FOR MEDICARE OP): Performed by: INTERNAL MEDICINE

## 2025-01-17 PROCEDURE — 93005 ELECTROCARDIOGRAM TRACING: CPT

## 2025-01-17 PROCEDURE — 2500000001 HC RX 250 WO HCPCS SELF ADMINISTERED DRUGS (ALT 637 FOR MEDICARE OP): Performed by: EMERGENCY MEDICINE

## 2025-01-17 PROCEDURE — 96367 TX/PROPH/DG ADDL SEQ IV INF: CPT

## 2025-01-17 PROCEDURE — 36415 COLL VENOUS BLD VENIPUNCTURE: CPT | Performed by: EMERGENCY MEDICINE

## 2025-01-17 PROCEDURE — 84484 ASSAY OF TROPONIN QUANT: CPT

## 2025-01-17 PROCEDURE — 2500000002 HC RX 250 W HCPCS SELF ADMINISTERED DRUGS (ALT 637 FOR MEDICARE OP, ALT 636 FOR OP/ED): Performed by: INTERNAL MEDICINE

## 2025-01-17 PROCEDURE — 82947 ASSAY GLUCOSE BLOOD QUANT: CPT | Mod: 59

## 2025-01-17 PROCEDURE — 87075 CULTR BACTERIA EXCEPT BLOOD: CPT | Mod: 59,GEALAB | Performed by: EMERGENCY MEDICINE

## 2025-01-17 PROCEDURE — 99223 1ST HOSP IP/OBS HIGH 75: CPT | Performed by: INTERNAL MEDICINE

## 2025-01-17 PROCEDURE — 2500000004 HC RX 250 GENERAL PHARMACY W/ HCPCS (ALT 636 FOR OP/ED): Performed by: EMERGENCY MEDICINE

## 2025-01-17 PROCEDURE — 94760 N-INVAS EAR/PLS OXIMETRY 1: CPT

## 2025-01-17 PROCEDURE — 36415 COLL VENOUS BLD VENIPUNCTURE: CPT

## 2025-01-17 PROCEDURE — 80053 COMPREHEN METABOLIC PANEL: CPT

## 2025-01-17 PROCEDURE — 2500000004 HC RX 250 GENERAL PHARMACY W/ HCPCS (ALT 636 FOR OP/ED): Performed by: INTERNAL MEDICINE

## 2025-01-17 PROCEDURE — 86140 C-REACTIVE PROTEIN: CPT | Performed by: INTERNAL MEDICINE

## 2025-01-17 PROCEDURE — 99285 EMERGENCY DEPT VISIT HI MDM: CPT | Performed by: EMERGENCY MEDICINE

## 2025-01-17 PROCEDURE — 96361 HYDRATE IV INFUSION ADD-ON: CPT

## 2025-01-17 PROCEDURE — 71045 X-RAY EXAM CHEST 1 VIEW: CPT | Mod: FOREIGN READ | Performed by: RADIOLOGY

## 2025-01-17 PROCEDURE — G0378 HOSPITAL OBSERVATION PER HR: HCPCS

## 2025-01-17 PROCEDURE — 1157F ADVNC CARE PLAN IN RCRD: CPT

## 2025-01-17 PROCEDURE — 96365 THER/PROPH/DIAG IV INF INIT: CPT

## 2025-01-17 PROCEDURE — 3074F SYST BP LT 130 MM HG: CPT

## 2025-01-17 PROCEDURE — 94664 DEMO&/EVAL PT USE INHALER: CPT

## 2025-01-17 PROCEDURE — 71045 X-RAY EXAM CHEST 1 VIEW: CPT

## 2025-01-17 PROCEDURE — 87636 SARSCOV2 & INF A&B AMP PRB: CPT

## 2025-01-17 PROCEDURE — 3079F DIAST BP 80-89 MM HG: CPT

## 2025-01-17 PROCEDURE — 85025 COMPLETE CBC W/AUTO DIFF WBC: CPT

## 2025-01-17 PROCEDURE — 1123F ACP DISCUSS/DSCN MKR DOCD: CPT

## 2025-01-17 PROCEDURE — 99215 OFFICE O/P EST HI 40 MIN: CPT

## 2025-01-17 PROCEDURE — 83605 ASSAY OF LACTIC ACID: CPT

## 2025-01-17 PROCEDURE — 1159F MED LIST DOCD IN RCRD: CPT

## 2025-01-17 PROCEDURE — 83735 ASSAY OF MAGNESIUM: CPT

## 2025-01-17 PROCEDURE — 94640 AIRWAY INHALATION TREATMENT: CPT

## 2025-01-17 RX ORDER — DOCUSATE SODIUM 100 MG/1
100 CAPSULE, LIQUID FILLED ORAL DAILY
Status: DISCONTINUED | OUTPATIENT
Start: 2025-01-17 | End: 2025-01-20 | Stop reason: HOSPADM

## 2025-01-17 RX ORDER — ALBUTEROL SULFATE 0.83 MG/ML
2.5 SOLUTION RESPIRATORY (INHALATION) EVERY 2 HOUR PRN
Status: DISCONTINUED | OUTPATIENT
Start: 2025-01-17 | End: 2025-01-20 | Stop reason: HOSPADM

## 2025-01-17 RX ORDER — CEFTRIAXONE 1 G/50ML
1 INJECTION, SOLUTION INTRAVENOUS EVERY 24 HOURS
Status: DISCONTINUED | OUTPATIENT
Start: 2025-01-18 | End: 2025-01-20 | Stop reason: HOSPADM

## 2025-01-17 RX ORDER — AMLODIPINE BESYLATE 5 MG/1
2.5 TABLET ORAL DAILY
Status: DISCONTINUED | OUTPATIENT
Start: 2025-01-18 | End: 2025-01-17

## 2025-01-17 RX ORDER — METOPROLOL TARTRATE 25 MG/1
25 TABLET, FILM COATED ORAL 2 TIMES DAILY
Status: DISCONTINUED | OUTPATIENT
Start: 2025-01-17 | End: 2025-01-17

## 2025-01-17 RX ORDER — IBUPROFEN 600 MG/1
600 TABLET ORAL ONCE
Status: COMPLETED | OUTPATIENT
Start: 2025-01-17 | End: 2025-01-17

## 2025-01-17 RX ORDER — GUAIFENESIN 600 MG/1
1200 TABLET, EXTENDED RELEASE ORAL 2 TIMES DAILY
Status: ON HOLD | COMMUNITY

## 2025-01-17 RX ORDER — ATORVASTATIN CALCIUM 40 MG/1
40 TABLET, FILM COATED ORAL NIGHTLY
Status: DISCONTINUED | OUTPATIENT
Start: 2025-01-17 | End: 2025-01-20 | Stop reason: HOSPADM

## 2025-01-17 RX ORDER — TAMSULOSIN HYDROCHLORIDE 0.4 MG/1
0.8 CAPSULE ORAL DAILY
Status: DISCONTINUED | OUTPATIENT
Start: 2025-01-17 | End: 2025-01-17

## 2025-01-17 RX ORDER — PREDNISONE 20 MG/1
40 TABLET ORAL DAILY
Status: DISCONTINUED | OUTPATIENT
Start: 2025-01-17 | End: 2025-01-19

## 2025-01-17 RX ORDER — CLARITHROMYCIN 500 MG/1
500 TABLET, FILM COATED ORAL 2 TIMES DAILY
Qty: 28 TABLET | Refills: 0 | Status: ON HOLD | OUTPATIENT
Start: 2025-01-17 | End: 2025-01-31

## 2025-01-17 RX ORDER — METOPROLOL TARTRATE 25 MG/1
25 TABLET, FILM COATED ORAL DAILY
Status: DISCONTINUED | OUTPATIENT
Start: 2025-01-19 | End: 2025-01-20 | Stop reason: HOSPADM

## 2025-01-17 RX ORDER — FORMOTEROL FUMARATE DIHYDRATE 20 UG/2ML
20 SOLUTION RESPIRATORY (INHALATION)
Status: DISCONTINUED | OUTPATIENT
Start: 2025-01-17 | End: 2025-01-20 | Stop reason: HOSPADM

## 2025-01-17 RX ORDER — ALBUTEROL SULFATE 0.83 MG/ML
2.5 SOLUTION RESPIRATORY (INHALATION) EVERY 2 HOUR PRN
Status: DISCONTINUED | OUTPATIENT
Start: 2025-01-17 | End: 2025-01-17

## 2025-01-17 RX ORDER — PREDNISONE 20 MG/1
40 TABLET ORAL DAILY
Qty: 10 TABLET | Refills: 0 | Status: ON HOLD | OUTPATIENT
Start: 2025-01-17 | End: 2025-01-22

## 2025-01-17 RX ORDER — CLOPIDOGREL BISULFATE 75 MG/1
75 TABLET ORAL DAILY
Status: DISCONTINUED | OUTPATIENT
Start: 2025-01-17 | End: 2025-01-20 | Stop reason: HOSPADM

## 2025-01-17 RX ORDER — AZITHROMYCIN 250 MG/1
250 TABLET, FILM COATED ORAL
Status: DISCONTINUED | OUTPATIENT
Start: 2025-01-18 | End: 2025-01-20 | Stop reason: HOSPADM

## 2025-01-17 RX ORDER — CEFTRIAXONE 1 G/50ML
1 INJECTION, SOLUTION INTRAVENOUS ONCE
Status: COMPLETED | OUTPATIENT
Start: 2025-01-17 | End: 2025-01-17

## 2025-01-17 RX ORDER — UMECLIDINIUM BROMIDE AND VILANTEROL TRIFENATATE 62.5; 25 UG/1; UG/1
1 POWDER RESPIRATORY (INHALATION) DAILY
Qty: 30 EACH | Refills: 0 | Status: ON HOLD | OUTPATIENT
Start: 2025-01-17 | End: 2025-02-16

## 2025-01-17 RX ORDER — TAMSULOSIN HYDROCHLORIDE 0.4 MG/1
0.4 CAPSULE ORAL 2 TIMES DAILY
Status: DISCONTINUED | OUTPATIENT
Start: 2025-01-18 | End: 2025-01-20 | Stop reason: HOSPADM

## 2025-01-17 RX ORDER — ALBUTEROL SULFATE 90 UG/1
2 INHALANT RESPIRATORY (INHALATION) EVERY 4 HOURS PRN
Status: DISCONTINUED | OUTPATIENT
Start: 2025-01-17 | End: 2025-01-17

## 2025-01-17 RX ADMIN — CEFTRIAXONE SODIUM 1 G: 1 INJECTION, SOLUTION INTRAVENOUS at 16:20

## 2025-01-17 RX ADMIN — IBUPROFEN 600 MG: 600 TABLET, FILM COATED ORAL at 16:20

## 2025-01-17 RX ADMIN — CLOPIDOGREL 75 MG: 75 TABLET ORAL at 21:09

## 2025-01-17 RX ADMIN — FORMOTEROL FUMARATE DIHYDRATE 20 MCG: 20 SOLUTION RESPIRATORY (INHALATION) at 21:19

## 2025-01-17 RX ADMIN — APIXABAN 2.5 MG: 2.5 TABLET, FILM COATED ORAL at 21:09

## 2025-01-17 RX ADMIN — ATORVASTATIN CALCIUM 40 MG: 40 TABLET, FILM COATED ORAL at 21:09

## 2025-01-17 RX ADMIN — SODIUM CHLORIDE 250 ML: 9 INJECTION, SOLUTION INTRAVENOUS at 21:10

## 2025-01-17 RX ADMIN — PREDNISONE 40 MG: 20 TABLET ORAL at 21:09

## 2025-01-17 RX ADMIN — AZITHROMYCIN MONOHYDRATE 490.2 MG: 500 INJECTION, POWDER, LYOPHILIZED, FOR SOLUTION INTRAVENOUS at 16:59

## 2025-01-17 SDOH — SOCIAL STABILITY: SOCIAL INSECURITY: WITHIN THE LAST YEAR, HAVE YOU BEEN HUMILIATED OR EMOTIONALLY ABUSED IN OTHER WAYS BY YOUR PARTNER OR EX-PARTNER?: NO

## 2025-01-17 SDOH — ECONOMIC STABILITY: FOOD INSECURITY: WITHIN THE PAST 12 MONTHS, THE FOOD YOU BOUGHT JUST DIDN'T LAST AND YOU DIDN'T HAVE MONEY TO GET MORE.: NEVER TRUE

## 2025-01-17 SDOH — ECONOMIC STABILITY: FOOD INSECURITY: WITHIN THE PAST 12 MONTHS, YOU WORRIED THAT YOUR FOOD WOULD RUN OUT BEFORE YOU GOT THE MONEY TO BUY MORE.: NEVER TRUE

## 2025-01-17 SDOH — SOCIAL STABILITY: SOCIAL INSECURITY: HAVE YOU HAD THOUGHTS OF HARMING ANYONE ELSE?: NO

## 2025-01-17 SDOH — SOCIAL STABILITY: SOCIAL INSECURITY: ABUSE: ADULT

## 2025-01-17 SDOH — SOCIAL STABILITY: SOCIAL INSECURITY: DO YOU FEEL UNSAFE GOING BACK TO THE PLACE WHERE YOU ARE LIVING?: NO

## 2025-01-17 SDOH — SOCIAL STABILITY: SOCIAL INSECURITY: WITHIN THE LAST YEAR, HAVE YOU BEEN AFRAID OF YOUR PARTNER OR EX-PARTNER?: NO

## 2025-01-17 SDOH — ECONOMIC STABILITY: INCOME INSECURITY: IN THE PAST 12 MONTHS HAS THE ELECTRIC, GAS, OIL, OR WATER COMPANY THREATENED TO SHUT OFF SERVICES IN YOUR HOME?: NO

## 2025-01-17 SDOH — SOCIAL STABILITY: SOCIAL INSECURITY: DOES ANYONE TRY TO KEEP YOU FROM HAVING/CONTACTING OTHER FRIENDS OR DOING THINGS OUTSIDE YOUR HOME?: NO

## 2025-01-17 SDOH — SOCIAL STABILITY: SOCIAL INSECURITY: WERE YOU ABLE TO COMPLETE ALL THE BEHAVIORAL HEALTH SCREENINGS?: YES

## 2025-01-17 SDOH — SOCIAL STABILITY: SOCIAL INSECURITY: ARE THERE ANY APPARENT SIGNS OF INJURIES/BEHAVIORS THAT COULD BE RELATED TO ABUSE/NEGLECT?: NO

## 2025-01-17 SDOH — SOCIAL STABILITY: SOCIAL INSECURITY: DO YOU FEEL ANYONE HAS EXPLOITED OR TAKEN ADVANTAGE OF YOU FINANCIALLY OR OF YOUR PERSONAL PROPERTY?: NO

## 2025-01-17 SDOH — SOCIAL STABILITY: SOCIAL INSECURITY: ARE YOU OR HAVE YOU BEEN THREATENED OR ABUSED PHYSICALLY, EMOTIONALLY, OR SEXUALLY BY ANYONE?: NO

## 2025-01-17 SDOH — SOCIAL STABILITY: SOCIAL INSECURITY: HAS ANYONE EVER THREATENED TO HURT YOUR FAMILY OR YOUR PETS?: NO

## 2025-01-17 ASSESSMENT — ENCOUNTER SYMPTOMS
CHEST TIGHTNESS: 1
NEUROLOGICAL NEGATIVE: 1
WHEEZING: 1
ENDOCRINE NEGATIVE: 1
SINUS PAIN: 0
CARDIOVASCULAR NEGATIVE: 1
PSYCHIATRIC NEGATIVE: 1
GASTROINTESTINAL NEGATIVE: 1
DYSURIA: 0
FATIGUE: 1
ACTIVITY CHANGE: 1
UNEXPECTED WEIGHT CHANGE: 1
APPETITE CHANGE: 1
MYALGIAS: 0
FATIGUE: 1
EYES NEGATIVE: 1
CHEST TIGHTNESS: 0
HEMATOLOGIC/LYMPHATIC NEGATIVE: 1
SHORTNESS OF BREATH: 1
ABDOMINAL DISTENTION: 0
ARTHRALGIAS: 0
DIFFICULTY URINATING: 0
SHORTNESS OF BREATH: 1
COUGH: 1
ARTHRALGIAS: 1
JOINT SWELLING: 0
BACK PAIN: 0
ALLERGIC/IMMUNOLOGIC NEGATIVE: 1
SINUS PRESSURE: 1
PALPITATIONS: 0
WHEEZING: 1
ACTIVITY CHANGE: 1

## 2025-01-17 ASSESSMENT — PAIN - FUNCTIONAL ASSESSMENT
PAIN_FUNCTIONAL_ASSESSMENT: 0-10
PAIN_FUNCTIONAL_ASSESSMENT: 0-10

## 2025-01-17 ASSESSMENT — ACTIVITIES OF DAILY LIVING (ADL)
GROOMING: INDEPENDENT
DRESSING YOURSELF: INDEPENDENT
PATIENT'S MEMORY ADEQUATE TO SAFELY COMPLETE DAILY ACTIVITIES?: YES
ASSISTIVE_DEVICE: DENTURES UPPER;DENTURES LOWER
HEARING - RIGHT EAR: FUNCTIONAL
ADEQUATE_TO_COMPLETE_ADL: YES
HEARING - LEFT EAR: FUNCTIONAL
LACK_OF_TRANSPORTATION: NO
WALKS IN HOME: INDEPENDENT
TOILETING: INDEPENDENT
BATHING: INDEPENDENT
FEEDING YOURSELF: INDEPENDENT
JUDGMENT_ADEQUATE_SAFELY_COMPLETE_DAILY_ACTIVITIES: YES

## 2025-01-17 ASSESSMENT — PAIN SCALES - GENERAL
PAINLEVEL_OUTOF10: 6
PAINLEVEL_OUTOF10: 0 - NO PAIN

## 2025-01-17 ASSESSMENT — COGNITIVE AND FUNCTIONAL STATUS - GENERAL
PATIENT BASELINE BEDBOUND: NO
MOBILITY SCORE: 24
DAILY ACTIVITIY SCORE: 24

## 2025-01-17 ASSESSMENT — LIFESTYLE VARIABLES
EVER FELT BAD OR GUILTY ABOUT YOUR DRINKING: NO
HOW MANY STANDARD DRINKS CONTAINING ALCOHOL DO YOU HAVE ON A TYPICAL DAY: PATIENT DOES NOT DRINK
HAVE YOU EVER FELT YOU SHOULD CUT DOWN ON YOUR DRINKING: NO
SKIP TO QUESTIONS 9-10: 1
AUDIT-C TOTAL SCORE: 0
HOW OFTEN DO YOU HAVE A DRINK CONTAINING ALCOHOL: NEVER
HAVE PEOPLE ANNOYED YOU BY CRITICIZING YOUR DRINKING: NO
HOW OFTEN DO YOU HAVE 6 OR MORE DRINKS ON ONE OCCASION: NEVER
EVER HAD A DRINK FIRST THING IN THE MORNING TO STEADY YOUR NERVES TO GET RID OF A HANGOVER: NO
TOTAL SCORE: 0
AUDIT-C TOTAL SCORE: 0

## 2025-01-17 ASSESSMENT — PAIN DESCRIPTION - PAIN TYPE: TYPE: ACUTE PAIN

## 2025-01-17 ASSESSMENT — PAIN DESCRIPTION - PROGRESSION: CLINICAL_PROGRESSION: NOT CHANGED

## 2025-01-17 ASSESSMENT — PAIN DESCRIPTION - LOCATION: LOCATION: GENERALIZED

## 2025-01-17 NOTE — ED PROVIDER NOTES
History of Present Illness     History provided by: Patient  Limitations to History: None  External Records Reviewed with Brief Summary: Discharge Summary from most recent hospitalization which showed pertinent PMHx. Primary care note which showed indication for presentation.    HPI:  Sheldon Arteaga is a 84 y.o. male history of HTN, HLD, atrial fibrillation, CVA, DVT, PAD s/p resection of fem-pop bypass, and COPD without PFTs documented. Patient presents with a ten day history of decreased PO intake, taste change, dyspnea, cough with green/yellow sputum, headache, and pleuritic chest pain with cough. Hypoxic to 91% on pulse ox at PCP who urged patient to present to ED. Treated for COPD exacerbation in 11/2024.    Physical Exam   Triage vitals:  T 36.8 °C (98.2 °F)  HR 85  /67  RR 16  O2 (!) 93 % None (Room air)    Physical Exam  Constitutional:       General: He is not in acute distress.     Appearance: Normal appearance. He is ill-appearing.   HENT:      Head: Normocephalic and atraumatic.      Nose: Nose normal.      Mouth/Throat:      Mouth: Mucous membranes are dry.      Pharynx: Oropharynx is clear.   Eyes:      Extraocular Movements: Extraocular movements intact.      Pupils: Pupils are equal, round, and reactive to light.   Cardiovascular:      Rate and Rhythm: Normal rate. Rhythm irregular.      Pulses: Normal pulses.      Heart sounds: Normal heart sounds. No murmur heard.     No friction rub. No gallop.   Pulmonary:      Effort: No respiratory distress.      Breath sounds: Wheezing and rhonchi present. No rales.   Chest:      Chest wall: Tenderness present.   Abdominal:      General: Abdomen is flat. Bowel sounds are normal.      Palpations: Abdomen is soft.   Musculoskeletal:         General: Normal range of motion.      Right lower leg: No edema.      Left lower leg: No edema.   Skin:     General: Skin is warm and dry.      Capillary Refill: Capillary refill takes less than 2 seconds.    Neurological:      General: No focal deficit present.      Mental Status: He is alert and oriented to person, place, and time. Mental status is at baseline.        Medical Decision Making & ED Course   Medical Decision Makin y.o. male hx htn, hld, copd, afib, cva, dvt, pad s/p bypass, presenting for acute cough and dyspnea. Poor PO intake 3-4 days 2/2 taste change. Favor COPD exacerbation ISO acute COVID vs URI infection, differential includes ACS, heart failure exacerbation, bacterial pneumonia. Routine labs and XR chest, viral swabs. Unclear dispo pending workup, probably stable for DC home. Patient stable and signed out to covering attending physician.  ----  Scoring Tools Utilized: None    Differential diagnoses considered include but are not limited to: COPD exacerbation, ACS, heart failure exacerbation, bacterial pneumonia     Social Determinants of Health which Significantly Impact Care: None identified The following actions were taken to address these social determinants: None    EKG Independent Interpretation: EKG interpreted by myself. Please see ED Course for full interpretation.    Independent Result Review and Interpretation: Relevant laboratory and radiographic results were reviewed and independently interpreted by myself.  As necessary, they are commented on in the ED Course.    Chronic conditions affecting the patient's care: As documented above in MDM    The patient was discussed with the following consultants/services: None    Care Considerations: As documented above in MDM    ED Course:  Labs -   Labs Reviewed   CBC WITH AUTO DIFFERENTIAL   MAGNESIUM   COMPREHENSIVE METABOLIC PANEL   LACTATE   TROPONIN I, HIGH SENSITIVITY   BLOOD GAS VENOUS FULL PANEL   SARS-COV-2 AND INFLUENZA A/B PCR     Imaging -   XR chest 1 view   Final Result   Question left lung infiltrate. Pulmonary hyperinflation.   Signed by Franck Gipson MD         Interventions - Medications - No data to display  Disposition    Pending further workup. Likely stable for discharge home.    Patient seen and discussed with ED attending physician.      Uday Winters MD  Resident  01/17/25 1400

## 2025-01-17 NOTE — H&P
History Of Present Illness    Sheldon Arteaga is a 84 y.o. male with a PMHx of HTN, HLD, COPD (not on home O2, recently started albuterol inhalers), Afib on Eliquis , DVT, possible HFpEF (EF 60-65%,), CVA (2022), PAD s/p resection of fem-pop bypass with right CFA to left profunda bypass with reversed right femoral vein (5/2022) who presented to [facility] on 1/17/2025  with a chief complaint of worsening productive cough and SOB. Patient had been to his PCP today who sent him to the ED with the presumptive Dx of CAP. He has had no chills or rigors. He says he has had a head cold since 1/1/2025. His cough is not productive.  He is UTD on influenza but not RSV or COVID.        Past Medical History  He has a past medical history of (HFpEF) heart failure with preserved ejection fraction, A-fib (Multi), COPD (chronic obstructive pulmonary disease) (Multi), Hypertension, and Stroke (Multi).    Surgical History  He has a past surgical history that includes Total hip arthroplasty (08/31/2018); Other surgical history (09/17/2019); Other surgical history (09/17/2019); Other surgical history (01/17/2020); Other surgical history (01/17/2020); CT angio aorta and bilateral iliofemoral runoff including without contrast if performed (04/10/2022); CT angio aorta and bilateral iliofemoral runoff including without contrast if performed (04/13/2022); CT angio head w and wo IV contrast (04/19/2022); CT angio neck (04/19/2022); CT angio aorta and bilateral iliofemoral runoff including without contrast if performed (05/22/2022); CT angio aorta and bilateral iliofemoral runoff including without contrast if performed (04/02/2023); and Femoral bypass.     Social History  He reports that he has been smoking cigarettes. He started smoking about 50 years ago. He has a 50 pack-year smoking history. He has never used smokeless tobacco. He reports that he does not drink alcohol and does not use drugs.    Family History  Family History    Problem Relation Name Age of Onset    Heart attack Mother      Colon cancer Father      Heart attack Brother      Heart block Brother       Immunization History   Administered Date(s) Administered    Flu vaccine (IIV4), preservative free *Check age/dose* 09/11/2017, 10/02/2019, 09/14/2020    Flu vaccine, quadrivalent, high-dose, preservative free, age 65y+ (FLUZONE) 10/29/2021, 11/11/2022    Flu vaccine, trivalent, preservative free, HIGH-DOSE, age 65y+ (Fluzone) 09/16/2014, 10/29/2021, 11/11/2022    Influenza, Seasonal, Quadrivalent, Adjuvanted 10/23/2023    Influenza, Unspecified 11/03/2010, 11/13/2013    Influenza, seasonal, injectable 10/03/2012, 12/09/2015, 10/03/2016    Influenza, trivalent, adjuvanted 10/16/2018    Moderna SARS-CoV-2 Vaccination 02/27/2021, 03/27/2021, 12/02/2021    Pneumococcal conjugate vaccine, 13-valent (PREVNAR 13) 12/09/2015          Allergies  Doxycycline, T-painol extra strength, and Levaquin [levofloxacin]    Review of Systems   Constitutional:  Positive for activity change and fatigue.   HENT:  Positive for sinus pressure. Negative for sinus pain.    Respiratory:  Positive for cough, shortness of breath and wheezing. Negative for chest tightness.    Cardiovascular:  Negative for chest pain, palpitations and leg swelling.   Gastrointestinal:  Negative for abdominal distention.   Genitourinary:  Negative for difficulty urinating, dysuria and enuresis.   Musculoskeletal:  Negative for arthralgias, back pain, gait problem, joint swelling and myalgias.   All other systems reviewed and are negative.       Physical Exam  Vitals and nursing note reviewed.   Constitutional:       General: He is not in acute distress.     Appearance: Normal appearance. He is normal weight.   HENT:      Head: Normocephalic and atraumatic.      Nose: Nose normal.      Mouth/Throat:      Mouth: Mucous membranes are moist.   Eyes:      Extraocular Movements: Extraocular movements intact.      Pupils: Pupils are  equal, round, and reactive to light.   Cardiovascular:      Rate and Rhythm: Normal rate and regular rhythm.      Pulses: Normal pulses.   Pulmonary:      Effort: Pulmonary effort is normal.      Breath sounds: Wheezing and rhonchi present.   Abdominal:      General: Abdomen is flat.      Palpations: Abdomen is soft.   Musculoskeletal:         General: Normal range of motion.      Cervical back: Normal range of motion.   Skin:     General: Skin is warm.      Capillary Refill: Capillary refill takes less than 2 seconds.   Neurological:      General: No focal deficit present.      Mental Status: He is alert and oriented to person, place, and time.      Cranial Nerves: No cranial nerve deficit.   Psychiatric:         Mood and Affect: Mood normal.          Last Recorded Vitals  /68 (BP Location: Left arm, Patient Position: Sitting)   Pulse 86   Temp (!) 38.5 °C (101.3 °F) (Temporal)   Resp 19   Wt 59.4 kg (131 lb)   SpO2 96%     Relevant Results    Results for orders placed or performed during the hospital encounter of 01/17/25 (from the past 24 hours)   CBC and Auto Differential   Result Value Ref Range    WBC 8.9 4.4 - 11.3 x10*3/uL    nRBC 0.0 0.0 - 0.0 /100 WBCs    RBC 3.63 (L) 4.50 - 5.90 x10*6/uL    Hemoglobin 11.1 (L) 13.5 - 17.5 g/dL    Hematocrit 34.7 (L) 41.0 - 52.0 %    MCV 96 80 - 100 fL    MCH 30.6 26.0 - 34.0 pg    MCHC 32.0 32.0 - 36.0 g/dL    RDW 14.1 11.5 - 14.5 %    Platelets 233 150 - 450 x10*3/uL    Neutrophils % 76.9 40.0 - 80.0 %    Immature Granulocytes %, Automated 0.7 0.0 - 0.9 %    Lymphocytes % 11.5 13.0 - 44.0 %    Monocytes % 8.9 2.0 - 10.0 %    Eosinophils % 1.7 0.0 - 6.0 %    Basophils % 0.3 0.0 - 2.0 %    Neutrophils Absolute 6.83 (H) 1.60 - 5.50 x10*3/uL    Immature Granulocytes Absolute, Automated 0.06 0.00 - 0.50 x10*3/uL    Lymphocytes Absolute 1.02 0.80 - 3.00 x10*3/uL    Monocytes Absolute 0.79 0.05 - 0.80 x10*3/uL    Eosinophils Absolute 0.15 0.00 - 0.40 x10*3/uL     Basophils Absolute 0.03 0.00 - 0.10 x10*3/uL   Magnesium   Result Value Ref Range    Magnesium 1.98 1.60 - 2.40 mg/dL   Comprehensive metabolic panel   Result Value Ref Range    Glucose 127 (H) 74 - 99 mg/dL    Sodium 138 136 - 145 mmol/L    Potassium 4.0 3.5 - 5.3 mmol/L    Chloride 104 98 - 107 mmol/L    Bicarbonate 25 21 - 32 mmol/L    Anion Gap 13 10 - 20 mmol/L    Urea Nitrogen 17 6 - 23 mg/dL    Creatinine 0.67 0.50 - 1.30 mg/dL    eGFR >90 >60 mL/min/1.73m*2    Calcium 8.4 (L) 8.6 - 10.3 mg/dL    Albumin 3.6 3.4 - 5.0 g/dL    Alkaline Phosphatase 54 33 - 136 U/L    Total Protein 6.5 6.4 - 8.2 g/dL    AST 18 9 - 39 U/L    Bilirubin, Total 0.4 0.0 - 1.2 mg/dL    ALT 9 (L) 10 - 52 U/L   Lactate   Result Value Ref Range    Lactate 1.9 0.4 - 2.0 mmol/L   Troponin I, High Sensitivity   Result Value Ref Range    Troponin I, High Sensitivity 6 0 - 20 ng/L   Blood Gas Venous Full Panel   Result Value Ref Range    POCT pH, Venous 7.39 7.33 - 7.43 pH    POCT pCO2, Venous 47 41 - 51 mm Hg    POCT pO2, Venous 36 35 - 45 mm Hg    POCT SO2, Venous 61 45 - 75 %    POCT Oxy Hemoglobin, Venous 58.9 45.0 - 75.0 %    POCT Hematocrit Calculated, Venous 28.0 (L) 41.0 - 52.0 %    POCT Sodium, Venous 137 136 - 145 mmol/L    POCT Potassium, Venous 4.1 3.5 - 5.3 mmol/L    POCT Chloride, Venous 104 98 - 107 mmol/L    POCT Ionized Calicum, Venous 1.17 1.10 - 1.33 mmol/L    POCT Glucose, Venous 125 (H) 74 - 99 mg/dL    POCT Lactate, Venous 2.3 (H) 0.4 - 2.0 mmol/L    POCT Base Excess, Venous 3.0 -2.0 - 3.0 mmol/L    POCT HCO3 Calculated, Venous 28.5 (H) 22.0 - 26.0 mmol/L    POCT Hemoglobin, Venous 9.4 (L) 13.5 - 17.5 g/dL    POCT Anion Gap, Venous 9.0 (L) 10.0 - 25.0 mmol/L    Patient Temperature      FiO2 21 %   Sars-CoV-2 and Influenza A/B PCR   Result Value Ref Range    Flu A Result Not Detected Not Detected    Flu B Result Not Detected Not Detected    Coronavirus 2019, PCR Not Detected Not Detected   ECG 12 lead   Result Value Ref  Range    Ventricular Rate 85 BPM    Atrial Rate 85 BPM    AZ Interval 152 ms    QRS Duration 94 ms    QT Interval 384 ms    QTC Calculation(Bazett) 456 ms    P Axis 68 degrees    R Axis 75 degrees    T Axis 81 degrees    QRS Count 14 beats    Q Onset 216 ms    P Onset 140 ms    P Offset 201 ms    T Offset 408 ms    QTC Fredericia 431 ms         ECG 12 lead    Result Date: 1/17/2025  Normal sinus rhythm Normal ECG When compared with ECG of 17-JAN-2025 14:15, (unconfirmed) No significant change was found    XR chest 1 view    Result Date: 1/17/2025  STUDY: Chest Radiograph;  0/17/2025 1:26PM INDICATION: Dyspnea and flu-like symptoms. COMPARISON: XR Chest: 11/12/2024, 11/9/2024, 10/22/2024 CTA Chest: 4/10/2021 ACCESSION NUMBER(S): [id number] ORDERING CLINICIAN: RICK BALDWIN TECHNIQUE:  Frontal chest was obtained at 13:26 hours. FINDINGS: CARDIOMEDIASTINAL SILHOUETTE: Cardiomediastinal silhouette is normal in size and configuration.  LUNGS: Lungs again noted be hyperinflated.  Questionable infiltrate within the left lung.  ABDOMEN: No remarkable upper abdominal findings.  BONES: No acute osseous changes.    Question left lung infiltrate. Pulmonary hyperinflation. Signed by Franck iGpson MD          Assessment/Plan   Assessment & Plan  CAP (community acquired pneumonia) due to Chlamydia species    Pneumonia of left lower lobe due to infectious organism    Paroxysmal atrial fibrillation (Multi)    PAD (peripheral artery disease) (CMS-HCC)    Fatigue    COPD (chronic obstructive pulmonary disease) (Multi)    Hyperlipidemia    Benign essential hypertension    Benign prostatic hyperplasia without lower urinary tract symptoms    Plan:    #CAP  Will continue home COPD meds.   Will treat with ceftriaxone and azithromycin.   Not severely ill but will watch closely. Not on Oxygen. Blood cx pending.   Will check Strep pneumo antigen     #COPD  While not clearly exacerbation, does have some wheezes. Will give low dose  oral prednisone x 5 days.     #PAF  Continue Eliquis. Currently in sinus rhythm.     #PAD  Stable.    #HTN   Stable. Continue home meds    #BPH  Continue flomax    #HFpEF  Stable     #DVT: Prophy: on Eliquis  #Diet: Regualr  #Code Status: POA is a daughter. Patient is DNR    Rambo Liz MD, PhD,, [facility], [facility] Internal Medicine Residency Program   Attending Physician, Hospital Medicine

## 2025-01-17 NOTE — PROGRESS NOTES
"Subjective   Patient ID: Sheldon Arteaga \"Dejon\" is a 84 y.o. male who presents for Follow-up (1 month BP) and Hypotension (Feeling \"bad\" after starting his change in med for amlodipine. Very tired, headache, eye pain, poor appetite. Negative for covid. Concerned for carbon monoxide poisoning.).  Patient presents for blood pressure follow-up today, he presents with his daughter    He has ongoing fatigue and weakness and lack of appetite, no complaints of fever  On exam patient appears acutely ill, pallor present, rhonchi and rales present bilateral lower lobes with cough    Pulse ox is 91% on room air, recommend patient go straight to the ER, offered ambulance service but patient declines, offered stat chest x-ray patient declines, will send medication for short dose of prednisone as well as antibiotic management but I have highly recommended the patient go straight to the ER for risk of worsening condition    Patient understands risks of being driven by family rather than being monitored by ambulance service        Vitals:    01/17/25 1029   BP: 126/80   Pulse: 62   Temp: 36.3 °C (97.3 °F)   SpO2: 91%       Review of Systems   Constitutional:  Positive for activity change, appetite change, fatigue and unexpected weight change.   HENT:  Positive for congestion.    Eyes: Negative.    Respiratory:  Positive for chest tightness, shortness of breath and wheezing.    Cardiovascular: Negative.    Gastrointestinal: Negative.    Endocrine: Negative.    Genitourinary: Negative.    Musculoskeletal:  Positive for arthralgias.   Skin: Negative.    Allergic/Immunologic: Negative.    Neurological: Negative.    Hematological: Negative.    Psychiatric/Behavioral: Negative.         Objective   Physical Exam  Cardiovascular:      Rate and Rhythm: Regular rhythm. Tachycardia present.   Pulmonary:      Effort: No respiratory distress.      Breath sounds: Stridor present. Wheezing, rhonchi and rales present.   Chest:      Chest " wall: Tenderness present.         Assessment/Plan   Problem List Items Addressed This Visit       COPD (chronic obstructive pulmonary disease) (Multi)    Relevant Medications    umeclidinium-vilanteroL (Anoro Ellipta) 62.5-25 mcg/actuation blister with device    Community acquired pneumonia - Primary    Relevant Medications    predniSONE (Deltasone) 20 mg tablet    clarithromycin (Biaxin) 500 mg tablet            Thank you for coming in today, please call my office if you have any concerns or questions.     Alvaro AYERS, CNP

## 2025-01-17 NOTE — PATIENT INSTRUCTIONS
Recommend to go to the ER for further evaluation  Antibiotic sent to your pharmacy    Thank you for coming in today, if any questions or concerns arise, please call my office.   ARMEN Oliveira-CNP

## 2025-01-18 LAB
CRP SERPL-MCNC: 9.16 MG/DL
MRSA DNA SPEC QL NAA+PROBE: NOT DETECTED

## 2025-01-18 PROCEDURE — 2500000002 HC RX 250 W HCPCS SELF ADMINISTERED DRUGS (ALT 637 FOR MEDICARE OP, ALT 636 FOR OP/ED): Performed by: INTERNAL MEDICINE

## 2025-01-18 PROCEDURE — 9420000001 HC RT PATIENT EDUCATION 5 MIN

## 2025-01-18 PROCEDURE — 87899 AGENT NOS ASSAY W/OPTIC: CPT | Mod: GEALAB | Performed by: INTERNAL MEDICINE

## 2025-01-18 PROCEDURE — 2500000001 HC RX 250 WO HCPCS SELF ADMINISTERED DRUGS (ALT 637 FOR MEDICARE OP): Performed by: INTERNAL MEDICINE

## 2025-01-18 PROCEDURE — 94760 N-INVAS EAR/PLS OXIMETRY 1: CPT

## 2025-01-18 PROCEDURE — 94640 AIRWAY INHALATION TREATMENT: CPT

## 2025-01-18 PROCEDURE — 2500000004 HC RX 250 GENERAL PHARMACY W/ HCPCS (ALT 636 FOR OP/ED): Performed by: INTERNAL MEDICINE

## 2025-01-18 PROCEDURE — 87449 NOS EACH ORGANISM AG IA: CPT | Mod: GEALAB | Performed by: INTERNAL MEDICINE

## 2025-01-18 PROCEDURE — 99232 SBSQ HOSP IP/OBS MODERATE 35: CPT | Performed by: INTERNAL MEDICINE

## 2025-01-18 PROCEDURE — 94667 MNPJ CHEST WALL 1ST: CPT

## 2025-01-18 PROCEDURE — 87640 STAPH A DNA AMP PROBE: CPT | Mod: 59 | Performed by: INTERNAL MEDICINE

## 2025-01-18 PROCEDURE — G0378 HOSPITAL OBSERVATION PER HR: HCPCS

## 2025-01-18 RX ORDER — CALCIUM CARBONATE 200(500)MG
500 TABLET,CHEWABLE ORAL 4 TIMES DAILY PRN
Status: DISCONTINUED | OUTPATIENT
Start: 2025-01-18 | End: 2025-01-20 | Stop reason: HOSPADM

## 2025-01-18 RX ORDER — ONDANSETRON 4 MG/1
4 TABLET, FILM COATED ORAL EVERY 8 HOURS PRN
Status: DISCONTINUED | OUTPATIENT
Start: 2025-01-18 | End: 2025-01-20 | Stop reason: HOSPADM

## 2025-01-18 RX ORDER — IPRATROPIUM BROMIDE AND ALBUTEROL SULFATE 2.5; .5 MG/3ML; MG/3ML
3 SOLUTION RESPIRATORY (INHALATION)
Status: DISCONTINUED | OUTPATIENT
Start: 2025-01-18 | End: 2025-01-18

## 2025-01-18 RX ORDER — ALUMINUM HYDROXIDE, MAGNESIUM HYDROXIDE, AND SIMETHICONE 1200; 120; 1200 MG/30ML; MG/30ML; MG/30ML
30 SUSPENSION ORAL EVERY 6 HOURS PRN
Status: DISCONTINUED | OUTPATIENT
Start: 2025-01-18 | End: 2025-01-20 | Stop reason: HOSPADM

## 2025-01-18 RX ORDER — GUAIFENESIN/DEXTROMETHORPHAN 100-10MG/5
5 SYRUP ORAL EVERY 4 HOURS PRN
Status: DISCONTINUED | OUTPATIENT
Start: 2025-01-18 | End: 2025-01-19

## 2025-01-18 RX ORDER — GUAIFENESIN 600 MG/1
600 TABLET, EXTENDED RELEASE ORAL EVERY 12 HOURS PRN
Status: DISCONTINUED | OUTPATIENT
Start: 2025-01-18 | End: 2025-01-19

## 2025-01-18 RX ORDER — ONDANSETRON HYDROCHLORIDE 2 MG/ML
4 INJECTION, SOLUTION INTRAVENOUS EVERY 8 HOURS PRN
Status: DISCONTINUED | OUTPATIENT
Start: 2025-01-18 | End: 2025-01-20 | Stop reason: HOSPADM

## 2025-01-18 RX ORDER — IPRATROPIUM BROMIDE AND ALBUTEROL SULFATE 2.5; .5 MG/3ML; MG/3ML
3 SOLUTION RESPIRATORY (INHALATION)
Status: DISCONTINUED | OUTPATIENT
Start: 2025-01-19 | End: 2025-01-20 | Stop reason: HOSPADM

## 2025-01-18 RX ADMIN — APIXABAN 2.5 MG: 2.5 TABLET, FILM COATED ORAL at 09:26

## 2025-01-18 RX ADMIN — APIXABAN 2.5 MG: 2.5 TABLET, FILM COATED ORAL at 22:11

## 2025-01-18 RX ADMIN — TAMSULOSIN HYDROCHLORIDE 0.4 MG: 0.4 CAPSULE ORAL at 22:10

## 2025-01-18 RX ADMIN — FORMOTEROL FUMARATE DIHYDRATE 20 MCG: 20 SOLUTION RESPIRATORY (INHALATION) at 10:15

## 2025-01-18 RX ADMIN — TIOTROPIUM BROMIDE INHALATION SPRAY 2 PUFF: 3.12 SPRAY, METERED RESPIRATORY (INHALATION) at 11:51

## 2025-01-18 RX ADMIN — FORMOTEROL FUMARATE DIHYDRATE 20 MCG: 20 SOLUTION RESPIRATORY (INHALATION) at 19:52

## 2025-01-18 RX ADMIN — AZITHROMYCIN DIHYDRATE 250 MG: 250 TABLET, FILM COATED ORAL at 18:10

## 2025-01-18 RX ADMIN — PREDNISONE 40 MG: 20 TABLET ORAL at 09:26

## 2025-01-18 RX ADMIN — CLOPIDOGREL 75 MG: 75 TABLET ORAL at 09:26

## 2025-01-18 RX ADMIN — DOCUSATE SODIUM 100 MG: 100 CAPSULE, LIQUID FILLED ORAL at 09:24

## 2025-01-18 RX ADMIN — TAMSULOSIN HYDROCHLORIDE 0.4 MG: 0.4 CAPSULE ORAL at 09:26

## 2025-01-18 RX ADMIN — CEFTRIAXONE SODIUM 1 G: 1 INJECTION, SOLUTION INTRAVENOUS at 18:14

## 2025-01-18 RX ADMIN — ATORVASTATIN CALCIUM 40 MG: 40 TABLET, FILM COATED ORAL at 22:11

## 2025-01-18 RX ADMIN — IPRATROPIUM BROMIDE AND ALBUTEROL SULFATE 3 ML: 2.5; .5 SOLUTION RESPIRATORY (INHALATION) at 19:51

## 2025-01-18 ASSESSMENT — PAIN SCALES - GENERAL
PAINLEVEL_OUTOF10: 0 - NO PAIN
PAINLEVEL_OUTOF10: 0 - NO PAIN

## 2025-01-18 ASSESSMENT — ACTIVITIES OF DAILY LIVING (ADL): LACK_OF_TRANSPORTATION: NO

## 2025-01-18 ASSESSMENT — PAIN - FUNCTIONAL ASSESSMENT: PAIN_FUNCTIONAL_ASSESSMENT: 0-10

## 2025-01-18 NOTE — PROGRESS NOTES
01/18/25 1531   Discharge Planning   Living Arrangements Children  (Home with daughter)   Support Systems Children   Assistance Needed Patient is A&0x4, independent with ADLs, no DME, drives, room air, no cpap or bipap, not active with any HHC, PCP is Alvaro Cedeño   Type of Residence Private residence   Number of Stairs to Enter Residence 3   Number of Stairs Within Residence 0   Do you have animals or pets at home? Yes   Type of Animals or Pets 3 cats 1 dog   Who is requesting discharge planning? Provider   Home or Post Acute Services None   Expected Discharge Disposition Home  (denies any HHC needs)   Does the patient need discharge transport arranged? No   Financial Resource Strain   How hard is it for you to pay for the very basics like food, housing, medical care, and heating? Not very   Housing Stability   In the last 12 months, was there a time when you were not able to pay the mortgage or rent on time? N   In the past 12 months, how many times have you moved where you were living? 0   At any time in the past 12 months, were you homeless or living in a shelter (including now)? N   Transportation Needs   In the past 12 months, has lack of transportation kept you from medical appointments or from getting medications? no   In the past 12 months, has lack of transportation kept you from meetings, work, or from getting things needed for daily living? No   Stroke Family Assessment   Stroke Family Assessment Needed No   Intensity of Service   Intensity of Service 0-30 min

## 2025-01-18 NOTE — PROGRESS NOTES
[facility] Hospitalist Progress Note       [id number]: Please page me for patient care issues.  [phone]: Please page night hospitalist for any issues.     Sheldon Arteaga  :  1940(84 y.o.)  MRN:  [id number]  PCP: Alvaro Cedeño, ARMEN-CNP  LOS: 0  day(s) since admission    Assessment & Plan  CAP (community acquired pneumonia) due to Chlamydia species    Pneumonia of left lower lobe due to infectious organism    Paroxysmal atrial fibrillation (Multi)    PAD (peripheral artery disease) (CMS-[facility])    Fatigue    COPD (chronic obstructive pulmonary disease) (Multi)    Hyperlipidemia    Benign essential hypertension    Benign prostatic hyperplasia without lower urinary tract symptoms      Assessment and Plan:     Sheldon Arteaga is a 84 y.o. male with a PMHx of HTN, HLD, COPD (not on home O2, recently started albuterol inhalers), Afib on Eliquis , DVT, possible HFpEF (EF 60-65%,), CVA (), PAD s/p resection of fem-pop bypass with right CFA to left profunda bypass with reversed right femoral vein (2022) who presented to [facility] on 2025  with a chief complaint of worsening productive cough and SOB. Patient had been to his PCP today who sent him to the ED with the presumptive Dx of CAP. He has had no chills or rigors. He says he has had a head cold since 2025. His cough is not productive.  He is UTD on influenza but not RSV or COVID.    is a 84 y.o. male    #CAP (suspect gram-negative)  #AECOPD  #Chronic HFpEF  #Paroxysmal A-fib on apixaban, currently NSR  #PAD  #BPH  #HTN  #Underweight BMI <18.5 with serious co morbidities  , Body mass index is 16 kg/m².  -Ceftriaxone and azithromycin pending cultures  -On prednisone, bronchodilators  -Resume rest of home meds as indicated    Disposition: await test results, await consultant recommendations, await clinical improvement, and await placement    DVT Prophylaxis: full anticoagulation [place]    Code status: DNR and No Intubation  Diet:  Adult diet Regular    Level of MDM:  High    discussed with nurse, discussed with TCC/SW, I personally examined the patient, and I personally reviewed chart, data, labs radiology reports    Family Communication  Number :  Name of Designated Family Representative:      Total time spent: 35 minutes, of total time providing counseling or in coordination of care. Total time on this day of visit includes record and documentation review before and after visit including documentation and time not explicitly included on EMR time stamp      Subjective:   Interval History:  HPI  The patient complains of dyspnea and productive cough  The patient feels their symptoms areimproving  no events or new concerns    Scheduled Meds:apixaban, 2.5 mg, oral, BID  atorvastatin, 40 mg, oral, Nightly  azithromycin, 250 mg, oral, q24h DOROTEO  cefTRIAXone, 1 g, intravenous, q24h  clopidogrel, 75 mg, oral, Daily  docusate sodium, 100 mg, oral, Daily  tiotropium, 2 puff, inhalation, Daily   And  formoterol, 20 mcg, nebulization, q12h  [START ON 1/19/2025] metoprolol tartrate, 25 mg, oral, Daily  predniSONE, 40 mg, oral, Daily  tamsulosin, 0.4 mg, oral, BID      Continuous Infusions:   PRN Meds:PRN medications: albuterol    Review of Systems   All other systems reviewed and are negative.    Interval Pertinent History:  Social History     Tobacco Use    Smoking status: Every Day     Current packs/day: 1.00     Average packs/day: 1 pack/day for 50.0 years (50.0 ttl pk-yrs)     Types: Cigarettes     Start date: 1/17/1975    Smokeless tobacco: Never   Substance Use Topics    Alcohol use: Never         Objective:   Patient Vitals for the past 24 hrs:   BP Temp Temp src Pulse Resp SpO2 Height Weight   01/18/25 1557 145/67 36.6 °C (97.9 °F) Temporal 70 18 96 % -- --   01/18/25 1015 -- -- -- -- -- 90 % -- --   01/18/25 0817 117/68 36.4 °C (97.5 °F) Temporal 68 16 92 % -- --   01/18/25 0444 125/65 36.1 °C (97 °F) Temporal 66 16 93 % -- --   01/17/25 2220  115/56 36.4 °C (97.5 °F) Temporal 77 19 92 % -- --   25 -- -- -- -- -- 92 % -- --   25 92/51 36.6 °C (97.9 °F) Temporal 87 18 92 % 1.829 m (6') 53.5 kg (118 lb)       Average, Min, and Max for last 24 hours Vitals:  TEMPERATURE:  Temp  Av.4 °C (97.6 °F)  Min: 36.1 °C (97 °F)  Max: 36.6 °C (97.9 °F)    RESPIRATIONS RANGE: Resp  Av.4  Min: 16  Max: 19    PULSE RANGE: Pulse  Av.6  Min: 66  Max: 87    BLOOD PRESSURE RANGE:  Systolic (24hrs), Av , Min:92 , Max:145   ; Diastolic (24hrs), Av, Min:51, Max:68      PULSE OXIMETRY RANGE: SpO2  Av.4 %  Min: 90 %  Max: 96 %  Body mass index is 16 kg/m².    I/O last 3 completed shifts:  In: 250 (4.7 mL/kg) [IV Piggyback:250]  Out: - (0 mL/kg)   Weight: 53.5 kg   Weight change:      Physical Exam  Vitals and nursing note reviewed.   Constitutional:       General: He is not in acute distress.     Appearance: Normal appearance. He is cachectic. He is ill-appearing (Chronically).   HENT:      Head: Normocephalic and atraumatic.      Right Ear: External ear normal.      Left Ear: External ear normal.      Nose: Nose normal. No congestion or rhinorrhea.      Mouth/Throat:      Mouth: Mucous membranes are moist.   Eyes:      Extraocular Movements: Extraocular movements intact.      Pupils: Pupils are equal, round, and reactive to light.   Cardiovascular:      Rate and Rhythm: Normal rate and regular rhythm.      Pulses: Normal pulses.      Heart sounds: Normal heart sounds.   Pulmonary:      Effort: Pulmonary effort is normal.      Breath sounds: Wheezing present.   Abdominal:      General: Abdomen is flat. Bowel sounds are normal.      Palpations: Abdomen is soft.   Musculoskeletal:         General: Normal range of motion.      Cervical back: Normal range of motion and neck supple.      Right lower leg: No edema.      Left lower leg: No edema.   Skin:     General: Skin is warm and dry.      Capillary Refill: Capillary refill takes less  than 2 seconds.   Neurological:      General: No focal deficit present.      Mental Status: He is alert and oriented to person, place, and time. Mental status is at baseline.   Psychiatric:         Mood and Affect: Mood normal.         Thought Content: Thought content normal.         Judgment: Judgment normal.         Lab Results   Component Value Date     01/17/2025    K 4.0 01/17/2025     01/17/2025    CO2 25 01/17/2025    BUN 17 01/17/2025    CREATININE 0.67 01/17/2025    GLUCOSE 127 (H) 01/17/2025    CALCIUM 8.4 (L) 01/17/2025    PROT 6.5 01/17/2025    BILITOT 0.4 01/17/2025    ALKPHOS 54 01/17/2025    AST 18 01/17/2025    ALT 9 (L) 01/17/2025       Lab Results   Component Value Date    WBC 8.9 01/17/2025    HGB 11.1 (L) 01/17/2025    HCT 34.7 (L) 01/17/2025    MCV 96 01/17/2025     01/17/2025    LYMPHOPCT 11.5 01/17/2025    RBC 3.63 (L) 01/17/2025    MCH 30.6 01/17/2025    MCHC 32.0 01/17/2025    RDW 14.1 01/17/2025    CRP 1.73 (H) 10/22/2024       Lab Results   Component Value Date    TSH 3.49 04/20/2022     Lab Results   Component Value Date    LACTATE 1.9 01/17/2025    TROPONINI <0.02 04/12/2022    BNP 17 11/12/2024    INR 1.1 11/09/2024       IMAGES:  Encounter Date: 01/17/25   ECG 12 lead   Result Value    Ventricular Rate 85    Atrial Rate 85    [place] Interval 152    QRS Duration 94    QT Interval 384    QTC Calculation(Bazett) 456    P Axis 68    R Axis 75    T Axis 81    QRS Count 14    Q Onset 216    P Onset 140    P Offset 201    T Offset 408    QTC Fredericia 431    Narrative    Normal sinus rhythm  Normal ECG  When compared with ECG of 17-JAN-2025 14:15, (unconfirmed)  No significant change was found        No echocardiogram results found for the past 12 months  === 01/17/25 ===    XR CHEST 1 VIEW    - Impression -  Question left lung infiltrate. Pulmonary hyperinflation.  Signed by Franck Gipson MD  === 11/12/24 ===    CT ANGIO CHEST FOR PULMONARY EMBOLISM    - Impression -  1. No  evidence of pulmonary emboli.  2. Bronchial wall thickening, suggestive of bronchitis. Mucoid  impaction at the smaller airways at the bilateral lower lobes.  3. Emphysema.  4. Coronary artery calcifications. Dilated main pulmonary artery,  please correlate for pulmonary arterial hypertension.      MACRO:  None.    Signed by: Maureen Newell 11/12/2024 7:40 PM  Dictation workstation:   [id number]  === 01/17/25 ===    XR CHEST 1 VIEW    - Impression -  Question left lung infiltrate. Pulmonary hyperinflation.  Signed by Franck Gipson MD  === 10/17/24 ===    MR BRAIN WO CONTRAST    - Impression -  1.  No evidence of new infarct, or other acute intracranial  abnormality.  2. Geographic area of cystic encephalomalacia and gliosis in the left  temporal lobe likely represent sequela of prior infarct/injury.  Smaller areas of encephalomalacia also present in the bilateral  cerebellar hemispheres, likely representing sequela of prior  cerebellar infarcts.  3. Scattered foci of hyperintense FLAIR and T2 signal are present in  the periventricular and subcortical white matter of bilateral  cerebral hemispheres, nonspecific findings favored to represent  chronic sequela of microvascular disease.    MACRO:  None    Signed by: Castillo Beckham 10/17/2024 11:44 PM  Dictation workstation:   [id number]    Additional results of the last 24 hours have been reviewed.    Dictated using [employer] Data Version 2.4  Proof read however unrecognized voice recognition errors may have occurred     Electronically signed by Jonnie Neely DO on 01/18/25 at 5:50 PM

## 2025-01-19 VITALS
DIASTOLIC BLOOD PRESSURE: 65 MMHG | BODY MASS INDEX: 15.98 KG/M2 | RESPIRATION RATE: 16 BRPM | SYSTOLIC BLOOD PRESSURE: 135 MMHG | HEART RATE: 72 BPM | WEIGHT: 118 LBS | TEMPERATURE: 99 F | OXYGEN SATURATION: 94 % | HEIGHT: 72 IN

## 2025-01-19 LAB
ALBUMIN SERPL BCP-MCNC: 3 G/DL (ref 3.4–5)
ANION GAP SERPL CALC-SCNC: 11 MMOL/L (ref 10–20)
BACTERIA BLD CULT: NORMAL
BACTERIA BLD CULT: NORMAL
BASOPHILS # BLD AUTO: 0.03 X10*3/UL (ref 0–0.1)
BASOPHILS NFR BLD AUTO: 0.3 %
BUN SERPL-MCNC: 17 MG/DL (ref 6–23)
CALCIUM SERPL-MCNC: 7.8 MG/DL (ref 8.6–10.3)
CHLORIDE SERPL-SCNC: 105 MMOL/L (ref 98–107)
CO2 SERPL-SCNC: 25 MMOL/L (ref 21–32)
CREAT SERPL-MCNC: 0.62 MG/DL (ref 0.5–1.3)
CRP SERPL-MCNC: 2.73 MG/DL
EGFRCR SERPLBLD CKD-EPI 2021: >90 ML/MIN/1.73M*2
EOSINOPHIL # BLD AUTO: 0.07 X10*3/UL (ref 0–0.4)
EOSINOPHIL NFR BLD AUTO: 0.8 %
ERYTHROCYTE [DISTWIDTH] IN BLOOD BY AUTOMATED COUNT: 14 % (ref 11.5–14.5)
GLUCOSE SERPL-MCNC: 106 MG/DL (ref 74–99)
HCT VFR BLD AUTO: 31.3 % (ref 41–52)
HGB BLD-MCNC: 9.9 G/DL (ref 13.5–17.5)
IMM GRANULOCYTES # BLD AUTO: 0.06 X10*3/UL (ref 0–0.5)
IMM GRANULOCYTES NFR BLD AUTO: 0.7 % (ref 0–0.9)
LEGIONELLA AG UR QL: NEGATIVE
LYMPHOCYTES # BLD AUTO: 1.88 X10*3/UL (ref 0.8–3)
LYMPHOCYTES NFR BLD AUTO: 21.9 %
MAGNESIUM SERPL-MCNC: 1.88 MG/DL (ref 1.6–2.4)
MCH RBC QN AUTO: 30.2 PG (ref 26–34)
MCHC RBC AUTO-ENTMCNC: 31.6 G/DL (ref 32–36)
MCV RBC AUTO: 95 FL (ref 80–100)
MONOCYTES # BLD AUTO: 0.57 X10*3/UL (ref 0.05–0.8)
MONOCYTES NFR BLD AUTO: 6.6 %
NEUTROPHILS # BLD AUTO: 5.98 X10*3/UL (ref 1.6–5.5)
NEUTROPHILS NFR BLD AUTO: 69.7 %
NRBC BLD-RTO: 0 /100 WBCS (ref 0–0)
PHOSPHATE SERPL-MCNC: 3.2 MG/DL (ref 2.5–4.9)
PLATELET # BLD AUTO: 265 X10*3/UL (ref 150–450)
POTASSIUM SERPL-SCNC: 4.2 MMOL/L (ref 3.5–5.3)
PROCALCITONIN SERPL-MCNC: 0.29 NG/ML
RBC # BLD AUTO: 3.28 X10*6/UL (ref 4.5–5.9)
S PNEUM AG UR QL: NEGATIVE
SODIUM SERPL-SCNC: 137 MMOL/L (ref 136–145)
WBC # BLD AUTO: 8.6 X10*3/UL (ref 4.4–11.3)

## 2025-01-19 PROCEDURE — 94669 MECHANICAL CHEST WALL OSCILL: CPT

## 2025-01-19 PROCEDURE — 94640 AIRWAY INHALATION TREATMENT: CPT

## 2025-01-19 PROCEDURE — 86140 C-REACTIVE PROTEIN: CPT | Performed by: INTERNAL MEDICINE

## 2025-01-19 PROCEDURE — 2500000001 HC RX 250 WO HCPCS SELF ADMINISTERED DRUGS (ALT 637 FOR MEDICARE OP): Performed by: INTERNAL MEDICINE

## 2025-01-19 PROCEDURE — 2500000002 HC RX 250 W HCPCS SELF ADMINISTERED DRUGS (ALT 637 FOR MEDICARE OP, ALT 636 FOR OP/ED): Mod: MUE | Performed by: INTERNAL MEDICINE

## 2025-01-19 PROCEDURE — 96366 THER/PROPH/DIAG IV INF ADDON: CPT

## 2025-01-19 PROCEDURE — 83735 ASSAY OF MAGNESIUM: CPT | Performed by: INTERNAL MEDICINE

## 2025-01-19 PROCEDURE — 2500000004 HC RX 250 GENERAL PHARMACY W/ HCPCS (ALT 636 FOR OP/ED): Performed by: INTERNAL MEDICINE

## 2025-01-19 PROCEDURE — 99232 SBSQ HOSP IP/OBS MODERATE 35: CPT | Performed by: INTERNAL MEDICINE

## 2025-01-19 PROCEDURE — 96375 TX/PRO/DX INJ NEW DRUG ADDON: CPT

## 2025-01-19 PROCEDURE — 85025 COMPLETE CBC W/AUTO DIFF WBC: CPT | Performed by: INTERNAL MEDICINE

## 2025-01-19 PROCEDURE — 84145 PROCALCITONIN (PCT): CPT | Mod: GEALAB | Performed by: INTERNAL MEDICINE

## 2025-01-19 PROCEDURE — 2500000004 HC RX 250 GENERAL PHARMACY W/ HCPCS (ALT 636 FOR OP/ED): Mod: JZ | Performed by: INTERNAL MEDICINE

## 2025-01-19 PROCEDURE — 36415 COLL VENOUS BLD VENIPUNCTURE: CPT | Performed by: INTERNAL MEDICINE

## 2025-01-19 PROCEDURE — 94760 N-INVAS EAR/PLS OXIMETRY 1: CPT | Mod: MUE

## 2025-01-19 PROCEDURE — G0378 HOSPITAL OBSERVATION PER HR: HCPCS

## 2025-01-19 PROCEDURE — 94668 MNPJ CHEST WALL SBSQ: CPT | Mod: 59

## 2025-01-19 PROCEDURE — 80069 RENAL FUNCTION PANEL: CPT | Performed by: INTERNAL MEDICINE

## 2025-01-19 RX ORDER — ACETAMINOPHEN 500 MG
5 TABLET ORAL NIGHTLY
Status: DISCONTINUED | OUTPATIENT
Start: 2025-01-19 | End: 2025-01-20 | Stop reason: HOSPADM

## 2025-01-19 RX ORDER — HYDROCODONE BITARTRATE AND HOMATROPINE METHYLBROMIDE ORAL SOLUTION 5; 1.5 MG/5ML; MG/5ML
5 LIQUID ORAL EVERY 6 HOURS PRN
Status: DISCONTINUED | OUTPATIENT
Start: 2025-01-19 | End: 2025-01-20 | Stop reason: HOSPADM

## 2025-01-19 RX ORDER — BENZONATATE 100 MG/1
200 CAPSULE ORAL 3 TIMES DAILY
Status: DISCONTINUED | OUTPATIENT
Start: 2025-01-19 | End: 2025-01-20 | Stop reason: HOSPADM

## 2025-01-19 RX ADMIN — ATORVASTATIN CALCIUM 40 MG: 40 TABLET, FILM COATED ORAL at 21:38

## 2025-01-19 RX ADMIN — DOCUSATE SODIUM 100 MG: 100 CAPSULE, LIQUID FILLED ORAL at 08:46

## 2025-01-19 RX ADMIN — IPRATROPIUM BROMIDE AND ALBUTEROL SULFATE 3 ML: 2.5; .5 SOLUTION RESPIRATORY (INHALATION) at 13:07

## 2025-01-19 RX ADMIN — CEFTRIAXONE SODIUM 1 G: 1 INJECTION, SOLUTION INTRAVENOUS at 16:25

## 2025-01-19 RX ADMIN — PREDNISONE 40 MG: 20 TABLET ORAL at 08:46

## 2025-01-19 RX ADMIN — GUAIFENESIN AND DEXTROMETHORPHAN 5 ML: 10; 100 SYRUP ORAL at 00:11

## 2025-01-19 RX ADMIN — BENZONATATE 200 MG: 100 CAPSULE ORAL at 14:05

## 2025-01-19 RX ADMIN — IPRATROPIUM BROMIDE AND ALBUTEROL SULFATE 3 ML: 2.5; .5 SOLUTION RESPIRATORY (INHALATION) at 19:51

## 2025-01-19 RX ADMIN — CLOPIDOGREL 75 MG: 75 TABLET ORAL at 08:46

## 2025-01-19 RX ADMIN — FORMOTEROL FUMARATE DIHYDRATE 20 MCG: 20 SOLUTION RESPIRATORY (INHALATION) at 19:50

## 2025-01-19 RX ADMIN — GUAIFENESIN, DEXTROMETHORPHAN HBR 2 TABLET: 600; 30 TABLET ORAL at 21:38

## 2025-01-19 RX ADMIN — APIXABAN 2.5 MG: 2.5 TABLET, FILM COATED ORAL at 08:45

## 2025-01-19 RX ADMIN — METHYLPREDNISOLONE SODIUM SUCCINATE 40 MG: 40 INJECTION, POWDER, FOR SOLUTION INTRAMUSCULAR; INTRAVENOUS at 14:05

## 2025-01-19 RX ADMIN — TAMSULOSIN HYDROCHLORIDE 0.4 MG: 0.4 CAPSULE ORAL at 08:46

## 2025-01-19 RX ADMIN — BENZONATATE 200 MG: 100 CAPSULE ORAL at 21:38

## 2025-01-19 RX ADMIN — IPRATROPIUM BROMIDE AND ALBUTEROL SULFATE 3 ML: 2.5; .5 SOLUTION RESPIRATORY (INHALATION) at 08:52

## 2025-01-19 RX ADMIN — GUAIFENESIN, DEXTROMETHORPHAN HBR 2 TABLET: 600; 30 TABLET ORAL at 14:05

## 2025-01-19 RX ADMIN — METOPROLOL TARTRATE 25 MG: 25 TABLET, FILM COATED ORAL at 08:46

## 2025-01-19 RX ADMIN — APIXABAN 2.5 MG: 2.5 TABLET, FILM COATED ORAL at 21:38

## 2025-01-19 RX ADMIN — AZITHROMYCIN DIHYDRATE 250 MG: 250 TABLET, FILM COATED ORAL at 08:47

## 2025-01-19 RX ADMIN — Medication 5 MG: at 21:38

## 2025-01-19 RX ADMIN — TAMSULOSIN HYDROCHLORIDE 0.4 MG: 0.4 CAPSULE ORAL at 21:38

## 2025-01-19 RX ADMIN — FORMOTEROL FUMARATE DIHYDRATE 20 MCG: 20 SOLUTION RESPIRATORY (INHALATION) at 08:53

## 2025-01-19 ASSESSMENT — COGNITIVE AND FUNCTIONAL STATUS - GENERAL
DAILY ACTIVITIY SCORE: 24
MOBILITY SCORE: 24

## 2025-01-19 ASSESSMENT — PAIN SCALES - WONG BAKER: WONGBAKER_NUMERICALRESPONSE: NO HURT

## 2025-01-19 ASSESSMENT — PAIN - FUNCTIONAL ASSESSMENT
PAIN_FUNCTIONAL_ASSESSMENT: WONG-BAKER FACES
PAIN_FUNCTIONAL_ASSESSMENT: 0-10

## 2025-01-19 ASSESSMENT — PAIN SCALES - GENERAL
PAINLEVEL_OUTOF10: 0 - NO PAIN

## 2025-01-19 NOTE — CARE PLAN
The patient's goals for the shift include to breath better    The clinical goals for the shift include Patient's SP02 will remain => 93% throughout this shift    Over the shift, the patient did not make progress toward the following goals. Barriers to progression include. Recommendations to address these barriers include.

## 2025-01-19 NOTE — PROGRESS NOTES
[facility] Hospitalist Progress Note       [phone]: Please page me for patient care issues.  [phone]: Please page night hospitalist for any issues.     Sheldon Arteaga  :  1940(84 y.o.)  MRN:  [id number]  PCP: Alvaro Cedeño, ARMEN-CNP  LOS: 0  day(s) since admission    Assessment & Plan  CAP (community acquired pneumonia) due to Chlamydia species    Pneumonia of left lower lobe due to infectious organism    Paroxysmal atrial fibrillation (Multi)    PAD (peripheral artery disease) (CMS-[facility])    Fatigue    COPD (chronic obstructive pulmonary disease) (Multi)    Hyperlipidemia    Benign essential hypertension    Benign prostatic hyperplasia without lower urinary tract symptoms      Assessment and Plan:     Sheldon Arteaga is a 84 y.o. male with a PMHx of HTN, HLD, COPD (not on home O2, recently started albuterol inhalers), Afib on Eliquis , DVT, possible HFpEF (EF 60-65%,), CVA (), PAD s/p resection of fem-pop bypass with right CFA to left profunda bypass with reversed right femoral vein (2022) who presented to [facility] on 2025  with a chief complaint of worsening productive cough and SOB. Patient had been to his PCP today who sent him to the ED with the presumptive Dx of CAP. He has had no chills or rigors. He says he has had a head cold since 2025. His cough is not productive.  Neg influenza RSV and COVID.     #CAP (suspect gram-negative)  #AECOPD  #Chronic HFpEF  #Paroxysmal A-fib on apixaban, currently NSR  #PAD  #BPH  #HTN  #Underweight BMI <18.5 with serious co morbidities  , Body mass index is 16 kg/m².  -Ceftriaxone and azithromycin. Cultures NTD  -On prednisone, bronchodilators  -Resume rest of home meds as indicated    Disposition: await test results, await consultant recommendations, await clinical improvement, and await placement    DVT Prophylaxis: full anticoagulation apixaban    Code status: DNR and No Intubation  Diet: Adult diet Regular    Level of MDM:   High    discussed with nurse, discussed with TCC/SW, I personally examined the patient, and I personally reviewed chart, data, labs radiology reports    Family Communication  Number :  Name of Designated Family Representative:      Total time spent: 35 minutes, of total time providing counseling or in coordination of care. Total time on this day of visit includes record and documentation review before and after visit including documentation and time not explicitly included on EMR time stamp      Subjective:   Interval History:  HPI  The patient complains of dyspnea and productive cough  The patient feels their symptoms areimproving  no events or new concerns    Scheduled Meds:apixaban, 2.5 mg, oral, BID  atorvastatin, 40 mg, oral, Nightly  azithromycin, 250 mg, oral, q24h DOROTEO  cefTRIAXone, 1 g, intravenous, q24h  clopidogrel, 75 mg, oral, Daily  docusate sodium, 100 mg, oral, Daily  tiotropium, 2 puff, inhalation, Daily   And  formoterol, 20 mcg, nebulization, q12h  ipratropium-albuteroL, 3 mL, nebulization, TID  metoprolol tartrate, 25 mg, oral, Daily  predniSONE, 40 mg, oral, Daily  tamsulosin, 0.4 mg, oral, BID      Continuous Infusions:   PRN Meds:PRN medications: albuterol, alum-mag hydroxide-simeth, calcium carbonate, dextromethorphan-guaifenesin, guaiFENesin, ondansetron **OR** ondansetron    Review of Systems   All other systems reviewed and are negative.    Interval Pertinent History:  Social History     Tobacco Use    Smoking status: Every Day     Current packs/day: 1.00     Average packs/day: 1 pack/day for 50.0 years (50.0 ttl pk-yrs)     Types: Cigarettes     Start date: 1/17/1975    Smokeless tobacco: Never   Substance Use Topics    Alcohol use: Never         Objective:   Patient Vitals for the past 24 hrs:   BP Temp Temp src Pulse Resp SpO2   01/19/25 0852 -- -- -- -- -- 94 %   01/19/25 0738 148/73 36.1 °C (97 °F) Temporal 60 16 95 %   01/19/25 0330 145/78 37.1 °C (98.8 °F) Temporal 68 16 95 %    25 -- -- -- -- -- 97 %   25 194 135/67 37.2 °C (99 °F) Temporal 61 16 98 %   25 1557 145/67 36.6 °C (97.9 °F) Temporal 70 18 96 %       Average, Min, and Max for last 24 hours Vitals:  TEMPERATURE:  Temp  Av.8 °C (98.2 °F)  Min: 36.1 °C (97 °F)  Max: 37.2 °C (99 °F)    RESPIRATIONS RANGE: Resp  Av.5  Min: 16  Max: 18    PULSE RANGE: Pulse  Av.8  Min: 60  Max: 70    BLOOD PRESSURE RANGE:  Systolic (24hrs), Av , Min:135 , Max:148   ; Diastolic (24hrs), Av, Min:67, Max:78      PULSE OXIMETRY RANGE: SpO2  Av.8 %  Min: 94 %  Max: 98 %  Body mass index is 16 kg/m².    I/O last 3 completed shifts:  In: 1020 (19.1 mL/kg) [P.O.:720; IV Piggyback:300]  Out: 50 (0.9 mL/kg) [Urine:50 (0 mL/kg/hr)]  Weight: 53.5 kg   Weight change:      Physical Exam  Vitals and nursing note reviewed.   Constitutional:       General: He is not in acute distress.     Appearance: Normal appearance. He is cachectic. He is ill-appearing (Chronically).   HENT:      Head: Normocephalic and atraumatic.      Right Ear: External ear normal.      Left Ear: External ear normal.      Nose: Nose normal. No congestion or rhinorrhea.      Mouth/Throat:      Mouth: Mucous membranes are moist.   Eyes:      Extraocular Movements: Extraocular movements intact.      Pupils: Pupils are equal, round, and reactive to light.   Cardiovascular:      Rate and Rhythm: Normal rate and regular rhythm.      Pulses: Normal pulses.      Heart sounds: Normal heart sounds.   Pulmonary:      Effort: Pulmonary effort is normal.      Breath sounds: Wheezing present.   Abdominal:      General: Abdomen is flat. Bowel sounds are normal.      Palpations: Abdomen is soft.   Musculoskeletal:         General: Normal range of motion.      Cervical back: Normal range of motion and neck supple.      Right lower leg: No edema.      Left lower leg: No edema.   Skin:     General: Skin is warm and dry.      Capillary Refill: Capillary refill  takes less than 2 seconds.   Neurological:      General: No focal deficit present.      Mental Status: He is alert and oriented to person, place, and time. Mental status is at baseline.   Psychiatric:         Mood and Affect: Mood normal.         Thought Content: Thought content normal.         Judgment: Judgment normal.         Lab Results   Component Value Date     01/19/2025    K 4.2 01/19/2025     01/19/2025    CO2 25 01/19/2025    BUN 17 01/19/2025    CREATININE 0.62 01/19/2025    GLUCOSE 106 (H) 01/19/2025    CALCIUM 7.8 (L) 01/19/2025    PROT 6.5 01/17/2025    BILITOT 0.4 01/17/2025    ALKPHOS 54 01/17/2025    AST 18 01/17/2025    ALT 9 (L) 01/17/2025       Lab Results   Component Value Date    WBC 8.6 01/19/2025    HGB 9.9 (L) 01/19/2025    HCT 31.3 (L) 01/19/2025    MCV 95 01/19/2025     01/19/2025    LYMPHOPCT 21.9 01/19/2025    RBC 3.28 (L) 01/19/2025    MCH 30.2 01/19/2025    MCHC 31.6 (L) 01/19/2025    RDW 14.0 01/19/2025    CRP 2.73 (H) 01/19/2025       Lab Results   Component Value Date    TSH 3.49 04/20/2022     Lab Results   Component Value Date    LACTATE 1.9 01/17/2025    TROPONINI <0.02 04/12/2022    BNP 17 11/12/2024    INR 1.1 11/09/2024       IMAGES:  Encounter Date: 01/17/25   ECG 12 lead   Result Value    Ventricular Rate 85    Atrial Rate 85    NH Interval 152    QRS Duration 94    QT Interval 384    QTC Calculation(Bazett) 456    P Axis 68    R Axis 75    T Axis 81    QRS Count 14    Q Onset 216    P Onset 140    P Offset 201    T Offset 408    QTC Fredericia 431    Narrative    Normal sinus rhythm  Normal ECG  When compared with ECG of 17-JAN-2025 14:15, (unconfirmed)  No significant change was found        No echocardiogram results found for the past 12 months  === 01/17/25 ===    XR CHEST 1 VIEW    - Impression -  Question left lung infiltrate. Pulmonary hyperinflation.  Signed by Franck Gipson MD  === 11/12/24 ===    CT ANGIO CHEST FOR PULMONARY EMBOLISM    -  Impression -  1. No evidence of pulmonary emboli.  2. Bronchial wall thickening, suggestive of bronchitis. Mucoid  impaction at the smaller airways at the bilateral lower lobes.  3. Emphysema.  4. Coronary artery calcifications. Dilated main pulmonary artery,  please correlate for pulmonary arterial hypertension.      MACRO:  None.    Signed by: Maureen Newell 11/12/2024 7:40 PM  Dictation workstation:   [id number]  === 01/17/25 ===    XR CHEST 1 VIEW    - Impression -  Question left lung infiltrate. Pulmonary hyperinflation.  Signed by Franck Gipson MD  === 10/17/24 ===    MR BRAIN WO CONTRAST    - Impression -  1.  No evidence of new infarct, or other acute intracranial  abnormality.  2. Geographic area of cystic encephalomalacia and gliosis in the left  temporal lobe likely represent sequela of prior infarct/injury.  Smaller areas of encephalomalacia also present in the bilateral  cerebellar hemispheres, likely representing sequela of prior  cerebellar infarcts.  3. Scattered foci of hyperintense FLAIR and T2 signal are present in  the periventricular and subcortical white matter of bilateral  cerebral hemispheres, nonspecific findings favored to represent  chronic sequela of microvascular disease.    MACRO:  None    Signed by: Castillo Beckham 10/17/2024 11:44 PM  Dictation workstation:   [id number]    Additional results of the last 24 hours have been reviewed.    Dictated using [employer] Version 2.4  Proof read however unrecognized voice recognition errors may have occurred     Electronically signed by Jonnie Neely DO on 01/19/25 at 11:54 AM

## 2025-01-20 ENCOUNTER — PHARMACY VISIT (OUTPATIENT)
Dept: PHARMACY | Facility: CLINIC | Age: 85
End: 2025-01-20
Payer: COMMERCIAL

## 2025-01-20 VITALS
RESPIRATION RATE: 16 BRPM | TEMPERATURE: 97.7 F | HEIGHT: 72 IN | OXYGEN SATURATION: 95 % | HEART RATE: 64 BPM | SYSTOLIC BLOOD PRESSURE: 109 MMHG | DIASTOLIC BLOOD PRESSURE: 59 MMHG | WEIGHT: 118 LBS | BODY MASS INDEX: 15.98 KG/M2

## 2025-01-20 LAB
ALBUMIN SERPL BCP-MCNC: 3.2 G/DL (ref 3.4–5)
ANION GAP SERPL CALC-SCNC: 12 MMOL/L (ref 10–20)
ATRIAL RATE: 84 BPM
BACTERIA SPEC RESP CULT: ABNORMAL
BASOPHILS # BLD AUTO: 0.01 X10*3/UL (ref 0–0.1)
BASOPHILS NFR BLD AUTO: 0.1 %
BUN SERPL-MCNC: 15 MG/DL (ref 6–23)
CALCIUM SERPL-MCNC: 8.4 MG/DL (ref 8.6–10.3)
CHLORIDE SERPL-SCNC: 103 MMOL/L (ref 98–107)
CO2 SERPL-SCNC: 25 MMOL/L (ref 21–32)
CREAT SERPL-MCNC: 0.63 MG/DL (ref 0.5–1.3)
CRP SERPL-MCNC: 1.68 MG/DL
EGFRCR SERPLBLD CKD-EPI 2021: >90 ML/MIN/1.73M*2
EOSINOPHIL # BLD AUTO: 0 X10*3/UL (ref 0–0.4)
EOSINOPHIL NFR BLD AUTO: 0 %
ERYTHROCYTE [DISTWIDTH] IN BLOOD BY AUTOMATED COUNT: 14.1 % (ref 11.5–14.5)
GLUCOSE SERPL-MCNC: 132 MG/DL (ref 74–99)
GRAM STN SPEC: ABNORMAL
HCT VFR BLD AUTO: 32.5 % (ref 41–52)
HGB BLD-MCNC: 10.3 G/DL (ref 13.5–17.5)
IMM GRANULOCYTES # BLD AUTO: 0.07 X10*3/UL (ref 0–0.5)
IMM GRANULOCYTES NFR BLD AUTO: 0.9 % (ref 0–0.9)
LYMPHOCYTES # BLD AUTO: 0.96 X10*3/UL (ref 0.8–3)
LYMPHOCYTES NFR BLD AUTO: 11.8 %
MAGNESIUM SERPL-MCNC: 2.06 MG/DL (ref 1.6–2.4)
MCH RBC QN AUTO: 30.3 PG (ref 26–34)
MCHC RBC AUTO-ENTMCNC: 31.7 G/DL (ref 32–36)
MCV RBC AUTO: 96 FL (ref 80–100)
MONOCYTES # BLD AUTO: 0.29 X10*3/UL (ref 0.05–0.8)
MONOCYTES NFR BLD AUTO: 3.6 %
NEUTROPHILS # BLD AUTO: 6.78 X10*3/UL (ref 1.6–5.5)
NEUTROPHILS NFR BLD AUTO: 83.6 %
NRBC BLD-RTO: 0 /100 WBCS (ref 0–0)
P AXIS: 63 DEGREES
P OFFSET: 194 MS
P ONSET: 150 MS
PHOSPHATE SERPL-MCNC: 2.8 MG/DL (ref 2.5–4.9)
PLATELET # BLD AUTO: 313 X10*3/UL (ref 150–450)
POTASSIUM SERPL-SCNC: 4.4 MMOL/L (ref 3.5–5.3)
PR INTERVAL: 140 MS
PROCALCITONIN SERPL-MCNC: 0.2 NG/ML
Q ONSET: 220 MS
QRS COUNT: 14 BEATS
QRS DURATION: 92 MS
QT INTERVAL: 376 MS
QTC CALCULATION(BAZETT): 444 MS
QTC FREDERICIA: 420 MS
R AXIS: 68 DEGREES
RBC # BLD AUTO: 3.4 X10*6/UL (ref 4.5–5.9)
SODIUM SERPL-SCNC: 136 MMOL/L (ref 136–145)
T AXIS: 67 DEGREES
T OFFSET: 408 MS
VENTRICULAR RATE: 84 BPM
WBC # BLD AUTO: 8.1 X10*3/UL (ref 4.4–11.3)

## 2025-01-20 PROCEDURE — 94668 MNPJ CHEST WALL SBSQ: CPT

## 2025-01-20 PROCEDURE — 94760 N-INVAS EAR/PLS OXIMETRY 1: CPT

## 2025-01-20 PROCEDURE — 2500000001 HC RX 250 WO HCPCS SELF ADMINISTERED DRUGS (ALT 637 FOR MEDICARE OP): Performed by: INTERNAL MEDICINE

## 2025-01-20 PROCEDURE — 2500000002 HC RX 250 W HCPCS SELF ADMINISTERED DRUGS (ALT 637 FOR MEDICARE OP, ALT 636 FOR OP/ED): Mod: MUE | Performed by: INTERNAL MEDICINE

## 2025-01-20 PROCEDURE — 80069 RENAL FUNCTION PANEL: CPT | Performed by: INTERNAL MEDICINE

## 2025-01-20 PROCEDURE — 94669 MECHANICAL CHEST WALL OSCILL: CPT

## 2025-01-20 PROCEDURE — 86140 C-REACTIVE PROTEIN: CPT | Performed by: INTERNAL MEDICINE

## 2025-01-20 PROCEDURE — 99239 HOSP IP/OBS DSCHRG MGMT >30: CPT | Performed by: INTERNAL MEDICINE

## 2025-01-20 PROCEDURE — 85025 COMPLETE CBC W/AUTO DIFF WBC: CPT | Performed by: INTERNAL MEDICINE

## 2025-01-20 PROCEDURE — 87205 SMEAR GRAM STAIN: CPT | Mod: GEALAB | Performed by: INTERNAL MEDICINE

## 2025-01-20 PROCEDURE — G0378 HOSPITAL OBSERVATION PER HR: HCPCS

## 2025-01-20 PROCEDURE — 94640 AIRWAY INHALATION TREATMENT: CPT

## 2025-01-20 PROCEDURE — 36415 COLL VENOUS BLD VENIPUNCTURE: CPT | Performed by: INTERNAL MEDICINE

## 2025-01-20 PROCEDURE — 2500000004 HC RX 250 GENERAL PHARMACY W/ HCPCS (ALT 636 FOR OP/ED): Mod: JZ | Performed by: INTERNAL MEDICINE

## 2025-01-20 PROCEDURE — 96376 TX/PRO/DX INJ SAME DRUG ADON: CPT

## 2025-01-20 PROCEDURE — 83735 ASSAY OF MAGNESIUM: CPT | Performed by: INTERNAL MEDICINE

## 2025-01-20 PROCEDURE — 2500000002 HC RX 250 W HCPCS SELF ADMINISTERED DRUGS (ALT 637 FOR MEDICARE OP, ALT 636 FOR OP/ED): Performed by: INTERNAL MEDICINE

## 2025-01-20 PROCEDURE — 84145 PROCALCITONIN (PCT): CPT | Mod: GEALAB | Performed by: INTERNAL MEDICINE

## 2025-01-20 PROCEDURE — RXMED WILLOW AMBULATORY MEDICATION CHARGE

## 2025-01-20 RX ORDER — SULFAMETHOXAZOLE AND TRIMETHOPRIM 800; 160 MG/1; MG/1
1 TABLET ORAL 2 TIMES DAILY
Qty: 28 TABLET | Refills: 0 | Status: SHIPPED | OUTPATIENT
Start: 2025-01-20 | End: 2025-02-03

## 2025-01-20 RX ORDER — PREDNISONE 10 MG/1
TABLET ORAL
Qty: 42 TABLET | Refills: 0 | Status: SHIPPED | OUTPATIENT
Start: 2025-01-20

## 2025-01-20 RX ORDER — PREDNISONE 20 MG/1
40 TABLET ORAL DAILY
Status: DISCONTINUED | OUTPATIENT
Start: 2025-01-20 | End: 2025-01-20

## 2025-01-20 RX ORDER — BENZONATATE 200 MG/1
200 CAPSULE ORAL 3 TIMES DAILY
Qty: 90 CAPSULE | Refills: 1 | Status: SHIPPED | OUTPATIENT
Start: 2025-01-20

## 2025-01-20 RX ADMIN — METOPROLOL TARTRATE 25 MG: 25 TABLET, FILM COATED ORAL at 08:49

## 2025-01-20 RX ADMIN — TAMSULOSIN HYDROCHLORIDE 0.4 MG: 0.4 CAPSULE ORAL at 08:50

## 2025-01-20 RX ADMIN — METHYLPREDNISOLONE SODIUM SUCCINATE 40 MG: 40 INJECTION, POWDER, FOR SOLUTION INTRAMUSCULAR; INTRAVENOUS at 03:32

## 2025-01-20 RX ADMIN — FORMOTEROL FUMARATE DIHYDRATE 20 MCG: 20 SOLUTION RESPIRATORY (INHALATION) at 08:55

## 2025-01-20 RX ADMIN — AZITHROMYCIN DIHYDRATE 250 MG: 250 TABLET, FILM COATED ORAL at 08:50

## 2025-01-20 RX ADMIN — APIXABAN 2.5 MG: 2.5 TABLET, FILM COATED ORAL at 08:50

## 2025-01-20 RX ADMIN — TIOTROPIUM BROMIDE INHALATION SPRAY 2 PUFF: 3.12 SPRAY, METERED RESPIRATORY (INHALATION) at 07:02

## 2025-01-20 RX ADMIN — IPRATROPIUM BROMIDE AND ALBUTEROL SULFATE 3 ML: 2.5; .5 SOLUTION RESPIRATORY (INHALATION) at 08:50

## 2025-01-20 RX ADMIN — METHYLPREDNISOLONE SODIUM SUCCINATE 40 MG: 40 INJECTION, POWDER, FOR SOLUTION INTRAMUSCULAR; INTRAVENOUS at 13:39

## 2025-01-20 RX ADMIN — GUAIFENESIN, DEXTROMETHORPHAN HBR 2 TABLET: 600; 30 TABLET ORAL at 08:49

## 2025-01-20 RX ADMIN — IPRATROPIUM BROMIDE AND ALBUTEROL SULFATE 3 ML: 2.5; .5 SOLUTION RESPIRATORY (INHALATION) at 13:50

## 2025-01-20 RX ADMIN — CLOPIDOGREL 75 MG: 75 TABLET ORAL at 08:50

## 2025-01-20 RX ADMIN — BENZONATATE 200 MG: 100 CAPSULE ORAL at 08:49

## 2025-01-20 ASSESSMENT — COGNITIVE AND FUNCTIONAL STATUS - GENERAL
MOBILITY SCORE: 24
DAILY ACTIVITIY SCORE: 24

## 2025-01-20 ASSESSMENT — PAIN SCALES - GENERAL
PAINLEVEL_OUTOF10: 0 - NO PAIN
PAINLEVEL_OUTOF10: 0 - NO PAIN

## 2025-01-20 ASSESSMENT — PAIN - FUNCTIONAL ASSESSMENT: PAIN_FUNCTIONAL_ASSESSMENT: 0-10

## 2025-01-20 NOTE — PROGRESS NOTES
[facility] Hospitalist Progress Note       [phone]: Please page me for patient care issues.  [phone]: Please page night hospitalist for any issues.     Sheldon Arteaga  :  1940(84 y.o.)  MRN:  [id number]  PCP: Alvaro Cedeño, ARMEN-CNP  LOS: 0  day(s) since admission    Assessment & Plan  CAP (community acquired pneumonia) due to Chlamydia species    Pneumonia of left lower lobe due to infectious organism    Paroxysmal atrial fibrillation (Multi)    PAD (peripheral artery disease) (CMS-[facility])    Fatigue    COPD (chronic obstructive pulmonary disease) (Multi)    Hyperlipidemia    Benign essential hypertension    Benign prostatic hyperplasia without lower urinary tract symptoms      Assessment and Plan:     Sheldon Arteaga is a 84 y.o. male with a PMHx of HTN, HLD, COPD (not on home O2, recently started albuterol inhalers), Afib on Eliquis , DVT, possible HFpEF (EF 60-65%,), CVA (), PAD s/p resection of fem-pop bypass with right CFA to left profunda bypass with reversed right femoral vein (2022) who presented to [facility] on 2025  with a chief complaint of worsening productive cough and SOB. Patient had been to his PCP today who sent him to the ED with the presumptive Dx of CAP. He has had no chills or rigors. He says he has had a head cold since 2025. His cough is not productive.  Neg influenza RSV and COVID.     #CAP (suspect gram-negative)  #AECOPD  #Chronic HFpEF  #Paroxysmal A-fib on apixaban, currently NSR  #PAD  #BPH  #HTN  #Underweight BMI <18.5 with serious co morbidities  , Body mass index is 16 kg/m².  -Ceftriaxone and azithromycin. Cultures NTD  -On prednisone, bronchodilators  -Resume rest of home meds as indicated    Disposition: await test results, await consultant recommendations, await clinical improvement, and await placement    DVT Prophylaxis: full anticoagulation apixaban    Code status: DNR and No Intubation  Diet: Adult diet Regular    Level of MDM:   High    discussed with nurse, discussed with TCC/SW, I personally examined the patient, and I personally reviewed chart, data, labs radiology reports    Family Communication  Number :  Name of Designated Family Representative:      Total time spent: 35 minutes, of total time providing counseling or in coordination of care. Total time on this day of visit includes record and documentation review before and after visit including documentation and time not explicitly included on EMR time stamp      Subjective:   Interval History:  HPI  The patient complains of dyspnea and productive cough  The patient feels their symptoms areimproving  no events or new concerns    Scheduled Meds:apixaban, 2.5 mg, oral, BID  atorvastatin, 40 mg, oral, Nightly  azithromycin, 250 mg, oral, q24h DOROTEO  benzonatate, 200 mg, oral, TID  cefTRIAXone, 1 g, intravenous, q24h  clopidogrel, 75 mg, oral, Daily  dextromethorphan-guaifenesin, 2 tablet, oral, BID  docusate sodium, 100 mg, oral, Daily  tiotropium, 2 puff, inhalation, Daily   And  formoterol, 20 mcg, nebulization, q12h  ipratropium-albuteroL, 3 mL, nebulization, TID  melatonin, 5 mg, oral, Nightly  methylPREDNISolone sodium succinate (PF), 40 mg, intravenous, q12h  metoprolol tartrate, 25 mg, oral, Daily  tamsulosin, 0.4 mg, oral, BID      Continuous Infusions:   PRN Meds:PRN medications: albuterol, alum-mag hydroxide-simeth, calcium carbonate, hydrocodone-homatropine, ondansetron **OR** ondansetron    Review of Systems   All other systems reviewed and are negative.    Interval Pertinent History:  Social History     Tobacco Use    Smoking status: Every Day     Current packs/day: 1.00     Average packs/day: 1 pack/day for 50.0 years (50.0 ttl pk-yrs)     Types: Cigarettes     Start date: 1/17/1975    Smokeless tobacco: Never   Substance Use Topics    Alcohol use: Never         Objective:   Patient Vitals for the past 24 hrs:   BP Temp Temp src Pulse Resp SpO2   01/20/25 0849 126/71 -- --  73 -- --   25 0346 139/68 36.4 °C (97.5 °F) Temporal 54 16 95 %   25 1950 -- -- -- -- -- 94 %   25 1930 135/65 37.2 °C (99 °F) Temporal 72 16 93 %   25 1712 -- -- -- -- -- 96 %   25 1545 129/69 36.5 °C (97.7 °F) Temporal 64 17 96 %   25 1307 -- -- -- -- -- 96 %       Average, Min, and Max for last 24 hours Vitals:  TEMPERATURE:  Temp  Av.7 °C (98.1 °F)  Min: 36.4 °C (97.5 °F)  Max: 37.2 °C (99 °F)    RESPIRATIONS RANGE: Resp  Av.3  Min: 16  Max: 17    PULSE RANGE: Pulse  Av.8  Min: 54  Max: 73    BLOOD PRESSURE RANGE:  Systolic (24hrs), Av , Min:126 , Max:139   ; Diastolic (24hrs), Av, Min:65, Max:71      PULSE OXIMETRY RANGE: SpO2  Av %  Min: 93 %  Max: 96 %  Body mass index is 16 kg/m².    I/O last 3 completed shifts:  In: 650 (12.1 mL/kg) [P.O.:600; IV Piggyback:50]  Out: - (0 mL/kg)   Weight: 53.5 kg   Weight change:      Physical Exam  Vitals and nursing note reviewed.   Constitutional:       General: He is not in acute distress.     Appearance: Normal appearance. He is cachectic. He is ill-appearing (Chronically).   HENT:      Head: Normocephalic and atraumatic.      Right Ear: External ear normal.      Left Ear: External ear normal.      Nose: Nose normal. No congestion or rhinorrhea.      Mouth/Throat:      Mouth: Mucous membranes are moist.   Eyes:      Extraocular Movements: Extraocular movements intact.      Pupils: Pupils are equal, round, and reactive to light.   Cardiovascular:      Rate and Rhythm: Normal rate and regular rhythm.      Pulses: Normal pulses.      Heart sounds: Normal heart sounds.   Pulmonary:      Effort: Pulmonary effort is normal.      Breath sounds: Wheezing present.   Abdominal:      General: Abdomen is flat. Bowel sounds are normal.      Palpations: Abdomen is soft.   Musculoskeletal:         General: Normal range of motion.      Cervical back: Normal range of motion and neck supple.      Right lower leg: No edema.       Left lower leg: No edema.   Skin:     General: Skin is warm and dry.      Capillary Refill: Capillary refill takes less than 2 seconds.   Neurological:      General: No focal deficit present.      Mental Status: He is alert and oriented to person, place, and time. Mental status is at baseline.   Psychiatric:         Mood and Affect: Mood normal.         Thought Content: Thought content normal.         Judgment: Judgment normal.         Lab Results   Component Value Date     01/20/2025    K 4.4 01/20/2025     01/20/2025    CO2 25 01/20/2025    BUN 15 01/20/2025    CREATININE 0.63 01/20/2025    GLUCOSE 132 (H) 01/20/2025    CALCIUM 8.4 (L) 01/20/2025    PROT 6.5 01/17/2025    BILITOT 0.4 01/17/2025    ALKPHOS 54 01/17/2025    AST 18 01/17/2025    ALT 9 (L) 01/17/2025       Lab Results   Component Value Date    WBC 8.1 01/20/2025    HGB 10.3 (L) 01/20/2025    HCT 32.5 (L) 01/20/2025    MCV 96 01/20/2025     01/20/2025    LYMPHOPCT 11.8 01/20/2025    RBC 3.40 (L) 01/20/2025    MCH 30.3 01/20/2025    MCHC 31.7 (L) 01/20/2025    RDW 14.1 01/20/2025    CRP 1.68 (H) 01/20/2025       Lab Results   Component Value Date    TSH 3.49 04/20/2022     Lab Results   Component Value Date    LACTATE 1.9 01/17/2025    TROPONINI <0.02 04/12/2022    BNP 17 11/12/2024    INR 1.1 11/09/2024       IMAGES:  Encounter Date: 01/17/25   ECG 12 lead   Result Value    Ventricular Rate 85    Atrial Rate 85    NV Interval 152    QRS Duration 94    QT Interval 384    QTC Calculation(Bazett) 456    P Axis 68    R Axis 75    T Axis 81    QRS Count 14    Q Onset 216    P Onset 140    P Offset 201    T Offset 408    QTC Fredericia 431    Narrative    Normal sinus rhythm  Normal ECG  When compared with ECG of 17-JAN-2025 14:15, (unconfirmed)  No significant change was found        No echocardiogram results found for the past 12 months  === 01/17/25 ===    XR CHEST 1 VIEW    - Impression -  Question left lung infiltrate. Pulmonary  hyperinflation.  Signed by Franck Gipson MD  === 11/12/24 ===    CT ANGIO CHEST FOR PULMONARY EMBOLISM    - Impression -  1. No evidence of pulmonary emboli.  2. Bronchial wall thickening, suggestive of bronchitis. Mucoid  impaction at the smaller airways at the bilateral lower lobes.  3. Emphysema.  4. Coronary artery calcifications. Dilated main pulmonary artery,  please correlate for pulmonary arterial hypertension.      MACRO:  None.    Signed by: Maureen Newell 11/12/2024 7:40 PM  Dictation workstation:   [id number]  === 01/17/25 ===    XR CHEST 1 VIEW    - Impression -  Question left lung infiltrate. Pulmonary hyperinflation.  Signed by Franck Gipson MD  === 10/17/24 ===    MR BRAIN WO CONTRAST    - Impression -  1.  No evidence of new infarct, or other acute intracranial  abnormality.  2. Geographic area of cystic encephalomalacia and gliosis in the left  temporal lobe likely represent sequela of prior infarct/injury.  Smaller areas of encephalomalacia also present in the bilateral  cerebellar hemispheres, likely representing sequela of prior  cerebellar infarcts.  3. Scattered foci of hyperintense FLAIR and T2 signal are present in  the periventricular and subcortical white matter of bilateral  cerebral hemispheres, nonspecific findings favored to represent  chronic sequela of microvascular disease.    MACRO:  None    Signed by: Castillo Beckham 10/17/2024 11:44 PM  Dictation workstation:   [id number]    Additional results of the last 24 hours have been reviewed.    Dictated using [employer] Version 2.4  Proof read however unrecognized voice recognition errors may have occurred     Electronically signed by Jonnie Neely DO on 01/20/25 at 9:10 AM

## 2025-01-20 NOTE — CARE PLAN
The patient's goals for the shift include  rest    The clinical goals for the shift include patient will remain safe throughout the shift

## 2025-01-20 NOTE — PROGRESS NOTES
01/20/25 1230   Discharge Planning   Living Arrangements Children   Support Systems Children   Assistance Needed Alert and oriented x 3, Independent with ADL's, No DME, Drives, Room air, No cpap or bipap, Not active with any HHC, PCP is Alvaro Cedeño   Type of Residence Private residence   Number of Stairs to Enter Residence 3   Number of Stairs Within Residence 0   Do you have animals or pets at home? Yes   Type of Animals or Pets 3 cats 1 dog   Who is requesting discharge planning? Provider   Home or Post Acute Services None   Expected Discharge Disposition Home   Does the patient need discharge transport arranged? No   Patient Choice   Provider Choice list and CMS website (https://medicare.gov/care-compare#search) for post-acute Quality and Resource Measure Data were provided and reviewed with: Patient;Family   Patient / Family choosing to utilize agency / facility established prior to hospitalization No   Stroke Family Assessment   Stroke Family Assessment Needed No   Intensity of Service   Intensity of Service 0-30 min

## 2025-01-20 NOTE — DISCHARGE SUMMARY
[facility] Hospitalist  Discharge Summary with Discharge Day Progress Note and Transition Note                 Sheldon Arteaga                  : 1940                      MRN:  [id number]     ADMIT DATE: 2025            DISCHARGE DATE:  2025    LOS: 0  day(s) since admission     PRIMARYCARE PHYSICIAN:  Alvaro Cedeño, ARMEN-CNP     VISIT STATUS: Admission     CODE STATUS:  DNR and No Intubation     DISCHARGE DIAGNOSES:    Assessment & Plan  CAP (community acquired pneumonia) due to Chlamydia species    Pneumonia of left lower lobe due to infectious organism    Paroxysmal atrial fibrillation (Multi)    PAD (peripheral artery disease) (CMS-[facility])    Fatigue    COPD (chronic obstructive pulmonary disease) (Multi)    Hyperlipidemia    Benign essential hypertension    Benign prostatic hyperplasia without lower urinary tract symptoms       HOSPITAL COURSE:  Sheldon Arteaga is a 84 y.o. male with a PMHx of HTN, HLD, COPD (not on home O2, recently started albuterol inhalers), Afib on Eliquis , DVT, possible HFpEF (EF 60-65%,), CVA (), PAD s/p resection of fem-pop bypass with right CFA to left profunda bypass with reversed right femoral vein (2022) who presented to [facility] on 2025  with a chief complaint of worsening productive cough and SOB. Patient had been to his PCP today who sent him to the ED with the presumptive Dx of CAP. He has had no chills or rigors. He says he has had a head cold since 2025. His cough not productive.  Neg influenza RSV and COVID.     Patient was admitted for AECOPD due to CAP with acute on chronic bronchitis pattient improved with antibiotics, bronchodilators and steroid. Patient was dicharged home prolong antibiotics and steroid taper due to frequent hospitalizations for complications of COPD. Patient was instructed to follow up with his pulmonologist.    #CAP (suspect gram-negative)  #Acute on chronic bronchitis  #AECOPD  #Chronic  HFpEF  #Paroxysmal A-fib on apixaban, currently NSR  #PAD  #BPH  #HTN  #Underweight BMI <18.5 with serious co morbidities  , Body mass index is 16 kg/m².      PROCEDURES:  none  CONSULTANTS:  none    COMPLEXITY OF FOLLOW UP:  [] Moderate Complexity: follow up within 7-14 calendar days (83027)  [x]Severe Complexity: follow up within 7 calendar days (21788)     FOLLOW UP TESTING, PENDING RESULTS ORREFERRALS AT TRANSITIONAL CARE VISIT:   [x]Yes  F/U with PCP and pulmonology   [] No    DAY OF DISCHARGE:  Review of Systems   All other systems reviewed and are negative.       Patient Vitals for the past 24 hrs:   BP Temp Temp src Pulse Resp SpO2   25 0915 109/59 36.5 °C (97.7 °F) -- 64 16 94 %   25 0850 -- -- -- 73 16 93 %   25 0849 126/71 -- -- 73 -- --   25 0346 139/68 36.4 °C (97.5 °F) Temporal 54 16 95 %   25 1950 -- -- -- -- -- 94 %   25 1930 135/65 37.2 °C (99 °F) Temporal 72 16 93 %   25 1712 -- -- -- -- -- 96 %   25 1545 129/69 36.5 °C (97.7 °F) Temporal 64 17 96 %   25 1307 -- -- -- -- -- 96 %        Average, Min, and Max forlast 24 hours Vitals:  TEMPERATURE:  Temp  Av.7 °C (98 °F)  Min: 36.4 °C (97.5 °F)  Max: 37.2 °C (99 °F)     RESPIRATIONS RANGE: Resp  Av.2  Min: 16  Max: 17     PULSE RANGE: Pulse  Av.7  Min: 54  Max: 73     BLOOD PRESSURE RANGE:  Systolic (24hrs), Av , Min:109 , Max:139   ; Diastolic (24hrs), Av, Min:59, Max:71       PULSE OXIMETRYRANGE: SpO2  Av.6 %  Min: 93 %  Max: 96 %  Body mass index is 16 kg/m².     I/O last 3 completed shifts:  In: 650 (12.1 mL/kg) [P.O.:600; IV Piggyback:50]  Out: - (0 mL/kg)   Weight: 53.5 kg      Physical Exam  Vitals and nursing note reviewed.   Constitutional:       General: He is not in acute distress.     Appearance: Normal appearance. He is cachectic. He is ill-appearing (Chronically).   HENT:      Head: Normocephalic and atraumatic.      Right Ear: External ear normal.       Left Ear: External ear normal.      Nose: Nose normal. No congestion or rhinorrhea.      Mouth/Throat:      Mouth: Mucous membranes are moist.   Eyes:      Extraocular Movements: Extraocular movements intact.      Pupils: Pupils are equal, round, and reactive to light.   Cardiovascular:      Rate and Rhythm: Normal rate and regular rhythm.      Pulses: Normal pulses.      Heart sounds: Normal heart sounds.   Pulmonary:      Effort: Pulmonary effort is normal.      Breath sounds: No wheezing (none).   Abdominal:      General: Abdomen is flat. Bowel sounds are normal.      Palpations: Abdomen is soft.   Musculoskeletal:         General: Normal range of motion.      Cervical back: Normal range of motion and neck supple.      Right lower leg: No edema.      Left lower leg: No edema.   Skin:     General: Skin is warm and dry.      Capillary Refill: Capillary refill takes less than 2 seconds.   Neurological:      General: No focal deficit present.      Mental Status: He is alert and oriented to person, place, and time. Mental status is at baseline.   Psychiatric:         Mood and Affect: Mood normal.         Thought Content: Thought content normal.         Judgment: Judgment normal.           DISCHARGE MEDICATIONS:     Significant Medication Changes:         Your medication list        START taking these medications        Instructions Last Dose Given Next Dose Due   dextromethorphan-guaifenesin  mg 12 hr tablet  Commonly known as: Mucinex DM      Take 1 tablet by mouth every 12 hours if needed for cough. Do not crush, chew, or split.       sulfamethoxazole-trimethoprim 800-160 mg tablet  Commonly known as: Bactrim DS      Take 1 tablet by mouth 2 times a day for 14 days.              CHANGE how you take these medications        Instructions Last Dose Given Next Dose Due   benzonatate 200 mg capsule  Commonly known as: Tessalon  What changed:   medication strength  how much to take  when to take this  reasons to  take this      Take 1 capsule (200 mg) by mouth 3 times a day. Do not crush or chew.       predniSONE 10 mg tablet  Commonly known as: Deltasone  What changed:   medication strength  how much to take  how to take this  when to take this  additional instructions      4 tabs daily for 4 days, 3 tabs daily for 4 days, 2 tabs daily for 4 days, 1 tab daily for 4 days, then half tab daily for 4 days              CONTINUE taking these medications        Instructions Last Dose Given Next Dose Due   albuterol 2.5 mg /3 mL (0.083 %) nebulizer solution      Take 3 mL (2.5 mg) by nebulization every 2 hours if needed for wheezing or shortness of breath.       albuterol 90 mcg/actuation inhaler  Commonly known as: Ventolin HFA      Inhale 2 puffs every 4 hours if needed for wheezing or shortness of breath.       amLODIPine 5 mg tablet  Commonly known as: Norvasc      Take 0.5 tablets (2.5 mg) by mouth once daily.       Anoro Ellipta 62.5-25 mcg/actuation blister with device  Generic drug: umeclidinium-vilanteroL      Inhale 1 puff once daily.       apixaban 5 mg tablet  Commonly known as: Eliquis      Take 0.5 tablets (2.5 mg) by mouth 2 times a day.       atorvastatin 40 mg tablet  Commonly known as: Lipitor      Take 1 tablet (40 mg) by mouth once daily at bedtime.       clarithromycin 500 mg tablet  Commonly known as: Biaxin      Take 1 tablet (500 mg) by mouth 2 times a day for 14 days.       clopidogrel 75 mg tablet  Commonly known as: Plavix      Take 1 tablet (75 mg) by mouth once daily.       docusate sodium 100 mg capsule  Commonly known as: Colace           ipratropium-albuteroL 0.5-2.5 mg/3 mL nebulizer solution  Commonly known as: Duo-Neb      Take 3 mL by nebulization every 6 hours.       metoprolol tartrate 25 mg tablet  Commonly known as: Lopressor      Take 1 tablet by mouth twice daily       tamsulosin 0.4 mg 24 hr capsule  Commonly known as: Flomax      Take 2 capsules (0.8 mg) by mouth once daily.               STOP taking these medications      guaiFENesin 600 mg 12 hr tablet  Commonly known as: Mucinex               ASK your doctor about these medications        Instructions Last Dose Given Next Dose Due   ipratropium-albuteroL 0.5-2.5 mg/3 mL nebulizer solution  Commonly known as: Duo-Neb      Take 3 mL by nebulization every 2 hours if needed for wheezing or shortness of breath (pt request).                 Where to Get Your Medications        These medications were sent to [facility]  [id number] [place] [id number]      Hours: 9 AM to 5 PM Mon-Fri Phone: [phone]   benzonatate 200 mg capsule  dextromethorphan-guaifenesin  mg 12 hr tablet  predniSONE 10 mg tablet  sulfamethoxazole-trimethoprim 800-160 mg tablet            DIET: regular      DISPOSITION:  home     Follow up with CESARIO Oliveira  .       DISCHARGE TIME: > 30 minutes    Electronically signed by Jonnie Neely DO on 01/20/25 at 1:05 PM    Dictated using [employer] Version 2.4  Proof read however unrecognized voice recognition errors may have occurred

## 2025-01-20 NOTE — CARE PLAN
The patient's goals for the shift include to get some sleep    The clinical goals for the shift include patient will remain safe throughout entire shift    Over the shift, the patient did not make progress toward the following goals. Barriers to progression include. Recommendations to address these barriers include.

## 2025-01-21 ENCOUNTER — PATIENT OUTREACH (OUTPATIENT)
Dept: PRIMARY CARE | Facility: CLINIC | Age: 85
End: 2025-01-21
Payer: MEDICARE

## 2025-01-21 NOTE — PROGRESS NOTES
Discharge Facility:[facility]  Discharge Diagnosis:CAP  Admission Date:1/17/25  Discharge Date: 1/20/25    PCP Appointment Date:patient will schedule  Specialist Appointment Date: Pulmonology   Hospital Encounter and Summary Linked: Yes  See discharge assessment below for further details  Engagement  Call Start Time: 0901 (I spoke to family member) (1/21/2025  9:00 AM)    Medications  Medications reviewed with patient/caregiver?: Yes (1/21/2025  9:00 AM)  Is the patient having any side effects they believe may be caused by any medication additions or changes?: No (1/21/2025  9:00 AM)  Does the patient have all medications ordered at discharge?: Yes (1/21/2025  9:00 AM)  Prescription Comments: Start these medications  dextromethorphan-guaifenesin  mg 12 hr tablet  sulfamethoxazole-trimethoprim 800-160 mg tablet  Change these medications  benzonatate 200 mg capsule  predniSONE 10 mg tablet  hese medications were sent to [facility]  [id number] [facility] [address]       Hours: 9 AM to 5 PM Mon-Fri Phone: [phone]  · benzonatate 200 mg capsule  · dextromethorphan-guaifenesin  mg 12 hr tablet  · predniSONE 10 mg tablet  · sulfamethoxazole-trimethoprim 800-160 mg tablet (1/21/2025  9:00 AM)  Is the patient taking all medications as directed (includes completed medication regime)?: Yes (1/21/2025  9:00 AM)  Medication Comments: see med list (1/21/2025  9:00 AM)    Appointments  Does the patient have a primary care provider?: Yes (1/21/2025  9:00 AM)  Care Management Interventions: Educated patient on importance of making appointment; Advised patient to make appointment (1/21/2025  9:00 AM)  Has the patient kept scheduled appointments due by today?: Yes (1/21/2025  9:00 AM)  Care Management Interventions: Advised to schedule with specialist (Pulmonology) (1/21/2025  9:00 AM)    Patient Teaching  Does the patient have access to their discharge instructions?: Yes (1/21/2025  9:00 AM)  Care  Management Interventions: Reviewed instructions with patient (1/21/2025  9:00 AM)  What is the patient's perception of their health status since discharge?: Improving (1/21/2025  9:00 AM)  Is the patient/caregiver able to teach back the hierarchy of who to call/visit for symptoms/problems? PCP, Specialist, Home Health nurse, Urgent Care, ED, 911: Yes (1/21/2025  9:00 AM)    Wrap Up  Wrap Up Additional Comments: This CM spoke with pt via phone. Pt reports doing well at home since discharge. New meds reviewed. Pt denies CP and SOB. Patient denies any further discharge questions/needs at this time. Emphasized that Follow up is needed after discharge to review the hospital recommendations, assess your response to your treatment. Pt aware of my availability for non-emergent concerns. Contact info provided to patient. (1/21/2025  9:00 AM)      Daily Tucker LPN

## 2025-01-22 LAB
BACTERIA BLD CULT: NORMAL
BACTERIA BLD CULT: NORMAL

## 2025-01-23 ENCOUNTER — OFFICE VISIT (OUTPATIENT)
Dept: PRIMARY CARE | Facility: CLINIC | Age: 85
End: 2025-01-23
Payer: MEDICARE

## 2025-01-23 VITALS
DIASTOLIC BLOOD PRESSURE: 66 MMHG | HEART RATE: 65 BPM | OXYGEN SATURATION: 94 % | BODY MASS INDEX: 18.39 KG/M2 | WEIGHT: 135.6 LBS | SYSTOLIC BLOOD PRESSURE: 112 MMHG | TEMPERATURE: 96.1 F

## 2025-01-23 DIAGNOSIS — I10 PRIMARY HYPERTENSION: ICD-10-CM

## 2025-01-23 PROCEDURE — 3074F SYST BP LT 130 MM HG: CPT

## 2025-01-23 PROCEDURE — 3078F DIAST BP <80 MM HG: CPT

## 2025-01-23 PROCEDURE — 1159F MED LIST DOCD IN RCRD: CPT

## 2025-01-23 PROCEDURE — 99496 TRANSJ CARE MGMT HIGH F2F 7D: CPT

## 2025-01-23 PROCEDURE — 1123F ACP DISCUSS/DSCN MKR DOCD: CPT

## 2025-01-23 PROCEDURE — 1157F ADVNC CARE PLAN IN RCRD: CPT

## 2025-01-23 RX ORDER — METOPROLOL TARTRATE 25 MG/1
12.5 TABLET, FILM COATED ORAL 2 TIMES DAILY
Qty: 180 TABLET | Refills: 0 | Status: SHIPPED | OUTPATIENT
Start: 2025-01-23

## 2025-01-23 NOTE — PATIENT INSTRUCTIONS
Follow up blood pressure and heart rate in 1 month  EKG at that time    Thank you for coming in today, if any questions or concerns arise, please call my office.   ARMEN Oliveira-CNP    
Walker/Wheelchair

## 2025-01-23 NOTE — PROGRESS NOTES
"Patient: Sheldon Arteaga \"Dejon\"  : 1940  PCP: Alvaro Cedeño, APRN-CNP  MRN: [id number]  Program: No linked episodes     Sheldon Arteaga \"Dejon\" is a 84 y.o. male presenting today for follow-up after being discharged from the hospital 3 days ago. The main problem requiring admission was CAP. The discharge summary and/or Transitional Care Management documentation was reviewed. Medication reconciliation was performed as indicated via the \"Jose as Reviewed\" timestamp.     Sheldon Arteaga \"Dejon\" was contacted by Transitional Care Management services two days after his discharge. This encounter and supporting documentation was reviewed.    The complexity of medical decision making for this patient's transitional care is high.    Community Acquired Pneumonia  Improving now, he is on steroid and antibiotic for 2 weeks  Continued hypotension, decrease BB, continue to hold CCB.    Review of Systems    Physical Exam  Vitals and nursing note reviewed.   Constitutional:       Appearance: Normal appearance. He is not ill-appearing.   Cardiovascular:      Rate and Rhythm: Normal rate and regular rhythm.   Pulmonary:      Breath sounds: Wheezing present. No rhonchi or rales.   Chest:      Chest wall: No tenderness.   Neurological:      Mental Status: He is alert.          Family History   Problem Relation Name Age of Onset    Heart attack Mother      Colon cancer Father      Heart attack Brother      Heart block Brother         No data recorded    Assessment/Plan   Problem List Items Addressed This Visit    None  Visit Diagnoses       Primary hypertension        Relevant Medications    metoprolol tartrate (Lopressor) 25 mg tablet            No follow-ups on file.       "

## 2025-02-02 ENCOUNTER — APPOINTMENT (OUTPATIENT)
Dept: RADIOLOGY | Facility: HOSPITAL | Age: 85
End: 2025-02-02
Payer: MEDICARE

## 2025-02-02 ENCOUNTER — HOSPITAL ENCOUNTER (INPATIENT)
Facility: HOSPITAL | Age: 85
End: 2025-02-02
Attending: EMERGENCY MEDICINE | Admitting: FAMILY MEDICINE
Payer: MEDICARE

## 2025-02-02 ENCOUNTER — APPOINTMENT (OUTPATIENT)
Dept: CARDIOLOGY | Facility: HOSPITAL | Age: 85
End: 2025-02-02
Payer: MEDICARE

## 2025-02-02 VITALS
WEIGHT: 128 LBS | RESPIRATION RATE: 21 BRPM | DIASTOLIC BLOOD PRESSURE: 65 MMHG | HEART RATE: 91 BPM | HEIGHT: 72 IN | TEMPERATURE: 98.4 F | OXYGEN SATURATION: 96 % | BODY MASS INDEX: 17.34 KG/M2 | SYSTOLIC BLOOD PRESSURE: 116 MMHG

## 2025-02-02 DIAGNOSIS — R09.02 HYPOXIA: ICD-10-CM

## 2025-02-02 DIAGNOSIS — J10.1 INFLUENZA A: Primary | ICD-10-CM

## 2025-02-02 DIAGNOSIS — I10 PRIMARY HYPERTENSION: ICD-10-CM

## 2025-02-02 DIAGNOSIS — J44.9 CHRONIC OBSTRUCTIVE PULMONARY DISEASE, UNSPECIFIED COPD TYPE (MULTI): ICD-10-CM

## 2025-02-02 LAB
ALBUMIN SERPL BCP-MCNC: 3.5 G/DL (ref 3.4–5)
ALP SERPL-CCNC: 46 U/L (ref 33–136)
ALT SERPL W P-5'-P-CCNC: 10 U/L (ref 10–52)
ANION GAP SERPL CALC-SCNC: 15 MMOL/L (ref 10–20)
AST SERPL W P-5'-P-CCNC: 21 U/L (ref 9–39)
BASE EXCESS BLDV CALC-SCNC: -0.6 MMOL/L (ref -2–3)
BASOPHILS # BLD AUTO: 0.02 X10*3/UL (ref 0–0.1)
BASOPHILS NFR BLD AUTO: 0.3 %
BILIRUB SERPL-MCNC: 0.5 MG/DL (ref 0–1.2)
BNP SERPL-MCNC: 51 PG/ML (ref 0–99)
BODY TEMPERATURE: NORMAL
BUN SERPL-MCNC: 16 MG/DL (ref 6–23)
CALCIUM SERPL-MCNC: 8.2 MG/DL (ref 8.6–10.3)
CARDIAC TROPONIN I PNL SERPL HS: 13 NG/L (ref 0–20)
CHLORIDE SERPL-SCNC: 93 MMOL/L (ref 98–107)
CO2 SERPL-SCNC: 22 MMOL/L (ref 21–32)
CREAT SERPL-MCNC: 1.01 MG/DL (ref 0.5–1.3)
EGFRCR SERPLBLD CKD-EPI 2021: 73 ML/MIN/1.73M*2
EOSINOPHIL # BLD AUTO: 0.2 X10*3/UL (ref 0–0.4)
EOSINOPHIL NFR BLD AUTO: 2.6 %
ERYTHROCYTE [DISTWIDTH] IN BLOOD BY AUTOMATED COUNT: 15.7 % (ref 11.5–14.5)
FLUAV RNA RESP QL NAA+PROBE: DETECTED
FLUBV RNA RESP QL NAA+PROBE: NOT DETECTED
GLUCOSE SERPL-MCNC: 134 MG/DL (ref 74–99)
HCO3 BLDV-SCNC: 24.9 MMOL/L (ref 22–26)
HCT VFR BLD AUTO: 38.1 % (ref 41–52)
HGB BLD-MCNC: 12.3 G/DL (ref 13.5–17.5)
IMM GRANULOCYTES # BLD AUTO: 0.07 X10*3/UL (ref 0–0.5)
IMM GRANULOCYTES NFR BLD AUTO: 0.9 % (ref 0–0.9)
INHALED O2 CONCENTRATION: 21 %
INR PPP: 1.6 (ref 0.9–1.1)
LYMPHOCYTES # BLD AUTO: 0.4 X10*3/UL (ref 0.8–3)
LYMPHOCYTES NFR BLD AUTO: 5.3 %
MAGNESIUM SERPL-MCNC: 1.98 MG/DL (ref 1.6–2.4)
MCH RBC QN AUTO: 31 PG (ref 26–34)
MCHC RBC AUTO-ENTMCNC: 32.3 G/DL (ref 32–36)
MCV RBC AUTO: 96 FL (ref 80–100)
MONOCYTES # BLD AUTO: 0.33 X10*3/UL (ref 0.05–0.8)
MONOCYTES NFR BLD AUTO: 4.3 %
NEUTROPHILS # BLD AUTO: 6.58 X10*3/UL (ref 1.6–5.5)
NEUTROPHILS NFR BLD AUTO: 86.6 %
NRBC BLD-RTO: 0 /100 WBCS (ref 0–0)
OXYHGB MFR BLDV: 57.9 % (ref 45–75)
PCO2 BLDV: 43 MM HG (ref 41–51)
PH BLDV: 7.37 PH (ref 7.33–7.43)
PLATELET # BLD AUTO: 214 X10*3/UL (ref 150–450)
PO2 BLDV: 36 MM HG (ref 35–45)
POTASSIUM SERPL-SCNC: 4.6 MMOL/L (ref 3.5–5.3)
PROT SERPL-MCNC: 6.6 G/DL (ref 6.4–8.2)
PROTHROMBIN TIME: 17.9 SECONDS (ref 9.8–12.8)
RBC # BLD AUTO: 3.97 X10*6/UL (ref 4.5–5.9)
RSV RNA RESP QL NAA+PROBE: NOT DETECTED
SAO2 % BLDV: 59 % (ref 45–75)
SARS-COV-2 RNA RESP QL NAA+PROBE: NOT DETECTED
SODIUM SERPL-SCNC: 125 MMOL/L (ref 136–145)
WBC # BLD AUTO: 7.6 X10*3/UL (ref 4.4–11.3)

## 2025-02-02 PROCEDURE — 99222 1ST HOSP IP/OBS MODERATE 55: CPT | Performed by: FAMILY MEDICINE

## 2025-02-02 PROCEDURE — 99285 EMERGENCY DEPT VISIT HI MDM: CPT | Mod: 25 | Performed by: EMERGENCY MEDICINE

## 2025-02-02 PROCEDURE — 96361 HYDRATE IV INFUSION ADD-ON: CPT

## 2025-02-02 PROCEDURE — 2500000004 HC RX 250 GENERAL PHARMACY W/ HCPCS (ALT 636 FOR OP/ED): Performed by: FAMILY MEDICINE

## 2025-02-02 PROCEDURE — 36415 COLL VENOUS BLD VENIPUNCTURE: CPT | Performed by: PHYSICIAN ASSISTANT

## 2025-02-02 PROCEDURE — 87637 SARSCOV2&INF A&B&RSV AMP PRB: CPT | Performed by: PHYSICIAN ASSISTANT

## 2025-02-02 PROCEDURE — 71045 X-RAY EXAM CHEST 1 VIEW: CPT

## 2025-02-02 PROCEDURE — 71045 X-RAY EXAM CHEST 1 VIEW: CPT | Mod: FOREIGN READ | Performed by: RADIOLOGY

## 2025-02-02 PROCEDURE — 2500000001 HC RX 250 WO HCPCS SELF ADMINISTERED DRUGS (ALT 637 FOR MEDICARE OP): Performed by: FAMILY MEDICINE

## 2025-02-02 PROCEDURE — 94664 DEMO&/EVAL PT USE INHALER: CPT

## 2025-02-02 PROCEDURE — 82805 BLOOD GASES W/O2 SATURATION: CPT | Performed by: PHYSICIAN ASSISTANT

## 2025-02-02 PROCEDURE — 94640 AIRWAY INHALATION TREATMENT: CPT

## 2025-02-02 PROCEDURE — 94640 AIRWAY INHALATION TREATMENT: CPT | Mod: 59

## 2025-02-02 PROCEDURE — 96374 THER/PROPH/DIAG INJ IV PUSH: CPT

## 2025-02-02 PROCEDURE — 85025 COMPLETE CBC W/AUTO DIFF WBC: CPT | Performed by: PHYSICIAN ASSISTANT

## 2025-02-02 PROCEDURE — 2500000002 HC RX 250 W HCPCS SELF ADMINISTERED DRUGS (ALT 637 FOR MEDICARE OP, ALT 636 FOR OP/ED): Performed by: PHYSICIAN ASSISTANT

## 2025-02-02 PROCEDURE — 83735 ASSAY OF MAGNESIUM: CPT | Performed by: PHYSICIAN ASSISTANT

## 2025-02-02 PROCEDURE — 2500000002 HC RX 250 W HCPCS SELF ADMINISTERED DRUGS (ALT 637 FOR MEDICARE OP, ALT 636 FOR OP/ED): Performed by: FAMILY MEDICINE

## 2025-02-02 PROCEDURE — 84484 ASSAY OF TROPONIN QUANT: CPT | Performed by: PHYSICIAN ASSISTANT

## 2025-02-02 PROCEDURE — 2500000005 HC RX 250 GENERAL PHARMACY W/O HCPCS: Performed by: EMERGENCY MEDICINE

## 2025-02-02 PROCEDURE — 83880 ASSAY OF NATRIURETIC PEPTIDE: CPT | Performed by: PHYSICIAN ASSISTANT

## 2025-02-02 PROCEDURE — 2500000004 HC RX 250 GENERAL PHARMACY W/ HCPCS (ALT 636 FOR OP/ED): Performed by: PHYSICIAN ASSISTANT

## 2025-02-02 PROCEDURE — 84075 ASSAY ALKALINE PHOSPHATASE: CPT | Performed by: PHYSICIAN ASSISTANT

## 2025-02-02 PROCEDURE — 93005 ELECTROCARDIOGRAM TRACING: CPT

## 2025-02-02 PROCEDURE — 85610 PROTHROMBIN TIME: CPT | Performed by: PHYSICIAN ASSISTANT

## 2025-02-02 PROCEDURE — 94760 N-INVAS EAR/PLS OXIMETRY 1: CPT

## 2025-02-02 PROCEDURE — 82810 BLOOD GASES O2 SAT ONLY: CPT | Performed by: EMERGENCY MEDICINE

## 2025-02-02 PROCEDURE — 1100000001 HC PRIVATE ROOM DAILY

## 2025-02-02 PROCEDURE — 2500000001 HC RX 250 WO HCPCS SELF ADMINISTERED DRUGS (ALT 637 FOR MEDICARE OP): Performed by: PHYSICIAN ASSISTANT

## 2025-02-02 RX ORDER — ONDANSETRON 4 MG/1
4 TABLET, ORALLY DISINTEGRATING ORAL EVERY 8 HOURS PRN
Status: DISCONTINUED | OUTPATIENT
Start: 2025-02-02 | End: 2025-02-05 | Stop reason: HOSPADM

## 2025-02-02 RX ORDER — TAMSULOSIN HYDROCHLORIDE 0.4 MG/1
0.8 CAPSULE ORAL DAILY
Status: DISCONTINUED | OUTPATIENT
Start: 2025-02-03 | End: 2025-02-05 | Stop reason: HOSPADM

## 2025-02-02 RX ORDER — CLOPIDOGREL BISULFATE 75 MG/1
75 TABLET ORAL DAILY
Status: DISCONTINUED | OUTPATIENT
Start: 2025-02-03 | End: 2025-02-05 | Stop reason: HOSPADM

## 2025-02-02 RX ORDER — IPRATROPIUM BROMIDE AND ALBUTEROL SULFATE 2.5; .5 MG/3ML; MG/3ML
3 SOLUTION RESPIRATORY (INHALATION)
Status: DISCONTINUED | OUTPATIENT
Start: 2025-02-02 | End: 2025-02-02

## 2025-02-02 RX ORDER — CLARITHROMYCIN 500 MG/1
500 TABLET, FILM COATED ORAL 2 TIMES DAILY
Status: COMPLETED | OUTPATIENT
Start: 2025-02-02 | End: 2025-02-04

## 2025-02-02 RX ORDER — FORMOTEROL FUMARATE DIHYDRATE 20 UG/2ML
20 SOLUTION RESPIRATORY (INHALATION)
Status: DISCONTINUED | OUTPATIENT
Start: 2025-02-02 | End: 2025-02-05 | Stop reason: HOSPADM

## 2025-02-02 RX ORDER — ATORVASTATIN CALCIUM 40 MG/1
40 TABLET, FILM COATED ORAL NIGHTLY
Status: DISCONTINUED | OUTPATIENT
Start: 2025-02-02 | End: 2025-02-05 | Stop reason: HOSPADM

## 2025-02-02 RX ORDER — BENZONATATE 100 MG/1
200 CAPSULE ORAL 3 TIMES DAILY
Status: DISCONTINUED | OUTPATIENT
Start: 2025-02-02 | End: 2025-02-05 | Stop reason: HOSPADM

## 2025-02-02 RX ORDER — IPRATROPIUM BROMIDE AND ALBUTEROL SULFATE 2.5; .5 MG/3ML; MG/3ML
3 SOLUTION RESPIRATORY (INHALATION) EVERY 2 HOUR PRN
Status: DISCONTINUED | OUTPATIENT
Start: 2025-02-02 | End: 2025-02-05 | Stop reason: HOSPADM

## 2025-02-02 RX ORDER — SODIUM CHLORIDE 9 MG/ML
50 INJECTION, SOLUTION INTRAVENOUS CONTINUOUS
Status: DISCONTINUED | OUTPATIENT
Start: 2025-02-02 | End: 2025-02-03

## 2025-02-02 RX ORDER — METOPROLOL TARTRATE 25 MG/1
12.5 TABLET, FILM COATED ORAL 2 TIMES DAILY
Status: DISCONTINUED | OUTPATIENT
Start: 2025-02-02 | End: 2025-02-05 | Stop reason: HOSPADM

## 2025-02-02 RX ORDER — PREDNISONE 5 MG/1
10 TABLET ORAL DAILY
Status: COMPLETED | OUTPATIENT
Start: 2025-02-02 | End: 2025-02-04

## 2025-02-02 RX ORDER — SULFAMETHOXAZOLE AND TRIMETHOPRIM 800; 160 MG/1; MG/1
1 TABLET ORAL 2 TIMES DAILY
Status: COMPLETED | OUTPATIENT
Start: 2025-02-02 | End: 2025-02-05

## 2025-02-02 RX ORDER — IPRATROPIUM BROMIDE AND ALBUTEROL SULFATE 2.5; .5 MG/3ML; MG/3ML
3 SOLUTION RESPIRATORY (INHALATION)
Status: DISCONTINUED | OUTPATIENT
Start: 2025-02-02 | End: 2025-02-05 | Stop reason: HOSPADM

## 2025-02-02 RX ORDER — SULFAMETHOXAZOLE AND TRIMETHOPRIM 800; 160 MG/1; MG/1
1 TABLET ORAL 2 TIMES DAILY
Status: DISCONTINUED | OUTPATIENT
Start: 2025-02-02 | End: 2025-02-02

## 2025-02-02 RX ORDER — ONDANSETRON HYDROCHLORIDE 2 MG/ML
4 INJECTION, SOLUTION INTRAVENOUS EVERY 8 HOURS PRN
Status: DISCONTINUED | OUTPATIENT
Start: 2025-02-02 | End: 2025-02-05 | Stop reason: HOSPADM

## 2025-02-02 RX ORDER — IPRATROPIUM BROMIDE AND ALBUTEROL SULFATE 2.5; .5 MG/3ML; MG/3ML
3 SOLUTION RESPIRATORY (INHALATION) EVERY 20 MIN
Status: COMPLETED | OUTPATIENT
Start: 2025-02-02 | End: 2025-02-02

## 2025-02-02 RX ORDER — ACETAMINOPHEN 325 MG/1
975 TABLET ORAL ONCE
Status: COMPLETED | OUTPATIENT
Start: 2025-02-02 | End: 2025-02-02

## 2025-02-02 RX ORDER — OSELTAMIVIR PHOSPHATE 75 MG/1
75 CAPSULE ORAL 2 TIMES DAILY
Status: DISCONTINUED | OUTPATIENT
Start: 2025-02-02 | End: 2025-02-05 | Stop reason: HOSPADM

## 2025-02-02 RX ORDER — TALC
3 POWDER (GRAM) TOPICAL NIGHTLY PRN
Status: DISCONTINUED | OUTPATIENT
Start: 2025-02-02 | End: 2025-02-05 | Stop reason: HOSPADM

## 2025-02-02 RX ORDER — DOCUSATE SODIUM 100 MG/1
100 CAPSULE, LIQUID FILLED ORAL 2 TIMES DAILY
Status: DISCONTINUED | OUTPATIENT
Start: 2025-02-02 | End: 2025-02-04

## 2025-02-02 RX ORDER — ALUMINUM HYDROXIDE, MAGNESIUM HYDROXIDE, AND SIMETHICONE 1200; 120; 1200 MG/30ML; MG/30ML; MG/30ML
30 SUSPENSION ORAL EVERY 6 HOURS PRN
Status: DISCONTINUED | OUTPATIENT
Start: 2025-02-02 | End: 2025-02-05 | Stop reason: HOSPADM

## 2025-02-02 RX ADMIN — IPRATROPIUM BROMIDE AND ALBUTEROL SULFATE 3 ML: 2.5; .5 SOLUTION RESPIRATORY (INHALATION) at 11:17

## 2025-02-02 RX ADMIN — TIOTROPIUM BROMIDE INHALATION SPRAY 2 PUFF: 3.12 SPRAY, METERED RESPIRATORY (INHALATION) at 20:41

## 2025-02-02 RX ADMIN — GUAIFENESIN, DEXTROMETHORPHAN HBR 1 TABLET: 600; 30 TABLET ORAL at 17:32

## 2025-02-02 RX ADMIN — SODIUM CHLORIDE 500 ML: 9 INJECTION, SOLUTION INTRAVENOUS at 11:17

## 2025-02-02 RX ADMIN — PREDNISONE 10 MG: 5 TABLET ORAL at 17:32

## 2025-02-02 RX ADMIN — IPRATROPIUM BROMIDE AND ALBUTEROL SULFATE 3 ML: 2.5; .5 SOLUTION RESPIRATORY (INHALATION) at 11:21

## 2025-02-02 RX ADMIN — Medication 3 L/MIN: at 14:32

## 2025-02-02 RX ADMIN — SODIUM CHLORIDE 75 ML/HR: 9 INJECTION, SOLUTION INTRAVENOUS at 15:55

## 2025-02-02 RX ADMIN — METHYLPREDNISOLONE SODIUM SUCCINATE 125 MG: 125 INJECTION, POWDER, FOR SOLUTION INTRAMUSCULAR; INTRAVENOUS at 11:18

## 2025-02-02 RX ADMIN — FORMOTEROL FUMARATE DIHYDRATE 20 MCG: 20 SOLUTION RESPIRATORY (INHALATION) at 19:19

## 2025-02-02 RX ADMIN — IPRATROPIUM BROMIDE AND ALBUTEROL SULFATE 3 ML: 2.5; .5 SOLUTION RESPIRATORY (INHALATION) at 11:19

## 2025-02-02 RX ADMIN — Medication 3 L/MIN: at 19:19

## 2025-02-02 RX ADMIN — APIXABAN 2.5 MG: 2.5 TABLET, FILM COATED ORAL at 20:30

## 2025-02-02 RX ADMIN — ACETAMINOPHEN 975 MG: 325 TABLET, FILM COATED ORAL at 13:46

## 2025-02-02 RX ADMIN — OSELTAMAVIR PHOSPHATE 75 MG: 75 CAPSULE ORAL at 20:30

## 2025-02-02 RX ADMIN — CLARITHROMYCIN 500 MG: 500 TABLET, FILM COATED ORAL at 20:31

## 2025-02-02 RX ADMIN — BENZONATATE 200 MG: 100 CAPSULE ORAL at 17:32

## 2025-02-02 RX ADMIN — IPRATROPIUM BROMIDE AND ALBUTEROL SULFATE 3 ML: 2.5; .5 SOLUTION RESPIRATORY (INHALATION) at 19:19

## 2025-02-02 RX ADMIN — METOPROLOL TARTRATE 12.5 MG: 25 TABLET, FILM COATED ORAL at 20:41

## 2025-02-02 RX ADMIN — SULFAMETHOXAZOLE AND TRIMETHOPRIM 1 TABLET: 800; 160 TABLET ORAL at 20:31

## 2025-02-02 RX ADMIN — ATORVASTATIN CALCIUM 40 MG: 40 TABLET, FILM COATED ORAL at 20:30

## 2025-02-02 SDOH — SOCIAL STABILITY: SOCIAL INSECURITY: ARE THERE ANY APPARENT SIGNS OF INJURIES/BEHAVIORS THAT COULD BE RELATED TO ABUSE/NEGLECT?: NO

## 2025-02-02 SDOH — ECONOMIC STABILITY: FOOD INSECURITY: WITHIN THE PAST 12 MONTHS, YOU WORRIED THAT YOUR FOOD WOULD RUN OUT BEFORE YOU GOT THE MONEY TO BUY MORE.: NEVER TRUE

## 2025-02-02 SDOH — SOCIAL STABILITY: SOCIAL INSECURITY: HAS ANYONE EVER THREATENED TO HURT YOUR FAMILY OR YOUR PETS?: NO

## 2025-02-02 SDOH — SOCIAL STABILITY: SOCIAL INSECURITY: WITHIN THE LAST YEAR, HAVE YOU BEEN AFRAID OF YOUR PARTNER OR EX-PARTNER?: NO

## 2025-02-02 SDOH — ECONOMIC STABILITY: FOOD INSECURITY: WITHIN THE PAST 12 MONTHS, THE FOOD YOU BOUGHT JUST DIDN'T LAST AND YOU DIDN'T HAVE MONEY TO GET MORE.: NEVER TRUE

## 2025-02-02 SDOH — SOCIAL STABILITY: SOCIAL INSECURITY: WERE YOU ABLE TO COMPLETE ALL THE BEHAVIORAL HEALTH SCREENINGS?: YES

## 2025-02-02 SDOH — ECONOMIC STABILITY: INCOME INSECURITY: IN THE PAST 12 MONTHS HAS THE ELECTRIC, GAS, OIL, OR WATER COMPANY THREATENED TO SHUT OFF SERVICES IN YOUR HOME?: NO

## 2025-02-02 SDOH — SOCIAL STABILITY: SOCIAL INSECURITY: ARE YOU OR HAVE YOU BEEN THREATENED OR ABUSED PHYSICALLY, EMOTIONALLY, OR SEXUALLY BY ANYONE?: NO

## 2025-02-02 SDOH — SOCIAL STABILITY: SOCIAL INSECURITY: WITHIN THE LAST YEAR, HAVE YOU BEEN HUMILIATED OR EMOTIONALLY ABUSED IN OTHER WAYS BY YOUR PARTNER OR EX-PARTNER?: NO

## 2025-02-02 SDOH — SOCIAL STABILITY: SOCIAL INSECURITY: HAVE YOU HAD THOUGHTS OF HARMING ANYONE ELSE?: NO

## 2025-02-02 SDOH — SOCIAL STABILITY: SOCIAL INSECURITY: DO YOU FEEL ANYONE HAS EXPLOITED OR TAKEN ADVANTAGE OF YOU FINANCIALLY OR OF YOUR PERSONAL PROPERTY?: NO

## 2025-02-02 SDOH — SOCIAL STABILITY: SOCIAL INSECURITY: HAVE YOU HAD ANY THOUGHTS OF HARMING ANYONE ELSE?: NO

## 2025-02-02 SDOH — SOCIAL STABILITY: SOCIAL INSECURITY: ABUSE: ADULT

## 2025-02-02 SDOH — SOCIAL STABILITY: SOCIAL INSECURITY: DO YOU FEEL UNSAFE GOING BACK TO THE PLACE WHERE YOU ARE LIVING?: NO

## 2025-02-02 ASSESSMENT — COGNITIVE AND FUNCTIONAL STATUS - GENERAL
PATIENT BASELINE BEDBOUND: NO
MOBILITY SCORE: 23
CLIMB 3 TO 5 STEPS WITH RAILING: A LITTLE
DRESSING REGULAR LOWER BODY CLOTHING: A LITTLE
DAILY ACTIVITIY SCORE: 23

## 2025-02-02 ASSESSMENT — LIFESTYLE VARIABLES
TOTAL SCORE: 0
HAVE YOU EVER FELT YOU SHOULD CUT DOWN ON YOUR DRINKING: NO
AUDIT-C TOTAL SCORE: 0
EVER FELT BAD OR GUILTY ABOUT YOUR DRINKING: NO
EVER HAD A DRINK FIRST THING IN THE MORNING TO STEADY YOUR NERVES TO GET RID OF A HANGOVER: NO
HOW MANY STANDARD DRINKS CONTAINING ALCOHOL DO YOU HAVE ON A TYPICAL DAY: PATIENT DOES NOT DRINK
HOW OFTEN DO YOU HAVE A DRINK CONTAINING ALCOHOL: NEVER
SKIP TO QUESTIONS 9-10: 1
HOW OFTEN DO YOU HAVE 6 OR MORE DRINKS ON ONE OCCASION: NEVER
HAVE PEOPLE ANNOYED YOU BY CRITICIZING YOUR DRINKING: NO
AUDIT-C TOTAL SCORE: 0

## 2025-02-02 ASSESSMENT — COLUMBIA-SUICIDE SEVERITY RATING SCALE - C-SSRS
2. HAVE YOU ACTUALLY HAD ANY THOUGHTS OF KILLING YOURSELF?: NO
6. HAVE YOU EVER DONE ANYTHING, STARTED TO DO ANYTHING, OR PREPARED TO DO ANYTHING TO END YOUR LIFE?: NO
1. IN THE PAST MONTH, HAVE YOU WISHED YOU WERE DEAD OR WISHED YOU COULD GO TO SLEEP AND NOT WAKE UP?: NO

## 2025-02-02 ASSESSMENT — ACTIVITIES OF DAILY LIVING (ADL)
WALKS IN HOME: INDEPENDENT
PATIENT'S MEMORY ADEQUATE TO SAFELY COMPLETE DAILY ACTIVITIES?: YES
JUDGMENT_ADEQUATE_SAFELY_COMPLETE_DAILY_ACTIVITIES: YES
HEARING - RIGHT EAR: FUNCTIONAL
LACK_OF_TRANSPORTATION: NO
DRESSING YOURSELF: INDEPENDENT
ASSISTIVE_DEVICE: DENTURES UPPER;DENTURES LOWER
ADEQUATE_TO_COMPLETE_ADL: YES
BATHING: INDEPENDENT
LACK_OF_TRANSPORTATION: NO
TOILETING: INDEPENDENT
FEEDING YOURSELF: INDEPENDENT
HEARING - LEFT EAR: FUNCTIONAL
GROOMING: INDEPENDENT

## 2025-02-02 ASSESSMENT — ENCOUNTER SYMPTOMS
BACK PAIN: 0
DIARRHEA: 1
NAUSEA: 0
DYSURIA: 0
DIZZINESS: 0
VOMITING: 0
SINUS PRESSURE: 1
FEVER: 1
PALPITATIONS: 0
SHORTNESS OF BREATH: 1
ABDOMINAL PAIN: 0
CONSTIPATION: 0
CHILLS: 1
CHEST TIGHTNESS: 0
HEADACHES: 1

## 2025-02-02 ASSESSMENT — PAIN SCALES - GENERAL
PAINLEVEL_OUTOF10: 0 - NO PAIN
PAINLEVEL_OUTOF10: 0 - NO PAIN

## 2025-02-02 ASSESSMENT — PAIN - FUNCTIONAL ASSESSMENT
PAIN_FUNCTIONAL_ASSESSMENT: 0-10
PAIN_FUNCTIONAL_ASSESSMENT: 0-10

## 2025-02-02 NOTE — CARE PLAN
The patient's goals for the shift include      The clinical goals for the shift include breathing support    Over the shift, the patient did not make progress toward the following goals. Barriers to progression include new/acute supplemental oxygen support. Recommendations to address these barriers include disease process.

## 2025-02-02 NOTE — ED PROVIDER NOTES
The patient was seen by the midlevel/resident.  I have personally saw the patient and made/approved the management plan and take responsibility for the patient management.  I reviewed the EKG's (when done) and agree with the interpretation.  I have seen and examined the patient; agree with the workup, evaluation, MDM, and diagnosis.  The care plan has been discussed with the midlevel/resident; I have reviewed the note and agree with the documented findings.     Patient presents ED with complaint of shortness of breath weakness.  He was worked up and found to have influenza A.  He is weak and tired unable to get around at home very well.  He does live with his family member but she is not a was there.  Last day or 2 has been having more difficulty and more short of breath.  He is not normally on oxygen but was hypoxic this morning and here in the ED.  He was placed on oxygen and worked up.  Talked about options and he would like to be hospitalized for further treatment.  Diagnoses as of 02/02/25 1414   Influenza A   Hypoxia   Chronic obstructive pulmonary disease, unspecified COPD type (Multi)     MD Denzel Zepeda MD  02/02/25 1415

## 2025-02-02 NOTE — ED PROVIDER NOTES
HPI   Chief Complaint   Patient presents with    Flu Symptoms     [facility] Monday for PNA        This is an 84-year-old male with PMH HTN, HLD, COPD not on home O2, A-fib on Eliquis, PAD presenting for evaluation of shortness of breath.  Recently had hospitalization for community-acquired pneumonia and COPD exacerbation and is on the last 2 days of steroids.  Family at bedside notes that his O2 saturation was 88% on room air and his heart rate was up to 150.  EMS was then called.  The patient states this past week he developed upper respiratory congestion, sinus pressure, worsening shortness of breath and he had a couple of episodes of diarrhea.  Denies chest pain, palpitations, hemoptysis, orthopnea, PND.      History provided by:  Relative and patient   used: No            Patient History   Past Medical History:   Diagnosis Date    (HFpEF) heart failure with preserved ejection fraction     A-fib (Multi)     COPD (chronic obstructive pulmonary disease) (Multi)     Hypertension     Stroke (Multi)      Past Surgical History:   Procedure Laterality Date    CT ANGIO AORTA AND BILATERAL ILIOFEMORAL RUN OFF INCLUDING WITHOUT CONTRAST IF PERFORMED  04/10/2022    CT AORTA AND BILATERAL ILIOFEMORAL RUNOFF ANGIOGRAM W AND/OR WO IV CONTRAST 4/10/2022 [facility] EMERGENCY LEGACY    CT ANGIO AORTA AND BILATERAL ILIOFEMORAL RUN OFF INCLUDING WITHOUT CONTRAST IF PERFORMED  04/13/2022    CT AORTA AND BILATERAL ILIOFEMORAL RUNOFF ANGIOGRAM W AND/OR WO IV CONTRAST 4/13/2022 [facility] CLINICAL LEGACY    CT ANGIO AORTA AND BILATERAL ILIOFEMORAL RUN OFF INCLUDING WITHOUT CONTRAST IF PERFORMED  05/22/2022    CT AORTA AND BILATERAL ILIOFEMORAL RUNOFF ANGIOGRAM W AND/OR WO IV CONTRAST 5/22/2022 [facility] EMERGENCY LEGACY    CT ANGIO AORTA AND BILATERAL ILIOFEMORAL RUN OFF INCLUDING WITHOUT CONTRAST IF PERFORMED  04/02/2023    CT AORTA AND BILATERAL ILIOFEMORAL RUNOFF ANGIOGRAM W AND/OR WO IV CONTRAST [facility] CT    CT ANGIO NECK   04/19/2022    CT NECK ANGIO W AND WO IV CONTRAST 4/19/2022 [facility] CLINICAL LEGACY    CT HEAD ANGIO W AND WO IV CONTRAST  04/19/2022    CT HEAD ANGIO W AND WO IV CONTRAST 4/19/2022 [facility] CLINICAL LEGACY    FEMORAL BYPASS      OTHER SURGICAL HISTORY  09/17/2019    [place] tooth extraction    OTHER SURGICAL HISTORY  09/17/2019    Tonsillectomy with adenoidectomy    OTHER SURGICAL HISTORY  01/17/2020    Lower leg fracture repair    OTHER SURGICAL HISTORY  01/17/2020    Cardiac catheterization    TOTAL HIP ARTHROPLASTY  08/31/2018    Hip Replacement     Family History   Problem Relation Name Age of Onset    Heart attack Mother      Colon cancer Father      Heart attack Brother      Heart block Brother       Social History     Tobacco Use    Smoking status: Every Day     Current packs/day: 1.00     Average packs/day: 1 pack/day for 50.0 years (50.0 ttl pk-yrs)     Types: Cigarettes     Start date: 1/17/1975    Smokeless tobacco: Never   Vaping Use    Vaping status: Never Used   Substance Use Topics    Alcohol use: Never    Drug use: Never       Physical Exam   ED Triage Vitals [02/02/25 1045]   Temperature Heart Rate Respirations BP   36.5 °C (97.7 °F) 87 18 96/54      Pulse Ox Temp Source Heart Rate Source Patient Position   96 % Temporal -- Standing      BP Location FiO2 (%)     Right arm --       Physical Exam    General: Vitals noted, no distress.  Chronically ill-appearing.  Neck: Trachea midline.  No JVD appreciated.  Cardiac: Regular rate regular rhythm.  Grade 2/6 systolic murmur heard best at the LSB.  Pulmonary: Coarse breath sounds bilaterally.  No tachypnea.  Abdomen: Soft. Nontender  Extremities: No peripheral edema  Skin: No rash  Neuro: No focal neurologic deficits    ED Course & MDM   Diagnoses as of 02/02/25 1602   Influenza A   Hypoxia   Chronic obstructive pulmonary disease, unspecified COPD type (Multi)                 No data recorded     Pender Coma Scale Score: 15 (02/02/25 1049 : Germán Khan RN)                            Medical Decision Making  DDx: Flu, COVID, RSV, pneumonia, pneumothorax, bronchospasm, dysrhythmia    EKG Interpreted by me: NSR.  Rate 96.  Normal axis.  QTc 409 ms.  No acute T-wave changes. No STEMI.    Patient is currently on 2 L nasal cannula on my assessment.  No increased work of breathing.  No respiratory distress.  Has coarse breath sounds bilaterally.  Family reports apparently there was a exposure to flu by a family member.  He is positive for flu A.  Was given 125 mg IV Solu-Medrol DuoNeb x 3.  He is able to tolerate p.o.  He was found to be hyponatremic at 125.  Normal pH.  No hypercarbia.  H&H stable.  No leukocytosis.  Chest x-ray without focal infiltrate.  Given his hypoxia and multiple risk factors patient benefit from hospitalization for further care.  Discussed with the hospitalist who accepted.  This visit was staffed with the attending physician Dr. Desai.      Disclaimer: This note was dictated using speech recognition software. An attempt at proofreading was made to minimize errors. Minor errors in transcription may be present. Please call if questions.    Amount and/or Complexity of Data Reviewed  Independent Historian:      Details: Patient and daughters  Labs: ordered.  Radiology: ordered.  ECG/medicine tests: ordered and independent interpretation performed.        Procedure  Procedures     Harjeet Valenzuela PA-C  02/02/25 8628

## 2025-02-02 NOTE — PROGRESS NOTES
02/02/25 1517   Discharge Planning   Living Arrangements Children  (Home with daughter Stuart)   Support Systems Children   Assistance Needed A&OX4; independent with ADLs with no DME; drives; room air baseline-currently 3L NC; PCP Dr Johny Cedeño; on Eliquis prior to hospitalization   Type of Residence Private residence   Number of Stairs to Enter Residence 3   Number of Stairs Within Residence 0   Do you have animals or pets at home? Yes   Type of Animals or Pets 3 cats 1 dog   Who is requesting discharge planning? Provider   Home or Post Acute Services None   Expected Discharge Disposition Home  (TBD pending workup but likely home no needs when ready. Will need to wean to room air)   Does the patient need discharge transport arranged? No   Financial Resource Strain   How hard is it for you to pay for the very basics like food, housing, medical care, and heating? Not hard   Housing Stability   In the last 12 months, was there a time when you were not able to pay the mortgage or rent on time? N   In the past 12 months, how many times have you moved where you were living? 0   At any time in the past 12 months, were you homeless or living in a shelter (including now)? N   Transportation Needs   In the past 12 months, has lack of transportation kept you from meetings, work, or from getting things needed for daily living? No

## 2025-02-02 NOTE — Clinical Note
[facility] Recommendation:  ED- Rec. (02/02/25 1423 : Nori Stanley RN)

## 2025-02-03 LAB
ANION GAP SERPL CALC-SCNC: 11 MMOL/L (ref 10–20)
ATRIAL RATE: 96 BPM
BUN SERPL-MCNC: 20 MG/DL (ref 6–23)
CALCIUM SERPL-MCNC: 7.6 MG/DL (ref 8.6–10.3)
CHLORIDE SERPL-SCNC: 101 MMOL/L (ref 98–107)
CO2 SERPL-SCNC: 22 MMOL/L (ref 21–32)
CREAT SERPL-MCNC: 0.71 MG/DL (ref 0.5–1.3)
EGFRCR SERPLBLD CKD-EPI 2021: 90 ML/MIN/1.73M*2
ERYTHROCYTE [DISTWIDTH] IN BLOOD BY AUTOMATED COUNT: 15.7 % (ref 11.5–14.5)
GLUCOSE SERPL-MCNC: 130 MG/DL (ref 74–99)
HCT VFR BLD AUTO: 31.7 % (ref 41–52)
HGB BLD-MCNC: 10.3 G/DL (ref 13.5–17.5)
MCH RBC QN AUTO: 30.9 PG (ref 26–34)
MCHC RBC AUTO-ENTMCNC: 32.5 G/DL (ref 32–36)
MCV RBC AUTO: 95 FL (ref 80–100)
NRBC BLD-RTO: 0 /100 WBCS (ref 0–0)
P AXIS: 61 DEGREES
P OFFSET: 197 MS
P ONSET: 149 MS
PLATELET # BLD AUTO: 170 X10*3/UL (ref 150–450)
POTASSIUM SERPL-SCNC: 4.3 MMOL/L (ref 3.5–5.3)
PR INTERVAL: 148 MS
Q ONSET: 223 MS
QRS COUNT: 15 BEATS
QRS DURATION: 92 MS
QT INTERVAL: 324 MS
QTC CALCULATION(BAZETT): 409 MS
QTC FREDERICIA: 379 MS
R AXIS: 73 DEGREES
RBC # BLD AUTO: 3.33 X10*6/UL (ref 4.5–5.9)
SODIUM SERPL-SCNC: 130 MMOL/L (ref 136–145)
T AXIS: 79 DEGREES
T OFFSET: 385 MS
VENTRICULAR RATE: 96 BPM
WBC # BLD AUTO: 5.6 X10*3/UL (ref 4.4–11.3)

## 2025-02-03 PROCEDURE — 2500000001 HC RX 250 WO HCPCS SELF ADMINISTERED DRUGS (ALT 637 FOR MEDICARE OP): Performed by: FAMILY MEDICINE

## 2025-02-03 PROCEDURE — 36415 COLL VENOUS BLD VENIPUNCTURE: CPT | Performed by: FAMILY MEDICINE

## 2025-02-03 PROCEDURE — 99232 SBSQ HOSP IP/OBS MODERATE 35: CPT | Performed by: FAMILY MEDICINE

## 2025-02-03 PROCEDURE — 80048 BASIC METABOLIC PNL TOTAL CA: CPT | Performed by: FAMILY MEDICINE

## 2025-02-03 PROCEDURE — 2500000004 HC RX 250 GENERAL PHARMACY W/ HCPCS (ALT 636 FOR OP/ED): Performed by: FAMILY MEDICINE

## 2025-02-03 PROCEDURE — 2500000005 HC RX 250 GENERAL PHARMACY W/O HCPCS: Performed by: EMERGENCY MEDICINE

## 2025-02-03 PROCEDURE — 85027 COMPLETE CBC AUTOMATED: CPT | Performed by: FAMILY MEDICINE

## 2025-02-03 PROCEDURE — 94760 N-INVAS EAR/PLS OXIMETRY 1: CPT

## 2025-02-03 PROCEDURE — 1100000001 HC PRIVATE ROOM DAILY

## 2025-02-03 PROCEDURE — 94640 AIRWAY INHALATION TREATMENT: CPT

## 2025-02-03 PROCEDURE — 2500000002 HC RX 250 W HCPCS SELF ADMINISTERED DRUGS (ALT 637 FOR MEDICARE OP, ALT 636 FOR OP/ED): Performed by: FAMILY MEDICINE

## 2025-02-03 RX ORDER — ACETAMINOPHEN 160 MG/5ML
650 SOLUTION ORAL EVERY 4 HOURS PRN
Status: DISCONTINUED | OUTPATIENT
Start: 2025-02-03 | End: 2025-02-05 | Stop reason: HOSPADM

## 2025-02-03 RX ORDER — ACETAMINOPHEN 325 MG/1
650 TABLET ORAL EVERY 4 HOURS PRN
Status: DISCONTINUED | OUTPATIENT
Start: 2025-02-03 | End: 2025-02-05 | Stop reason: HOSPADM

## 2025-02-03 RX ADMIN — TAMSULOSIN HYDROCHLORIDE 0.8 MG: 0.4 CAPSULE ORAL at 09:28

## 2025-02-03 RX ADMIN — Medication 3 L/MIN: at 20:10

## 2025-02-03 RX ADMIN — IPRATROPIUM BROMIDE AND ALBUTEROL SULFATE 3 ML: 2.5; .5 SOLUTION RESPIRATORY (INHALATION) at 08:01

## 2025-02-03 RX ADMIN — APIXABAN 2.5 MG: 2.5 TABLET, FILM COATED ORAL at 22:22

## 2025-02-03 RX ADMIN — TIOTROPIUM BROMIDE INHALATION SPRAY 2 PUFF: 3.12 SPRAY, METERED RESPIRATORY (INHALATION) at 06:16

## 2025-02-03 RX ADMIN — FORMOTEROL FUMARATE DIHYDRATE 20 MCG: 20 SOLUTION RESPIRATORY (INHALATION) at 08:03

## 2025-02-03 RX ADMIN — OSELTAMAVIR PHOSPHATE 75 MG: 75 CAPSULE ORAL at 09:27

## 2025-02-03 RX ADMIN — IPRATROPIUM BROMIDE AND ALBUTEROL SULFATE 3 ML: 2.5; .5 SOLUTION RESPIRATORY (INHALATION) at 14:01

## 2025-02-03 RX ADMIN — ACETAMINOPHEN 650 MG: 325 TABLET, FILM COATED ORAL at 11:45

## 2025-02-03 RX ADMIN — BENZONATATE 200 MG: 100 CAPSULE ORAL at 09:27

## 2025-02-03 RX ADMIN — DOCUSATE SODIUM 100 MG: 100 CAPSULE, LIQUID FILLED ORAL at 09:27

## 2025-02-03 RX ADMIN — SULFAMETHOXAZOLE AND TRIMETHOPRIM 1 TABLET: 800; 160 TABLET ORAL at 09:27

## 2025-02-03 RX ADMIN — SULFAMETHOXAZOLE AND TRIMETHOPRIM 1 TABLET: 800; 160 TABLET ORAL at 22:23

## 2025-02-03 RX ADMIN — OSELTAMAVIR PHOSPHATE 75 MG: 75 CAPSULE ORAL at 22:23

## 2025-02-03 RX ADMIN — METOPROLOL TARTRATE 12.5 MG: 25 TABLET, FILM COATED ORAL at 09:27

## 2025-02-03 RX ADMIN — Medication 3 L/MIN: at 08:03

## 2025-02-03 RX ADMIN — METOPROLOL TARTRATE 12.5 MG: 25 TABLET, FILM COATED ORAL at 22:25

## 2025-02-03 RX ADMIN — BENZONATATE 200 MG: 100 CAPSULE ORAL at 22:22

## 2025-02-03 RX ADMIN — ATORVASTATIN CALCIUM 40 MG: 40 TABLET, FILM COATED ORAL at 22:22

## 2025-02-03 RX ADMIN — CLARITHROMYCIN 500 MG: 500 TABLET, FILM COATED ORAL at 09:27

## 2025-02-03 RX ADMIN — BENZONATATE 200 MG: 100 CAPSULE ORAL at 15:30

## 2025-02-03 RX ADMIN — FORMOTEROL FUMARATE DIHYDRATE 20 MCG: 20 SOLUTION RESPIRATORY (INHALATION) at 20:10

## 2025-02-03 RX ADMIN — CLOPIDOGREL 75 MG: 75 TABLET ORAL at 09:27

## 2025-02-03 RX ADMIN — CLARITHROMYCIN 500 MG: 500 TABLET, FILM COATED ORAL at 22:23

## 2025-02-03 RX ADMIN — IPRATROPIUM BROMIDE AND ALBUTEROL SULFATE 3 ML: 2.5; .5 SOLUTION RESPIRATORY (INHALATION) at 20:09

## 2025-02-03 RX ADMIN — PREDNISONE 10 MG: 5 TABLET ORAL at 09:28

## 2025-02-03 RX ADMIN — APIXABAN 2.5 MG: 2.5 TABLET, FILM COATED ORAL at 09:27

## 2025-02-03 ASSESSMENT — COGNITIVE AND FUNCTIONAL STATUS - GENERAL
CLIMB 3 TO 5 STEPS WITH RAILING: A LITTLE
MOBILITY SCORE: 23
DAILY ACTIVITIY SCORE: 24
MOBILITY SCORE: 23
CLIMB 3 TO 5 STEPS WITH RAILING: A LITTLE
DAILY ACTIVITIY SCORE: 24

## 2025-02-03 ASSESSMENT — PAIN DESCRIPTION - LOCATION: LOCATION: HEAD

## 2025-02-03 ASSESSMENT — PAIN SCALES - GENERAL
PAINLEVEL_OUTOF10: 0 - NO PAIN
PAINLEVEL_OUTOF10: 3

## 2025-02-03 ASSESSMENT — PAIN - FUNCTIONAL ASSESSMENT: PAIN_FUNCTIONAL_ASSESSMENT: 0-10

## 2025-02-03 NOTE — PROGRESS NOTES
02/03/25 1121   Discharge Planning   Living Arrangements Children  (Home with daughter Stuart)   Support Systems Children   Assistance Needed A&OX4; independent with ADLs with no DME; drives; room air baseline-currently 3L NC; PCP Dr Johny Cedeño; on Eliquis prior to hospitalization   Type of Residence Private residence   Number of Stairs to Enter Residence 3   Number of Stairs Within Residence 0   Do you have animals or pets at home? Yes   Type of Animals or Pets 3 cats 1 dog   Who is requesting discharge planning? Provider   Home or Post Acute Services None   Expected Discharge Disposition Home

## 2025-02-03 NOTE — CARE PLAN
The patient's goals for the shift include  rest    The clinical goals for the shift include breathing support

## 2025-02-03 NOTE — PROGRESS NOTES
"Sheldon Arteaga \"Dejon\" is a 84 y.o. male on day 1 of admission presenting with Influenza A.      Subjective   Patient resting in bed, states he is feeling a little better but still exhausted.    Objective     Last Recorded Vitals  /50   Pulse 74   Temp 36.7 °C (98.1 °F)   Resp 18   Wt 58.1 kg (128 lb)   SpO2 96%   Intake/Output last 3 Shifts:    Intake/Output Summary (Last 24 hours) at 2/3/2025 1610  Last data filed at 2/3/2025 1148  Gross per 24 hour   Intake 722.92 ml   Output 900 ml   Net -177.08 ml       Admission Weight  Weight: 59 kg (130 lb) (02/02/25 1045)    Daily Weight  02/02/25 : 58.1 kg (128 lb)      Physical Exam  Constitutional: alert and oriented x 3, awake, cooperative, improved appearane today, no acute distress  Skin: warm and dry  Head/Neck: Normocephalic, atraumatic  Eyes: clear sclera  ENMT: mucous membranes moist  Cardio: Regular rate and rhythm, no murmur  Resp: Coarse breath sounds bilaterally, no wheeze  Gastrointestinal: Soft, nontender, nondistended, BSx4  Musculoskeletal: ROM intact, no joint swelling  Extremities: No edema, cyanosis, or clubbing  Neuro: alert and oriented x 3  Psychological: Appropriate mood and behavior    Relevant Results  Scheduled medications  apixaban, 2.5 mg, oral, BID  atorvastatin, 40 mg, oral, Nightly  benzonatate, 200 mg, oral, TID  clarithromycin, 500 mg, oral, BID  clopidogrel, 75 mg, oral, Daily  docusate sodium, 100 mg, oral, BID  tiotropium, 2 puff, inhalation, Daily   And  formoterol, 20 mcg, nebulization, q12h  ipratropium-albuteroL, 3 mL, nebulization, TID  metoprolol tartrate, 12.5 mg, oral, BID  oseltamivir, 75 mg, oral, BID  oxygen, , inhalation, Continuous - Inhalation  predniSONE, 10 mg, oral, Daily  sulfamethoxazole-trimethoprim, 1 tablet, oral, BID  tamsulosin, 0.8 mg, oral, Daily      Continuous medications     PRN medications  PRN medications: acetaminophen **OR** acetaminophen, alum-mag hydroxide-lon, " dextromethorphan-guaifenesin, ipratropium-albuteroL, melatonin, ondansetron ODT **OR** ondansetron    Results for orders placed or performed during the hospital encounter of 02/02/25 (from the past 24 hours)   CBC   Result Value Ref Range    WBC 5.6 4.4 - 11.3 x10*3/uL    nRBC 0.0 0.0 - 0.0 /100 WBCs    RBC 3.33 (L) 4.50 - 5.90 x10*6/uL    Hemoglobin 10.3 (L) 13.5 - 17.5 g/dL    Hematocrit 31.7 (L) 41.0 - 52.0 %    MCV 95 80 - 100 fL    MCH 30.9 26.0 - 34.0 pg    MCHC 32.5 32.0 - 36.0 g/dL    RDW 15.7 (H) 11.5 - 14.5 %    Platelets 170 150 - 450 x10*3/uL   Basic metabolic panel   Result Value Ref Range    Glucose 130 (H) 74 - 99 mg/dL    Sodium 130 (L) 136 - 145 mmol/L    Potassium 4.3 3.5 - 5.3 mmol/L    Chloride 101 98 - 107 mmol/L    Bicarbonate 22 21 - 32 mmol/L    Anion Gap 11 10 - 20 mmol/L    Urea Nitrogen 20 6 - 23 mg/dL    Creatinine 0.71 0.50 - 1.30 mg/dL    eGFR 90 >60 mL/min/1.73m*2    Calcium 7.6 (L) 8.6 - 10.3 mg/dL     ECG 12 lead    Result Date: 2/3/2025  Normal sinus rhythm Normal ECG When compared with ECG of 17-JAN-2025 15:03, (unconfirmed) No significant change was found    XR chest 1 view    Result Date: 2/2/2025  STUDY: Chest Radiograph; 02/02/2025 11:46 AM INDICATION: Shortness of breath, cough. COMPARISON: XR chest 01/17/2025, 11/12/2024. ACCESSION NUMBER(S): [id number] ORDERING CLINICIAN: MARINA LAYTON TECHNIQUE:  Frontal chest was obtained at 11:45:00 hours. FINDINGS: CARDIOMEDIASTINAL SILHOUETTE: Cardiomediastinal silhouette is normal in size and configuration.  LUNGS: Lungs are clear.  ABDOMEN: No remarkable upper abdominal findings.  BONES: No acute osseous changes.    Consistent with COPD.  No acute findings. Signed by Cisco Leos MD       Assessment & Plan    84 y.o. male with PMH of Afib on eliquis, HTN, diastolic HF, COPD, CVA, PAD s/p  procedure, CVA, HLD, and tobacco abuse that presented to the ED with three day history of shortness of breath and was admitted for acute hypoxic  respiratory failure secondary to Influenza A.     Acute hypoxic respiratory failure   - secondary to influenza A  - continue tamiflu day 2/5  - currently on 2L O2, wean to RA as tolerated     Influenza A   - in setting of COPD, and multiple recent hospitalizations for PNA  - continue tamiflu day 2/5  - complete 2week course of antibiotics on 2/4  - continue prednisone course to be completed on 2/4  - continue duonebs PRN  - continue Anoro previously prescribed  - pulm consulted, appreciate recs     HTN, Diastolic HF, HLD, PAD  - continue home metoprolol, eliquis, plavix, and statin     DVT Proph: eliquis     Dispo: Patient requires close inpatient monitoring in setting of hypoxia secondary to FluA, discharge planning pending oxygen wean.    Adrianne Acuña, DO

## 2025-02-04 LAB
ANION GAP SERPL CALC-SCNC: 13 MMOL/L (ref 10–20)
BUN SERPL-MCNC: 16 MG/DL (ref 6–23)
CALCIUM SERPL-MCNC: 8 MG/DL (ref 8.6–10.3)
CHLORIDE SERPL-SCNC: 99 MMOL/L (ref 98–107)
CO2 SERPL-SCNC: 25 MMOL/L (ref 21–32)
CREAT SERPL-MCNC: 0.72 MG/DL (ref 0.5–1.3)
CRP SERPL-MCNC: 2.65 MG/DL
EGFRCR SERPLBLD CKD-EPI 2021: 90 ML/MIN/1.73M*2
ERYTHROCYTE [DISTWIDTH] IN BLOOD BY AUTOMATED COUNT: 16.1 % (ref 11.5–14.5)
ERYTHROCYTE [SEDIMENTATION RATE] IN BLOOD BY WESTERGREN METHOD: 15 MM/H (ref 0–20)
GLUCOSE SERPL-MCNC: 122 MG/DL (ref 74–99)
HCT VFR BLD AUTO: 34 % (ref 41–52)
HGB BLD-MCNC: 10.8 G/DL (ref 13.5–17.5)
MCH RBC QN AUTO: 30.6 PG (ref 26–34)
MCHC RBC AUTO-ENTMCNC: 31.8 G/DL (ref 32–36)
MCV RBC AUTO: 96 FL (ref 80–100)
NRBC BLD-RTO: 0 /100 WBCS (ref 0–0)
PLATELET # BLD AUTO: 209 X10*3/UL (ref 150–450)
POTASSIUM SERPL-SCNC: 4.2 MMOL/L (ref 3.5–5.3)
PROCALCITONIN SERPL-MCNC: 0.13 NG/ML
RBC # BLD AUTO: 3.53 X10*6/UL (ref 4.5–5.9)
SODIUM SERPL-SCNC: 133 MMOL/L (ref 136–145)
WBC # BLD AUTO: 7.4 X10*3/UL (ref 4.4–11.3)

## 2025-02-04 PROCEDURE — 85027 COMPLETE CBC AUTOMATED: CPT | Performed by: FAMILY MEDICINE

## 2025-02-04 PROCEDURE — 2500000005 HC RX 250 GENERAL PHARMACY W/O HCPCS: Performed by: EMERGENCY MEDICINE

## 2025-02-04 PROCEDURE — 2500000001 HC RX 250 WO HCPCS SELF ADMINISTERED DRUGS (ALT 637 FOR MEDICARE OP): Performed by: INTERNAL MEDICINE

## 2025-02-04 PROCEDURE — 94760 N-INVAS EAR/PLS OXIMETRY 1: CPT

## 2025-02-04 PROCEDURE — 94640 AIRWAY INHALATION TREATMENT: CPT

## 2025-02-04 PROCEDURE — 86140 C-REACTIVE PROTEIN: CPT | Performed by: INTERNAL MEDICINE

## 2025-02-04 PROCEDURE — 84145 PROCALCITONIN (PCT): CPT | Mod: GEALAB | Performed by: INTERNAL MEDICINE

## 2025-02-04 PROCEDURE — 2500000004 HC RX 250 GENERAL PHARMACY W/ HCPCS (ALT 636 FOR OP/ED): Performed by: FAMILY MEDICINE

## 2025-02-04 PROCEDURE — 80048 BASIC METABOLIC PNL TOTAL CA: CPT | Performed by: FAMILY MEDICINE

## 2025-02-04 PROCEDURE — 36415 COLL VENOUS BLD VENIPUNCTURE: CPT | Performed by: FAMILY MEDICINE

## 2025-02-04 PROCEDURE — 36415 COLL VENOUS BLD VENIPUNCTURE: CPT | Performed by: INTERNAL MEDICINE

## 2025-02-04 PROCEDURE — 85652 RBC SED RATE AUTOMATED: CPT | Performed by: INTERNAL MEDICINE

## 2025-02-04 PROCEDURE — 2500000001 HC RX 250 WO HCPCS SELF ADMINISTERED DRUGS (ALT 637 FOR MEDICARE OP): Performed by: FAMILY MEDICINE

## 2025-02-04 PROCEDURE — 2500000002 HC RX 250 W HCPCS SELF ADMINISTERED DRUGS (ALT 637 FOR MEDICARE OP, ALT 636 FOR OP/ED): Performed by: FAMILY MEDICINE

## 2025-02-04 PROCEDURE — 1100000001 HC PRIVATE ROOM DAILY

## 2025-02-04 PROCEDURE — 9420000001 HC RT PATIENT EDUCATION 5 MIN

## 2025-02-04 PROCEDURE — 99232 SBSQ HOSP IP/OBS MODERATE 35: CPT | Performed by: STUDENT IN AN ORGANIZED HEALTH CARE EDUCATION/TRAINING PROGRAM

## 2025-02-04 PROCEDURE — 99223 1ST HOSP IP/OBS HIGH 75: CPT | Performed by: INTERNAL MEDICINE

## 2025-02-04 RX ORDER — AZITHROMYCIN 500 MG/1
500 TABLET, FILM COATED ORAL
Status: DISCONTINUED | OUTPATIENT
Start: 2025-02-04 | End: 2025-02-05 | Stop reason: HOSPADM

## 2025-02-04 RX ADMIN — IPRATROPIUM BROMIDE AND ALBUTEROL SULFATE 3 ML: 2.5; .5 SOLUTION RESPIRATORY (INHALATION) at 20:12

## 2025-02-04 RX ADMIN — ATORVASTATIN CALCIUM 40 MG: 40 TABLET, FILM COATED ORAL at 22:00

## 2025-02-04 RX ADMIN — Medication 21 PERCENT: at 20:13

## 2025-02-04 RX ADMIN — SULFAMETHOXAZOLE AND TRIMETHOPRIM 1 TABLET: 800; 160 TABLET ORAL at 08:48

## 2025-02-04 RX ADMIN — OSELTAMAVIR PHOSPHATE 75 MG: 75 CAPSULE ORAL at 22:00

## 2025-02-04 RX ADMIN — METOPROLOL TARTRATE 12.5 MG: 25 TABLET, FILM COATED ORAL at 22:00

## 2025-02-04 RX ADMIN — PREDNISONE 10 MG: 5 TABLET ORAL at 08:48

## 2025-02-04 RX ADMIN — BENZONATATE 200 MG: 100 CAPSULE ORAL at 22:00

## 2025-02-04 RX ADMIN — CLOPIDOGREL 75 MG: 75 TABLET ORAL at 08:48

## 2025-02-04 RX ADMIN — TIOTROPIUM BROMIDE INHALATION SPRAY 2 PUFF: 3.12 SPRAY, METERED RESPIRATORY (INHALATION) at 06:23

## 2025-02-04 RX ADMIN — CLARITHROMYCIN 500 MG: 500 TABLET, FILM COATED ORAL at 08:48

## 2025-02-04 RX ADMIN — SULFAMETHOXAZOLE AND TRIMETHOPRIM 1 TABLET: 800; 160 TABLET ORAL at 22:00

## 2025-02-04 RX ADMIN — Medication 3 L/MIN: at 09:06

## 2025-02-04 RX ADMIN — APIXABAN 2.5 MG: 2.5 TABLET, FILM COATED ORAL at 22:00

## 2025-02-04 RX ADMIN — BENZONATATE 200 MG: 100 CAPSULE ORAL at 14:27

## 2025-02-04 RX ADMIN — IPRATROPIUM BROMIDE AND ALBUTEROL SULFATE 3 ML: 2.5; .5 SOLUTION RESPIRATORY (INHALATION) at 15:37

## 2025-02-04 RX ADMIN — FORMOTEROL FUMARATE DIHYDRATE 20 MCG: 20 SOLUTION RESPIRATORY (INHALATION) at 08:32

## 2025-02-04 RX ADMIN — IPRATROPIUM BROMIDE AND ALBUTEROL SULFATE 3 ML: 2.5; .5 SOLUTION RESPIRATORY (INHALATION) at 09:06

## 2025-02-04 RX ADMIN — OSELTAMAVIR PHOSPHATE 75 MG: 75 CAPSULE ORAL at 08:49

## 2025-02-04 RX ADMIN — Medication 21 PERCENT: at 15:37

## 2025-02-04 RX ADMIN — AZITHROMYCIN 500 MG: 500 TABLET, FILM COATED ORAL at 12:58

## 2025-02-04 RX ADMIN — TAMSULOSIN HYDROCHLORIDE 0.8 MG: 0.4 CAPSULE ORAL at 08:48

## 2025-02-04 RX ADMIN — BENZONATATE 200 MG: 100 CAPSULE ORAL at 08:48

## 2025-02-04 RX ADMIN — METOPROLOL TARTRATE 12.5 MG: 25 TABLET, FILM COATED ORAL at 08:48

## 2025-02-04 RX ADMIN — FORMOTEROL FUMARATE DIHYDRATE 20 MCG: 20 SOLUTION RESPIRATORY (INHALATION) at 20:13

## 2025-02-04 RX ADMIN — APIXABAN 2.5 MG: 2.5 TABLET, FILM COATED ORAL at 08:49

## 2025-02-04 ASSESSMENT — PAIN SCALES - GENERAL
PAINLEVEL_OUTOF10: 0 - NO PAIN
PAINLEVEL_OUTOF10: 0 - NO PAIN

## 2025-02-04 ASSESSMENT — ENCOUNTER SYMPTOMS
WHEEZING: 0
COUGH: 1
SHORTNESS OF BREATH: 1

## 2025-02-04 ASSESSMENT — COGNITIVE AND FUNCTIONAL STATUS - GENERAL
MOBILITY SCORE: 23
DAILY ACTIVITIY SCORE: 24
CLIMB 3 TO 5 STEPS WITH RAILING: A LITTLE

## 2025-02-04 ASSESSMENT — PAIN - FUNCTIONAL ASSESSMENT: PAIN_FUNCTIONAL_ASSESSMENT: 0-10

## 2025-02-04 NOTE — CONSULTS
"    Department of Medicine  Division of Pulmonary, Critical Care, and Sleep Medicine  Location  [facility]    Inpatient consult to Pulmonology  Consult performed by: Quoc Cam DO  Consult ordered by: Adrianne Acuña DO        Reason for consult: \"Poorly controlled COPD with 5 admissions in last 3 months \"    Physician HPI (2/4/2025):  84 y.o. male admitted on 2/2/2025 10:32 AM for shortness of breath. He started developing worsening shortness of breath on 1/31/2025 which continued to get worse over the weekend despite albuterol inhalers and eventually came into the emergency room. He is prescribed Spiriva Respimat which admits to using. He was around multiple grandchildren who tested positive for influenza. At baseline, he can walk back and forth in his 80ft mobile home a few times before needing to sit down and rest. He does not have any O2 at home. He ambulated to the bathroom today, and states that he became short of breath. No more hypoxia today.    He has a PMH of Afib, HFpEF, stroke (2022), PAD, DVT, suspected COPD, and is a former 70 pack/year smoker who quit last week.     Labs on admission notable for positive influenza A, troponin 13, BNP 51, bicarb 22, WBC 7.6, VBG pH 7.37.    PMH:  Past Medical History:   Diagnosis Date    (HFpEF) heart failure with preserved ejection fraction     A-fib (Multi)     COPD (chronic obstructive pulmonary disease) (Multi)     Hypertension     Stroke (Multi)        PSH:  Past Surgical History:   Procedure Laterality Date    CT ANGIO AORTA AND BILATERAL ILIOFEMORAL RUN OFF INCLUDING WITHOUT CONTRAST IF PERFORMED  04/10/2022    CT AORTA AND BILATERAL ILIOFEMORAL RUNOFF ANGIOGRAM W AND/OR WO IV CONTRAST 4/10/2022 [facility] EMERGENCY LEGACY    CT ANGIO AORTA AND BILATERAL ILIOFEMORAL RUN OFF INCLUDING WITHOUT CONTRAST IF PERFORMED  04/13/2022    CT AORTA AND BILATERAL ILIOFEMORAL RUNOFF ANGIOGRAM W AND/OR WO IV CONTRAST 4/13/2022 [facility] CLINICAL LEGACY    CT ANGIO " AORTA AND BILATERAL ILIOFEMORAL RUN OFF INCLUDING WITHOUT CONTRAST IF PERFORMED  2022    CT AORTA AND BILATERAL ILIOFEMORAL RUNOFF ANGIOGRAM W AND/OR WO IV CONTRAST 2022 [facility]ERGENCY LEGACY    CT ANGIO AORTA AND BILATERAL ILIOFEMORAL RUN OFF INCLUDING WITHOUT CONTRAST IF PERFORMED  2023    CT AORTA AND BILATERAL ILIOFEMORAL RUNOFF ANGIOGRAM W AND/OR WO IV CONTRAST [facility]    CT ANGIO NECK  2022    CT NECK ANGIO W AND WO IV CONTRAST 2022 [facility] CLINICAL LEGACY    CT HEAD ANGIO W AND WO IV CONTRAST  2022    CT HEAD ANGIO W AND WO IV CONTRAST 2022 [facility] CLINICAL LEGACY    FEMORAL BYPASS      OTHER SURGICAL HISTORY  2019    Dearing tooth extraction    OTHER SURGICAL HISTORY  2019    Tonsillectomy with adenoidectomy    OTHER SURGICAL HISTORY  2020    Lower leg fracture repair    OTHER SURGICAL HISTORY  2020    Cardiac catheterization    TOTAL HIP ARTHROPLASTY  2018    Hip Replacement       FHx:  Family History   Problem Relation Name Age of Onset    Heart attack Mother      Colon cancer Father      Heart attack Brother      Heart block Brother         Social Hx:  Social History     Socioeconomic History    Marital status:    Tobacco Use    Smoking status: Former     Current packs/day: 0.00     Average packs/day: 1 pack/day for 71.1 years (71.1 ttl pk-yrs)     Types: Cigarettes     Start date:      Quit date: 2025     Years since quittin.0    Smokeless tobacco: Never   Vaping Use    Vaping status: Never Used   Substance and Sexual Activity    Alcohol use: Never    Drug use: Never    Sexual activity: Not Currently   Social History Narrative    Lives with Daughter. Wife passed 6 years ago.      Social Drivers of Health     Financial Resource Strain: Low Risk  (2025)    Overall Financial Resource Strain (CARDIA)     Difficulty of Paying Living Expenses: Not hard at all   Food Insecurity: No Food Insecurity (2025)    Hunger Vital Sign      Worried About Running Out of Food in the Last Year: Never true     Ran Out of Food in the Last Year: Never true   Transportation Needs: No Transportation Needs (2/2/2025)    PRAPARE - Transportation     Lack of Transportation (Medical): No     Lack of Transportation (Non-Medical): No   Intimate Partner Violence: Not At Risk (2/2/2025)    Humiliation, Afraid, Rape, and Kick questionnaire     Fear of Current or Ex-Partner: No     Emotionally Abused: No     Physically Abused: No     Sexually Abused: No   Housing Stability: Low Risk  (2/2/2025)    Housing Stability Vital Sign     Unable to Pay for Housing in the Last Year: No     Number of Times Moved in the Last Year: 0     Homeless in the Last Year: No       Immunization History:  Immunization History   Administered Date(s) Administered    Flu vaccine (IIV4), preservative free *Check age/dose* 09/11/2017, 10/02/2019, 09/14/2020    Flu vaccine, quadrivalent, high-dose, preservative free, age 65y+ (FLUZONE) 10/29/2021, 11/11/2022    Flu vaccine, trivalent, preservative free, HIGH-DOSE, age 65y+ (Fluzone) 09/16/2014, 10/29/2021, 11/11/2022    Influenza, Seasonal, Quadrivalent, Adjuvanted 10/23/2023    Influenza, Unspecified 11/03/2010, 11/13/2013    Influenza, seasonal, injectable 10/03/2012, 12/09/2015, 10/03/2016    Influenza, trivalent, adjuvanted 10/16/2018    Moderna SARS-CoV-2 Vaccination 02/27/2021, 03/27/2021, 12/02/2021    Pneumococcal conjugate vaccine, 13-valent (PREVNAR 13) 12/09/2015       Current Medications:  Scheduled medications  apixaban, 2.5 mg, oral, BID  atorvastatin, 40 mg, oral, Nightly  azithromycin, 500 mg, oral, q24h DOROTEO  benzonatate, 200 mg, oral, TID  clopidogrel, 75 mg, oral, Daily  tiotropium, 2 puff, inhalation, Daily   And  formoterol, 20 mcg, nebulization, q12h  ipratropium-albuteroL, 3 mL, nebulization, TID  metoprolol tartrate, 12.5 mg, oral, BID  oseltamivir, 75 mg, oral, BID  oxygen, , inhalation, Continuous -  Inhalation  sulfamethoxazole-trimethoprim, 1 tablet, oral, BID  tamsulosin, 0.8 mg, oral, Daily      Continuous medications     PRN medications  PRN medications: acetaminophen **OR** acetaminophen, alum-mag hydroxide-simeth, dextromethorphan-guaifenesin, ipratropium-albuteroL, melatonin, ondansetron ODT **OR** ondansetron     Drug Allergies/Intolerances:  Allergies   Allergen Reactions    Doxycycline GI Upset    Levaquin [Levofloxacin] Itching and Rash        Review of Systems:  Review of Systems   Respiratory:  Positive for cough and shortness of breath. Negative for wheezing.         All other review of systems are negative and/or non-contributory.    Physical Examination:      2/3/2025     4:25 AM 2/3/2025    12:02 PM 2/3/2025     8:01 PM 2/3/2025    10:24 PM 2/4/2025     4:55 AM 2/4/2025     7:59 AM 2/4/2025    12:09 PM   Vitals   Systolic 110 101 115 123 110 126 111   Diastolic 57 50 55 61 64 66 54   BP Location Left arm  Left arm Left arm Left arm Left arm Left arm   Heart Rate 76 74 83 79 80 100 90   Temp 37.3 °C (99.1 °F) 36.7 °C (98.1 °F) 37 °C (98.6 °F)  36.7 °C (98.1 °F) 36.8 °C (98.2 °F) 36.6 °C (97.9 °F)   Resp 18 18 16  15 15 21         GEN: elderly man. No respiratory difficulties  CV: RRR, no m/g/r  LUNGS: good effort, clear bilaterally, no w/r/r  EXT: no edema, cyanosis, clubbing      Pulmonary Function Test Results     None    Exacerbation History     January 2025 (admitted)  November 2024 (admitted)  October 2024 (admitted)    Imaging     CXR 2/2/2025: hyperinflated lungs    CXR 1/17/2025: hyperinflated lungs    CTA chest 11/12/2024:  1. No evidence of pulmonary emboli.  2. Bronchial wall thickening, suggestive of bronchitis. Mucoid  impaction at the smaller airways at the bilateral lower lobes.  3. Emphysema.  4. Coronary artery calcifications. Dilated main pulmonary artery,  please correlate for pulmonary arterial hypertension.    CT chest 11/9/2024:  Partially calcified right lower lobe nodule  which has decreased in  size when compared to the previous study.  7 mm nodule in the right lower lobe which was obscured on the  previous examination by collapse/consolidation. A few additional tiny  nodules are seen measuring 3 mm or less in size. Continued  surveillance as per Fleischner society guidelines.    Bronchoscopy     None    Labs     Results for orders placed or performed during the hospital encounter of 02/02/25 (from the past 24 hours)   CBC   Result Value Ref Range    WBC 7.4 4.4 - 11.3 x10*3/uL    nRBC 0.0 0.0 - 0.0 /100 WBCs    RBC 3.53 (L) 4.50 - 5.90 x10*6/uL    Hemoglobin 10.8 (L) 13.5 - 17.5 g/dL    Hematocrit 34.0 (L) 41.0 - 52.0 %    MCV 96 80 - 100 fL    MCH 30.6 26.0 - 34.0 pg    MCHC 31.8 (L) 32.0 - 36.0 g/dL    RDW 16.1 (H) 11.5 - 14.5 %    Platelets 209 150 - 450 x10*3/uL   Basic metabolic panel   Result Value Ref Range    Glucose 122 (H) 74 - 99 mg/dL    Sodium 133 (L) 136 - 145 mmol/L    Potassium 4.2 3.5 - 5.3 mmol/L    Chloride 99 98 - 107 mmol/L    Bicarbonate 25 21 - 32 mmol/L    Anion Gap 13 10 - 20 mmol/L    Urea Nitrogen 16 6 - 23 mg/dL    Creatinine 0.72 0.50 - 1.30 mg/dL    eGFR 90 >60 mL/min/1.73m*2    Calcium 8.0 (L) 8.6 - 10.3 mg/dL   C-reactive protein   Result Value Ref Range    C-Reactive Protein 2.65 (H) <1.00 mg/dL         Echocardiogram     No results found for this or any previous visit from the past 365 days.       CAT and mMRC     mMRC (Modified Medical Research [place]) Dyspnea Scale  Stops for breath after walking 100 yards (91m) or after a few minutes: +3    Reference: Spencer DA, Dean CK. Evaluation of clinical methods for rating dyspnea. CHEST. 1988;93(3):580-6.    ASSESSMENT & PLAN     Summary:  84 y.o. male admitted on 2/2/2025 10:32 AM for presumed COPD exacerbation secondary to influenza A. He has not established care with outpatient pulmonary.    Problem list:  -influenza A  -acute COPD exacerbation    Recommendations:  -Discharge on ICS/LAMA/LABA  therapy  -Pt undecided about where he would like to follow up. Offered follow-up with me at my [facility] versus Dr. Krueger or Brown here.  He will also reach out and see if Dr. Haney would be willing to accept him as a new patient in [place].  -Okay with oseltamivir and bronchodilator therapy currently.  -Not actively wheezing. Could potentially benefit from steroids if no improvements in the next 24 hours  -Home o2 eval prior to discharge  -Outpatient PFT and 6-minute walk test  -He was encouraged to continue abstinence from cigarette smoking    Quoc Cam DO  Staff Physician - Pulmonary & Critical Care  02/04/25 12:49 PM

## 2025-02-04 NOTE — CARE PLAN
The patient's goals for the shift include      The clinical goals for the shift include Patient to tolerate RA

## 2025-02-04 NOTE — CONSULTS
Reason For Consult  Patient consulted for COPD readmissions and smoking cessation.    History Of Present Illness  Dejon Arteaga is a 84 y.o. male presenting with Influenza A. Patient has a hx of COPD, HFpEF and AFIB.    Patient is a current smoker down to . 25 a pack a day.     Patient has no recent PFT testing and currently does not see a pulmonologist. Patient has had multiple frequent admissions for COPD.       Past Medical History  He has a past medical history of (HFpEF) heart failure with preserved ejection fraction, A-fib (Multi), COPD (chronic obstructive pulmonary disease) (Multi), Hypertension, and Stroke (Multi).    Surgical History  He has a past surgical history that includes Total hip arthroplasty (08/31/2018); Other surgical history (09/17/2019); Other surgical history (09/17/2019); Other surgical history (01/17/2020); Other surgical history (01/17/2020); CT angio aorta and bilateral iliofemoral runoff including without contrast if performed (04/10/2022); CT angio aorta and bilateral iliofemoral runoff including without contrast if performed (04/13/2022); CT angio head w and wo IV contrast (04/19/2022); CT angio neck (04/19/2022); CT angio aorta and bilateral iliofemoral runoff including without contrast if performed (05/22/2022); CT angio aorta and bilateral iliofemoral runoff including without contrast if performed (04/02/2023); and Femoral bypass.     Social History  He reports that he has been smoking cigarettes. He started smoking about 50 years ago. He has a 50 pack-year smoking history. He has never used smokeless tobacco. He reports that he does not drink alcohol and does not use drugs.         Allergies  Doxycycline and Levaquin [levofloxacin]       Last Recorded Vitals  Blood pressure 126/66, pulse 100, temperature 36.8 °C (98.2 °F), temperature source Tympanic, resp. rate 15, height 1.829 m (6'), weight 58.1 kg (128 lb), SpO2 95%.       Assessment/Plan     This RT to see patient for  COPD consult. The patient was given a COPD booklet with educational materials regarding pulmonary issues. Smoking cessation education reviewed, documentation given. Smoking history? I discussed various options for quitting smoking. I talked about different strategies that can be used to support changing habits and reduce the urges of withdraw symptoms.  [facility] support group discussed. Flyer given for next month’s meeting.I educated patient about the disease process and how it affected the lungs making it difficult to breathe. We discussed current medications and if their current medications give them relief.  Patient given  pulmonary office phone number to make an appt. Patient was very receptive to all information given.           Flutter valve- The patient was given a flutter valve to help facilitate the removal of mucous from the airways. I instructed the patient on how to use the flutter with the proper technique to get the best results. In return, the patient demonstrated flutter training appropriately with a good understanding on how it is utilized.      Pulmonary rehab- Educated patient on pulmonary rehab program. The patient was given information regarding our pulmonary rehabilitation program. Spoke to the patient about the benefits of this program which can offer coping skills and exercise strengthening sessions providing a better quality of life. Patient is receptive to joining the program.           Healthy at home referral placed.         ZEKE SAMAYOA, RRT

## 2025-02-04 NOTE — CONSULTS
"Consults  Referred by CATARINO Berg MD: Alvaro Cedeño, APRN-CNP    Reason For Consult  pneumonia    History Of Present Illness  Sheldon Arteaga \"Dejon\" is a 84 y.o. male, hx of COPD, hx of HTN, hx of AF, hx of CVA, hx of diastolic dysfunction, he has been in and out of the hospital for his lungs over the last few minths, he was admitted for a 2 day hx of increasing sob, wheezing, cough, fever, he was hypoxic and needed O2, WBC were N, the viral screen was positive for influenza A. No chest pain, no hemoptysis, no emesis, had diarrhea, no travel, no ill contacts     Past Medical History  He has a past medical history of (HFpEF) heart failure with preserved ejection fraction, A-fib (Multi), COPD (chronic obstructive pulmonary disease) (Multi), Hypertension, and Stroke (Multi).    Surgical History  He has a past surgical history that includes Total hip arthroplasty (08/31/2018); Other surgical history (09/17/2019); Other surgical history (09/17/2019); Other surgical history (01/17/2020); Other surgical history (01/17/2020); CT angio aorta and bilateral iliofemoral runoff including without contrast if performed (04/10/2022); CT angio aorta and bilateral iliofemoral runoff including without contrast if performed (04/13/2022); CT angio head w and wo IV contrast (04/19/2022); CT angio neck (04/19/2022); CT angio aorta and bilateral iliofemoral runoff including without contrast if performed (05/22/2022); CT angio aorta and bilateral iliofemoral runoff including without contrast if performed (04/02/2023); and Femoral bypass.     Social History     Occupational History    Not on file   Tobacco Use    Smoking status: Every Day     Current packs/day: 1.00     Average packs/day: 1 pack/day for 50.0 years (50.0 ttl pk-yrs)     Types: Cigarettes     Start date: 1/17/1975    Smokeless tobacco: Never   Vaping Use    Vaping status: Never Used   Substance and Sexual Activity    Alcohol use: Never    Drug use: Never    " Sexual activity: Not Currently     Travel History   Travel since 01/04/25    No documented travel since 01/04/25          Family History  Family History   Problem Relation Name Age of Onset    Heart attack Mother      Colon cancer Father      Heart attack Brother      Heart block Brother       Allergies  Doxycycline and Levaquin [levofloxacin]     Immunization History   Administered Date(s) Administered    Flu vaccine (IIV4), preservative free *Check age/dose* 09/11/2017, 10/02/2019, 09/14/2020    Flu vaccine, quadrivalent, high-dose, preservative free, age 65y+ (FLUZONE) 10/29/2021, 11/11/2022    Flu vaccine, trivalent, preservative free, HIGH-DOSE, age 65y+ (Fluzone) 09/16/2014, 10/29/2021, 11/11/2022    Influenza, Seasonal, Quadrivalent, Adjuvanted 10/23/2023    Influenza, Unspecified 11/03/2010, 11/13/2013    Influenza, seasonal, injectable 10/03/2012, 12/09/2015, 10/03/2016    Influenza, trivalent, adjuvanted 10/16/2018    Moderna SARS-CoV-2 Vaccination 02/27/2021, 03/27/2021, 12/02/2021    Pneumococcal conjugate vaccine, 13-valent (PREVNAR 13) 12/09/2015   Pneumonia and influenza vaccines are up to date  You fall risk 45, preventive protocol was implemented  Depression screen is negative  5 min of smoking cessation was provided    Medications  Home medications:  Medications Prior to Admission   Medication Sig Dispense Refill Last Dose/Taking    albuterol (Ventolin HFA) 90 mcg/actuation inhaler Inhale 2 puffs every 4 hours if needed for wheezing or shortness of breath. 8 g 5 2/1/2025    atorvastatin (Lipitor) 40 mg tablet Take 1 tablet (40 mg) by mouth once daily at bedtime. 90 tablet 3 2/1/2025    benzonatate (Tessalon) 200 mg capsule Take 1 capsule (200 mg) by mouth 3 times a day. Do not crush or chew. 90 capsule 1 2/1/2025    clopidogrel (Plavix) 75 mg tablet Take 1 tablet (75 mg) by mouth once daily. 90 tablet 0 2/2/2025    dextromethorphan-guaifenesin (Mucinex DM)  mg 12 hr tablet Take 1 tablet by  mouth every 12 hours if needed for cough. Do not crush, chew, or split. 60 tablet 1 Past Week    docusate sodium (Colace) 100 mg capsule Take 1 capsule (100 mg) by mouth once daily.   2025    metoprolol tartrate (Lopressor) 25 mg tablet Take 0.5 tablets (12.5 mg) by mouth 2 times a day. 180 tablet 0 2025    predniSONE (Deltasone) 10 mg tablet Take 4 tablets daily for 4 days, then 3 tablets daily for 4 days, then 2 tablets daily for 4 days, then 1 tablet daily for 4 days, then half a tablet daily for 4 days 42 tablet 0 2025    [] sulfamethoxazole-trimethoprim (Bactrim DS) 800-160 mg tablet Take 1 tablet by mouth 2 times a day for 14 days. 28 tablet 0 2025    tamsulosin (Flomax) 0.4 mg 24 hr capsule Take 2 capsules (0.8 mg) by mouth once daily. 60 capsule 3 2025    albuterol 2.5 mg /3 mL (0.083 %) nebulizer solution Take 3 mL (2.5 mg) by nebulization every 2 hours if needed for wheezing or shortness of breath. 75 mL 1     apixaban (Eliquis) 5 mg tablet Take 0.5 tablets (2.5 mg) by mouth 2 times a day. 30 tablet 1     [] clarithromycin (Biaxin) 500 mg tablet Take 1 tablet (500 mg) by mouth 2 times a day for 14 days. 28 tablet 0     ipratropium-albuteroL (Duo-Neb) 0.5-2.5 mg/3 mL nebulizer solution Take 3 mL by nebulization every 6 hours. (Patient not taking: Reported on 2025) 360 mL 0     ipratropium-albuteroL (Duo-Neb) 0.5-2.5 mg/3 mL nebulizer solution Take 3 mL by nebulization every 2 hours if needed for wheezing or shortness of breath (pt request). (Patient not taking: Reported on 2025) 180 mL 11     umeclidinium-vilanteroL (Anoro Ellipta) 62.5-25 mcg/actuation blister with device Inhale 1 puff once daily. 30 each 0      Current medications:  Scheduled medications  apixaban, 2.5 mg, oral, BID  atorvastatin, 40 mg, oral, Nightly  benzonatate, 200 mg, oral, TID  clopidogrel, 75 mg, oral, Daily  docusate sodium, 100 mg, oral, BID  tiotropium, 2 puff, inhalation, Daily    And  formoterol, 20 mcg, nebulization, q12h  ipratropium-albuteroL, 3 mL, nebulization, TID  metoprolol tartrate, 12.5 mg, oral, BID  oseltamivir, 75 mg, oral, BID  oxygen, , inhalation, Continuous - Inhalation  sulfamethoxazole-trimethoprim, 1 tablet, oral, BID  tamsulosin, 0.8 mg, oral, Daily      Continuous medications     PRN medications  PRN medications: acetaminophen **OR** acetaminophen, alum-mag hydroxide-simeth, dextromethorphan-guaifenesin, ipratropium-albuteroL, melatonin, ondansetron ODT **OR** ondansetron    Review of Systems   All other systems reviewed and are negative.       Objective  Range of Vitals (last 24 hours)  Heart Rate:  []   Temp:  [36.7 °C (98.1 °F)-37 °C (98.6 °F)]   Resp:  [15-18]   BP: (101-126)/(50-66)   SpO2:  [91 %-97 %]   Daily Weight  02/02/25 : 58.1 kg (128 lb)    Body mass index is 17.36 kg/m².   Nutritional consult    Physical Exam  Constitutional:       Appearance: Normal appearance.   HENT:      Head: Normocephalic and atraumatic.      Mouth/Throat:      Mouth: Mucous membranes are moist.      Pharynx: Oropharynx is clear.   Eyes:      Pupils: Pupils are equal, round, and reactive to light.   Cardiovascular:      Rate and Rhythm: Normal rate and regular rhythm.      Heart sounds: Normal heart sounds.   Pulmonary:      Effort: Pulmonary effort is normal.      Breath sounds: Wheezing present.   Abdominal:      General: Abdomen is flat. Bowel sounds are normal.      Palpations: Abdomen is soft.   Musculoskeletal:      Cervical back: Normal range of motion.   Neurological:      Mental Status: He is alert.          Relevant Results  Outside Hospital Results  reviewed  Labs  Results from last 72 hours   Lab Units 02/04/25  0819 02/03/25  0717 02/02/25  1105   WBC AUTO x10*3/uL 7.4 5.6 7.6   HEMOGLOBIN g/dL 10.8* 10.3* 12.3*   HEMATOCRIT % 34.0* 31.7* 38.1*   PLATELETS AUTO x10*3/uL 209 170 214   NEUTROS PCT AUTO %  --   --  86.6   LYMPHS PCT AUTO %  --   --  5.3   MONOS PCT  "AUTO %  --   --  4.3   EOS PCT AUTO %  --   --  2.6     Results from last 72 hours   Lab Units 02/04/25  0819 02/03/25  0717 02/02/25  1105   SODIUM mmol/L 133* 130* 125*   POTASSIUM mmol/L 4.2 4.3 4.6   CHLORIDE mmol/L 99 101 93*   CO2 mmol/L 25 22 22   BUN mg/dL 16 20 16   CREATININE mg/dL 0.72 0.71 1.01   GLUCOSE mg/dL 122* 130* 134*   CALCIUM mg/dL 8.0* 7.6* 8.2*   ANION GAP mmol/L 13 11 15   EGFR mL/min/1.73m*2 90 90 73     Results from last 72 hours   Lab Units 02/02/25  1105   ALK PHOS U/L 46   BILIRUBIN TOTAL mg/dL 0.5   PROTEIN TOTAL g/dL 6.6   ALT U/L 10   AST U/L 21   ALBUMIN g/dL 3.5     Estimated Creatinine Clearance: 62.8 mL/min (by C-G formula based on SCr of 0.72 mg/dL).  C-Reactive Protein   Date Value Ref Range Status   01/20/2025 1.68 (H) <1.00 mg/dL Final   01/19/2025 2.73 (H) <1.00 mg/dL Final   01/17/2025 9.16 (H) <1.00 mg/dL Final     Sedimentation Rate   Date Value Ref Range Status   10/22/2024 36 (H) 0 - 20 mm/h Final   10/18/2024 24 (H) 0 - 20 mm/h Final   05/11/2022 21 (H) 0 - 20 mm/h Final     No results found for: \"HIV1X2\", \"HIVCONF\", \"WTAKCI0WN\"  No results found for: \"HEPCABINIT\", \"HEPCAB\", \"HCVPCRQUANT\"  Microbiology  Reviewed  Imaging  Reviewed      Assessment/Plan     Respiratory failure / COPD / influenza A    Recommendations :  Tamiflu protocol  Azithromycin  Inflammatory markers  Chest PT / incentive spirometry    I spent minutes in the professional and overall care of this patient.      Jaret Fierro MD  "

## 2025-02-04 NOTE — PROGRESS NOTES
"Sheldon Arteaga \"Dejon\" is a 84 y.o. male on day 2 of admission presenting with Influenza A.    Subjective   Pt is feeling better. He still has SOB and is on 2L NC.        Objective     Physical Exam  Vitals and nursing note reviewed.   Constitutional:       General: He is not in acute distress.     Appearance: Normal appearance. He is not ill-appearing or toxic-appearing.   HENT:      Head: Normocephalic and atraumatic.      Nose: Nose normal.      Mouth/Throat:      Mouth: Mucous membranes are moist.   Eyes:      Extraocular Movements: Extraocular movements intact.      Conjunctiva/sclera: Conjunctivae normal.      Pupils: Pupils are equal, round, and reactive to light.   Cardiovascular:      Rate and Rhythm: Normal rate and regular rhythm.      Heart sounds: No murmur heard.     No gallop.   Pulmonary:      Effort: Pulmonary effort is normal. No respiratory distress.      Breath sounds: Rhonchi present. No wheezing or rales.   Abdominal:      General: Abdomen is flat. Bowel sounds are normal. There is no distension.      Palpations: Abdomen is soft. There is no mass.      Tenderness: There is no abdominal tenderness.   Musculoskeletal:         General: No swelling or tenderness. Normal range of motion.      Cervical back: Normal range of motion and neck supple.   Skin:     General: Skin is warm and dry.   Neurological:      General: No focal deficit present.      Mental Status: He is alert and oriented to person, place, and time. Mental status is at baseline.   Psychiatric:         Mood and Affect: Mood normal.         Behavior: Behavior normal.         Thought Content: Thought content normal.         Judgment: Judgment normal.         Last Recorded Vitals:  /66 (BP Location: Left arm)   Pulse 100   Temp 36.8 °C (98.2 °F) (Tympanic)   Resp 15   Ht 1.829 m (6')   Wt 58.1 kg (128 lb)   SpO2 95%   BMI 17.36 kg/m²      Scheduled medications:  apixaban, 2.5 mg, oral, BID  atorvastatin, 40 mg, oral, " Nightly  azithromycin, 500 mg, oral, q24h DOROTEO  benzonatate, 200 mg, oral, TID  clopidogrel, 75 mg, oral, Daily  docusate sodium, 100 mg, oral, BID  tiotropium, 2 puff, inhalation, Daily   And  formoterol, 20 mcg, nebulization, q12h  ipratropium-albuteroL, 3 mL, nebulization, TID  metoprolol tartrate, 12.5 mg, oral, BID  oseltamivir, 75 mg, oral, BID  oxygen, , inhalation, Continuous - Inhalation  sulfamethoxazole-trimethoprim, 1 tablet, oral, BID  tamsulosin, 0.8 mg, oral, Daily      Continuous medications:     PRN medications:  PRN medications: acetaminophen **OR** acetaminophen, alum-mag hydroxide-simeth, dextromethorphan-guaifenesin, ipratropium-albuteroL, melatonin, ondansetron ODT **OR** ondansetron     Relevant Results:  Results for orders placed or performed during the hospital encounter of 02/02/25 (from the past 24 hours)   CBC   Result Value Ref Range    WBC 7.4 4.4 - 11.3 x10*3/uL    nRBC 0.0 0.0 - 0.0 /100 WBCs    RBC 3.53 (L) 4.50 - 5.90 x10*6/uL    Hemoglobin 10.8 (L) 13.5 - 17.5 g/dL    Hematocrit 34.0 (L) 41.0 - 52.0 %    MCV 96 80 - 100 fL    MCH 30.6 26.0 - 34.0 pg    MCHC 31.8 (L) 32.0 - 36.0 g/dL    RDW 16.1 (H) 11.5 - 14.5 %    Platelets 209 150 - 450 x10*3/uL   Basic metabolic panel   Result Value Ref Range    Glucose 122 (H) 74 - 99 mg/dL    Sodium 133 (L) 136 - 145 mmol/L    Potassium 4.2 3.5 - 5.3 mmol/L    Chloride 99 98 - 107 mmol/L    Bicarbonate 25 21 - 32 mmol/L    Anion Gap 13 10 - 20 mmol/L    Urea Nitrogen 16 6 - 23 mg/dL    Creatinine 0.72 0.50 - 1.30 mg/dL    eGFR 90 >60 mL/min/1.73m*2    Calcium 8.0 (L) 8.6 - 10.3 mg/dL   C-reactive protein   Result Value Ref Range    C-Reactive Protein 2.65 (H) <1.00 mg/dL       ECG 12 lead    Result Date: 2/3/2025  Normal sinus rhythm Normal ECG When compared with ECG of 17-JAN-2025 15:03, (unconfirmed) No significant change was found    XR chest 1 view    Result Date: 2/2/2025  STUDY: Chest Radiograph; 02/02/2025 11:46 AM INDICATION: Shortness  of breath, cough. COMPARISON: XR chest 01/17/2025, 11/12/2024. ACCESSION NUMBER(S): [id number] ORDERING CLINICIAN: MARINA SANCHEZ WIRE TECHNIQUE:  Frontal chest was obtained at 11:45:00 hours. FINDINGS: CARDIOMEDIASTINAL SILHOUETTE: Cardiomediastinal silhouette is normal in size and configuration.  LUNGS: Lungs are clear.  ABDOMEN: No remarkable upper abdominal findings.  BONES: No acute osseous changes.    Consistent with COPD.  No acute findings. Signed by Cisco Leos MD                    Assessment/Plan   Assessment & Plan  Influenza A    Hyperlipidemia    84 y.o. male with PMH of Afib on eliquis, HTN, diastolic HF, COPD, CVA, PAD s/p  procedure, CVA, HLD, and tobacco abuse that presented to the ED with three day history of shortness of breath and was admitted for acute hypoxic respiratory failure secondary to Influenza A.     Acute hypoxic respiratory failure   - secondary to influenza A and COPD exacerbation  Hx of multiple hospitalizations for pna  - currently on 2L O2, wean to RA as tolerated  - continue tamiflu day 3/5  - bactrim finish today  - continue prednisone course to be completed on 2/4  - duonebs PRN  - Anoro   - pulm consulted, appreciate recs  - ID consult  - AZT     HypoNa  - monitor    Normocytic anemia  - monitor    HTN, Diastolic HF, HLD, PAD  - continue home metoprolol, eliquis, plavix, and statin     DVT ppx: eliquis  Dispo: Patient requires close inpatient monitoring in setting of hypoxia secondary to FluA, discharge planning pending oxygen wean.         Tennille Berg MD  Hospitalist

## 2025-02-05 VITALS
HEART RATE: 121 BPM | BODY MASS INDEX: 17.34 KG/M2 | HEIGHT: 72 IN | DIASTOLIC BLOOD PRESSURE: 75 MMHG | WEIGHT: 128 LBS | TEMPERATURE: 97.7 F | RESPIRATION RATE: 28 BRPM | SYSTOLIC BLOOD PRESSURE: 124 MMHG | OXYGEN SATURATION: 91 %

## 2025-02-05 PROBLEM — J96.01 ACUTE HYPOXIC RESPIRATORY FAILURE (MULTI): Status: ACTIVE | Noted: 2025-02-05

## 2025-02-05 PROBLEM — J44.1 ACUTE EXACERBATION OF CHRONIC OBSTRUCTIVE PULMONARY DISEASE (COPD) (MULTI): Status: ACTIVE | Noted: 2023-07-31

## 2025-02-05 LAB
ALBUMIN SERPL BCP-MCNC: 3 G/DL (ref 3.4–5)
ALP SERPL-CCNC: 40 U/L (ref 33–136)
ALT SERPL W P-5'-P-CCNC: 26 U/L (ref 10–52)
ANION GAP SERPL CALC-SCNC: 12 MMOL/L (ref 10–20)
AST SERPL W P-5'-P-CCNC: 32 U/L (ref 9–39)
BASOPHILS # BLD AUTO: 0.01 X10*3/UL (ref 0–0.1)
BASOPHILS NFR BLD AUTO: 0.1 %
BILIRUB SERPL-MCNC: 0.4 MG/DL (ref 0–1.2)
BUN SERPL-MCNC: 14 MG/DL (ref 6–23)
CALCIUM SERPL-MCNC: 7.9 MG/DL (ref 8.6–10.3)
CHLORIDE SERPL-SCNC: 98 MMOL/L (ref 98–107)
CO2 SERPL-SCNC: 26 MMOL/L (ref 21–32)
CREAT SERPL-MCNC: 0.76 MG/DL (ref 0.5–1.3)
EGFRCR SERPLBLD CKD-EPI 2021: 89 ML/MIN/1.73M*2
EOSINOPHIL # BLD AUTO: 0.52 X10*3/UL (ref 0–0.4)
EOSINOPHIL NFR BLD AUTO: 7.3 %
ERYTHROCYTE [DISTWIDTH] IN BLOOD BY AUTOMATED COUNT: 16.1 % (ref 11.5–14.5)
GLUCOSE SERPL-MCNC: 87 MG/DL (ref 74–99)
HCT VFR BLD AUTO: 32.5 % (ref 41–52)
HGB BLD-MCNC: 10.2 G/DL (ref 13.5–17.5)
IMM GRANULOCYTES # BLD AUTO: 0.05 X10*3/UL (ref 0–0.5)
IMM GRANULOCYTES NFR BLD AUTO: 0.7 % (ref 0–0.9)
LYMPHOCYTES # BLD AUTO: 1.19 X10*3/UL (ref 0.8–3)
LYMPHOCYTES NFR BLD AUTO: 16.8 %
MCH RBC QN AUTO: 29.8 PG (ref 26–34)
MCHC RBC AUTO-ENTMCNC: 31.4 G/DL (ref 32–36)
MCV RBC AUTO: 95 FL (ref 80–100)
MONOCYTES # BLD AUTO: 0.38 X10*3/UL (ref 0.05–0.8)
MONOCYTES NFR BLD AUTO: 5.4 %
NEUTROPHILS # BLD AUTO: 4.93 X10*3/UL (ref 1.6–5.5)
NEUTROPHILS NFR BLD AUTO: 69.7 %
NRBC BLD-RTO: 0 /100 WBCS (ref 0–0)
PLATELET # BLD AUTO: 209 X10*3/UL (ref 150–450)
POTASSIUM SERPL-SCNC: 4.3 MMOL/L (ref 3.5–5.3)
PROT SERPL-MCNC: 5.6 G/DL (ref 6.4–8.2)
RBC # BLD AUTO: 3.42 X10*6/UL (ref 4.5–5.9)
SODIUM SERPL-SCNC: 132 MMOL/L (ref 136–145)
WBC # BLD AUTO: 7.1 X10*3/UL (ref 4.4–11.3)

## 2025-02-05 PROCEDURE — 2500000005 HC RX 250 GENERAL PHARMACY W/O HCPCS: Performed by: EMERGENCY MEDICINE

## 2025-02-05 PROCEDURE — 36415 COLL VENOUS BLD VENIPUNCTURE: CPT | Performed by: STUDENT IN AN ORGANIZED HEALTH CARE EDUCATION/TRAINING PROGRAM

## 2025-02-05 PROCEDURE — 80053 COMPREHEN METABOLIC PANEL: CPT | Performed by: STUDENT IN AN ORGANIZED HEALTH CARE EDUCATION/TRAINING PROGRAM

## 2025-02-05 PROCEDURE — 2500000001 HC RX 250 WO HCPCS SELF ADMINISTERED DRUGS (ALT 637 FOR MEDICARE OP): Performed by: INTERNAL MEDICINE

## 2025-02-05 PROCEDURE — 2500000001 HC RX 250 WO HCPCS SELF ADMINISTERED DRUGS (ALT 637 FOR MEDICARE OP): Performed by: FAMILY MEDICINE

## 2025-02-05 PROCEDURE — 2500000002 HC RX 250 W HCPCS SELF ADMINISTERED DRUGS (ALT 637 FOR MEDICARE OP, ALT 636 FOR OP/ED): Performed by: FAMILY MEDICINE

## 2025-02-05 PROCEDURE — 94640 AIRWAY INHALATION TREATMENT: CPT

## 2025-02-05 PROCEDURE — 85025 COMPLETE CBC W/AUTO DIFF WBC: CPT | Performed by: STUDENT IN AN ORGANIZED HEALTH CARE EDUCATION/TRAINING PROGRAM

## 2025-02-05 PROCEDURE — 94760 N-INVAS EAR/PLS OXIMETRY 1: CPT

## 2025-02-05 PROCEDURE — 99232 SBSQ HOSP IP/OBS MODERATE 35: CPT | Performed by: INTERNAL MEDICINE

## 2025-02-05 PROCEDURE — 2500000001 HC RX 250 WO HCPCS SELF ADMINISTERED DRUGS (ALT 637 FOR MEDICARE OP): Performed by: STUDENT IN AN ORGANIZED HEALTH CARE EDUCATION/TRAINING PROGRAM

## 2025-02-05 PROCEDURE — 99239 HOSP IP/OBS DSCHRG MGMT >30: CPT | Performed by: STUDENT IN AN ORGANIZED HEALTH CARE EDUCATION/TRAINING PROGRAM

## 2025-02-05 RX ORDER — BUDESONIDE AND FORMOTEROL FUMARATE DIHYDRATE 80; 4.5 UG/1; UG/1
2 AEROSOL RESPIRATORY (INHALATION)
Qty: 10.2 G | Refills: 5 | Status: SHIPPED | OUTPATIENT
Start: 2025-02-05

## 2025-02-05 RX ORDER — OSELTAMIVIR PHOSPHATE 75 MG/1
75 CAPSULE ORAL 2 TIMES DAILY
Qty: 4 CAPSULE | Refills: 0 | Status: SHIPPED | OUTPATIENT
Start: 2025-02-05 | End: 2025-02-07

## 2025-02-05 RX ORDER — METOPROLOL TARTRATE 25 MG/1
25 TABLET, FILM COATED ORAL 2 TIMES DAILY
Start: 2025-02-05

## 2025-02-05 RX ORDER — METOPROLOL TARTRATE 1 MG/ML
5 INJECTION, SOLUTION INTRAVENOUS ONCE
Status: DISCONTINUED | OUTPATIENT
Start: 2025-02-05 | End: 2025-02-05

## 2025-02-05 RX ORDER — PREDNISONE 20 MG/1
40 TABLET ORAL DAILY
Qty: 10 TABLET | Refills: 0 | Status: SHIPPED | OUTPATIENT
Start: 2025-02-05 | End: 2025-02-10

## 2025-02-05 RX ORDER — METOPROLOL TARTRATE 25 MG/1
12.5 TABLET, FILM COATED ORAL ONCE
Status: COMPLETED | OUTPATIENT
Start: 2025-02-05 | End: 2025-02-05

## 2025-02-05 RX ORDER — PREDNISONE 20 MG/1
40 TABLET ORAL DAILY
Status: DISCONTINUED | OUTPATIENT
Start: 2025-02-05 | End: 2025-02-05

## 2025-02-05 RX ADMIN — IPRATROPIUM BROMIDE AND ALBUTEROL SULFATE 3 ML: 2.5; .5 SOLUTION RESPIRATORY (INHALATION) at 14:39

## 2025-02-05 RX ADMIN — IPRATROPIUM BROMIDE AND ALBUTEROL SULFATE 3 ML: 2.5; .5 SOLUTION RESPIRATORY (INHALATION) at 08:51

## 2025-02-05 RX ADMIN — TAMSULOSIN HYDROCHLORIDE 0.8 MG: 0.4 CAPSULE ORAL at 08:55

## 2025-02-05 RX ADMIN — AZITHROMYCIN 500 MG: 500 TABLET, FILM COATED ORAL at 08:55

## 2025-02-05 RX ADMIN — METOPROLOL TARTRATE 12.5 MG: 25 TABLET, FILM COATED ORAL at 08:55

## 2025-02-05 RX ADMIN — FORMOTEROL FUMARATE DIHYDRATE 20 MCG: 20 SOLUTION RESPIRATORY (INHALATION) at 08:51

## 2025-02-05 RX ADMIN — METOPROLOL TARTRATE 12.5 MG: 25 TABLET, FILM COATED ORAL at 11:16

## 2025-02-05 RX ADMIN — SULFAMETHOXAZOLE AND TRIMETHOPRIM 1 TABLET: 800; 160 TABLET ORAL at 08:55

## 2025-02-05 RX ADMIN — BENZONATATE 200 MG: 100 CAPSULE ORAL at 14:37

## 2025-02-05 RX ADMIN — BENZONATATE 200 MG: 100 CAPSULE ORAL at 08:55

## 2025-02-05 RX ADMIN — APIXABAN 2.5 MG: 2.5 TABLET, FILM COATED ORAL at 08:56

## 2025-02-05 RX ADMIN — CLOPIDOGREL 75 MG: 75 TABLET ORAL at 08:56

## 2025-02-05 RX ADMIN — TIOTROPIUM BROMIDE INHALATION SPRAY 2 PUFF: 3.12 SPRAY, METERED RESPIRATORY (INHALATION) at 06:09

## 2025-02-05 RX ADMIN — Medication 1 L/MIN: at 10:58

## 2025-02-05 RX ADMIN — OSELTAMAVIR PHOSPHATE 75 MG: 75 CAPSULE ORAL at 08:55

## 2025-02-05 RX ADMIN — Medication 1 L/MIN: at 00:58

## 2025-02-05 ASSESSMENT — COGNITIVE AND FUNCTIONAL STATUS - GENERAL
DAILY ACTIVITIY SCORE: 24
MOBILITY SCORE: 24

## 2025-02-05 ASSESSMENT — PAIN SCALES - GENERAL: PAINLEVEL_OUTOF10: 0 - NO PAIN

## 2025-02-05 NOTE — CARE PLAN
The patient's goals for the shift include      The clinical goals for the shift include Patient will maintain SpO2 above 92% on room air, remain free from injury, and rest comfortably this shift

## 2025-02-05 NOTE — PROGRESS NOTES
Dejon Arteaga is a 84 y.o. male on day 3 of admission presenting with Influenza A.    Subjective   Interval History: no fever, no new complaints        Review of Systems    Objective   Range of Vitals (last 24 hours)  Heart Rate:  []   Temp:  [36.5 °C (97.7 °F)-37.1 °C (98.8 °F)]   Resp:  [19-21]   BP: (102-124)/(53-75)   SpO2:  [88 %-93 %]   Daily Weight  02/02/25 : 58.1 kg (128 lb)    Body mass index is 17.36 kg/m².    Physical Exam  Constitutional:       Appearance: Normal appearance.   HENT:      Head: Normocephalic and atraumatic.      Mouth/Throat:      Mouth: Mucous membranes are moist.      Pharynx: Oropharynx is clear.   Eyes:      Pupils: Pupils are equal, round, and reactive to light.   Cardiovascular:      Rate and Rhythm: Normal rate and regular rhythm.      Heart sounds: Normal heart sounds.   Pulmonary:      Effort: Pulmonary effort is normal.      Breath sounds: Normal breath sounds.   Abdominal:      General: Abdomen is flat. Bowel sounds are normal.      Palpations: Abdomen is soft.   Musculoskeletal:      Cervical back: Normal range of motion.   Neurological:      Mental Status: He is alert.         Antibiotics  azithromycin - 500 mg    Relevant Results  Labs  Results from last 72 hours   Lab Units 02/05/25  0554 02/04/25  0819 02/03/25  0717   WBC AUTO x10*3/uL 7.1 7.4 5.6   HEMOGLOBIN g/dL 10.2* 10.8* 10.3*   HEMATOCRIT % 32.5* 34.0* 31.7*   PLATELETS AUTO x10*3/uL 209 209 170   NEUTROS PCT AUTO % 69.7  --   --    LYMPHS PCT AUTO % 16.8  --   --    MONOS PCT AUTO % 5.4  --   --    EOS PCT AUTO % 7.3  --   --      Results from last 72 hours   Lab Units 02/05/25  0554 02/04/25  0819 02/03/25  0717   SODIUM mmol/L 132* 133* 130*   POTASSIUM mmol/L 4.3 4.2 4.3   CHLORIDE mmol/L 98 99 101   CO2 mmol/L 26 25 22   BUN mg/dL 14 16 20   CREATININE mg/dL 0.76 0.72 0.71   GLUCOSE mg/dL 87 122* 130*   CALCIUM mg/dL 7.9* 8.0* 7.6*   ANION GAP mmol/L 12 13 11   EGFR mL/min/1.73m*2 89 90 90      Results from last 72 hours   Lab Units 02/05/25  0554   ALK PHOS U/L 40   BILIRUBIN TOTAL mg/dL 0.4   PROTEIN TOTAL g/dL 5.6*   ALT U/L 26   AST U/L 32   ALBUMIN g/dL 3.0*     Estimated Creatinine Clearance: 59.5 mL/min (by C-G formula based on SCr of 0.76 mg/dL).  C-Reactive Protein   Date Value Ref Range Status   02/04/2025 2.65 (H) <1.00 mg/dL Final   01/20/2025 1.68 (H) <1.00 mg/dL Final   01/19/2025 2.73 (H) <1.00 mg/dL Final     Microbiology  Reviewed  Imaging  Reviewed       Assessment/Plan     Respiratory failure / COPD / influenza A, procalcitonin 0.13     Recommendations :  Tamiflu protocol  Continue Azithromycin  Discussed with the medical team     I spent minutes in the professional and overall care of this patient.      Jaret Fierro MD

## 2025-02-05 NOTE — CARE PLAN
The patient's goals for the shift include      The clinical goals for the shift include Patient to tolerate RA this shift

## 2025-02-05 NOTE — PROGRESS NOTES
"Sheldon Arteaga \"Dejon\" is a 84 y.o. male on day 3 of admission presenting with Influenza A.    Subjective   Feels somewhat better than yesterday. No more sputum production. Ambulated in hallway and became hypoxic after returning to his room. Currently on O2 supplementation.       Objective   GEN: breathing somewhat labored  CV: RRR, no m/g/r  LUNGS: good effort, clear bilaterally, no w/r/r  EXT: no edema, cyanosis, clubbing      Physical Exam    Last Recorded Vitals  Blood pressure 124/75, pulse (!) 123, temperature 36.5 °C (97.7 °F), resp. rate 19, height 1.829 m (6'), weight 58.1 kg (128 lb), SpO2 93%.  Intake/Output last 3 Shifts:  I/O last 3 completed shifts:  In: 1667.1 (28.7 mL/kg) [P.O.:1140; I.V.:527.1 (9.1 mL/kg)]  Out: 1885 (32.5 mL/kg) [Urine:1885 (0.9 mL/kg/hr)]  Weight: 58.1 kg     Relevant Results  Scheduled medications  apixaban, 2.5 mg, oral, BID  atorvastatin, 40 mg, oral, Nightly  azithromycin, 500 mg, oral, q24h DOROTEO  benzonatate, 200 mg, oral, TID  clopidogrel, 75 mg, oral, Daily  tiotropium, 2 puff, inhalation, Daily   And  formoterol, 20 mcg, nebulization, q12h  ipratropium-albuteroL, 3 mL, nebulization, TID  metoprolol tartrate, 12.5 mg, oral, BID  oseltamivir, 75 mg, oral, BID  oxygen, , inhalation, Continuous - Inhalation  tamsulosin, 0.8 mg, oral, Daily      Continuous medications     PRN medications  PRN medications: acetaminophen **OR** acetaminophen, alum-mag hydroxide-simeth, dextromethorphan-guaifenesin, ipratropium-albuteroL, melatonin, ondansetron ODT **OR** ondansetron    Results for orders placed or performed during the hospital encounter of 02/02/25 (from the past 24 hours)   Comprehensive Metabolic Panel   Result Value Ref Range    Glucose 87 74 - 99 mg/dL    Sodium 132 (L) 136 - 145 mmol/L    Potassium 4.3 3.5 - 5.3 mmol/L    Chloride 98 98 - 107 mmol/L    Bicarbonate 26 21 - 32 mmol/L    Anion Gap 12 10 - 20 mmol/L    Urea Nitrogen 14 6 - 23 mg/dL    Creatinine 0.76 0.50 - " 1.30 mg/dL    eGFR 89 >60 mL/min/1.73m*2    Calcium 7.9 (L) 8.6 - 10.3 mg/dL    Albumin 3.0 (L) 3.4 - 5.0 g/dL    Alkaline Phosphatase 40 33 - 136 U/L    Total Protein 5.6 (L) 6.4 - 8.2 g/dL    AST 32 9 - 39 U/L    Bilirubin, Total 0.4 0.0 - 1.2 mg/dL    ALT 26 10 - 52 U/L   CBC and Auto Differential   Result Value Ref Range    WBC 7.1 4.4 - 11.3 x10*3/uL    nRBC 0.0 0.0 - 0.0 /100 WBCs    RBC 3.42 (L) 4.50 - 5.90 x10*6/uL    Hemoglobin 10.2 (L) 13.5 - 17.5 g/dL    Hematocrit 32.5 (L) 41.0 - 52.0 %    MCV 95 80 - 100 fL    MCH 29.8 26.0 - 34.0 pg    MCHC 31.4 (L) 32.0 - 36.0 g/dL    RDW 16.1 (H) 11.5 - 14.5 %    Platelets 209 150 - 450 x10*3/uL    Neutrophils % 69.7 40.0 - 80.0 %    Immature Granulocytes %, Automated 0.7 0.0 - 0.9 %    Lymphocytes % 16.8 13.0 - 44.0 %    Monocytes % 5.4 2.0 - 10.0 %    Eosinophils % 7.3 0.0 - 6.0 %    Basophils % 0.1 0.0 - 2.0 %    Neutrophils Absolute 4.93 1.60 - 5.50 x10*3/uL    Immature Granulocytes Absolute, Automated 0.05 0.00 - 0.50 x10*3/uL    Lymphocytes Absolute 1.19 0.80 - 3.00 x10*3/uL    Monocytes Absolute 0.38 0.05 - 0.80 x10*3/uL    Eosinophils Absolute 0.52 (H) 0.00 - 0.40 x10*3/uL    Basophils Absolute 0.01 0.00 - 0.10 x10*3/uL                                     Assessment/Plan   Assessment & Plan  Influenza A    Acute exacerbation of chronic obstructive pulmonary disease (COPD) (Multi)    Acute hypoxic respiratory failure (Multi)    Summary:  84 y.o. male admitted on 2/2/2025 10:32 AM for presumed COPD exacerbation secondary to influenza A. He has not established care with outpatient pulmonary.    Recommendations:  -Discharge on ICS/LAMA/LABA therapy  -Pt would like to follow up with Dr. Haney as an outpatient.  -Complete oseltamivir and azithromycin  -Bronchodilators  -can hold off on steroids for now as he is improving  -Home O2 eval prior to discharge. Supplement O2 to keep SpO2 89-92%. Currently on 2L/min.  -Outpatient PFT and 6-minute walk test  -He was  encouraged to continue abstinence from cigarette smoking    Quoc Cam DO  Pulmonary & Critical Care  2/5/2025 1:24 PM

## 2025-02-05 NOTE — SIGNIFICANT EVENT
02/05/25 1245   Vital Signs   Heart Rate (!) 121   Resp (!) 28   Medical Gas Therapy/Pulse Ox   Medical Gas Therapy Supplemental oxygen   Medical Gas Delivery Method Nasal cannula   SpO2 91 %  (on 1 L NC)   Patient Activity During SpO2 Measurement Walking   Pulse Oximetry Type Continuous     Attempted walking pulse ox to determine oxygen eligibility.   After walking patient in room for 1 minute -'s, patient became dyspneic, speaking in short sentences, RR 28, SPO2 91% on 1 L NC. Breath sounds coarse expiratory wheezing throughout noted. Patient escorted back to bed.     Dr. Berg made aware.     Per Dr. Berg patient will be discharged.

## 2025-02-05 NOTE — DISCHARGE SUMMARY
Discharge Diagnosis  Influenza A    Issues Requiring Follow-Up  Post hospital follow up    Discharge Meds     Medication List      START taking these medications     budesonide-formoteroL 80-4.5 mcg/actuation inhaler; Commonly known as:   Symbicort; Inhale 2 puffs 2 times a day. Rinse mouth with water after use   to reduce aftertaste and incidence of candidiasis. Do not swallow.   oseltamivir 75 mg capsule; Commonly known as: Tamiflu; Take 1 capsule   (75 mg) by mouth 2 times a day for 4 doses.     CHANGE how you take these medications     ipratropium-albuteroL 0.5-2.5 mg/3 mL nebulizer solution; Commonly known   as: Duo-Neb; Take 3 mL by nebulization every 6 hours.; What changed:   Another medication with the same name was removed. Continue taking this   medication, and follow the directions you see here.   metoprolol tartrate 25 mg tablet; Commonly known as: Lopressor; Take 1   tablet (25 mg) by mouth 2 times a day.; What changed: how much to take   predniSONE 20 mg tablet; Commonly known as: Deltasone; Take 2 tablets   (40 mg) by mouth once daily for 5 days. Take if you have worsening   shortness of breath; What changed: medication strength, how much to take,   how to take this, when to take this, additional instructions     CONTINUE taking these medications     * albuterol 2.5 mg /3 mL (0.083 %) nebulizer solution; Take 3 mL (2.5   mg) by nebulization every 2 hours if needed for wheezing or shortness of   breath.   * albuterol 90 mcg/actuation inhaler; Commonly known as: Ventolin HFA;   Inhale 2 puffs every 4 hours if needed for wheezing or shortness of   breath.   Anoro Ellipta 62.5-25 mcg/actuation blister with device; Generic drug:   umeclidinium-vilanteroL; Inhale 1 puff once daily.   apixaban 5 mg tablet; Commonly known as: Eliquis; Take 0.5 tablets (2.5   mg) by mouth 2 times a day.   atorvastatin 40 mg tablet; Commonly known as: Lipitor; Take 1 tablet (40   mg) by mouth once daily at bedtime.    benzonatate 200 mg capsule; Commonly known as: Tessalon; Take 1 capsule   (200 mg) by mouth 3 times a day. Do not crush or chew.   clopidogrel 75 mg tablet; Commonly known as: Plavix; Take 1 tablet (75   mg) by mouth once daily.   docusate sodium 100 mg capsule; Commonly known as: Colace   Mucinex DM  mg 12 hr tablet; Generic drug:   dextromethorphan-guaifenesin; Take 1 tablet by mouth every 12 hours if   needed for cough. Do not crush, chew, or split.   tamsulosin 0.4 mg 24 hr capsule; Commonly known as: Flomax; Take 2   capsules (0.8 mg) by mouth once daily.  * This list has 2 medication(s) that are the same as other medications   prescribed for you. Read the directions carefully, and ask your doctor or   other care provider to review them with you.     STOP taking these medications     clarithromycin 500 mg tablet; Commonly known as: Biaxin   sulfamethoxazole-trimethoprim 800-160 mg tablet; Commonly known as:   Bactrim DS       Test Results Pending At Discharge  Pending Labs       No current pending labs.            Hospital Course   84 y.o. male with PMH of Afib on eliquis, HTN, diastolic HF, COPD, CVA, PAD s/p  procedure, CVA, HLD, and tobacco abuse that presented to the ED with three day history of shortness of breath and was admitted for acute hypoxic respiratory failure secondary to Influenza A.     Acute hypoxic respiratory failure   - secondary to influenza A and COPD exacerbation  Hx of multiple hospitalizations for pna  Back to RA  - tamiflu  - bactrim finish today  - completed prednisone course  - mary PRN  - sent symbicort in for pt  - Anoro   - pulm consulted, appreciate recs  - ID following  - sent prednisone burst taper for rescue at home    HypoNa  - monitor     Normocytic anemia  - monitor     HTN, Diastolic HF, HLD, PAD  - continue home metoprolol, eliquis, plavix, and statin    Pt is hemodynamically stable for discharge at this time with close outpatient follow ups.     The patient was  discharged in satisfactory condition.   Medications and side effect profile reviewed with patient.  More than 30 minutes were spent in coordinating patient discharge     Pertinent Physical Exam At Time of Discharge  Physical Exam  Vitals and nursing note reviewed.   Constitutional:       General: He is not in acute distress.     Appearance: Normal appearance. He is not ill-appearing or toxic-appearing.   HENT:      Head: Normocephalic and atraumatic.      Nose: Nose normal.      Mouth/Throat:      Mouth: Mucous membranes are moist.   Eyes:      Extraocular Movements: Extraocular movements intact.      Conjunctiva/sclera: Conjunctivae normal.      Pupils: Pupils are equal, round, and reactive to light.   Cardiovascular:      Rate and Rhythm: Normal rate and regular rhythm.      Heart sounds: No murmur heard.     No gallop.   Pulmonary:      Effort: Pulmonary effort is normal. No respiratory distress.      Breath sounds: Rhonchi present. No wheezing or rales.   Abdominal:      General: Abdomen is flat. Bowel sounds are normal. There is no distension.      Palpations: Abdomen is soft. There is no mass.      Tenderness: There is no abdominal tenderness.   Musculoskeletal:         General: No swelling or tenderness. Normal range of motion.      Cervical back: Normal range of motion and neck supple.   Skin:     General: Skin is warm and dry.   Neurological:      General: No focal deficit present.      Mental Status: He is alert and oriented to person, place, and time. Mental status is at baseline.   Psychiatric:         Mood and Affect: Mood normal.         Behavior: Behavior normal.         Thought Content: Thought content normal.         Judgment: Judgment normal.         Outpatient Follow-Up  Future Appointments   Date Time Provider Department Center   2/26/2025 10:45 AM CESARIO Oliveira [facility]   6/9/2025 11:20 AM CESARIO Juan AHUURO [facility]   10/10/2025 10:15 AM Parker Olmedo MD [facility]          Tennille Berg MD  Hospitalist

## 2025-02-05 NOTE — PROGRESS NOTES
02/05/25 1127   Discharge Planning   Living Arrangements Children  (Home with daughter Stuart)   Support Systems Children   Assistance Needed A&OX4; independent with ADLs with no DME; drives; room air baseline-currently 2L NC; PCP Dr Johny Cedeño; on Eliquis prior to hospitalization   Type of Residence Private residence   Number of Stairs to Enter Residence 3   Number of Stairs Within Residence 0   Do you have animals or pets at home? Yes   Type of Animals or Pets 3 cats 1 dog   Who is requesting discharge planning? Provider   Home or Post Acute Services None   Expected Discharge Disposition Home  (Patient would like home O2 at discharge.)   Does the patient need discharge transport arranged? No   Stroke Family Assessment   Stroke Family Assessment Needed No   Intensity of Service   Intensity of Service 0-30 min        02/05/25 1503   Discharge Planning   Expected Discharge Disposition Home  (Patient will be discharged home. Patient continues to deny further home going needs. Family at bedside to transport home.)   Does the patient need discharge transport arranged? No

## 2025-02-05 NOTE — CONSULTS
Nutrition Initial Assessment:   Nutrition Assessment    Reason for Assessment: Dietitian discretion    Patient is a 84 y.o. male presenting with AHRF 2/2 influenza A & COPD exacerbation.    PMH: Afib on eliquis, HTN, diastolic HF, COPD, CVA, PAD s/p procedure, CVA, HLD, and tobacco abuse     Nutrition History:  Food and Nutrient History: Contacted pt via telephone given current droplet precautions for influenza. He reports no decreased in appetite despite illness. Notes he consumes x2 meals/day + snack at home. However, reports he has been eating 100% of meals 3x/day this admission. Occasionally drinks an oral supplement his daughter buys him but unsure of the name. Is amenable to chocolate Ensure Plus HP w/ meals this admission. Denies further needs at this time.  Vitamin/Herbal Supplement Use: None  Food Allergy:  (None)     Anthropometrics:  Height: 182.9 cm (6')   Weight: 58.1 kg (128 lb)   BMI (Calculated): 17.36  IBW/kg (Dietitian Calculated): 80.9 kg  Percent of IBW: 72 %     Weight History:   Wt Readings from Last 60 Encounters:   02/02/25 58.1 kg (128 lb) --> Admit wt   01/17/25 59.8 kg (131 lb 14.4 oz)   12/18/24 62.1 kg (137 lb)   11/04/24 59.9 kg (132 lb 1.6 oz)   10/11/24 60.3 kg (133 lb)   05/08/24 61.2 kg (135 lb)   03/10/23 63.1 kg (139 lb 0.3 oz)     Weight Change %:  Weight History / % Weight Change: 3% x 1 month, 3% x 3 months, 4% x 4 months, 8% x 1 year  Significant Weight Loss: No    Nutrition Focused Physical Exam Findings:  Defer: Unable to visit 2/2 droplet precautions     Edema:  Edema: none    Physical Findings:  Skin:  (Intact)  Digestive System Findings: Diarrhea  Mouth Findings:  (None)    Nutrition Significant Labs:  BMP Trend:   Results from last 7 days   Lab Units 02/05/25  0554 02/04/25  0819 02/03/25  0717 02/02/25  1105   GLUCOSE mg/dL 87 122* 130* 134*   CALCIUM mg/dL 7.9* 8.0* 7.6* 8.2*   SODIUM mmol/L 132* 133* 130* 125*   POTASSIUM mmol/L 4.3 4.2 4.3 4.6   CO2 mmol/L 26 25 22  22   CHLORIDE mmol/L 98 99 101 93*   BUN mg/dL 14 16 20 16   CREATININE mg/dL 0.76 0.72 0.71 1.01      Nutrition Specific Medications:  Scheduled medications  azithromycin, 500 mg, oral, q24h DOROTEO  predniSONE, 40 mg, oral, Daily    I/O:   LBM: 2/3 ; Stool Appearance: Loose (02/03/25 2301)    Dietary Orders (From admission, onward)       Start     Ordered    02/02/25 1625  May Participate in Room Service  ( ROOM SERVICE MAY PARTICIPATE)  Once        Question:  .  Answer:  Yes    02/02/25 1624    02/02/25 1519  Adult diet Regular  Diet effective now        Question:  Diet type  Answer:  Regular    02/02/25 1518             Estimated Needs:   Total Energy Estimated Needs in 24 hours (kCal):  (1750+)  Method for Estimating Needs: ~30 kcals/kg x CBW (58.1 kg)  Total Protein Estimated Needs in 24 Hours (g):  (75-85)  Method for Estimating 24 Hour Protein Needs: 1.3-1.5 g/kg x CBW (58.1 kg)  Total Fluid Estimated Needs in 24 Hours (mL):  (1750+)  Method for Estimating 24 Hour Fluid Needs: 1mL/kcal or per team        Nutrition Diagnosis   Malnutrition Diagnosis  Patient has Malnutrition Diagnosis:  (Unable to accurately assess given limited information)    Nutrition Diagnosis  Patient has Nutrition Diagnosis: Yes  Diagnosis Status (1): New  Nutrition Diagnosis 1: Increased nutrient needs  Related to (1): increased metabolic demand  As Evidenced by (1): COPD exacerbation c/b influenza A       Nutrition Interventions/Recommendations      Nutrition Recommendations:  Continue liberalized regular diet as tolerated    Nutrition Interventions/Goals:   Interventions: Medical food supplement  Medical Food Supplement: Commercial beverage medical food supplement therapy  Goal: Ensure Plus HP TID (350 kcals/20g protein each)      Nutrition Monitoring and Evaluation   Food/Nutrient Related History Monitoring  Monitoring and Evaluation Plan: Estimated Energy Intake  Estimated Energy Intake: Energy intake greater or equal to 75% of  estimated energy needs    Time Spent (min): 60 minutes

## 2025-02-06 ENCOUNTER — TELEPHONE (OUTPATIENT)
Dept: PRIMARY CARE | Facility: CLINIC | Age: 85
End: 2025-02-06
Payer: MEDICARE

## 2025-02-06 ENCOUNTER — PATIENT OUTREACH (OUTPATIENT)
Dept: PRIMARY CARE | Facility: CLINIC | Age: 85
End: 2025-02-06
Payer: MEDICARE

## 2025-02-06 NOTE — PROGRESS NOTES
Discharge Facility:[facility]   Discharge Diagnosis:Influenza A  Admission Date:2/2/25  Discharge Date: 2/5/25    PCP Appointment Date:Task to office staff  Specialist Appointment Date: N/A  Hospital Encounter and Summary Linked: Yes  See discharge assessment below for further details  ED to Hosp-Admission (Discharged) with Tennille Berg MD; Denzel Desai MD (02/02/2025)   Wrap Up  Wrap Up Additional Comments: This CM spoke with pt via phone. Pt reports doing well at home since discharge. New meds reviewed. Pt denies CP and SOB. Patient denies any further discharge questions/needs at this time. Emphasized that Follow up is needed after discharge to review the hospital recommendations, assess your response to your treatment. Pt aware of my availability for non-emergent concerns. Contact info provided to patient. (2/6/2025  8:52 AM)    Engagement  Call Start Time: 0852 (2/6/2025  8:52 AM)    Medications  Medications reviewed with patient/caregiver?: Yes (2/6/2025  8:52 AM)  Is the patient having any side effects they believe may be caused by any medication additions or changes?: No (2/6/2025  8:52 AM)  Does the patient have all medications ordered at discharge?: No (Patient will go to pick script from hospital) (2/6/2025  8:52 AM)  Prescription Comments: START taking: budesonide-formoteroL (Symbicort) oseltamivir (Tamiflu)  CHANGE how you take: ipratropium-albuteroL (Duo-Neb) metoprolol tartrate (Lopressor) predniSONE (Deltasone) STOP taking: clarithromycin 500 mg tablet (Biaxin) sulfamethoxazole-trimethoprim 800-160 mg tablet (Bactrim DS) (2/6/2025  8:52 AM)  Is the patient taking all medications as directed (includes completed medication regime)?: Yes (2/6/2025  8:52 AM)  Medication Comments: see med list (2/6/2025  8:52 AM)    Appointments  Does the patient have a primary care provider?: Yes (2/6/2025  8:52 AM)  Care Management Interventions: Educated patient on importance of making appointment; Advised  patient to make appointment (2/6/2025  8:52 AM)  Has the patient kept scheduled appointments due by today?: Yes (2/6/2025  8:52 AM)  Care Management Interventions: Advised to schedule with specialist (Pulmonology) (2/6/2025  8:52 AM)    Patient Teaching  Does the patient have access to their discharge instructions?: Yes (2/6/2025  8:52 AM)  Care Management Interventions: Reviewed instructions with patient (2/6/2025  8:52 AM)  What is the patient's perception of their health status since discharge?: Improving (2/6/2025  8:52 AM)  Is the patient/caregiver able to teach back the hierarchy of who to call/visit for symptoms/problems? PCP, Specialist, Home Health nurse, Urgent Care, ED, 911: Yes (2/6/2025  8:52 AM)      Daily Tucker LPN

## 2025-02-06 NOTE — TELEPHONE ENCOUNTER
Transition of Care    Inpatient facility:  [facility]  Discharge diagnosis: FLU  Discharged to: HOME  Discharge date: 02/05/2025  Initial Call date: 02/06/2025  Spoke with patient/caregiver: DAUGHTER                                                                     Do you need assistance  visits prior to your PCP visit: No  Home health care ordered: No  Have you been contacted by home care and have a start of care date: No  Are you taking medications as prescribed at discharge: Yes    Referral to [facility] Pharmacist: No  Patient advised to bring all medications to PCP follow-up appointment.  Patient advised to follow discharge instructions until provider follow-up.  TCM visit date: 02/19/2025  TCM provider visit with: ALEJANDRO HOUSE. JOSE FRANCISCO

## 2025-02-07 NOTE — SIGNIFICANT EVENT
Follow Up Phone Call    Outgoing phone call    Spoke to: Sheldon Arteaga Relationship:self   Phone number: [phone]      Outcome: contacted patient/ family   Chief Complaint   Patient presents with    Flu Symptoms     DC form hospital Monday for PNA           Diagnosis:Not applicable    States he is feeling better. No further questions or concerns.

## 2025-02-08 ENCOUNTER — PATIENT OUTREACH (OUTPATIENT)
Dept: CARE COORDINATION | Age: 85
End: 2025-02-08
Payer: MEDICARE

## 2025-02-09 DIAGNOSIS — I10 PRIMARY HYPERTENSION: ICD-10-CM

## 2025-02-10 RX ORDER — METOPROLOL TARTRATE 25 MG/1
25 TABLET, FILM COATED ORAL 2 TIMES DAILY
Qty: 180 TABLET | Refills: 0 | OUTPATIENT
Start: 2025-02-10

## 2025-02-11 DIAGNOSIS — I10 BENIGN ESSENTIAL HYPERTENSION: ICD-10-CM

## 2025-02-11 LAB
ATRIAL RATE: 84 BPM
ATRIAL RATE: 85 BPM
ATRIAL RATE: 96 BPM
P AXIS: 61 DEGREES
P AXIS: 63 DEGREES
P AXIS: 68 DEGREES
P OFFSET: 194 MS
P OFFSET: 197 MS
P OFFSET: 201 MS
P ONSET: 140 MS
P ONSET: 149 MS
P ONSET: 150 MS
PR INTERVAL: 140 MS
PR INTERVAL: 148 MS
PR INTERVAL: 152 MS
Q ONSET: 216 MS
Q ONSET: 220 MS
Q ONSET: 223 MS
QRS COUNT: 14 BEATS
QRS COUNT: 14 BEATS
QRS COUNT: 15 BEATS
QRS DURATION: 92 MS
QRS DURATION: 92 MS
QRS DURATION: 94 MS
QT INTERVAL: 324 MS
QT INTERVAL: 376 MS
QT INTERVAL: 384 MS
QTC CALCULATION(BAZETT): 409 MS
QTC CALCULATION(BAZETT): 444 MS
QTC CALCULATION(BAZETT): 456 MS
QTC FREDERICIA: 379 MS
QTC FREDERICIA: 420 MS
QTC FREDERICIA: 431 MS
R AXIS: 68 DEGREES
R AXIS: 73 DEGREES
R AXIS: 75 DEGREES
T AXIS: 67 DEGREES
T AXIS: 79 DEGREES
T AXIS: 81 DEGREES
T OFFSET: 385 MS
T OFFSET: 408 MS
T OFFSET: 408 MS
VENTRICULAR RATE: 84 BPM
VENTRICULAR RATE: 85 BPM
VENTRICULAR RATE: 96 BPM

## 2025-02-11 RX ORDER — CLOPIDOGREL BISULFATE 75 MG/1
75 TABLET ORAL DAILY
Qty: 90 TABLET | Refills: 0 | Status: SHIPPED | OUTPATIENT
Start: 2025-02-11

## 2025-02-19 ENCOUNTER — APPOINTMENT (OUTPATIENT)
Dept: PRIMARY CARE | Facility: CLINIC | Age: 85
End: 2025-02-19
Payer: MEDICARE

## 2025-02-19 VITALS
WEIGHT: 129.6 LBS | HEART RATE: 111 BPM | SYSTOLIC BLOOD PRESSURE: 102 MMHG | OXYGEN SATURATION: 99 % | BODY MASS INDEX: 17.58 KG/M2 | TEMPERATURE: 95.7 F | DIASTOLIC BLOOD PRESSURE: 58 MMHG

## 2025-02-19 DIAGNOSIS — J45.909 ASTHMA, UNSPECIFIED ASTHMA SEVERITY, UNSPECIFIED WHETHER COMPLICATED, UNSPECIFIED WHETHER PERSISTENT (HHS-HCC): ICD-10-CM

## 2025-02-19 DIAGNOSIS — J10.1 INFLUENZA A: Primary | ICD-10-CM

## 2025-02-19 PROCEDURE — 1123F ACP DISCUSS/DSCN MKR DOCD: CPT

## 2025-02-19 PROCEDURE — 99495 TRANSJ CARE MGMT MOD F2F 14D: CPT

## 2025-02-19 PROCEDURE — 3078F DIAST BP <80 MM HG: CPT

## 2025-02-19 PROCEDURE — 3074F SYST BP LT 130 MM HG: CPT

## 2025-02-19 PROCEDURE — 1111F DSCHRG MED/CURRENT MED MERGE: CPT

## 2025-02-19 PROCEDURE — 1160F RVW MEDS BY RX/DR IN RCRD: CPT

## 2025-02-19 PROCEDURE — 1157F ADVNC CARE PLAN IN RCRD: CPT

## 2025-02-19 PROCEDURE — 1159F MED LIST DOCD IN RCRD: CPT

## 2025-02-19 RX ORDER — ALBUTEROL SULFATE 0.83 MG/ML
2.5 SOLUTION RESPIRATORY (INHALATION) EVERY 2 HOUR PRN
Qty: 150 ML | Refills: 1 | Status: SHIPPED | OUTPATIENT
Start: 2025-02-19 | End: 2025-03-21

## 2025-02-19 ASSESSMENT — ENCOUNTER SYMPTOMS
COUGH: 1
SHORTNESS OF BREATH: 1
UNEXPECTED WEIGHT CHANGE: 1
FATIGUE: 1
WHEEZING: 1

## 2025-02-19 NOTE — PROGRESS NOTES
"Patient: Sheldon Arteaga \"Lul"  : 1940  PCP: Alvaro Cedeño, APRN-CNP  MRN: [id number]  Program: No linked episodes     Sheldon Arteaga \"Lul" is a 84 y.o. male presenting today for follow-up after being discharged from the hospital 14 days ago. The main problem requiring admission was Flu A. The discharge summary and/or Transitional Care Management documentation was reviewed. Medication reconciliation was performed as indicated via the \"Jose as Reviewed\" timestamp.     Seen by pulm  Having PFT done, possible o2 need    Sleep study pending, o2 6 minute walk testing.    Chief Complaint   Patient presents with    Hospital Follow-up     Was admitted into [facility] - for Flu A    He reports that he is feeling much better.         Sheldon Arteaga \"Lul" was contacted by Transitional Care Management services two days after his discharge. This encounter and supporting documentation was reviewed.    The complexity of medical decision making for this patient's transitional care is moderate.    Review of Systems   Constitutional:  Positive for fatigue and unexpected weight change.   Respiratory:  Positive for cough, shortness of breath and wheezing.        Physical Exam  Vitals and nursing note reviewed.   Pulmonary:      Effort: Pulmonary effort is normal. No respiratory distress.      Breath sounds: No stridor. Wheezing present. No rhonchi or rales.          Family History   Problem Relation Name Age of Onset    Heart attack Mother      Colon cancer Father      Heart attack Brother      Heart block Brother         No data recorded    Assessment/Plan   Problem List Items Addressed This Visit    None      No follow-ups on file.       "

## 2025-02-19 NOTE — PATIENT INSTRUCTIONS
Follow up with pulm for breathing tests PFT, walk test    See me in 3 months follow up breathing, BP, JOSSELYN  Thank you for coming in today, if any questions or concerns arise, please call my office.   ARMEN Oliveira-CNP

## 2025-02-21 ENCOUNTER — PATIENT OUTREACH (OUTPATIENT)
Dept: PRIMARY CARE | Facility: CLINIC | Age: 85
End: 2025-02-21
Payer: MEDICARE

## 2025-02-21 NOTE — PROGRESS NOTES
Call regarding appt. with PCP on 2/19/25 after hospitalization.  At time of outreach call the patient feels as if their condition has improved since last visit.  No questions or concerns at this time.

## 2025-02-25 ENCOUNTER — HOSPITAL ENCOUNTER (OUTPATIENT)
Facility: HOSPITAL | Age: 85
Setting detail: OBSERVATION
Discharge: HOME | End: 2025-02-27
Attending: STUDENT IN AN ORGANIZED HEALTH CARE EDUCATION/TRAINING PROGRAM | Admitting: INTERNAL MEDICINE
Payer: MEDICARE

## 2025-02-25 ENCOUNTER — APPOINTMENT (OUTPATIENT)
Dept: RADIOLOGY | Facility: HOSPITAL | Age: 85
End: 2025-02-25
Payer: MEDICARE

## 2025-02-25 ENCOUNTER — APPOINTMENT (OUTPATIENT)
Dept: CARDIOLOGY | Facility: HOSPITAL | Age: 85
End: 2025-02-25
Payer: MEDICARE

## 2025-02-25 DIAGNOSIS — J44.9 CHRONIC OBSTRUCTIVE PULMONARY DISEASE, UNSPECIFIED COPD TYPE (MULTI): ICD-10-CM

## 2025-02-25 DIAGNOSIS — R06.02 SHORTNESS OF BREATH: Primary | ICD-10-CM

## 2025-02-25 DIAGNOSIS — R06.09 DYSPNEA ON EXERTION: ICD-10-CM

## 2025-02-25 DIAGNOSIS — R06.00 DYSPNEA, UNSPECIFIED: ICD-10-CM

## 2025-02-25 LAB
ALBUMIN SERPL BCP-MCNC: 3.6 G/DL (ref 3.4–5)
ALP SERPL-CCNC: 51 U/L (ref 33–136)
ALT SERPL W P-5'-P-CCNC: 8 U/L (ref 10–52)
ANION GAP SERPL CALC-SCNC: 11 MMOL/L (ref 10–20)
AST SERPL W P-5'-P-CCNC: 16 U/L (ref 9–39)
BASOPHILS # BLD AUTO: 0.05 X10*3/UL (ref 0–0.1)
BASOPHILS NFR BLD AUTO: 1 %
BILIRUB SERPL-MCNC: 0.4 MG/DL (ref 0–1.2)
BNP SERPL-MCNC: 18 PG/ML (ref 0–99)
BUN SERPL-MCNC: 11 MG/DL (ref 6–23)
CALCIUM SERPL-MCNC: 8.4 MG/DL (ref 8.6–10.3)
CARDIAC TROPONIN I PNL SERPL HS: 6 NG/L (ref 0–20)
CHLORIDE SERPL-SCNC: 101 MMOL/L (ref 98–107)
CO2 SERPL-SCNC: 28 MMOL/L (ref 21–32)
CREAT SERPL-MCNC: 0.7 MG/DL (ref 0.5–1.3)
EGFRCR SERPLBLD CKD-EPI 2021: >90 ML/MIN/1.73M*2
EOSINOPHIL # BLD AUTO: 0.26 X10*3/UL (ref 0–0.4)
EOSINOPHIL NFR BLD AUTO: 5.3 %
ERYTHROCYTE [DISTWIDTH] IN BLOOD BY AUTOMATED COUNT: 16 % (ref 11.5–14.5)
FLUAV RNA RESP QL NAA+PROBE: NOT DETECTED
FLUBV RNA RESP QL NAA+PROBE: NOT DETECTED
GLUCOSE SERPL-MCNC: 109 MG/DL (ref 74–99)
HCT VFR BLD AUTO: 35.3 % (ref 41–52)
HGB BLD-MCNC: 11.2 G/DL (ref 13.5–17.5)
IMM GRANULOCYTES # BLD AUTO: 0.02 X10*3/UL (ref 0–0.5)
IMM GRANULOCYTES NFR BLD AUTO: 0.4 % (ref 0–0.9)
LACTATE BLDV-SCNC: 1.1 MMOL/L (ref 0.4–2)
LYMPHOCYTES # BLD AUTO: 1.4 X10*3/UL (ref 0.8–3)
LYMPHOCYTES NFR BLD AUTO: 28.6 %
MCH RBC QN AUTO: 31 PG (ref 26–34)
MCHC RBC AUTO-ENTMCNC: 31.7 G/DL (ref 32–36)
MCV RBC AUTO: 98 FL (ref 80–100)
MONOCYTES # BLD AUTO: 0.42 X10*3/UL (ref 0.05–0.8)
MONOCYTES NFR BLD AUTO: 8.6 %
NEUTROPHILS # BLD AUTO: 2.74 X10*3/UL (ref 1.6–5.5)
NEUTROPHILS NFR BLD AUTO: 56.1 %
NRBC BLD-RTO: 0 /100 WBCS (ref 0–0)
PLATELET # BLD AUTO: 204 X10*3/UL (ref 150–450)
POTASSIUM SERPL-SCNC: 4.4 MMOL/L (ref 3.5–5.3)
PROT SERPL-MCNC: 6.2 G/DL (ref 6.4–8.2)
RBC # BLD AUTO: 3.61 X10*6/UL (ref 4.5–5.9)
SARS-COV-2 RNA RESP QL NAA+PROBE: NOT DETECTED
SODIUM SERPL-SCNC: 136 MMOL/L (ref 136–145)
WBC # BLD AUTO: 4.9 X10*3/UL (ref 4.4–11.3)

## 2025-02-25 PROCEDURE — 87636 SARSCOV2 & INF A&B AMP PRB: CPT | Performed by: STUDENT IN AN ORGANIZED HEALTH CARE EDUCATION/TRAINING PROGRAM

## 2025-02-25 PROCEDURE — 85025 COMPLETE CBC W/AUTO DIFF WBC: CPT | Performed by: STUDENT IN AN ORGANIZED HEALTH CARE EDUCATION/TRAINING PROGRAM

## 2025-02-25 PROCEDURE — 2500000002 HC RX 250 W HCPCS SELF ADMINISTERED DRUGS (ALT 637 FOR MEDICARE OP, ALT 636 FOR OP/ED)

## 2025-02-25 PROCEDURE — 2500000001 HC RX 250 WO HCPCS SELF ADMINISTERED DRUGS (ALT 637 FOR MEDICARE OP)

## 2025-02-25 PROCEDURE — 2500000001 HC RX 250 WO HCPCS SELF ADMINISTERED DRUGS (ALT 637 FOR MEDICARE OP): Performed by: STUDENT IN AN ORGANIZED HEALTH CARE EDUCATION/TRAINING PROGRAM

## 2025-02-25 PROCEDURE — 2500000004 HC RX 250 GENERAL PHARMACY W/ HCPCS (ALT 636 FOR OP/ED): Performed by: STUDENT IN AN ORGANIZED HEALTH CARE EDUCATION/TRAINING PROGRAM

## 2025-02-25 PROCEDURE — 94664 DEMO&/EVAL PT USE INHALER: CPT | Mod: 59

## 2025-02-25 PROCEDURE — 84484 ASSAY OF TROPONIN QUANT: CPT | Performed by: STUDENT IN AN ORGANIZED HEALTH CARE EDUCATION/TRAINING PROGRAM

## 2025-02-25 PROCEDURE — 84075 ASSAY ALKALINE PHOSPHATASE: CPT | Performed by: STUDENT IN AN ORGANIZED HEALTH CARE EDUCATION/TRAINING PROGRAM

## 2025-02-25 PROCEDURE — 94640 AIRWAY INHALATION TREATMENT: CPT | Mod: 59,MUE

## 2025-02-25 PROCEDURE — 94640 AIRWAY INHALATION TREATMENT: CPT | Mod: MUE

## 2025-02-25 PROCEDURE — G0378 HOSPITAL OBSERVATION PER HR: HCPCS

## 2025-02-25 PROCEDURE — 71260 CT THORAX DX C+: CPT | Performed by: RADIOLOGY

## 2025-02-25 PROCEDURE — 99222 1ST HOSP IP/OBS MODERATE 55: CPT

## 2025-02-25 PROCEDURE — 36415 COLL VENOUS BLD VENIPUNCTURE: CPT | Performed by: STUDENT IN AN ORGANIZED HEALTH CARE EDUCATION/TRAINING PROGRAM

## 2025-02-25 PROCEDURE — 70450 CT HEAD/BRAIN W/O DYE: CPT | Performed by: RADIOLOGY

## 2025-02-25 PROCEDURE — 71046 X-RAY EXAM CHEST 2 VIEWS: CPT | Performed by: RADIOLOGY

## 2025-02-25 PROCEDURE — 83880 ASSAY OF NATRIURETIC PEPTIDE: CPT | Performed by: STUDENT IN AN ORGANIZED HEALTH CARE EDUCATION/TRAINING PROGRAM

## 2025-02-25 PROCEDURE — 74177 CT ABD & PELVIS W/CONTRAST: CPT

## 2025-02-25 PROCEDURE — 70450 CT HEAD/BRAIN W/O DYE: CPT

## 2025-02-25 PROCEDURE — 99285 EMERGENCY DEPT VISIT HI MDM: CPT | Mod: 25 | Performed by: STUDENT IN AN ORGANIZED HEALTH CARE EDUCATION/TRAINING PROGRAM

## 2025-02-25 PROCEDURE — 71046 X-RAY EXAM CHEST 2 VIEWS: CPT

## 2025-02-25 PROCEDURE — 2550000001 HC RX 255 CONTRASTS: Performed by: STUDENT IN AN ORGANIZED HEALTH CARE EDUCATION/TRAINING PROGRAM

## 2025-02-25 PROCEDURE — 83605 ASSAY OF LACTIC ACID: CPT | Performed by: STUDENT IN AN ORGANIZED HEALTH CARE EDUCATION/TRAINING PROGRAM

## 2025-02-25 PROCEDURE — 74177 CT ABD & PELVIS W/CONTRAST: CPT | Performed by: RADIOLOGY

## 2025-02-25 PROCEDURE — S4991 NICOTINE PATCH NONLEGEND: HCPCS

## 2025-02-25 PROCEDURE — 94760 N-INVAS EAR/PLS OXIMETRY 1: CPT

## 2025-02-25 PROCEDURE — 93005 ELECTROCARDIOGRAM TRACING: CPT

## 2025-02-25 RX ORDER — PREDNISONE 20 MG/1
40 TABLET ORAL DAILY
Status: DISCONTINUED | OUTPATIENT
Start: 2025-02-26 | End: 2025-02-27 | Stop reason: HOSPADM

## 2025-02-25 RX ORDER — TAMSULOSIN HYDROCHLORIDE 0.4 MG/1
0.8 CAPSULE ORAL DAILY
Status: DISCONTINUED | OUTPATIENT
Start: 2025-02-26 | End: 2025-02-27 | Stop reason: HOSPADM

## 2025-02-25 RX ORDER — AZITHROMYCIN 500 MG/1
500 TABLET, FILM COATED ORAL
Status: DISCONTINUED | OUTPATIENT
Start: 2025-02-25 | End: 2025-02-27 | Stop reason: HOSPADM

## 2025-02-25 RX ORDER — METOPROLOL TARTRATE 25 MG/1
25 TABLET, FILM COATED ORAL 2 TIMES DAILY
Status: DISCONTINUED | OUTPATIENT
Start: 2025-02-25 | End: 2025-02-27 | Stop reason: HOSPADM

## 2025-02-25 RX ORDER — FORMOTEROL FUMARATE 20 UG/2ML
20 SOLUTION RESPIRATORY (INHALATION)
Status: DISCONTINUED | OUTPATIENT
Start: 2025-02-25 | End: 2025-02-27 | Stop reason: HOSPADM

## 2025-02-25 RX ORDER — ALBUTEROL SULFATE 0.83 MG/ML
2.5 SOLUTION RESPIRATORY (INHALATION) EVERY 2 HOUR PRN
Status: DISCONTINUED | OUTPATIENT
Start: 2025-02-25 | End: 2025-02-27 | Stop reason: HOSPADM

## 2025-02-25 RX ORDER — DOCUSATE SODIUM 100 MG/1
100 CAPSULE, LIQUID FILLED ORAL DAILY
Status: DISCONTINUED | OUTPATIENT
Start: 2025-02-25 | End: 2025-02-27 | Stop reason: HOSPADM

## 2025-02-25 RX ORDER — ATORVASTATIN CALCIUM 40 MG/1
40 TABLET, FILM COATED ORAL NIGHTLY
Status: DISCONTINUED | OUTPATIENT
Start: 2025-02-25 | End: 2025-02-27 | Stop reason: HOSPADM

## 2025-02-25 RX ORDER — FORMOTEROL FUMARATE 20 UG/2ML
20 SOLUTION RESPIRATORY (INHALATION)
Status: DISCONTINUED | OUTPATIENT
Start: 2025-02-25 | End: 2025-02-25 | Stop reason: SDUPTHER

## 2025-02-25 RX ORDER — BENZONATATE 100 MG/1
200 CAPSULE ORAL 3 TIMES DAILY
Status: DISCONTINUED | OUTPATIENT
Start: 2025-02-25 | End: 2025-02-27 | Stop reason: HOSPADM

## 2025-02-25 RX ORDER — IBUPROFEN 200 MG
1 TABLET ORAL DAILY
Status: DISCONTINUED | OUTPATIENT
Start: 2025-02-25 | End: 2025-02-27 | Stop reason: HOSPADM

## 2025-02-25 RX ORDER — BUDESONIDE 0.25 MG/2ML
0.25 INHALANT ORAL
Status: DISCONTINUED | OUTPATIENT
Start: 2025-02-25 | End: 2025-02-27 | Stop reason: HOSPADM

## 2025-02-25 RX ORDER — PREDNISONE 20 MG/1
40 TABLET ORAL ONCE
Status: COMPLETED | OUTPATIENT
Start: 2025-02-25 | End: 2025-02-25

## 2025-02-25 RX ORDER — ALBUTEROL SULFATE 90 UG/1
2 INHALANT RESPIRATORY (INHALATION) ONCE
Status: COMPLETED | OUTPATIENT
Start: 2025-02-25 | End: 2025-02-25

## 2025-02-25 RX ORDER — FLUTICASONE FUROATE AND VILANTEROL 100; 25 UG/1; UG/1
1 POWDER RESPIRATORY (INHALATION)
Status: DISCONTINUED | OUTPATIENT
Start: 2025-02-26 | End: 2025-02-25

## 2025-02-25 RX ORDER — CLOPIDOGREL BISULFATE 75 MG/1
75 TABLET ORAL DAILY
Status: DISCONTINUED | OUTPATIENT
Start: 2025-02-26 | End: 2025-02-27 | Stop reason: HOSPADM

## 2025-02-25 RX ADMIN — APIXABAN 2.5 MG: 2.5 TABLET, FILM COATED ORAL at 21:02

## 2025-02-25 RX ADMIN — ALBUTEROL SULFATE 2 PUFF: 90 AEROSOL, METERED RESPIRATORY (INHALATION) at 18:58

## 2025-02-25 RX ADMIN — BUDESONIDE 0.25 MG: 0.25 INHALANT RESPIRATORY (INHALATION) at 22:02

## 2025-02-25 RX ADMIN — FORMOTEROL FUMARATE DIHYDRATE 20 MCG: 20 SOLUTION RESPIRATORY (INHALATION) at 22:02

## 2025-02-25 RX ADMIN — Medication 1 PATCH: at 21:03

## 2025-02-25 RX ADMIN — IOHEXOL 75 ML: 350 INJECTION, SOLUTION INTRAVENOUS at 15:39

## 2025-02-25 RX ADMIN — ATORVASTATIN CALCIUM 40 MG: 40 TABLET, FILM COATED ORAL at 21:02

## 2025-02-25 RX ADMIN — AZITHROMYCIN 500 MG: 500 TABLET, FILM COATED ORAL at 21:02

## 2025-02-25 RX ADMIN — PREDNISONE 40 MG: 20 TABLET ORAL at 18:58

## 2025-02-25 RX ADMIN — BENZONATATE 200 MG: 100 CAPSULE ORAL at 21:02

## 2025-02-25 SDOH — SOCIAL STABILITY: SOCIAL INSECURITY: ARE THERE ANY APPARENT SIGNS OF INJURIES/BEHAVIORS THAT COULD BE RELATED TO ABUSE/NEGLECT?: NO

## 2025-02-25 SDOH — SOCIAL STABILITY: SOCIAL INSECURITY: WITHIN THE LAST YEAR, HAVE YOU BEEN HUMILIATED OR EMOTIONALLY ABUSED IN OTHER WAYS BY YOUR PARTNER OR EX-PARTNER?: NO

## 2025-02-25 SDOH — SOCIAL STABILITY: SOCIAL INSECURITY: WITHIN THE LAST YEAR, HAVE YOU BEEN AFRAID OF YOUR PARTNER OR EX-PARTNER?: NO

## 2025-02-25 SDOH — ECONOMIC STABILITY: INCOME INSECURITY: IN THE PAST 12 MONTHS HAS THE ELECTRIC, GAS, OIL, OR WATER COMPANY THREATENED TO SHUT OFF SERVICES IN YOUR HOME?: NO

## 2025-02-25 SDOH — ECONOMIC STABILITY: FOOD INSECURITY: WITHIN THE PAST 12 MONTHS, YOU WORRIED THAT YOUR FOOD WOULD RUN OUT BEFORE YOU GOT THE MONEY TO BUY MORE.: NEVER TRUE

## 2025-02-25 SDOH — SOCIAL STABILITY: SOCIAL INSECURITY: ABUSE: ADULT

## 2025-02-25 SDOH — HEALTH STABILITY: MENTAL HEALTH: HOW OFTEN DO YOU HAVE SIX OR MORE DRINKS ON ONE OCCASION?: NEVER

## 2025-02-25 SDOH — ECONOMIC STABILITY: FOOD INSECURITY: WITHIN THE PAST 12 MONTHS, THE FOOD YOU BOUGHT JUST DIDN'T LAST AND YOU DIDN'T HAVE MONEY TO GET MORE.: NEVER TRUE

## 2025-02-25 SDOH — HEALTH STABILITY: MENTAL HEALTH: HOW OFTEN DO YOU HAVE A DRINK CONTAINING ALCOHOL?: NEVER

## 2025-02-25 SDOH — HEALTH STABILITY: MENTAL HEALTH: HOW MANY DRINKS CONTAINING ALCOHOL DO YOU HAVE ON A TYPICAL DAY WHEN YOU ARE DRINKING?: PATIENT DOES NOT DRINK

## 2025-02-25 SDOH — ECONOMIC STABILITY: HOUSING INSECURITY: AT ANY TIME IN THE PAST 12 MONTHS, WERE YOU HOMELESS OR LIVING IN A SHELTER (INCLUDING NOW)?: NO

## 2025-02-25 SDOH — SOCIAL STABILITY: SOCIAL INSECURITY: HAVE YOU HAD ANY THOUGHTS OF HARMING ANYONE ELSE?: NO

## 2025-02-25 SDOH — SOCIAL STABILITY: SOCIAL INSECURITY: DO YOU FEEL ANYONE HAS EXPLOITED OR TAKEN ADVANTAGE OF YOU FINANCIALLY OR OF YOUR PERSONAL PROPERTY?: NO

## 2025-02-25 SDOH — SOCIAL STABILITY: SOCIAL INSECURITY: HAS ANYONE EVER THREATENED TO HURT YOUR FAMILY OR YOUR PETS?: NO

## 2025-02-25 SDOH — ECONOMIC STABILITY: HOUSING INSECURITY: IN THE LAST 12 MONTHS, WAS THERE A TIME WHEN YOU WERE NOT ABLE TO PAY THE MORTGAGE OR RENT ON TIME?: NO

## 2025-02-25 SDOH — SOCIAL STABILITY: SOCIAL INSECURITY: DO YOU FEEL UNSAFE GOING BACK TO THE PLACE WHERE YOU ARE LIVING?: NO

## 2025-02-25 SDOH — ECONOMIC STABILITY: HOUSING INSECURITY: IN THE PAST 12 MONTHS, HOW MANY TIMES HAVE YOU MOVED WHERE YOU WERE LIVING?: 0

## 2025-02-25 SDOH — SOCIAL STABILITY: SOCIAL INSECURITY: HAVE YOU HAD THOUGHTS OF HARMING ANYONE ELSE?: NO

## 2025-02-25 SDOH — ECONOMIC STABILITY: FOOD INSECURITY: HOW HARD IS IT FOR YOU TO PAY FOR THE VERY BASICS LIKE FOOD, HOUSING, MEDICAL CARE, AND HEATING?: NOT HARD AT ALL

## 2025-02-25 SDOH — SOCIAL STABILITY: SOCIAL INSECURITY: WERE YOU ABLE TO COMPLETE ALL THE BEHAVIORAL HEALTH SCREENINGS?: YES

## 2025-02-25 SDOH — ECONOMIC STABILITY: TRANSPORTATION INSECURITY: IN THE PAST 12 MONTHS, HAS LACK OF TRANSPORTATION KEPT YOU FROM MEDICAL APPOINTMENTS OR FROM GETTING MEDICATIONS?: NO

## 2025-02-25 SDOH — SOCIAL STABILITY: SOCIAL INSECURITY: DOES ANYONE TRY TO KEEP YOU FROM HAVING/CONTACTING OTHER FRIENDS OR DOING THINGS OUTSIDE YOUR HOME?: NO

## 2025-02-25 SDOH — SOCIAL STABILITY: SOCIAL INSECURITY: ARE YOU OR HAVE YOU BEEN THREATENED OR ABUSED PHYSICALLY, EMOTIONALLY, OR SEXUALLY BY ANYONE?: NO

## 2025-02-25 ASSESSMENT — COLUMBIA-SUICIDE SEVERITY RATING SCALE - C-SSRS
2. HAVE YOU ACTUALLY HAD ANY THOUGHTS OF KILLING YOURSELF?: NO
1. IN THE PAST MONTH, HAVE YOU WISHED YOU WERE DEAD OR WISHED YOU COULD GO TO SLEEP AND NOT WAKE UP?: NO
6. HAVE YOU EVER DONE ANYTHING, STARTED TO DO ANYTHING, OR PREPARED TO DO ANYTHING TO END YOUR LIFE?: NO

## 2025-02-25 ASSESSMENT — COGNITIVE AND FUNCTIONAL STATUS - GENERAL
MOVING TO AND FROM BED TO CHAIR: A LITTLE
WALKING IN HOSPITAL ROOM: A LITTLE
STANDING UP FROM CHAIR USING ARMS: A LITTLE
MOBILITY SCORE: 19
DRESSING REGULAR LOWER BODY CLOTHING: A LITTLE
CLIMB 3 TO 5 STEPS WITH RAILING: A LOT
DAILY ACTIVITIY SCORE: 23
PATIENT BASELINE BEDBOUND: NO

## 2025-02-25 ASSESSMENT — ACTIVITIES OF DAILY LIVING (ADL)
PATIENT'S MEMORY ADEQUATE TO SAFELY COMPLETE DAILY ACTIVITIES?: YES
ASSISTIVE_DEVICE: DENTURES LOWER;DENTURES UPPER
WALKS IN HOME: NEEDS ASSISTANCE
HEARING - RIGHT EAR: FUNCTIONAL
FEEDING YOURSELF: INDEPENDENT
LACK_OF_TRANSPORTATION: NO
DRESSING YOURSELF: INDEPENDENT
TOILETING: INDEPENDENT
LACK_OF_TRANSPORTATION: NO
GROOMING: INDEPENDENT
ADEQUATE_TO_COMPLETE_ADL: YES
JUDGMENT_ADEQUATE_SAFELY_COMPLETE_DAILY_ACTIVITIES: YES
HEARING - LEFT EAR: FUNCTIONAL
BATHING: INDEPENDENT

## 2025-02-25 ASSESSMENT — LIFESTYLE VARIABLES
AUDIT-C TOTAL SCORE: 0
EVER FELT BAD OR GUILTY ABOUT YOUR DRINKING: NO
SKIP TO QUESTIONS 9-10: 1
AUDIT-C TOTAL SCORE: 0
HOW MANY STANDARD DRINKS CONTAINING ALCOHOL DO YOU HAVE ON A TYPICAL DAY: PATIENT DOES NOT DRINK
AUDIT-C TOTAL SCORE: 0
TOTAL SCORE: 0
EVER HAD A DRINK FIRST THING IN THE MORNING TO STEADY YOUR NERVES TO GET RID OF A HANGOVER: NO
HOW OFTEN DO YOU HAVE 6 OR MORE DRINKS ON ONE OCCASION: NEVER
SKIP TO QUESTIONS 9-10: 1
HAVE YOU EVER FELT YOU SHOULD CUT DOWN ON YOUR DRINKING: NO
HOW OFTEN DO YOU HAVE A DRINK CONTAINING ALCOHOL: NEVER
HAVE PEOPLE ANNOYED YOU BY CRITICIZING YOUR DRINKING: NO

## 2025-02-25 ASSESSMENT — PATIENT HEALTH QUESTIONNAIRE - PHQ9
2. FEELING DOWN, DEPRESSED OR HOPELESS: NOT AT ALL
1. LITTLE INTEREST OR PLEASURE IN DOING THINGS: NOT AT ALL
SUM OF ALL RESPONSES TO PHQ9 QUESTIONS 1 & 2: 0

## 2025-02-25 ASSESSMENT — PAIN SCALES - GENERAL
PAINLEVEL_OUTOF10: 5 - MODERATE PAIN
PAINLEVEL_OUTOF10: 5 - MODERATE PAIN

## 2025-02-25 ASSESSMENT — PAIN - FUNCTIONAL ASSESSMENT: PAIN_FUNCTIONAL_ASSESSMENT: 0-10

## 2025-02-25 ASSESSMENT — PAIN DESCRIPTION - LOCATION: LOCATION: CHEST

## 2025-02-25 ASSESSMENT — PAIN DESCRIPTION - PAIN TYPE: TYPE: ACUTE PAIN

## 2025-02-25 NOTE — ED PROVIDER NOTES
CC: Shortness of Breath and Cough     HPI:  Patient is an 84-year-old male with a history of PE on Eliquis as well as prior strokes on Plavix and COPD not on oxygen presenting the emergency department with concern for pneumonia.  He was recently admitted with influenza.  He states he was doing well for about a week up until about 2 to 3 days ago when he started having shortness of breath.  Worse with ambulation.  Coughing up green stuff.  No fever.  Also endorsing headache body aches and feeling tired all the time.  Overall    Records Reviewed:  Recent available ED and inpatient notes reviewed in EMR.    PMHx/PSHx:  Per HPI.   - has a past medical history of (HFpEF) heart failure with preserved ejection fraction, A-fib (Multi), COPD (chronic obstructive pulmonary disease) (Multi), Hypertension, and Stroke (Multi).  - has a past surgical history that includes Total hip arthroplasty (08/31/2018); Other surgical history (09/17/2019); Other surgical history (09/17/2019); Other surgical history (01/17/2020); Other surgical history (01/17/2020); CT angio aorta and bilateral iliofemoral runoff including without contrast if performed (04/10/2022); CT angio aorta and bilateral iliofemoral runoff including without contrast if performed (04/13/2022); CT angio head w and wo IV contrast (04/19/2022); CT angio neck (04/19/2022); CT angio aorta and bilateral iliofemoral runoff including without contrast if performed (05/22/2022); CT angio aorta and bilateral iliofemoral runoff including without contrast if performed (04/02/2023); and Femoral bypass.  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); Benign essential hypertension; Calcium oxalate crystals in urine; Chronic serous otitis media; Acute exacerbation of chronic obstructive pulmonary disease (COPD) (Multi); Cough, persistent; Excessive  cerumen in right ear canal; Fatigue; Hematuria; Hyperglycemia; Hyperlipidemia; Intracranial hemorrhage, cerebellar (Multi); Itching; Microscopic hematuria; NPH (normal pressure hydrocephalus) (Multi); Open wound of groin with complication; PAD (peripheral artery disease) (CMS-HCC); Pain of both hip joints; Paroxysmal atrial fibrillation (Multi); Pertussis; Pulmonary hypertension (Multi); Rhinitis; S/P bypass graft of extremity; Suspected COVID-19 virus infection; Vertigo; Vitamin B12 deficiency; Wound infection; Community acquired pneumonia; Pneumonia due to other specified bacteria (Multi); Community acquired bacterial pneumonia; Community acquired bilateral lower lobe pneumonia; Pneumonia of left lower lobe due to infectious organism; Acute right flank pain; Critical ischemia of lower extremity (Multi); URI (upper respiratory infection); Generalized weakness; Hyponatremia; Acute respiratory failure with hypoxia (Multi); Hypoxia; Urinary retention; Non-pressure chronic ulcer of right ankle limited to breakdown of skin; CAP (community acquired pneumonia) due to Chlamydia species; Benign prostatic hyperplasia without lower urinary tract symptoms; Influenza A; and Acute hypoxic respiratory failure (Multi) on their problem list.    Medications:  Reviewed in EMR. See EMR for complete list of medications and doses.    Allergies:  Doxycycline and Levaquin [levofloxacin]    Social History:  - Tobacco:  reports that he quit smoking about 4 weeks ago. His smoking use included cigarettes. He started smoking about 71 years ago. He has a 71.1 pack-year smoking history. He has never used smokeless tobacco.   - Alcohol:  reports no history of alcohol use.   - Illicit Drugs:  reports no history of drug use.     ROS:  Per HPI.       ???????????????????????????????????????????????????????????????  Triage Vitals:  T 36 °C (96.8 °F)  HR 68  /82  RR (!) 22  O2 94 % None (Room air)    Physical  Exam  ???????????????????????????????????????????????????????????????  GEN: overall well appearing, no acute distress  HEAD: atraumatic  EYES: PERRL, EOMI, no scleral icterus  ENT: mmm, no rhinorrhea, uvula midline  NECK: no JVD  CVS/CHEST: reg rate, nl rhythm  PULM: Rhonchi worse in left lobe with end expiratory wheeze throughout  GI: soft, NT/ND, no rebound or guarding   EXT: no LE edema, 2+ periph pulses in bilat radial and DP   NEURO: Awake and alert, Strength and sensation is equal in b/l upper and lower extremities, normal ambulation  SKIN: warm, dry  PSYCH: AAOx3 answers questions appropriately    EKG:  As interpreted by me EKG read by me reviewed by me is normal sinus rhythm at 77 bpm.  Normal axis.  Normal intervals.  No ST segment elevation or depression.     Assessment and Plan:  84-year-old presents to the emergency department feeling like he has pneumonia.  He has dyspnea with exertion.  He endorses a productive cough.  He did quit smoking 3 weeks ago.  He is on anticoagulation therefore low suspicion for pulmonary embolism.  He is overall well-appearing in no acute distress.  Workup reassuring but does show emphysema.  He is in the hallway therefore treatment is limited by the fact that I am unable to give him a nebulizer.  Did order albuterol inhaler and prednisone.  Went to attempt ambulation with the patient and he became acutely short of breath and tachycardic.  Admitted to medicine for dyspnea on exertion.    ED Course:  ED Course as of 02/25/25 2252 Tue Feb 25, 2025   1839 Performed walking pulse ox became tachy and sob will admit [HD]      ED Course User Index  [HD] Amy Armstrong DO         Diagnoses as of 02/25/25 2252   Shortness of breath       Disposition:  admitted    Amy Armstrong DO      Procedures ? SmartLinks last updated 2/25/2025 2:41 PM        Amy Armstrong DO  02/25/25 2254

## 2025-02-25 NOTE — HOSPITAL COURSE
Sheldon Arteaga is a 84 y.o. male with PMHx of Afib on eliquis, HTN, diastolic HF, COPD, CVA, PAD s/p procedure, CVA, HLD, and tobacco use disorder who presented to [facility] ED for dyspnea.    Patient was afebrile and hemodynamically stable in the ED. Troponin, BNP, lactate negative. CXR and CT chest/abdomen negative for acute cardiopulmonary process. HR increased to 130s on ambulation but patient not hypoxic while on ED. Admitted in stable condition for COPD exacerbation.    While on the floors, he was started on azithromycin for 3 days and prednisone.  Respiratory status improved.  Patient underwent TTE which indicated ejection fraction of 60 to 65% mild mitral valve prolapse and elevated RVSP..  Physical therapy did not recommend SNF placement at this time. Patient was stable for discharge on 2/27/2025 to home with additional steroids and instructions to visit pulmonology and undergo outpatient PFT testing.

## 2025-02-25 NOTE — ED TRIAGE NOTES
Pt presented to the ED with c/o SOB and cough. Pt states he was recently admitted for pneumonia. Pt was dc and finished abx about 1 week ago. Pt states he feels like he has pneumonia again. Pt gets frequent pneumonia. Pt has a cough and started to have CP due to the coughing. Pt denies dizziness or N/V/D.

## 2025-02-25 NOTE — PROGRESS NOTES
"Pharmacy Medication History Review    Sheldon Arteaga \"Dejon\" is a 84 y.o. male admitted for Dyspnea. Pharmacy reviewed the patient's [id number] medications and allergies for accuracy.    The list below reflectives the updated PTA list. Please review each medication in order reconciliation for additional clarification and justification.  Prior to Admission Medications   Prescriptions Last Dose Informant Patient Reported? Taking?   albuterol (Ventolin HFA) 90 mcg/actuation inhaler Past Month Self, Family Member Yes Yes   Sig: Inhale 2 puffs every 4 hours if needed for wheezing or shortness of breath.   albuterol 2.5 mg /3 mL (0.083 %) nebulizer solution 2/25/2025 Family Member, Self Yes Yes   Sig: Take 3 mL (2.5 mg) by nebulization every 2 hours if needed for wheezing or shortness of breath.   apixaban (Eliquis) 5 mg tablet 2/25/2025 Family Member, Self Yes Yes   Sig: Take 0.5 tablets (2.5 mg) by mouth 2 times a day.   atorvastatin (Lipitor) 40 mg tablet 2/24/2025 Self, Family Member Yes Yes   Sig: Take 1 tablet (40 mg) by mouth once daily at bedtime.   benzonatate (Tessalon) 200 mg capsule Not Taking Family Member, Self Yes No   Sig: Take 1 capsule (200 mg) by mouth 3 times a day. Do not crush or chew.   Patient not taking: Reported on 2/25/2025   budesonide-formoteroL (Symbicort) 80-4.5 mcg/actuation inhaler 2/25/2025 Family Member, Self Yes Yes   Sig: Inhale 2 puffs 2 times a day. Rinse mouth with water after use to reduce aftertaste and incidence of candidiasis. Do not swallow.   clopidogrel (Plavix) 75 mg tablet 2/25/2025 Family Member, Self Yes Yes   Sig: Take 1 tablet (75 mg) by mouth once daily.   dextromethorphan-guaifenesin (Mucinex DM)  mg 12 hr tablet Not Taking Family Member, Self Yes No   Sig: Take 1 tablet by mouth every 12 hours if needed for cough. Do not crush, chew, or split.   Patient not taking: Reported on 2/25/2025   docusate sodium (Colace) 100 mg capsule Not Taking Self, " Family Member Yes No   Sig: Take 1 capsule (100 mg) by mouth once daily.   Patient not taking: Reported on 2/25/2025   ipratropium-albuteroL (Duo-Neb) 0.5-2.5 mg/3 mL nebulizer solution Not Taking Self, Family Member Yes No   Sig: Take 3 mL by nebulization every 6 hours.   Patient not taking: Reported on 1/23/2025   metoprolol tartrate (Lopressor) 25 mg tablet 2/25/2025 Family Member, Self Yes Yes   Sig: Take 1 tablet (25 mg) by mouth 2 times a day.   Patient taking differently: Take 1 tablet (25 mg) by mouth once daily.   tamsulosin (Flomax) 0.4 mg 24 hr capsule 2/25/2025 Family Member, Self Yes Yes   Sig: Take 2 capsules (0.8 mg) by mouth once daily.   umeclidinium-vilanteroL (Anoro Ellipta) 62.5-25 mcg/actuation blister with device Not Taking Family Member, Self Yes No   Sig: Inhale 1 puff once daily.      Facility-Administered Medications: None           The list below reflectives the updated allergy list. Please review each documented allergy for additional clarification and justification.  Allergies  Reviewed by Arlene Queen RN on 2/25/2025        Severity Reactions Comments    Doxycycline Not Specified GI Upset     Levaquin [levofloxacin] Low Itching, Rash             Below are additional concerns with the patient's PTA list.    Heather Khan

## 2025-02-25 NOTE — H&P
HPI   HPI: Sheldon Arteaga is a 84 y.o. male with PMHx of Afib on eliquis, HTN, diastolic HF, COPD, CVA, PAD s/p procedure, CVA, HLD, and tobacco use disorder who presented to [facility] ED for dyspnea.    Of note, patient was recently admitted (2/2-2/5) for acute hypoxic respiratory failure 2/2 influenza A and   was discharged with symbicort and remaining prednisone and oseltamivir course. Over the past few days, he has felt short of breath with associated headache and body aches. Endorses cough with green sputum. Daughter noticed that he was increasingly short of breath on ambulation today and he was brought to the ED. Denies fevers/chills, chest pain, nausea/vomiting/diarrhea. He was scheduled for appointment with pulmonology tomorrow. Patient quit smoking 3-4 weeks ago after smoking ~70 years. States he has occasionally had cravings during that time. In the ED, patient was not hypoxic but had increased work of breathing and tachycardia to 130s on ambulation.    Code status was discussed with family. He is DNR but they would be okay with ICU care and intubation if the cause of intubation is reversible.     ED course:     Vitals: Temp 96.8, /82, HR ,RR 22, Spo2 92% on RA    Labs:   -CBC - WBC 4.9, RBC 3.61, Hgb 11.2, Hct35.3, MCV 98, Platelet 204,   -CMP - Glucose 109, Na 136, K 4.4, Cl 101, HCO3 28, BUN 11, Cr 0.70, Ca 8.4, Alk Phos 51, ALT 8, AST 16  - Troponin 6  - BNP 18  - Lactate: 1.1    Imaging:   - CXR: No acute cardiopulmonary proces  - CT chest, a/p: emphysema, no focal infiltrate of pleural effusion, possible nonspecific enterocolitis, bilateral nephrolithiasis, 4.2 cm AAA and vascular graft.  - CT head: No acute intracranial hemorrhage or mass effect  - EKG: Reviewed, NSR, ST depressions V3-V4.    Interventions: None    ROS: 12 points review of system is negative except as stated in the HPI above.   Past Medical History     Past Medical History:   Diagnosis Date    (HFpEF) heart failure  with preserved ejection fraction     A-fib (Multi)     COPD (chronic obstructive pulmonary disease) (Multi)     Hypertension     Stroke (Multi)       Surgical History     Past Surgical History:   Procedure Laterality Date    CT ANGIO AORTA AND BILATERAL ILIOFEMORAL RUN OFF INCLUDING WITHOUT CONTRAST IF PERFORMED  04/10/2022    CT AORTA AND BILATERAL ILIOFEMORAL RUNOFF ANGIOGRAM W AND/OR WO IV CONTRAST 4/10/2022 [facility]    CT ANGIO AORTA AND BILATERAL ILIOFEMORAL RUN OFF INCLUDING WITHOUT CONTRAST IF PERFORMED  04/13/2022    CT AORTA AND BILATERAL ILIOFEMORAL RUNOFF ANGIOGRAM W AND/OR WO IV CONTRAST 4/13/2022 [facility]    CT ANGIO AORTA AND BILATERAL ILIOFEMORAL RUN OFF INCLUDING WITHOUT CONTRAST IF PERFORMED  05/22/2022    CT AORTA AND BILATERAL ILIOFEMORAL RUNOFF ANGIOGRAM W AND/OR WO IV CONTRAST 5/22/2022 [facility]    CT ANGIO AORTA AND BILATERAL ILIOFEMORAL RUN OFF INCLUDING WITHOUT CONTRAST IF PERFORMED  04/02/2023    CT AORTA AND BILATERAL ILIOFEMORAL RUNOFF ANGIOGRAM W AND/OR WO IV CONTRAST [facility] CT    CT ANGIO NECK  04/19/2022    CT NECK ANGIO W AND WO IV CONTRAST 4/19/2022 [facility]    CT HEAD ANGIO W AND WO IV CONTRAST  04/19/2022    CT HEAD ANGIO W AND WO IV CONTRAST 4/19/2022 [facility]    FEMORAL BYPASS      OTHER SURGICAL HISTORY  09/17/2019    Pearl tooth extraction    OTHER SURGICAL HISTORY  09/17/2019    Tonsillectomy with adenoidectomy    OTHER SURGICAL HISTORY  01/17/2020    Lower leg fracture repair    OTHER SURGICAL HISTORY  01/17/2020    Cardiac catheterization    TOTAL HIP ARTHROPLASTY  08/31/2018    Hip Replacement     Family History     Family History   Problem Relation Name Age of Onset    Heart attack Mother      Colon cancer Father      Heart attack Brother      Heart block Brother       Social History     Social History     Socioeconomic History    Marital status:      Spouse name: Not on file    Number of children: Not on file     Years of education: Not on file    Highest education level: Not on file   Occupational History    Not on file   Tobacco Use    Smoking status: Former     Current packs/day: 0.00     Average packs/day: 1 pack/day for 71.1 years (71.1 ttl pk-yrs)     Types: Cigarettes     Start date:      Quit date: 2025     Years since quittin.0    Smokeless tobacco: Never   Vaping Use    Vaping status: Never Used   Substance and Sexual Activity    Alcohol use: Never    Drug use: Never    Sexual activity: Not Currently   Other Topics Concern    Not on file   Social History Narrative    Lives with Daughter. Wife passed 6 years ago.      Social Drivers of Health     Financial Resource Strain: Low Risk  (2025)    Overall Financial Resource Strain (CARDIA)     Difficulty of Paying Living Expenses: Not hard at all   Food Insecurity: No Food Insecurity (2025)    Hunger Vital Sign     Worried About Running Out of Food in the Last Year: Never true     Ran Out of Food in the Last Year: Never true   Transportation Needs: No Transportation Needs (2025)    PRAPARE - Transportation     Lack of Transportation (Medical): No     Lack of Transportation (Non-Medical): No   Physical Activity: Not on file   Stress: Not on file   Social Connections: Not on file   Intimate Partner Violence: Not At Risk (2025)    Humiliation, Afraid, Rape, and Kick questionnaire     Fear of Current or Ex-Partner: No     Emotionally Abused: No     Physically Abused: No     Sexually Abused: No   Housing Stability: Low Risk  (2025)    Housing Stability Vital Sign     Unable to Pay for Housing in the Last Year: No     Number of Times Moved in the Last Year: 0     Homeless in the Last Year: No       Tobacco Use: Medium Risk (2025)    Patient History     Smoking Tobacco Use: Former     Smokeless Tobacco Use: Never     Passive Exposure: Not on file        Social History     Substance and Sexual Activity   Alcohol Use Never      Allergies      Allergies   Allergen Reactions    Doxycycline GI Upset    Levaquin [Levofloxacin] Itching and Rash      Meds    Scheduled medications  albuterol, 2 puff, inhalation, Once  predniSONE, 40 mg, oral, Once      Continuous medications     PRN medications     Objective     Vitals  Visit Vitals  /82 (Patient Position: Sitting)   Pulse (!) 120   Temp 36 °C (96.8 °F) (Temporal)   Resp (!) 22   Ht 1.829 m (6')   Wt 58.5 kg (129 lb)   SpO2 (!) 93%   BMI 17.50 kg/m²   Smoking Status Former   BSA 1.72 m²        Physical Examination:  Physical Exam  Constitutional:       Appearance: Normal appearance.   Cardiovascular:      Rate and Rhythm: Normal rate and regular rhythm.      Heart sounds: Normal heart sounds. No murmur heard.  Pulmonary:      Breath sounds: Wheezing present.   Musculoskeletal:      Right lower leg: No edema.      Left lower leg: No edema.   Skin:     General: Skin is warm and dry.   Neurological:      General: No focal deficit present.      Mental Status: He is alert and oriented to person, place, and time.   Psychiatric:         Mood and Affect: Mood normal.         Behavior: Behavior normal.           I/Os  No intake or output data in the 24 hours ending 02/25/25 1849    Labs:   Results from last 72 hours   Lab Units 02/25/25  1426   SODIUM mmol/L 136   POTASSIUM mmol/L 4.4   CHLORIDE mmol/L 101   CO2 mmol/L 28   BUN mg/dL 11   CREATININE mg/dL 0.70   GLUCOSE mg/dL 109*   CALCIUM mg/dL 8.4*   ANION GAP mmol/L 11   EGFR mL/min/1.73m*2 >90      Results from last 72 hours   Lab Units 02/25/25  1426   WBC AUTO x10*3/uL 4.9   HEMOGLOBIN g/dL 11.2*   HEMATOCRIT % 35.3*   PLATELETS AUTO x10*3/uL 204   NEUTROS PCT AUTO % 56.1   LYMPHS PCT AUTO % 28.6   MONOS PCT AUTO % 8.6   EOS PCT AUTO % 5.3      Lab Results   Component Value Date    CALCIUM 8.4 (L) 02/25/2025    PHOS 2.8 01/20/2025      Lab Results   Component Value Date    CRP 2.65 (H) 02/04/2025        Micro/ID:   No results found for the last 90  "days.                   No lab exists for component: \"AGALPCRNB\"     Lab Results   Component Value Date    BLOODCULT No growth at 4 days -  FINAL REPORT 01/17/2025    BLOODCULT No growth at 4 days -  FINAL REPORT 01/17/2025     Images    CT chest abdomen pelvis w IV contrast    Result Date: 2/25/2025  CHEST:   Emphysema. No focal infiltrate or pleural effusion.   ABDOMEN AND PELVIS:   Mild uneven fluid distention of small-bowel loops and the right colon which could reflect nonspecific enterocolitis.   Bilateral nephrolithiasis. No hydronephrosis.   Extensive atherosclerotic disease with fusiform 4.2 cm distal abdominal aortic aneurysm and vascular graft in the anterior pelvis.   Additional findings as described above.   MACRO: None.   Signed by: Diana Moody 2/25/2025 4:22 PM Dictation workstation:   [id number]    CT head wo IV contrast    Result Date: 2/25/2025  No acute intracranial hemorrhage or mass-effect.   Mild right maxillary sinus mucosal thickening.   MACRO: None.   Signed by: Diana Moody 2/25/2025 4:06 PM Dictation workstation:   [id number]    XR chest 2 views    Result Date: 2/25/2025  Emphysematous changes. No evidence of acute cardiopulmonary process.     MACRO: None   Signed by: Kyler Casas 2/25/2025 2:19 PM Dictation workstation:   [id number]    XR chest 1 view    Result Date: 2/2/2025  Consistent with COPD.  No acute findings. Signed by Cisco Leos MD    Assessment and Plan    Sheldon Arteaga is a 84 y.o. male with PMHx of Afib on eliquis, HTN, diastolic HF, COPD, CVA, PAD s/p procedure, CVA, HLD, and tobacco use disorder who presented to ED for dyspnea admitted for possible COPD exacerbation.     Acute Medical Issues   #COPD exacerbation  - New cough productive of green sputum  - CXR, Chest CT without acute cardiopulmonary process  - Patient stopped smoking 3-4 weeks ago  PLAN:   - Albuterol 2.5 mg Q2H prn   - Azithromycin 500 mg Q24H (2/25-2/27)  - Prednisone 40 mg daily (2/25-)  - " Formoterol 20 mcg Q12H RT  - TTE pending    Chronic Medical Issues   Chronic Medical Issues:  #Atrial Fibrillation: Continue home Metoprolol tartrate 25 mg BID, eliquis  #HLD: continue home Atorvastatin  #HTN: continue home Amlodipine  #Hx of CVA, PAD: continue home Clopidogrel   #BPH: continue flomax 0.4mg daily    F: PO intake & IVF PRN   E: Replete as needed   N: Regular diet  GI ppx: None  DVT ppx: Therapeutic eliquis  Antibiotics: Azithromycin  Tubes/Lines/Drains: pIV    Code Status: DNR   Emergency Contact: Extended Emergency Contact Information  Primary Emergency Contact: Stuart Arteaga  [place] Phone: [phone]  Relation: Daughter     Disposition: 84 y.o.male admitted for COPD exacrbation. Estimated length of stay less than 48 hrs.     Tor John MD  PGY-1 Transitional Year

## 2025-02-26 ENCOUNTER — APPOINTMENT (OUTPATIENT)
Dept: CARDIOLOGY | Facility: HOSPITAL | Age: 85
End: 2025-02-26
Payer: MEDICARE

## 2025-02-26 ENCOUNTER — APPOINTMENT (OUTPATIENT)
Dept: PRIMARY CARE | Facility: CLINIC | Age: 85
End: 2025-02-26
Payer: MEDICARE

## 2025-02-26 LAB
ALBUMIN SERPL BCP-MCNC: 3.5 G/DL (ref 3.4–5)
ANION GAP SERPL CALC-SCNC: 11 MMOL/L (ref 10–20)
AORTIC VALVE MEAN GRADIENT: 4 MMHG
AORTIC VALVE PEAK VELOCITY: 1.28 M/S
ATRIAL RATE: 100 BPM
AV PEAK GRADIENT: 7 MMHG
AVA (PEAK VEL): 2.28 CM2
AVA (VTI): 1.99 CM2
BUN SERPL-MCNC: 11 MG/DL (ref 6–23)
CALCIUM SERPL-MCNC: 8.3 MG/DL (ref 8.6–10.3)
CHLORIDE SERPL-SCNC: 100 MMOL/L (ref 98–107)
CO2 SERPL-SCNC: 28 MMOL/L (ref 21–32)
CREAT SERPL-MCNC: 0.64 MG/DL (ref 0.5–1.3)
EGFRCR SERPLBLD CKD-EPI 2021: >90 ML/MIN/1.73M*2
EJECTION FRACTION APICAL 4 CHAMBER: 62.1
EJECTION FRACTION: 63 %
ERYTHROCYTE [DISTWIDTH] IN BLOOD BY AUTOMATED COUNT: 15.8 % (ref 11.5–14.5)
GLUCOSE SERPL-MCNC: 123 MG/DL (ref 74–99)
HCT VFR BLD AUTO: 34 % (ref 41–52)
HGB BLD-MCNC: 10.8 G/DL (ref 13.5–17.5)
LEFT ATRIUM VOLUME AREA LENGTH INDEX BSA: 25.6 ML/M2
LEFT VENTRICLE INTERNAL DIMENSION DIASTOLE: 3.71 CM (ref 3.5–6)
LEFT VENTRICULAR OUTFLOW TRACT DIAMETER: 2.01 CM
MCH RBC QN AUTO: 30.6 PG (ref 26–34)
MCHC RBC AUTO-ENTMCNC: 31.8 G/DL (ref 32–36)
MCV RBC AUTO: 96 FL (ref 80–100)
MITRAL VALVE E/A RATIO: 0.74
NRBC BLD-RTO: 0 /100 WBCS (ref 0–0)
P AXIS: 82 DEGREES
P OFFSET: 210 MS
P ONSET: 154 MS
PHOSPHATE SERPL-MCNC: 3.4 MG/DL (ref 2.5–4.9)
PLATELET # BLD AUTO: 224 X10*3/UL (ref 150–450)
POTASSIUM SERPL-SCNC: 4.3 MMOL/L (ref 3.5–5.3)
PR INTERVAL: 134 MS
Q ONSET: 221 MS
QRS COUNT: 17 BEATS
QRS DURATION: 94 MS
QT INTERVAL: 366 MS
QTC CALCULATION(BAZETT): 472 MS
QTC FREDERICIA: 434 MS
R AXIS: 73 DEGREES
RBC # BLD AUTO: 3.53 X10*6/UL (ref 4.5–5.9)
RIGHT VENTRICLE FREE WALL PEAK S': 11 CM/S
RIGHT VENTRICLE PEAK SYSTOLIC PRESSURE: 44.6 MMHG
SODIUM SERPL-SCNC: 135 MMOL/L (ref 136–145)
T AXIS: 33 DEGREES
T OFFSET: 404 MS
TRICUSPID ANNULAR PLANE SYSTOLIC EXCURSION: 2 CM
VENTRICULAR RATE: 100 BPM
WBC # BLD AUTO: 3.3 X10*3/UL (ref 4.4–11.3)

## 2025-02-26 PROCEDURE — 84100 ASSAY OF PHOSPHORUS: CPT

## 2025-02-26 PROCEDURE — 99232 SBSQ HOSP IP/OBS MODERATE 35: CPT

## 2025-02-26 PROCEDURE — 93306 TTE W/DOPPLER COMPLETE: CPT | Performed by: STUDENT IN AN ORGANIZED HEALTH CARE EDUCATION/TRAINING PROGRAM

## 2025-02-26 PROCEDURE — 2500000001 HC RX 250 WO HCPCS SELF ADMINISTERED DRUGS (ALT 637 FOR MEDICARE OP)

## 2025-02-26 PROCEDURE — 36415 COLL VENOUS BLD VENIPUNCTURE: CPT

## 2025-02-26 PROCEDURE — S4991 NICOTINE PATCH NONLEGEND: HCPCS

## 2025-02-26 PROCEDURE — 85027 COMPLETE CBC AUTOMATED: CPT

## 2025-02-26 PROCEDURE — 97165 OT EVAL LOW COMPLEX 30 MIN: CPT | Mod: GO

## 2025-02-26 PROCEDURE — 94667 MNPJ CHEST WALL 1ST: CPT

## 2025-02-26 PROCEDURE — 2500000004 HC RX 250 GENERAL PHARMACY W/ HCPCS (ALT 636 FOR OP/ED)

## 2025-02-26 PROCEDURE — G0378 HOSPITAL OBSERVATION PER HR: HCPCS

## 2025-02-26 PROCEDURE — 94760 N-INVAS EAR/PLS OXIMETRY 1: CPT | Mod: MUE

## 2025-02-26 PROCEDURE — 2500000002 HC RX 250 W HCPCS SELF ADMINISTERED DRUGS (ALT 637 FOR MEDICARE OP, ALT 636 FOR OP/ED)

## 2025-02-26 PROCEDURE — 97161 PT EVAL LOW COMPLEX 20 MIN: CPT | Mod: GP

## 2025-02-26 PROCEDURE — 94640 AIRWAY INHALATION TREATMENT: CPT | Mod: MUE

## 2025-02-26 PROCEDURE — C8929 TTE W OR WO FOL WCON,DOPPLER: HCPCS

## 2025-02-26 RX ORDER — ACETAMINOPHEN 500 MG
5 TABLET ORAL NIGHTLY
Status: DISCONTINUED | OUTPATIENT
Start: 2025-02-26 | End: 2025-02-27 | Stop reason: HOSPADM

## 2025-02-26 RX ORDER — PANTOPRAZOLE SODIUM 40 MG/1
40 TABLET, DELAYED RELEASE ORAL DAILY PRN
Status: DISCONTINUED | OUTPATIENT
Start: 2025-02-26 | End: 2025-02-27 | Stop reason: HOSPADM

## 2025-02-26 RX ORDER — CALCIUM CARBONATE 200(500)MG
500 TABLET,CHEWABLE ORAL ONCE
Status: COMPLETED | OUTPATIENT
Start: 2025-02-26 | End: 2025-02-26

## 2025-02-26 RX ADMIN — AZITHROMYCIN 500 MG: 500 TABLET, FILM COATED ORAL at 21:13

## 2025-02-26 RX ADMIN — BENZONATATE 200 MG: 100 CAPSULE ORAL at 08:00

## 2025-02-26 RX ADMIN — BENZONATATE 200 MG: 100 CAPSULE ORAL at 21:13

## 2025-02-26 RX ADMIN — METOPROLOL TARTRATE 25 MG: 25 TABLET, FILM COATED ORAL at 08:00

## 2025-02-26 RX ADMIN — BUDESONIDE 0.25 MG: 0.25 INHALANT RESPIRATORY (INHALATION) at 07:34

## 2025-02-26 RX ADMIN — FORMOTEROL FUMARATE DIHYDRATE 20 MCG: 20 SOLUTION RESPIRATORY (INHALATION) at 20:05

## 2025-02-26 RX ADMIN — Medication 1 PATCH: at 08:00

## 2025-02-26 RX ADMIN — ATORVASTATIN CALCIUM 40 MG: 40 TABLET, FILM COATED ORAL at 21:13

## 2025-02-26 RX ADMIN — METOPROLOL TARTRATE 25 MG: 25 TABLET, FILM COATED ORAL at 21:13

## 2025-02-26 RX ADMIN — FORMOTEROL FUMARATE DIHYDRATE 20 MCG: 20 SOLUTION RESPIRATORY (INHALATION) at 07:34

## 2025-02-26 RX ADMIN — BENZONATATE 200 MG: 100 CAPSULE ORAL at 15:54

## 2025-02-26 RX ADMIN — BUDESONIDE 0.25 MG: 0.25 INHALANT RESPIRATORY (INHALATION) at 20:05

## 2025-02-26 RX ADMIN — APIXABAN 2.5 MG: 2.5 TABLET, FILM COATED ORAL at 21:13

## 2025-02-26 RX ADMIN — PERFLUTREN 1 ML OF DILUTION: 6.52 INJECTION, SUSPENSION INTRAVENOUS at 10:44

## 2025-02-26 RX ADMIN — APIXABAN 2.5 MG: 2.5 TABLET, FILM COATED ORAL at 08:00

## 2025-02-26 RX ADMIN — Medication 5 MG: at 21:58

## 2025-02-26 RX ADMIN — TAMSULOSIN HYDROCHLORIDE 0.8 MG: 0.4 CAPSULE ORAL at 08:00

## 2025-02-26 RX ADMIN — PREDNISONE 40 MG: 20 TABLET ORAL at 08:00

## 2025-02-26 RX ADMIN — CALCIUM CARBONATE 500 MG: 500 TABLET, CHEWABLE ORAL at 23:44

## 2025-02-26 RX ADMIN — CLOPIDOGREL 75 MG: 75 TABLET ORAL at 08:00

## 2025-02-26 RX ADMIN — TIOTROPIUM BROMIDE INHALATION SPRAY 2 PUFF: 3.12 SPRAY, METERED RESPIRATORY (INHALATION) at 21:14

## 2025-02-26 ASSESSMENT — COGNITIVE AND FUNCTIONAL STATUS - GENERAL
DRESSING REGULAR LOWER BODY CLOTHING: A LITTLE
DAILY ACTIVITIY SCORE: 23
STANDING UP FROM CHAIR USING ARMS: A LITTLE
CLIMB 3 TO 5 STEPS WITH RAILING: A LOT
DRESSING REGULAR LOWER BODY CLOTHING: A LITTLE
WALKING IN HOSPITAL ROOM: A LITTLE
MOVING TO AND FROM BED TO CHAIR: A LITTLE
MOBILITY SCORE: 18
CLIMB 3 TO 5 STEPS WITH RAILING: A LITTLE
MOVING FROM LYING ON BACK TO SITTING ON SIDE OF FLAT BED WITH BEDRAILS: A LITTLE
MOVING TO AND FROM BED TO CHAIR: A LITTLE
TOILETING: A LITTLE
STANDING UP FROM CHAIR USING ARMS: A LITTLE
HELP NEEDED FOR BATHING: A LITTLE
WALKING IN HOSPITAL ROOM: A LITTLE
MOBILITY SCORE: 19
DAILY ACTIVITIY SCORE: 21
TURNING FROM BACK TO SIDE WHILE IN FLAT BAD: A LITTLE

## 2025-02-26 ASSESSMENT — ACTIVITIES OF DAILY LIVING (ADL)
ADL_ASSISTANCE: INDEPENDENT
LACK_OF_TRANSPORTATION: NO
BATHING_ASSISTANCE: STAND BY

## 2025-02-26 ASSESSMENT — PAIN SCALES - GENERAL
PAINLEVEL_OUTOF10: 0 - NO PAIN

## 2025-02-26 ASSESSMENT — PAIN - FUNCTIONAL ASSESSMENT
PAIN_FUNCTIONAL_ASSESSMENT: 0-10
PAIN_FUNCTIONAL_ASSESSMENT: 0-10

## 2025-02-26 NOTE — PROGRESS NOTES
02/26/25 1107   Discharge Planning   Living Arrangements Children  (Home, daughter lives with patient)   Support Systems Children   Assistance Needed Alert and oriented x 3, Independent with ADL's, Doesn't drive, Cane, (Walker, Wheel chair, Electric wheel chair, doesn't use anything but the cane)   Type of Residence Private residence  (Mobile [place]   Number of Stairs to Enter Residence 3  (3 steps in through the front of mobile home with railing, ramp into home through the garage(this is the way patient uses toget into his home))   Do you have animals or pets at home? Yes   Type of Animals or Pets 1 dog and 3 cats   Who is requesting discharge planning? Provider   Home or Post Acute Services None   Expected Discharge Disposition Home   Does the patient need discharge transport arranged? No   Financial Resource Strain   How hard is it for you to pay for the very basics like food, housing, medical care, and heating? Not hard   Housing Stability   In the last 12 months, was there a time when you were not able to pay the mortgage or rent on time? N   In the past 12 months, how many times have you moved where you were living? 0   At any time in the past 12 months, were you homeless or living in a shelter (including now)? N   Transportation Needs   In the past 12 months, has lack of transportation kept you from medical appointments or from getting medications? no   In the past 12 months, has lack of transportation kept you from meetings, work, or from getting things needed for daily living? No   Patient Choice   Provider Choice list and CMS website (https://medicare.gov/care-compare#search) for post-acute Quality and Resource Measure Data were provided and reviewed with: Patient;Family   Patient / Family choosing to utilize agency / facility established prior to hospitalization No   Stroke Family Assessment   Stroke Family Assessment Needed No   Intensity of Service   Intensity of Service 0-30 min

## 2025-02-26 NOTE — PROGRESS NOTES
"  Subjective    Overnight Events:     Patient states they are doing ***. Denies CP, SOB, abd pain, N/V/D.    CC/Pain***  Ambulation***  Diet/PO Intake***  BM***    Objective    Vitals  Visit Vitals  /83 (BP Location: Left arm)   Pulse 88   Temp 36.7 °C (98.1 °F) (Temporal)   Resp 18   Ht 1.829 m (6')   Wt 58.2 kg (128 lb 3.2 oz)   SpO2 93%   BMI 17.39 kg/m²   Smoking Status Former   BSA 1.72 m²       Physical Exam    Physical Exam      IOs    Intake/Output Summary (Last 24 hours) at 2/26/2025 0654  Last data filed at 2/25/2025 2102  Gross per 24 hour   Intake 120 ml   Output --   Net 120 ml       Labs:   Results from last 72 hours   Lab Units 02/25/25  1426   SODIUM mmol/L 136   POTASSIUM mmol/L 4.4   CHLORIDE mmol/L 101   CO2 mmol/L 28   BUN mg/dL 11   CREATININE mg/dL 0.70   GLUCOSE mg/dL 109*   CALCIUM mg/dL 8.4*   ANION GAP mmol/L 11   EGFR mL/min/1.73m*2 >90      Results from last 72 hours   Lab Units 02/26/25  0638 02/25/25  1426   WBC AUTO x10*3/uL 3.3* 4.9   HEMOGLOBIN g/dL 10.8* 11.2*   HEMATOCRIT % 34.0* 35.3*   PLATELETS AUTO x10*3/uL 224 204   NEUTROS PCT AUTO %  --  56.1   LYMPHS PCT AUTO %  --  28.6   MONOS PCT AUTO %  --  8.6   EOS PCT AUTO %  --  5.3      Lab Results   Component Value Date    CALCIUM 8.4 (L) 02/25/2025    PHOS 2.8 01/20/2025      Lab Results   Component Value Date    CRP 2.65 (H) 02/04/2025      [unfilled]     Micro/ID:   No results found for the last 90 days.                   No lab exists for component: \"AGALPCRNB\"   Lab Results   Component Value Date    BLOODCULT No growth at 4 days -  FINAL REPORT 01/17/2025    BLOODCULT No growth at 4 days -  FINAL REPORT 01/17/2025     Recent Labs     02/25/25  1426   FLUAFLUVID Not Detected   FLUBFLUVID Not Detected   [id number] Not Detected        Images  CT chest abdomen pelvis w IV contrast    Result Date: 2/25/2025  CHEST:   Emphysema. No focal infiltrate or pleural effusion.   ABDOMEN AND PELVIS:   Mild uneven fluid " distention of small-bowel loops and the right colon which could reflect nonspecific enterocolitis.   Bilateral nephrolithiasis. No hydronephrosis.   Extensive atherosclerotic disease with fusiform 4.2 cm distal abdominal aortic aneurysm and vascular graft in the anterior pelvis.   Additional findings as described above.   MACRO: None.   Signed by: Diana Moody 2/25/2025 4:22 PM Dictation workstation:   [id number]    CT head wo IV contrast    Result Date: 2/25/2025  No acute intracranial hemorrhage or mass-effect.   Mild right maxillary sinus mucosal thickening.   MACRO: None.   Signed by: Diana Moody 2/25/2025 4:06 PM Dictation workstation:   [id number]    XR chest 2 views    Result Date: 2/25/2025  Emphysematous changes. No evidence of acute cardiopulmonary process.     MACRO: None   Signed by: Kyler Casas 2/25/2025 2:19 PM Dictation workstation:   [id number]    XR chest 1 view    Result Date: 2/2/2025  Consistent with COPD.  No acute findings. Signed by Cisco Leos MD      Meds  Scheduled medications  apixaban, 2.5 mg, oral, BID  atorvastatin, 40 mg, oral, Nightly  azithromycin, 500 mg, oral, q24h DOROTEO  benzonatate, 200 mg, oral, TID  budesonide, 0.25 mg, nebulization, BID  clopidogrel, 75 mg, oral, Daily  docusate sodium, 100 mg, oral, Daily  tiotropium, 2 puff, inhalation, Daily   And  formoterol, 20 mcg, nebulization, q12h  metoprolol tartrate, 25 mg, oral, BID  nicotine, 1 patch, transdermal, Daily  predniSONE, 40 mg, oral, Daily  tamsulosin, 0.8 mg, oral, Daily      Continuous medications     PRN medications  PRN medications: albuterol, dextromethorphan-guaifenesin     Assessment and Plan    Assessment/Plan   Sheldon Arteaga is a 84 y.o. male with PMHx of Afib on eliquis, HTN, diastolic HF, COPD, CVA, PAD s/p procedure, CVA, HLD, and tobacco use disorder who presented to ED for dyspnea admitted for possible COPD exacerbation.      Acute Medical Issues   #COPD exacerbation  - New cough productive  of green sputum  - CXR, Chest CT without acute cardiopulmonary process  - Patient stopped smoking 3-4 weeks ago  PLAN:   - Albuterol 2.5 mg Q2H prn   - Azithromycin 500 mg Q24H (2/25-2/27)  - Prednisone 40 mg daily (2/25-)  - Formoterol 20 mcg Q12H RT  - TTE pending     Chronic Medical Issues   Chronic Medical Issues:  #Atrial Fibrillation: Continue home Metoprolol tartrate 25 mg BID, eliquis  #HLD: continue home Atorvastatin  #HTN: continue home Amlodipine  #Hx of CVA, PAD: continue home Clopidogrel   #BPH: continue flomax 0.4mg daily    Fluids:  Electrolytes:  Nutrition:  GI ppx:  Lines:  Antibiotics:  DVT ppx:  CODE STATUS:    Tor John MD  PGY-1 Transitional Year

## 2025-02-26 NOTE — PROGRESS NOTES
"Physical Therapy    Physical Therapy Evaluation    Patient Name: Sheldon Arteaga \"Lul"  MRN: [id number]  Department: [place] [id number]  Room: [address] [place] [address]  Today's Date: 2/26/2025   Time Calculation  Start Time: 0925  Stop Time: 0938  Time Calculation (min): 13 min    Assessment/Plan   PT Assessment  PT Assessment Results: Decreased mobility (deconditioning)  Rehab Prognosis: Excellent  Barriers to Discharge Home: No anticipated barriers  Evaluation/Treatment Tolerance: Patient tolerated treatment well  Medical Staff Made Aware: Yes  Strengths: Ability to acquire knowledge  Barriers to Participation: Comorbidities  End of Session Communication: Bedside nurse  End of Session Patient Position: Bed, 3 rail up, Alarm on  IP OR SWING BED PT PLAN  Inpatient or Swing Bed: Inpatient  PT Plan  Treatment/Interventions: Bed mobility, Transfer training, Gait training, Strengthening, Therapeutic exercise  PT Plan: Ongoing PT  PT Frequency: 3 times per week  PT Discharge Recommendations: Low intensity level of continued care  Equipment Recommended upon Discharge:  (least restrictive device)  PT Recommended Transfer Status: Independent  PT - OK to Discharge: Yes (Per PT POC)    Subjective   General Visit Information:  General  Reason for Referral: 85 yo male admitted 2' to dyspnea  Referred By: Dr. JANE Rider  Past Medical History Relevant to Rehab: A Fib (Eliquis),HTN, diastolic heart failure, COPD, CVA, PAD, HLD, hypoxic respiratory failure, Flu A  Prior to Session Communication: Bedside nurse  Patient Position Received: Bed, 3 rail up, Alarm off, not on at start of session  General Comment: Pt is pleasant and agreeable to work with PT. Pt is moving cautiously but giving good effort. Based on Pt's current status, recommend Pt have LOW follow up services.  Home Living:  Home Living  Type of Home: Mobile home  Lives With: Adult children (daughter)  Home Adaptive Equipment: Walker rolling or standard, Cane, Wheelchair-power  Home " Layout: One level  Home Access: Ramped entrance (also 3 step entry but Pt uses the ramp)  Bathroom Shower/Tub: Tub/shower unit  Bathroom Toilet: Standard  Bathroom Equipment: Grab bars in shower, Shower chair with back  Prior Level of Function:  Prior Function Per Pt/Caregiver Report  Level of Morgan: Independent with ADLs and functional transfers, Independent with homemaking with ambulation (no device)  Receives Help From: Family  Precautions:  Precautions  Medical Precautions: Fall precautions (telemetry)             Objective   Pain:     Cognition:  Cognition  Overall Cognitive Status: Within Functional Limits    General Assessments:  General Observation  General Observation: pt is moving cautiously during his session. He will benifit from LOW follow up services               Activity Tolerance  Endurance: Decreased tolerance for upright activites    Sensation  Light Touch: No apparent deficits    Strength  Strength Comments: B UEand LE are 4+ of 5  Static Sitting Balance  Static Sitting-Balance Support: Bilateral upper extremity supported, Feet supported  Static Sitting-Level of Assistance: Close supervision    Static Standing Balance  Static Standing-Balance Support: No upper extremity supported  Static Standing-Level of Assistance: Close supervision  Dynamic Standing Balance  Dynamic Standing-Balance Support: No upper extremity supported  Dynamic Standing-Level of Assistance: Close supervision  Functional Assessments:  Bed Mobility  Bed Mobility: Yes  Bed Mobility 1  Bed Mobility 1: Supine to sitting, Sitting to supine  Level of Assistance 1: Close supervision    Transfers  Transfer: Yes  Transfer 1  Transfer From 1: Bed to  Transfer to 1: Stand  Technique 1: Sit to stand, Stand to sit  Transfer Device 1:  (no device)  Transfer Level of Assistance 1: Close supervision    Ambulation/Gait Training  Ambulation/Gait Training Performed: Yes  Ambulation/Gait Training 1  Surface 1: Level tile  Device 1: No  device  Assistance 1: Close supervision  Quality of Gait 1: Narrow base of support, Decreased step length (Decreased migue)  Comments/Distance (ft) 1: 175 feet    Stairs  Stairs: No  Extremity/Trunk Assessments:  RUE   RUE : Within Functional Limits  LUE   LUE: Within Functional Limits  RLE   RLE : Within Functional Limits  LLE   LLE : Within Functional Limits  Outcome Measures:  [facility] Basic Mobility  Turning from your back to your side while in a flat bed without using bedrails: A little  Moving from lying on your back to sitting on the side of a flat bed without using bedrails: A little  Moving to and from bed to chair (including a wheelchair): A little  Standing up from a chair using your arms (e.g. wheelchair or bedside chair): A little  To walk in hospital room: A little  Climbing 3-5 steps with railing: A little  Basic Mobility - Total Score: 18    Encounter Problems       Encounter Problems (Active)       Mobility       STG - Patient will ambulate 200 plus feet MOD I with least restrictive device (Progressing)       Start:  02/26/25    Expected End:  03/11/25               PT Transfers       STG - Patient to transfer to and from sit to supine independently (Progressing)       Start:  02/26/25    Expected End:  03/11/25            STG - Patient will transfer sit to and from stand MOD I with least restrictive device (Progressing)       Start:  02/26/25    Expected End:  03/11/25                   Education Documentation  Mobility Training, taught by Parviz Hall, PT at 2/26/2025 10:44 AM.  Learner: Patient  Readiness: Eager  Method: Demonstration  Response: Verbalizes Understanding, Demonstrated Understanding    Education Comments  No comments found.

## 2025-02-26 NOTE — PROGRESS NOTES
Internal Medicine - Daily Progress Note   Hospital Day: 2       Name:Sheldon Arteaga, AGE: 84 y.o., GENDER: male, MRN: [id number], ROOM: 221/221-A   CODE STATUS: DNR and No Intubation  Attending Physician: Yosvany Rider DO  Resident: Brennon Stark DO        Chief Complaint     Chief Complaint   Patient presents with    Shortness of Breath    Cough        Subjective    Sheldon Arteaga is a 84 y.o. year old male patient on Hospital Day: 2 with PMHx of Afib on eliquis, HTN, diastolic HF, COPD, CVA, PAD s/p procedure, CVA, HLD, and tobacco use disorder who presented to ED for dyspnea admitted for possible COPD exacerbation.        Overnight events:NAOE  Patient is reporting improvement of shortness of breath, and remains on room air.  Remains afebrile.  Heart rate was elevated this morning at 111, however patient had yet to receive his morning metoprolol. Endorsing productive cough with green sputum, similar to yesterday. Patient is concerned about returning to the hospital, after discharge as he has had multiple prior admissions with similar symptoms and would like to stay an additional day.  He does not wish to go to SNF after discharge      Meds    Scheduled medications  apixaban, 2.5 mg, oral, BID  atorvastatin, 40 mg, oral, Nightly  azithromycin, 500 mg, oral, q24h DOROTEO  benzonatate, 200 mg, oral, TID  budesonide, 0.25 mg, nebulization, BID  clopidogrel, 75 mg, oral, Daily  docusate sodium, 100 mg, oral, Daily  tiotropium, 2 puff, inhalation, Daily   And  formoterol, 20 mcg, nebulization, q12h  metoprolol tartrate, 25 mg, oral, BID  nicotine, 1 patch, transdermal, Daily  perflutren lipid microspheres, 0.5-10 mL of dilution, intravenous, Once in imaging  predniSONE, 40 mg, oral, Daily  tamsulosin, 0.8 mg, oral, Daily      Continuous medications     PRN medications  PRN medications: albuterol, dextromethorphan-guaifenesin     Objective    Physical Exam  Constitutional:       General: He is not in acute  distress.  HENT:      Head: Normocephalic.      Mouth/Throat:      Mouth: Mucous membranes are moist.   Eyes:      Extraocular Movements: Extraocular movements intact.      Conjunctiva/sclera: Conjunctivae normal.   Cardiovascular:      Rate and Rhythm: Normal rate and regular rhythm.      Pulses: Normal pulses.      Heart sounds: Normal heart sounds.   Pulmonary:      Effort: Pulmonary effort is normal. No respiratory distress.      Breath sounds: Wheezing present.      Comments: Mild expiratory wheezes  Abdominal:      General: Abdomen is flat. There is no distension.      Tenderness: There is no abdominal tenderness.   Musculoskeletal:      Right lower leg: No edema.      Left lower leg: No edema.   Neurological:      Mental Status: He is alert and oriented to person, place, and time. Mental status is at baseline.      Sensory: No sensory deficit.   Psychiatric:         Mood and Affect: Mood normal.        Visit Vitals  /77 (BP Location: Left arm)   Pulse (!) 111   Temp 37 °C (98.6 °F) (Temporal)   Resp 18   Ht 1.829 m (6')   Wt 58.2 kg (128 lb 3.2 oz)   SpO2 94%   BMI 17.39 kg/m²   Smoking Status Former   BSA 1.72 m²        Intake/Output Summary (Last 24 hours) at 2/26/2025 1044  Last data filed at 2/26/2025 0945  Gross per 24 hour   Intake 360 ml   Output --   Net 360 ml       Labs:   Results from last 72 hours   Lab Units 02/26/25  0638 02/25/25  1426   SODIUM mmol/L 135* 136   POTASSIUM mmol/L 4.3 4.4   CHLORIDE mmol/L 100 101   CO2 mmol/L 28 28   BUN mg/dL 11 11   CREATININE mg/dL 0.64 0.70   GLUCOSE mg/dL 123* 109*   CALCIUM mg/dL 8.3* 8.4*   ANION GAP mmol/L 11 11   EGFR mL/min/1.73m*2 >90 >90   PHOSPHORUS mg/dL 3.4  --       Results from last 72 hours   Lab Units 02/26/25  0638 02/25/25  1426   WBC AUTO x10*3/uL 3.3* 4.9   HEMOGLOBIN g/dL 10.8* 11.2*   HEMATOCRIT % 34.0* 35.3*   PLATELETS AUTO x10*3/uL 224 204   NEUTROS PCT AUTO %  --  56.1   LYMPHS PCT AUTO %  --  28.6   MONOS PCT AUTO %  --  8.6  "  EOS PCT AUTO %  --  5.3      Lab Results   Component Value Date    CALCIUM 8.3 (L) 02/26/2025    PHOS 3.4 02/26/2025      Lab Results   Component Value Date    CRP 2.65 (H) 02/04/2025       [unfilled]     Micro/ID:   No results found for the last 90 days.                   No lab exists for component: \"AGALPCRNB\"     Lab Results   Component Value Date    BLOODCULT No growth at 4 days -  FINAL REPORT 01/17/2025    BLOODCULT No growth at 4 days -  FINAL REPORT 01/17/2025       Images    ECG 12 lead    Result Date: 2/26/2025  Normal sinus rhythm Nonspecific ST abnormality Abnormal ECG When compared with ECG of 25-FEB-2025 14:27, (unconfirmed) No significant change was found    CT chest abdomen pelvis w IV contrast    Result Date: 2/25/2025  Interpreted By:  Diana Moody, STUDY: CT CHEST ABDOMEN PELVIS W IV CONTRAST;  2/25/2025 3:38 pm   INDICATION: Signs/Symptoms:pneumonia? pain with eating.     COMPARISON: Chest CT 11/12/2024, CT abdomen pelvis 03/30/2023   ACCESSION NUMBER(S): [id number]   ORDERING CLINICIAN: GRACIE RINCON   TECHNIQUE: CT of the chest, abdomen, and pelvis was performed. Sagittal and coronal reconstructions were generated.  75 ML Omnipaque 350 intravenous contrast given for the examination.   FINDINGS: CHEST:       CHEST WALL AND LOWER NECK: Subcentimeter calcification in the right lobe of the thyroid. No significant axillary adenopathy.   MEDIASTINUM AND RUBINA:  No significant lymphadenopathy.   HEART AND VESSELS:  The heart is normal in size. No significant pericardial effusion. Multifocal atherosclerotic calcifications including the coronary arteries. Uneven mural thickening/plaque in the descending aorta similar to the prior exam.   LUNGS, PLEURA, LARGE AIRWAYS:  Emphysematous changes with central bronchial wall thickening.  Smooth partially calcified 10 mm nodule in the medial right lower lobe is similar to the prior exam. Punctate calcified granuloma anterior to the left hilum. Faint " linear densities in each lung base and the lingula. No pleural effusion. The central airways are patent.   BONES:  Osteopenia. Osteophytosis of the thoracic spine.     ABDOMEN/PELVIS:       ABDOMINAL ORGANS:   LIVER: Punctate, too small to characterize, hypodensity in the right lobe image 105/237 not definitively seen previously.   GALL BLADDER AND BILIARY TREE: No calcified gallstone. No significant biliary dilatation   SPLEEN: No focal lesion. Small isodense possible splenule adjacent to the inferior pole.   PANCREAS: Scattered punctate calcifications. No ductal dilatation or surrounding inflammatory changes.   ADRENALS: No focal lesion   KIDNEYS AND URETERS: Punctate stones in each kidney. No hydronephrosis within limits of nephrogram phase images.   BOWEL: No abnormally dilated large or small bowel loops. Mild uneven small-bowel fluid distention. Small amount of fluid in the cecum.   PERITONEUM, RETROPERITONEUM, NODES: Cul-de-sac is partially obscured. No significant free fluid as visualized. No free intraperitoneal air. No significant retroperitoneal lymphadenopathy.   VESSELS:  Heavy atherosclerotic calcifications and uneven aortoiliac mural thickening/plaque similar to the previous exam. Fusiform 4.2 x 3.3 cm distal abdominal aortic aneurysm. Grossly patent/enhancing graft in the anterior pelvic wall extending between the femoral arteries again seen..   PELVIS: Streak artifact from left hip prosthesis limits evaluation. Urinary bladder is grossly normal in contour as visualized. Prominent heterogenous prostate.   ABDOMINAL WALL: No sizable abdominal wall hernia.   BONES: Status post left hip arthroplasty. Osteopenia. Multifocal presumed degenerative changes.       CHEST:   Emphysema. No focal infiltrate or pleural effusion.   ABDOMEN AND PELVIS:   Mild uneven fluid distention of small-bowel loops and the right colon which could reflect nonspecific enterocolitis.   Bilateral nephrolithiasis. No hydronephrosis.    Extensive atherosclerotic disease with fusiform 4.2 cm distal abdominal aortic aneurysm and vascular graft in the anterior pelvis.   Additional findings as described above.   MACRO: None.   Signed by: Diana Moody 2/25/2025 4:22 PM Dictation workstation:   [id number]    CT head wo IV contrast    Result Date: 2/25/2025  Interpreted By:  Diana Moody, STUDY: CT HEAD WO IV CONTRAST;  2/25/2025 3:38 pm   INDICATION: Signs/Symptoms:headache.     COMPARISON: 10/17/2024   ACCESSION NUMBER(S): [id number]   ORDERING CLINICIAN: GRACIE RINCON   TECHNIQUE: Unenhanced CT images of the head were obtained.   FINDINGS: The ventricles, cisterns and sulci are enlarged, consistent with diffuse volume loss. There are areas of nonspecific white matter hypodensity, which are probably age related or microvascular in nature. Low-density possible encephalomalacia in the inferior left temporal lobe. These findings are similar to the prior exam. There is no acute intracranial hemorrhage, mass effect or midline shift. No extraaxial fluid collection.   No focal calvarial lesion. 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   Mild mucosal thickening in the right maxillary sinus. Remaining visualized paranasal sinuses are clear.       No acute intracranial hemorrhage or mass-effect.   Mild right maxillary sinus mucosal thickening.   MACRO: None.   Signed by: Diana Moody 2/25/2025 4:06 PM Dictation workstation:   [id number]    ECG 12 lead    Result Date: 2/25/2025  Normal sinus rhythm Normal ECG When compared with ECG of 02-FEB-2025 10:38, No significant change was found    XR chest 2 views    Result Date: 2/25/2025  Interpreted By:  Kyler Casas, STUDY: XR CHEST 2 VIEWS;  2/25/2025 2:11 pm   INDICATION: Signs/Symptoms:sob.     COMPARISON: 02/02/2025   ACCESSION NUMBER(S): [id number]   ORDERING CLINICIAN: GRACIE RINCON   FINDINGS:         CARDIOMEDIASTINAL SILHOUETTE:  Cardiomediastinal silhouette is normal in size and configuration.   LUNGS: The lungs are hyperinflated but clear. There is no consolidation or effusion. There is no edema.   ABDOMEN: No remarkable upper abdominal findings.   BONES: No acute osseous changes.       Emphysematous changes. No evidence of acute cardiopulmonary process.     MACRO: None   Signed by: Kyler Casas 2/25/2025 2:19 PM Dictation workstation:   [id number]    ECG 12 lead    Result Date: 2/11/2025  Normal sinus rhythm Normal ECG When compared with ECG of 17-JAN-2025 15:03, (unconfirmed) No significant change was found See ED provider note for full interpretation and clinical correlation Confirmed by Sandoval Bob ([id number]) on 2/11/2025 11:38:55 AM    XR chest 1 view    Result Date: 2/2/2025  STUDY: Chest Radiograph; 02/02/2025 11:46 AM INDICATION: Shortness of breath, cough. COMPARISON: XR chest 01/17/2025, 11/12/2024. ACCESSION NUMBER(S): [id number] ORDERING CLINICIAN: MARINA LAYTON TECHNIQUE:  Frontal chest was obtained at 11:45:00 hours. FINDINGS: CARDIOMEDIASTINAL SILHOUETTE: Cardiomediastinal silhouette is normal in size and configuration.  LUNGS: Lungs are clear.  ABDOMEN: No remarkable upper abdominal findings.  BONES: No acute osseous changes.    Consistent with COPD.  No acute findings. Signed by Cisco Leos MD      Assessment and Plan    Sheldon Arteaga is a 84 y.o. male admitted on 2/25/2025  with PMHx of Afib on eliquis, HTN, diastolic HF, COPD, CVA, PAD s/p procedure, CVA, HLD, and tobacco use disorder who presented to ED for dyspnea admitted for possible COPD exacerbation.      Acute Medical Issues   #COPD exacerbation  - New cough productive of green sputum  - CXR, Chest CT without acute cardiopulmonary process  - Patient stopped smoking 3-4 weeks ago  -TTE pending read  PLAN:   - Albuterol 2.5 mg Q2H prn   - Azithromycin 500 mg Q24H (2/25-2/27)  - IS and Acapella  - Prednisone 40 mg daily (2/25-)  - Formoterol 20 mcg Q12H  RT       Chronic Medical Issues   Chronic Medical Issues:  #Atrial Fibrillation: Continue home Metoprolol tartrate 25 mg BID, eliquis  #HLD: continue home Atorvastatin  #HTN: continue home Amlodipine  #Hx of CVA, PAD: continue home Clopidogrel   #BPH: continue flomax 0.4mg daily     F: PO intake & IVF PRN   E: Replete as needed   N: Regular diet  GI ppx: None  DVT ppx: Therapeutic eliquis  Antibiotics: Azithromycin  Tubes/Lines/Drains: pIV      Emergency Contact: Extended Emergency Contact Information  Primary Emergency Contact: Stuart Arteaga  Home Phone: [phone]  Relation: Daughter   Code: DNR and No Intubation     Disposition: Undergoing treatment for COPD exacerbation with steroids and azithromycin. Anticipate discharge tomorrow, patient does not wish to go to SNF for recovery      Brennon Stark, DO  Internal Medicine, PGY- 1  02/26/25 at 10:44 AM

## 2025-02-26 NOTE — PROGRESS NOTES
"Occupational Therapy    Evaluation    Patient Name: Sheldon Arteaga \"Lul"  MRN: [id number]  Department: [facility] OBS  Room: 221/221-A  Today's Date: 2/26/2025  Time Calculation  Start Time: 0910  Stop Time: 0925  Time Calculation (min): 15 min    Assessment  IP OT Assessment  OT Assessment: Pt is an 85 yo M referred to occupational therapy for impaired self-care and functional mobility 2/2 hospitalization for dyspnea. Pt demonstrates near baseline for ADLs and functional mobility completed bed mobility and STS w/ close supervision and functional mobility w/ CGA. Pt would benefit from OT services at the LOW intensity level to increase safety and endurance and help w/ return to PLOF.  Prognosis: Good  Barriers to Discharge Home: No anticipated barriers  Evaluation/Treatment Tolerance: Patient tolerated treatment well  Medical Staff Made Aware: Yes  End of Session Communication: Bedside nurse  End of Session Patient Position: Bed, 3 rail up, Alarm on (in room w/ PT)  Plan:  Treatment Interventions: ADL retraining, Functional transfer training, UE strengthening/ROM, Endurance training, Equipment evaluation/education, Compensatory technique education  OT Frequency: 2 times per week  OT Discharge Recommendations: Low intensity level of continued care  OT Recommended Transfer Status: Stand by assist, Assist of 1  OT - OK to Discharge: Yes (per OT POC)    Subjective   Current Problem:  1. Shortness of breath        2. Dyspnea on exertion  Transthoracic Echo (TTE) Complete    Transthoracic Echo (TTE) Complete        General:  General  Reason for Referral: Pt is an 85 yo M referred to occupational therapy for impaired self-care and functional mobility 2/2 hospitalization for dyspnea  Referred By: Dr. JANE Rider  Past Medical History Relevant to Rehab: A Fib (Eliquis),HTN, diastolic heart failure, COPD, CVA, PAD, HLD, hypoxic respiratory failure, Flu A  Family/Caregiver Present: No  Prior to Session Communication: Bedside " "nurse  Patient Position Received: Bed, 3 rail up, Alarm on  Preferred Learning Style: verbal, visual  General Comment: Pt pleasant and agreeable to therapy evaluation. Pt states he \"wants to get up and walk.\" Cleared by RN prior to session.  Precautions:  Medical Precautions: Fall precautions  Precautions Comment: melissa ponce     Date/Time Vitals Session Patient Position Pulse Resp SpO2 BP MAP (mmHg)    02/26/25 0910 --  --  --  --  94 %  --  --           Vital Signs Comment: SpO2 remained stable at 94-96% during ambulation and activities    Pain:  Pain Assessment  Pain Assessment: 0-10  0-10 (Numeric) Pain Score: 0 - No pain    Objective   Cognition:  Overall Cognitive Status: Within Functional Limits  Arousal/Alertness: Appropriate responses to stimuli  Orientation Level: Oriented X4    Home Living:  Type of Home: Mobile home  Lives With: Adult children (Daughter)  Home Adaptive Equipment: Walker rolling or standard, Cane, Wheelchair-power  Home Layout: One level  Home Access: Ramped entrance (3 HAM in front of home, pt uses ramp in back typically)  Bathroom Shower/Tub: Tub/shower unit  Bathroom Toilet: Standard  Bathroom Equipment: Grab bars in shower, Shower chair with back   Prior Function:  Level of [place]: Independent with ADLs and functional transfers, Independent with homemaking with ambulation  Receives Help From: Family  ADL Assistance: Independent (Pt reports getting winded while completing dressing and having to take breaks, pt educated on energy conservation techniques to use during ADLs)  Homemaking Assistance: Independent (Splits w/ daughter)  Ambulatory Assistance: Independent (no AD)  Prior Function Comments: Pt reports his daughter works night shift so is around to assist during the day,.    ADL:  Eating Assistance: Independent  Grooming Assistance: Independent  Bathing Assistance: Stand by  UE Dressing Assistance: Stand by  LE Dressing Assistance: Stand by  LE Dressing Deficit: Don/doff R " sock, Don/doff L sock  Toileting Assistance with Device: Stand by  Functional Assistance: Stand by  ADL Comments: Pt completed bed mobility w/ close supervision and STS w/ close supervision. Pt completed functional mobility in hallway w/ no device and CGA. Pt completed LE dressing w/ supervision. Other ADLs anticipated.  Activity Tolerance:  Endurance: Decreased tolerance for upright activites  Bed Mobility/Transfers:   Bed Mobility  Bed Mobility: Yes  Bed Mobility 1  Bed Mobility 1: Supine to sitting, Sitting to supine  Level of Assistance 1: Close supervision  Bed Mobility Comments 1: HOB slightly elevated    Transfers  Transfer: Yes  Transfer 1  Transfer From 1: Bed to  Transfer to 1: Stand  Technique 1: Sit to stand, Stand to sit  Transfer Level of Assistance 1: Close supervision    Functional Mobility:  Functional Mobility  Functional Mobility Performed: Yes  Functional Mobility 1  Surface 1: Level tile  Device 1: No device  Assistance 1: Contact guard  Comments 1: Pt appears cautious while ambulating walking slowly and steady. Pt reports he gets more winded when he walks fast. Pt educated on use of walker to increase balance and safety, pt deferred at this time.  Sitting Balance:  Static Sitting Balance  Static Sitting-Balance Support: Feet supported  Static Sitting-Level of Assistance: Independent  Dynamic Sitting Balance  Dynamic Sitting-Balance Support: Feet supported  Dynamic Sitting-Level of Assistance: Independent  Standing Balance:  Static Standing Balance  Static Standing-Balance Support: Bilateral upper extremity supported  Static Standing-Level of Assistance: Close supervision  Dynamic Standing Balance  Dynamic Standing-Balance Support: Bilateral upper extremity supported  Dynamic Standing-Level of Assistance: Close supervision    Sensation:  Light Touch: No apparent deficits  Strength:  Strength Comments: BUEs grossly 4+/5  Coordination:  Movements are Fluid and Coordinated: Yes   Hand  Function:  Hand Function  Gross Grasp: Functional  Coordination: Functional  Extremities:   RUE: Within Functional Limits  LUE: Within Functional Limits  RLE: Within Functional Limits  LLE: Within Functional Limits    Outcome Measures:   [facility] Daily Activity  Putting on and taking off regular lower body clothing: A little  Bathing (including washing, rinsing, drying): A little  Putting on and taking off regular upper body clothing: None  Toileting, which includes using toilet, bedpan or urinal: A little  Taking care of personal grooming such as brushing teeth: None  Eating Meals: None  Daily Activity - Total Score: 21    OT Adult Other Outcome Measures  4AT: 1/12  Patient's 4AT score:  Alertness: 0 - Normal (fully alert, but not agitated, throughout assessment)   AMT4: 0 - No mistakes   Attention: 1 - Starts but scores <7 months / refuses to start   Acute change or fluctuating course: 0 - No    4 AT Score: 1/12     4 AT is a standardized assessment completed with patient to assess delirium. The 4AT assesses 4 items  including alertness, abbreviated mental test (AMT4), Attention, and acute change or fluctuating course.     The test is scored out of 12 points. Score indications are as follows:   0: delirium or severe cognitive impairment unlikely   (delirium is still possible if acute change or fluctuating course information is incomplete and prior cognitive status is not obtained)  1-3: possible cognitive impairment  4 or above: possible delirium +/- cognitive impairment     The 4AT is scored as follows:   Alertness:   Normal (fully alert, but not agitated, throughout assessment) 0  Mild Sleepiness for <10 seconds after waking, then normal   0  Clearly Abnormal       4    AMT4: (age, date of birth, place (name of hospital or building), current year)  No mistakes     0  1 mistake    1  2 or more mistakes/untestable 2    Attention: (months of they year in backwards order starting with December)  Achieves 7 months or  more correctly     0  Starts but scores <7 months/refuses to start    1  Untestable (cannot start because unwell, drowsy, inattentive) 2    Acute change or fluctuating Course: (change in cognition or mental function in last 2 wks and is still evident in last 24 hrs.   No  0  Yes  4     Education Documentation  Body Mechanics, taught by Mariann Bryan OT at 2/26/2025 10:58 AM.  Learner: Patient  Readiness: Acceptance  Method: Explanation  Response: Verbalizes Understanding  Comment: Pt educated on POC, DC recs, safety and body mechanics during transfers and ADLs    ADL Training, taught by Mariann Bryan OT at 2/26/2025 10:58 AM.  Learner: Patient  Readiness: Acceptance  Method: Explanation  Response: Verbalizes Understanding  Comment: Pt educated on POC, DC recs, safety and body mechanics during transfers and ADLs    Goals:   Encounter Problems       Encounter Problems (Active)       ADLs       Patient will perform UB and LB bathing with set-up level of assistance and PRN bathroom equipment.       Start:  02/26/25    Expected End:  03/12/25            Patient with complete lower body dressing with set-up level of assistance donning and doffing all LE clothes  with PRN adaptive equipment while edge of bed and standing       Start:  02/26/25    Expected End:  03/12/25            Patient will complete toileting including hygiene clothing management/hygiene with supervision level of assistance and PRN bathroom equipment.       Start:  02/26/25    Expected End:  03/12/25               BALANCE       Pt will maintain dynamic standing balance during ADL task with supervision level of assistance in order to demonstrate decreased risk of falling and improved postural control.       Start:  02/26/25    Expected End:  03/12/25            Patient will maintain static standing balance during ADL task with supervision level of assistance drop down in order to demonstrate decreased risk of falling and improved postural control.        Start:  02/26/25    Expected End:  03/12/25               MOBILITY       Patient will perform Functional mobility max Household distances/Community Distances with supervision level of assistance and least restrictive device in order to improve safety and functional mobility.       Start:  02/26/25    Expected End:  03/12/25

## 2025-02-27 ENCOUNTER — PHARMACY VISIT (OUTPATIENT)
Dept: PHARMACY | Facility: CLINIC | Age: 85
End: 2025-02-27
Payer: COMMERCIAL

## 2025-02-27 VITALS
WEIGHT: 128.2 LBS | BODY MASS INDEX: 17.36 KG/M2 | HEART RATE: 64 BPM | RESPIRATION RATE: 16 BRPM | OXYGEN SATURATION: 93 % | HEIGHT: 72 IN | TEMPERATURE: 97.7 F | SYSTOLIC BLOOD PRESSURE: 136 MMHG | DIASTOLIC BLOOD PRESSURE: 71 MMHG

## 2025-02-27 PROBLEM — R06.00 DYSPNEA: Status: RESOLVED | Noted: 2025-02-25 | Resolved: 2025-02-27

## 2025-02-27 LAB
ALBUMIN SERPL BCP-MCNC: 3.5 G/DL (ref 3.4–5)
ANION GAP SERPL CALC-SCNC: 10 MMOL/L (ref 10–20)
BUN SERPL-MCNC: 16 MG/DL (ref 6–23)
CALCIUM SERPL-MCNC: 8.3 MG/DL (ref 8.6–10.3)
CHLORIDE SERPL-SCNC: 102 MMOL/L (ref 98–107)
CO2 SERPL-SCNC: 27 MMOL/L (ref 21–32)
CREAT SERPL-MCNC: 0.66 MG/DL (ref 0.5–1.3)
EGFRCR SERPLBLD CKD-EPI 2021: >90 ML/MIN/1.73M*2
ERYTHROCYTE [DISTWIDTH] IN BLOOD BY AUTOMATED COUNT: 16 % (ref 11.5–14.5)
GLUCOSE SERPL-MCNC: 99 MG/DL (ref 74–99)
HCT VFR BLD AUTO: 32.5 % (ref 41–52)
HGB BLD-MCNC: 10.5 G/DL (ref 13.5–17.5)
MCH RBC QN AUTO: 30.9 PG (ref 26–34)
MCHC RBC AUTO-ENTMCNC: 32.3 G/DL (ref 32–36)
MCV RBC AUTO: 96 FL (ref 80–100)
NRBC BLD-RTO: 0.3 /100 WBCS (ref 0–0)
PHOSPHATE SERPL-MCNC: 3 MG/DL (ref 2.5–4.9)
PLATELET # BLD AUTO: 240 X10*3/UL (ref 150–450)
POTASSIUM SERPL-SCNC: 3.7 MMOL/L (ref 3.5–5.3)
RBC # BLD AUTO: 3.4 X10*6/UL (ref 4.5–5.9)
SODIUM SERPL-SCNC: 135 MMOL/L (ref 136–145)
WBC # BLD AUTO: 6.6 X10*3/UL (ref 4.4–11.3)

## 2025-02-27 PROCEDURE — 2500000002 HC RX 250 W HCPCS SELF ADMINISTERED DRUGS (ALT 637 FOR MEDICARE OP, ALT 636 FOR OP/ED): Mod: MUE

## 2025-02-27 PROCEDURE — 94760 N-INVAS EAR/PLS OXIMETRY 1: CPT

## 2025-02-27 PROCEDURE — 9420000001 HC RT PATIENT EDUCATION 5 MIN

## 2025-02-27 PROCEDURE — 94640 AIRWAY INHALATION TREATMENT: CPT

## 2025-02-27 PROCEDURE — 85027 COMPLETE CBC AUTOMATED: CPT

## 2025-02-27 PROCEDURE — 80069 RENAL FUNCTION PANEL: CPT

## 2025-02-27 PROCEDURE — 99239 HOSP IP/OBS DSCHRG MGMT >30: CPT

## 2025-02-27 PROCEDURE — S4991 NICOTINE PATCH NONLEGEND: HCPCS

## 2025-02-27 PROCEDURE — RXMED WILLOW AMBULATORY MEDICATION CHARGE

## 2025-02-27 PROCEDURE — G0378 HOSPITAL OBSERVATION PER HR: HCPCS

## 2025-02-27 PROCEDURE — 36415 COLL VENOUS BLD VENIPUNCTURE: CPT

## 2025-02-27 PROCEDURE — 2500000001 HC RX 250 WO HCPCS SELF ADMINISTERED DRUGS (ALT 637 FOR MEDICARE OP)

## 2025-02-27 PROCEDURE — 94668 MNPJ CHEST WALL SBSQ: CPT

## 2025-02-27 PROCEDURE — 2500000004 HC RX 250 GENERAL PHARMACY W/ HCPCS (ALT 636 FOR OP/ED)

## 2025-02-27 RX ORDER — PREDNISONE 20 MG/1
40 TABLET ORAL DAILY
Qty: 6 TABLET | Refills: 0 | Status: SHIPPED | OUTPATIENT
Start: 2025-02-28 | End: 2025-03-03

## 2025-02-27 RX ORDER — AZITHROMYCIN 500 MG/1
500 TABLET, FILM COATED ORAL
Qty: 1 TABLET | Refills: 0 | Status: SHIPPED | OUTPATIENT
Start: 2025-02-27 | End: 2025-02-28

## 2025-02-27 RX ORDER — IBUPROFEN 200 MG
1 TABLET ORAL DAILY
Qty: 30 PATCH | Refills: 0 | Status: SHIPPED | OUTPATIENT
Start: 2025-02-28 | End: 2025-03-30

## 2025-02-27 RX ADMIN — BENZONATATE 200 MG: 100 CAPSULE ORAL at 09:30

## 2025-02-27 RX ADMIN — APIXABAN 2.5 MG: 2.5 TABLET, FILM COATED ORAL at 09:31

## 2025-02-27 RX ADMIN — DOCUSATE SODIUM 100 MG: 100 CAPSULE, LIQUID FILLED ORAL at 09:30

## 2025-02-27 RX ADMIN — TIOTROPIUM BROMIDE INHALATION SPRAY 2 PUFF: 3.12 SPRAY, METERED RESPIRATORY (INHALATION) at 09:31

## 2025-02-27 RX ADMIN — PREDNISONE 40 MG: 20 TABLET ORAL at 09:30

## 2025-02-27 RX ADMIN — BUDESONIDE 0.25 MG: 0.25 INHALANT RESPIRATORY (INHALATION) at 08:31

## 2025-02-27 RX ADMIN — TAMSULOSIN HYDROCHLORIDE 0.8 MG: 0.4 CAPSULE ORAL at 09:31

## 2025-02-27 RX ADMIN — CLOPIDOGREL 75 MG: 75 TABLET ORAL at 09:31

## 2025-02-27 RX ADMIN — Medication 1 PATCH: at 09:30

## 2025-02-27 RX ADMIN — METOPROLOL TARTRATE 25 MG: 25 TABLET, FILM COATED ORAL at 09:31

## 2025-02-27 RX ADMIN — FORMOTEROL FUMARATE DIHYDRATE 20 MCG: 20 SOLUTION RESPIRATORY (INHALATION) at 08:31

## 2025-02-27 ASSESSMENT — COGNITIVE AND FUNCTIONAL STATUS - GENERAL
DRESSING REGULAR LOWER BODY CLOTHING: A LITTLE
MOBILITY SCORE: 18
MOVING TO AND FROM BED TO CHAIR: A LITTLE
TURNING FROM BACK TO SIDE WHILE IN FLAT BAD: A LITTLE
WALKING IN HOSPITAL ROOM: A LITTLE
CLIMB 3 TO 5 STEPS WITH RAILING: A LOT
STANDING UP FROM CHAIR USING ARMS: A LITTLE

## 2025-02-27 ASSESSMENT — PAIN - FUNCTIONAL ASSESSMENT: PAIN_FUNCTIONAL_ASSESSMENT: 0-10

## 2025-02-27 ASSESSMENT — PAIN SCALES - GENERAL: PAINLEVEL_OUTOF10: 0 - NO PAIN

## 2025-02-27 NOTE — NURSING NOTE
Pt discharge with daughter, IV removed all discharge information reviewed, questions answered. Ciprianoo and tele removed.

## 2025-02-27 NOTE — DISCHARGE SUMMARY
Discharge Diagnosis  Dyspnea    Issues Requiring Follow-Up  COPD management    Discharge Meds     Medication List      START taking these medications     azithromycin 500 mg tablet; Commonly known as: Zithromax; Take 1 tablet   (500 mg) by mouth once every 24 hours for 1 dose.   nicotine 14 mg/24 hr patch; Commonly known as: Nicoderm CQ; Place 1   patch over 24 hours on the skin once daily.; Start taking on: February 28, 2025   predniSONE 20 mg tablet; Commonly known as: Deltasone; Take 2 tablets   (40 mg) by mouth once daily for 3 days.; Start taking on: February 28, 2025     CONTINUE taking these medications     * albuterol 90 mcg/actuation inhaler; Commonly known as: Ventolin HFA;   Inhale 2 puffs every 4 hours if needed for wheezing or shortness of   breath.   * albuterol 2.5 mg /3 mL (0.083 %) nebulizer solution; Take 3 mL (2.5   mg) by nebulization every 2 hours if needed for wheezing or shortness of   breath.   apixaban 5 mg tablet; Commonly known as: Eliquis; Take 0.5 tablets (2.5   mg) by mouth 2 times a day.   atorvastatin 40 mg tablet; Commonly known as: Lipitor; Take 1 tablet (40   mg) by mouth once daily at bedtime.   budesonide-formoteroL 80-4.5 mcg/actuation inhaler; Commonly known as:   Symbicort; Inhale 2 puffs 2 times a day. Rinse mouth with water after use   to reduce aftertaste and incidence of candidiasis. Do not swallow.   clopidogrel 75 mg tablet; Commonly known as: Plavix; Take 1 tablet (75   mg) by mouth once daily.   metoprolol tartrate 25 mg tablet; Commonly known as: Lopressor; Take 1   tablet (25 mg) by mouth 2 times a day.   Mucinex DM  mg 12 hr tablet; Generic drug:   dextromethorphan-guaifenesin; Take 1 tablet by mouth every 12 hours if   needed for cough. Do not crush, chew, or split.   tamsulosin 0.4 mg 24 hr capsule; Commonly known as: Flomax; Take 2   capsules (0.8 mg) by mouth once daily.  * This list has 2 medication(s) that are the same as other medications    prescribed for you. Read the directions carefully, and ask your doctor or   other care provider to review them with you.     STOP taking these medications     benzonatate 200 mg capsule; Commonly known as: Tessalon       Test Results Pending At Discharge  Pending Labs       No current pending labs.            Hospital Course  Sheldon Arteaga is a 84 y.o. male with PMHx of Afib on eliquis, HTN, diastolic HF, COPD, CVA, PAD s/p procedure, CVA, HLD, and tobacco use disorder who presented to [facility] ED for dyspnea.    Patient was afebrile and hemodynamically stable in the ED. Troponin, BNP, lactate negative. CXR and CT chest/abdomen negative for acute cardiopulmonary process. HR increased to 130s on ambulation but patient not hypoxic while on ED. Admitted in stable condition for COPD exacerbation.    While on the floors, he was started on azithromycin for 3 days and prednisone.  Respiratory status improved.  Patient underwent TTE which indicated ejection fraction of 60 to 65% mild mitral valve prolapse and elevated RVSP..  Physical therapy did not recommend SNF placement at this time. Heart rate returned to wnl after resuming home metoprolol tartrate Patient was stable for discharge on 2/27/2025 to home with Mucinex, instructions to see pulmonology,  and to complete a course of azithromycin and prednisone.    Pertinent Physical Exam At Time of Discharge  Physical Exam  Constitutional:       General: He is not in acute distress.     Appearance: He is not ill-appearing.   HENT:      Head: Normocephalic.   Eyes:      General: No scleral icterus.  Cardiovascular:      Rate and Rhythm: Normal rate and regular rhythm.      Pulses: Normal pulses.      Heart sounds: Normal heart sounds. No murmur heard.  Pulmonary:      Effort: Pulmonary effort is normal. No respiratory distress.      Breath sounds: Rales present.   Abdominal:      General: Abdomen is flat. There is no distension.      Tenderness: There is no abdominal  tenderness. There is no guarding.   Musculoskeletal:      Right lower leg: No edema.      Left lower leg: No edema.   Skin:     General: Skin is warm.   Neurological:      Mental Status: He is alert and oriented to person, place, and time. Mental status is at baseline.   Psychiatric:         Mood and Affect: Mood normal.         Outpatient Follow-Up  Future Appointments   Date Time Provider Department Center   5/19/2025 11:30 AM ARMEN Oliveira-CNP [facility]   6/9/2025 11:20 AM ARMEN Juan-CNP [facility]   10/10/2025 10:15 AM Parker Olmedo MD [facility]         Brennon Stark DO

## 2025-02-27 NOTE — DISCHARGE INSTRUCTIONS
Please, take your home medications as instructed after being discharged from the hospital.     NEW MEDICATIONS:  Please continue to take azithromycin 1 tablet for this evening (2/27/25)  Please continue to take prednisone 40 mg starting tomorrow 2/28 for 3 days in total  We have supplied you with a nicotine patch use 1 patch daily       UPCOMING APPOINTMENTS:     Please, follow-up with your Primary Care Provider within 7 to 14 days after leaving the hospital.  A referral has been made to pulmonology for you. /appointment services has been requested to make an appointment for you, however if you do not hear back from them within 1 to 2 days, please call your primary physician's office to schedule an appointment. Bring your photo ID and insurance card to your appointment.   [facility]  services can be reached at [phone].     If you experience any worsening symptoms or have any concerns, please contact your primary care provider to schedule an appointment. If you cannot get in touch with your primary care physician, please return to the nearest emergency room or urgent care clinic for an evaluation and treatment.     Thank you for choosing [facility] and allowing us to partake in your medical care!     - Your [facility] inpatient primary care team.

## 2025-02-27 NOTE — CONSULTS
Reason For Consult  Patient consulted on COPD Education and management.     History Of Present Illness  Dejon Arteaga is a 84 y.o. male presenting with Dyspnea. Patient has a hx of COPD.     Last admission -2/4/2025 with smoking cessation and COPD education preformed.     Past Medical History  He has a past medical history of (HFpEF) heart failure with preserved ejection fraction, A-fib (Multi), COPD (chronic obstructive pulmonary disease) (Multi), Hypertension, and Stroke (Multi).    Social History  He reports that he quit smoking about 4 weeks ago. His smoking use included cigarettes. He started smoking about 71 years ago. He has a 71.1 pack-year smoking history. He has never used smokeless tobacco. He reports that he does not drink alcohol and does not use drugs.      Last Recorded Vitals  Blood pressure 136/71, pulse 64, temperature 36.5 °C (97.7 °F), temperature source Temporal, resp. rate 16, height 1.829 m (6'), weight 58.2 kg (128 lb 3.2 oz), SpO2 93%.         Assessment/Plan      Patient seen by this RRT and education provided.   Patient states quit smoking since last admission.     Patient has follow up appts scheduled including PFT testing and a sleep study (rescheduling.)    Patient is currently taking Albuterol nebulizer's at home and he is using TID.    Patient educated on Symbicort -how to use, spacer technique and importance of medication.     Patient verbalized understanding.     Better breathers support group information given to patient.            ZEKE SAMAYOA, RRT

## 2025-02-28 ENCOUNTER — PATIENT OUTREACH (OUTPATIENT)
Dept: PRIMARY CARE | Facility: CLINIC | Age: 85
End: 2025-02-28
Payer: MEDICARE

## 2025-02-28 ENCOUNTER — TELEPHONE (OUTPATIENT)
Dept: PRIMARY CARE | Facility: CLINIC | Age: 85
End: 2025-02-28
Payer: MEDICARE

## 2025-02-28 LAB
ATRIAL RATE: 77 BPM
P AXIS: 73 DEGREES
P OFFSET: 185 MS
P ONSET: 150 MS
PR INTERVAL: 134 MS
Q ONSET: 217 MS
QRS COUNT: 13 BEATS
QRS DURATION: 94 MS
QT INTERVAL: 394 MS
QTC CALCULATION(BAZETT): 445 MS
QTC FREDERICIA: 428 MS
R AXIS: 68 DEGREES
T AXIS: 65 DEGREES
T OFFSET: 414 MS
VENTRICULAR RATE: 77 BPM

## 2025-02-28 NOTE — PROGRESS NOTES
Discharge Facility:[facility]   Discharge Diagnosis:Dyspnea  Admission Date:2/25/25  Discharge Date: 2/27/25    PCP Appointment Date:Task to office staff   Specialist Appointment Date: Pulmonology   Hospital Encounter and Summary Linked: Yes  See discharge assessment below for further details  ED to Hosp-Admission (Discharged) with Yosvany Rider DO (02/25/2025)     Two attempts were made to reach patient within two business days after discharge. Voicemail left with contact information for patient to call back with any non-emergent questions or concerns.      Daily Tucker LPN

## 2025-02-28 NOTE — TELEPHONE ENCOUNTER
Transition of Care    Inpatient facility: [facility]   Discharge diagnosis: AFIB  Discharged to: HOME  Discharge date: 02/27/25  Initial Call date: 02/28/25  Spoke with patient/caregiver: DAUGHTER                                                                     Do you need assistance  visits prior to your PCP visit: No  Home health care ordered: No  Have you been contacted by home care and have a start of care date: No  Are you taking medications as prescribed at discharge: Yes    Referral to [facility] Pharmacist: No  Patient advised to bring all medications to PCP follow-up appointment.  Patient advised to follow discharge instructions until provider follow-up.  TCM visit date: 03/17/25  TCM provider visit with: ALEJANDRO FELTON

## 2025-03-06 DIAGNOSIS — I10 PRIMARY HYPERTENSION: ICD-10-CM

## 2025-03-06 RX ORDER — METOPROLOL TARTRATE 25 MG/1
25 TABLET, FILM COATED ORAL DAILY
Qty: 90 TABLET | Refills: 1 | Status: SHIPPED | OUTPATIENT
Start: 2025-03-06 | End: 2025-09-02

## 2025-03-07 DIAGNOSIS — J44.1 ACUTE EXACERBATION OF CHRONIC OBSTRUCTIVE PULMONARY DISEASE (COPD) (MULTI): ICD-10-CM

## 2025-03-07 DIAGNOSIS — I63.9 ACUTE ISCHEMIC STROKE (MULTI): Primary | ICD-10-CM

## 2025-03-12 ENCOUNTER — PATIENT OUTREACH (OUTPATIENT)
Dept: PRIMARY CARE | Facility: CLINIC | Age: 85
End: 2025-03-12
Payer: MEDICARE

## 2025-03-12 NOTE — PROGRESS NOTES
Call regarding appt. with PCP on or after hospitalization.  At this time no follow up apppt was made.  At time of outreach call the patient feels as if their condition has improved since last visit.  No questions or concerns at this time.

## 2025-03-16 LAB
ATRIAL RATE: 100 BPM
P AXIS: 82 DEGREES
P OFFSET: 210 MS
P ONSET: 154 MS
PR INTERVAL: 134 MS
Q ONSET: 221 MS
QRS COUNT: 17 BEATS
QRS DURATION: 94 MS
QT INTERVAL: 366 MS
QTC CALCULATION(BAZETT): 472 MS
QTC FREDERICIA: 434 MS
R AXIS: 73 DEGREES
T AXIS: 33 DEGREES
T OFFSET: 404 MS
VENTRICULAR RATE: 100 BPM

## 2025-03-17 ENCOUNTER — APPOINTMENT (OUTPATIENT)
Dept: PRIMARY CARE | Facility: CLINIC | Age: 85
End: 2025-03-17
Payer: MEDICARE

## 2025-03-17 VITALS
WEIGHT: 133.9 LBS | DIASTOLIC BLOOD PRESSURE: 58 MMHG | BODY MASS INDEX: 18.16 KG/M2 | OXYGEN SATURATION: 100 % | HEART RATE: 79 BPM | TEMPERATURE: 96.3 F | SYSTOLIC BLOOD PRESSURE: 102 MMHG

## 2025-03-17 DIAGNOSIS — J44.9 CHRONIC OBSTRUCTIVE PULMONARY DISEASE, UNSPECIFIED COPD TYPE (MULTI): Primary | ICD-10-CM

## 2025-03-17 PROCEDURE — 1160F RVW MEDS BY RX/DR IN RCRD: CPT

## 2025-03-17 PROCEDURE — 1123F ACP DISCUSS/DSCN MKR DOCD: CPT

## 2025-03-17 PROCEDURE — 1159F MED LIST DOCD IN RCRD: CPT

## 2025-03-17 PROCEDURE — 3078F DIAST BP <80 MM HG: CPT

## 2025-03-17 PROCEDURE — 3074F SYST BP LT 130 MM HG: CPT

## 2025-03-17 PROCEDURE — 99214 OFFICE O/P EST MOD 30 MIN: CPT

## 2025-03-17 PROCEDURE — 1157F ADVNC CARE PLAN IN RCRD: CPT

## 2025-03-17 NOTE — PATIENT INSTRUCTIONS
Continue to hold amlodipine  Follow up with the specialists    Follow up with me 3 months [facility]    Thank you for coming in today, if any questions or concerns arise, please call my office.   ARMEN Oliveira-CNP

## 2025-03-17 NOTE — PROGRESS NOTES
"Patient: Sheldon Arteaga \"Lul"  : 1940  PCP: Alvaro Cedeño, APRN-CNP  MRN: [id number]  Program: No linked episodes     Sheldon Arteaga \"Lul" is a 85 y.o. male presenting today for follow-up after being discharged from the hospital  greater than 14  days ago. The main problem requiring admission was pneumonia, Dyspnea, COPD. The discharge summary and/or Transitional Care Management documentation was reviewed. Medication reconciliation was performed as indicated via the \"Jose as Reviewed\" timestamp.     Vitals:    25 1049   BP: 102/58   Pulse: 79   Temp: 35.7 °C (96.3 °F)   SpO2: 100%         Chief Complaint   Patient presents with    Hospital Follow-up     JOSSELYN- admitted to [facility] - for Dyspnea,COPD Exacerbation    Accompanied by daughter, Stuart Arteaga \"Lul" was contacted by Transitional Care Management services two days after his discharge. This encounter and supporting documentation was reviewed.    The complexity of medical decision making for this patient's transitional care is moderate.    Review of Systems    Physical Exam  Vitals and nursing note reviewed.   Constitutional:       Appearance: Normal appearance.   HENT:      Head: Normocephalic.      Mouth/Throat:      Mouth: Mucous membranes are moist.   Cardiovascular:      Rate and Rhythm: Normal rate and regular rhythm.      Pulses: Normal pulses.      Heart sounds: Normal heart sounds. No murmur heard.     No friction rub. No gallop.   Pulmonary:      Effort: Pulmonary effort is normal. No respiratory distress.      Breath sounds: Wheezing and rhonchi present.   Abdominal:      General: Bowel sounds are normal. There is no distension.      Palpations: Abdomen is soft.      Tenderness: There is no abdominal tenderness.   Musculoskeletal:         General: No deformity. Normal range of motion.   Skin:     General: Skin is warm and dry.      Capillary Refill: Capillary refill takes less than 2 seconds. "   Neurological:      General: No focal deficit present.      Mental Status: He is alert and oriented to person, place, and time.   Psychiatric:         Mood and Affect: Mood normal.          Family History   Problem Relation Name Age of Onset    Heart attack Mother      Colon cancer Father      Heart attack Brother      Heart block Brother         No data recorded    Assessment/Plan   Problem List Items Addressed This Visit    None  Visit Diagnoses       Chronic obstructive pulmonary disease, unspecified COPD type (Multi)    -  Primary            No follow-ups on file.

## 2025-03-28 ENCOUNTER — PATIENT OUTREACH (OUTPATIENT)
Dept: PRIMARY CARE | Facility: CLINIC | Age: 85
End: 2025-03-28
Payer: MEDICARE

## 2025-04-11 DIAGNOSIS — I48.20 CHRONIC ATRIAL FIBRILLATION (MULTI): ICD-10-CM

## 2025-04-15 DIAGNOSIS — R33.9 URINARY RETENTION: ICD-10-CM

## 2025-04-16 RX ORDER — TAMSULOSIN HYDROCHLORIDE 0.4 MG/1
0.8 CAPSULE ORAL DAILY
Qty: 60 CAPSULE | Refills: 0 | Status: SHIPPED | OUTPATIENT
Start: 2025-04-16 | End: 2025-05-16

## 2025-05-13 DIAGNOSIS — I10 BENIGN ESSENTIAL HYPERTENSION: ICD-10-CM

## 2025-05-13 DIAGNOSIS — I48.20 CHRONIC ATRIAL FIBRILLATION (MULTI): ICD-10-CM

## 2025-05-13 DIAGNOSIS — R33.9 URINARY RETENTION: ICD-10-CM

## 2025-05-13 RX ORDER — TAMSULOSIN HYDROCHLORIDE 0.4 MG/1
0.8 CAPSULE ORAL DAILY
Qty: 180 CAPSULE | Refills: 3 | Status: SHIPPED | OUTPATIENT
Start: 2025-05-13 | End: 2025-05-14 | Stop reason: SDUPTHER

## 2025-05-14 ENCOUNTER — OFFICE VISIT (OUTPATIENT)
Dept: UROLOGY | Facility: HOSPITAL | Age: 85
End: 2025-05-14
Payer: MEDICARE

## 2025-05-14 DIAGNOSIS — N40.1 BENIGN PROSTATIC HYPERPLASIA WITH URINARY RETENTION: Primary | ICD-10-CM

## 2025-05-14 DIAGNOSIS — R33.8 BENIGN PROSTATIC HYPERPLASIA WITH URINARY RETENTION: Primary | ICD-10-CM

## 2025-05-14 PROCEDURE — G2211 COMPLEX E/M VISIT ADD ON: HCPCS | Performed by: NURSE PRACTITIONER

## 2025-05-14 PROCEDURE — 99214 OFFICE O/P EST MOD 30 MIN: CPT | Performed by: NURSE PRACTITIONER

## 2025-05-14 PROCEDURE — 1159F MED LIST DOCD IN RCRD: CPT | Performed by: NURSE PRACTITIONER

## 2025-05-14 PROCEDURE — 51798 US URINE CAPACITY MEASURE: CPT | Performed by: NURSE PRACTITIONER

## 2025-05-14 PROCEDURE — 1160F RVW MEDS BY RX/DR IN RCRD: CPT | Performed by: NURSE PRACTITIONER

## 2025-05-14 RX ORDER — TAMSULOSIN HYDROCHLORIDE 0.4 MG/1
0.8 CAPSULE ORAL DAILY
Qty: 180 CAPSULE | Refills: 3 | Status: SHIPPED | OUTPATIENT
Start: 2025-05-14 | End: 2026-05-14

## 2025-05-14 RX ORDER — CLOPIDOGREL BISULFATE 75 MG/1
75 TABLET ORAL DAILY
Qty: 90 TABLET | Refills: 0 | Status: SHIPPED | OUTPATIENT
Start: 2025-05-14

## 2025-05-14 NOTE — PROGRESS NOTES
[facility]  Outpatient Clinic Note    Patient Name:  Sheldon Arteaga  MRN:  [id number]  :  1940  Date of Service: 2025     Visit type: Follow up visit    HPI    Interval History:  Sheldon Arteaga is a 85 y.o. male  with history of HTN, HLD, COPD, Afib, DVT, possible HFpEF, CVA, PAD s/p resection of fem-pop bypass, who is being seen today for  problems listed below.     Problem list/Chief complaints:  Urinary retention - Taking Tamsulosin 0.4 mg BID    24: NPV. Patient was hospitalized for acute COPD exacerbation from -11/15/24 and had sprague catheter placed for urinary retention, started on flomax and was discharged home with referral to Urology.  Patient presents with his daughter. He denies hematuria, no fever or chills. TOV failed,  ml.  Sprague catheter replaced. Patient to increase Tamsulosin to 0.4 mg BID.    24: Patient presents for TOV. He is accompanied by his wife. He is taking Tamsulosin BID as prescribed. He is having constipation issues, discussed taking miralax and increasing fluid intake. TOV passed, PVR 23 ml.    25: Patient presents for follow up and PVR. He is accompanied by his daughter. He is taking Tamsulosin as prescribed and tolerating it well. PVR 0 ml.    Past Medical History:   Diagnosis Date    (HFpEF) heart failure with preserved ejection fraction     A-fib (Multi)     COPD (chronic obstructive pulmonary disease) (Multi)     Hypertension     Stroke (Multi)        Past Surgical History:   Procedure Laterality Date    CT ANGIO AORTA AND BILATERAL ILIOFEMORAL RUN OFF INCLUDING WITHOUT CONTRAST IF PERFORMED  04/10/2022    CT AORTA AND BILATERAL ILIOFEMORAL RUNOFF ANGIOGRAM W AND/OR WO IV CONTRAST 4/10/2022 [facility] EMERGENCY LEGACY    CT ANGIO AORTA AND BILATERAL ILIOFEMORAL RUN OFF INCLUDING WITHOUT CONTRAST IF PERFORMED  2022    CT AORTA AND BILATERAL ILIOFEMORAL RUNOFF ANGIOGRAM W AND/OR WO IV CONTRAST 2022 [facility] CLINICAL LEGACY     CT ANGIO AORTA AND BILATERAL ILIOFEMORAL RUN OFF INCLUDING WITHOUT CONTRAST IF PERFORMED  2022    CT AORTA AND BILATERAL ILIOFEMORAL RUNOFF ANGIOGRAM W AND/OR WO IV CONTRAST 2022 [facility]ERGENCY LEGACY    CT ANGIO AORTA AND BILATERAL ILIOFEMORAL RUN OFF INCLUDING WITHOUT CONTRAST IF PERFORMED  2023    CT AORTA AND BILATERAL ILIOFEMORAL RUNOFF ANGIOGRAM W AND/OR WO IV CONTRAST [facility] CT    CT ANGIO NECK  2022    CT NECK ANGIO W AND WO IV CONTRAST 2022 [facility] CLINICAL LEGACY    CT HEAD ANGIO W AND WO IV CONTRAST  2022    CT HEAD ANGIO W AND WO IV CONTRAST 2022 [facility] CLINICAL LEGACY    FEMORAL BYPASS      OTHER SURGICAL HISTORY  2019    [place] tooth extraction    OTHER SURGICAL HISTORY  2019    Tonsillectomy with adenoidectomy    OTHER SURGICAL HISTORY  2020    Lower leg fracture repair    OTHER SURGICAL HISTORY  2020    Cardiac catheterization    TOTAL HIP ARTHROPLASTY  2018    Hip Replacement       Social History     Socioeconomic History    Marital status:      Spouse name: Not on file    Number of children: Not on file    Years of education: Not on file    Highest education level: Not on file   Occupational History    Not on file   Tobacco Use    Smoking status: Former     Current packs/day: 0.00     Average packs/day: 1 pack/day for 71.1 years (71.1 ttl pk-yrs)     Types: Cigarettes     Start date:      Quit date: 2025     Years since quittin.2    Smokeless tobacco: Never   Vaping Use    Vaping status: Never Used   Substance and Sexual Activity    Alcohol use: Never    Drug use: Never    Sexual activity: Not Currently   Other Topics Concern    Not on file   Social History Narrative    Lives with Daughter. Wife passed 6 years ago.      Social Drivers of Health     Financial Resource Strain: Low Risk  (2025)    Overall Financial Resource Strain (CARDIA)     Difficulty of Paying Living Expenses: Not hard at all   Food Insecurity: No  Food Insecurity (2/25/2025)    Hunger Vital Sign     Worried About Running Out of Food in the Last Year: Never true     Ran Out of Food in the Last Year: Never true   Transportation Needs: No Transportation Needs (2/26/2025)    PRAPARE - Transportation     Lack of Transportation (Medical): No     Lack of Transportation (Non-Medical): No   Physical Activity: Not on file   Stress: Not on file   Social Connections: Not on file   Intimate Partner Violence: Not At Risk (2/25/2025)    Humiliation, Afraid, Rape, and Kick questionnaire     Fear of Current or Ex-Partner: No     Emotionally Abused: No     Physically Abused: No     Sexually Abused: No   Housing Stability: Low Risk  (2/26/2025)    Housing Stability Vital Sign     Unable to Pay for Housing in the Last Year: No     Number of Times Moved in the Last Year: 0     Homeless in the Last Year: No       Allergies   Allergen Reactions    Doxycycline GI Upset    Levaquin [Levofloxacin] Itching and Rash          Current Outpatient Medications:     albuterol (Ventolin HFA) 90 mcg/actuation inhaler, Inhale 2 puffs every 4 hours if needed for wheezing or shortness of breath., Disp: 8 g, Rfl: 5    albuterol 2.5 mg /3 mL (0.083 %) nebulizer solution, Take 3 mL (2.5 mg) by nebulization every 2 hours if needed for wheezing or shortness of breath., Disp: 150 mL, Rfl: 1    apixaban (Eliquis) 5 mg tablet, Take 0.5 tablets (2.5 mg) by mouth 2 times a day., Disp: 30 tablet, Rfl: 1    atorvastatin (Lipitor) 40 mg tablet, Take 1 tablet (40 mg) by mouth once daily at bedtime., Disp: 90 tablet, Rfl: 3    budesonide-formoteroL (Symbicort) 80-4.5 mcg/actuation inhaler, Inhale 2 puffs 2 times a day. Rinse mouth with water after use to reduce aftertaste and incidence of candidiasis. Do not swallow., Disp: 10.2 g, Rfl: 5    clopidogrel (Plavix) 75 mg tablet, Take 1 tablet (75 mg) by mouth once daily., Disp: 90 tablet, Rfl: 0    dextromethorphan-guaifenesin (Mucinex DM)  mg 12 hr tablet,  Take 1 tablet by mouth every 12 hours if needed for cough. Do not crush, chew, or split., Disp: 60 tablet, Rfl: 1    metoprolol tartrate (Lopressor) 25 mg tablet, Take 1 tablet (25 mg) by mouth once daily., Disp: 90 tablet, Rfl: 1    nicotine (Nicoderm CQ) 14 mg/24 hr patch, Place 1 patch over 24 hours on the skin once daily., Disp: 30 patch, Rfl: 0    tamsulosin (Flomax) 0.4 mg 24 hr capsule, Take 2 capsules (0.8 mg) by mouth once daily., Disp: 180 capsule, Rfl: 3     Review of system:  All other systems have been reviewed and are negative for complaints      Last recorded vitals:  There were no vitals taken for this visit.    Physical Exam:  General: Appears comfortable and in no apparent distress.  Head: Normocephalic, atraumatic  Eyes: Non-injected conjunctiva, sclera clear, no proptosis  Lungs: Breathing is easy, non-labored. Speaking in clear and complete sentences. Normal diaphragmatic movement.  Cardiovascular: no peripheral edema, cyanosis or pallor.   Abdomen: soft, non-distended, non-tender  : Bladder: non tender, not distended  MSK: Ambulatory with steady gait, unassisted  Skin: No visible rashes or lesions  Neurologic: Alert, oriented to person, place, and time  Psychiatric: mood and affect appropriate    Labs  Lab Results   Component Value Date    WBC 6.6 02/27/2025    HGB 10.5 (L) 02/27/2025    HCT 32.5 (L) 02/27/2025    MCV 96 02/27/2025     02/27/2025     Lab Results   Component Value Date    GLUCOSE 99 02/27/2025    CALCIUM 8.3 (L) 02/27/2025     (L) 02/27/2025    K 3.7 02/27/2025    CO2 27 02/27/2025     02/27/2025    BUN 16 02/27/2025    CREATININE 0.66 02/27/2025       Assessment and Plan:  Sheldon Arteaga is a 85 y.o. male with urinary retention, who presents for follow up and PVR.     Today we discussed BPH. BPH is the benign growth of prostate tissue that may cause bothersome urinary symptoms. The mechanism of action as well as the risks, benefits, common side effects,  and alternatives to all prescribed medications were discussed with the patient at length. The patient had the opportunity to ask questions and all questions were answered. I primarily discussed alpha blockers, 5ARIs, and PDE5i.  I explained that 5 alpha reductase inhibitors can shrink the prostate up to 30%, can artificially decrease their PSA value by 50%, but take approximately 6-9 months to reach full efficacy and have potential side effects to include decreased libido, impotence and breast tenderness. Additionally, if you continue to experience bothersome symptoms despite medication, there are minimally invasive procedures to alleviate these symptoms.    Plan:  -PVR 0 ml  -Discussed the risks and benefit of continuing Tamsulosin, medication is working well for patient with no side effects. Discussed other management options. The patient and I agreed to continue with medication.  -Follow-up in 1 year, or sooner if needed, to reassess symptoms and for medication refill.    All questions and concerns were addressed. Patient verbalizes understanding and has no other questions at this time.     Some elements copied from my note on 12/9/24, the elements have been updated and all reflect current decision making from today, 05/14/25    E&M visit today is associated with current or anticipated ongoing medical care services related to a patient's single, serious condition or a complex condition.    ARMEN Juan-CNP   [facility] [place]  05/14/25 2:15 PM

## 2025-05-19 ENCOUNTER — APPOINTMENT (OUTPATIENT)
Dept: PRIMARY CARE | Facility: CLINIC | Age: 85
End: 2025-05-19
Payer: MEDICARE

## 2025-05-31 DIAGNOSIS — I10 PRIMARY HYPERTENSION: ICD-10-CM

## 2025-06-02 RX ORDER — AMLODIPINE BESYLATE 5 MG/1
5 TABLET ORAL DAILY
Qty: 90 TABLET | Refills: 0 | Status: SHIPPED | OUTPATIENT
Start: 2025-06-02

## 2025-06-09 ENCOUNTER — APPOINTMENT (OUTPATIENT)
Dept: UROLOGY | Facility: HOSPITAL | Age: 85
End: 2025-06-09
Payer: MEDICARE

## 2025-06-11 DIAGNOSIS — I48.20 CHRONIC ATRIAL FIBRILLATION (MULTI): ICD-10-CM

## 2025-06-12 ENCOUNTER — PATIENT OUTREACH (OUTPATIENT)
Dept: PRIMARY CARE | Facility: CLINIC | Age: 85
End: 2025-06-12
Payer: MEDICARE

## 2025-06-17 ENCOUNTER — APPOINTMENT (OUTPATIENT)
Dept: PRIMARY CARE | Facility: CLINIC | Age: 85
End: 2025-06-17
Payer: MEDICARE

## 2025-07-27 DIAGNOSIS — E78.49 OTHER HYPERLIPIDEMIA: ICD-10-CM

## 2025-07-28 RX ORDER — ATORVASTATIN CALCIUM 40 MG/1
40 TABLET, FILM COATED ORAL NIGHTLY
Qty: 90 TABLET | Refills: 0 | Status: SHIPPED | OUTPATIENT
Start: 2025-07-28

## 2025-08-10 DIAGNOSIS — I10 BENIGN ESSENTIAL HYPERTENSION: ICD-10-CM

## 2025-08-11 DIAGNOSIS — I10 PRIMARY HYPERTENSION: ICD-10-CM

## 2025-08-11 DIAGNOSIS — I10 BENIGN ESSENTIAL HYPERTENSION: ICD-10-CM

## 2025-08-11 DIAGNOSIS — E78.49 OTHER HYPERLIPIDEMIA: ICD-10-CM

## 2025-08-11 RX ORDER — METOPROLOL TARTRATE 25 MG/1
25 TABLET, FILM COATED ORAL DAILY
Qty: 90 TABLET | Refills: 0 | Status: SHIPPED | OUTPATIENT
Start: 2025-08-11

## 2025-08-11 RX ORDER — ATORVASTATIN CALCIUM 40 MG/1
40 TABLET, FILM COATED ORAL NIGHTLY
Qty: 90 TABLET | Refills: 0 | Status: SHIPPED | OUTPATIENT
Start: 2025-08-11

## 2025-08-11 RX ORDER — CLOPIDOGREL BISULFATE 75 MG/1
75 TABLET ORAL DAILY
Qty: 90 TABLET | Refills: 0 | Status: SHIPPED | OUTPATIENT
Start: 2025-08-11

## 2025-08-12 DIAGNOSIS — I63.9 ACUTE ISCHEMIC STROKE (MULTI): Primary | ICD-10-CM

## 2025-08-27 ENCOUNTER — APPOINTMENT (OUTPATIENT)
Dept: RADIOLOGY | Facility: HOSPITAL | Age: 85
End: 2025-08-27
Payer: MEDICARE

## 2025-08-27 ENCOUNTER — HOSPITAL ENCOUNTER (EMERGENCY)
Facility: HOSPITAL | Age: 85
Discharge: HOME | End: 2025-08-27
Payer: MEDICARE

## 2025-08-27 VITALS
HEART RATE: 88 BPM | TEMPERATURE: 97.5 F | OXYGEN SATURATION: 96 % | SYSTOLIC BLOOD PRESSURE: 105 MMHG | DIASTOLIC BLOOD PRESSURE: 74 MMHG | HEIGHT: 70 IN | WEIGHT: 133 LBS | BODY MASS INDEX: 19.04 KG/M2 | RESPIRATION RATE: 15 BRPM

## 2025-08-27 DIAGNOSIS — R07.81 RIB PAIN ON RIGHT SIDE: Primary | ICD-10-CM

## 2025-08-27 PROCEDURE — 2500000005 HC RX 250 GENERAL PHARMACY W/O HCPCS: Performed by: PHYSICIAN ASSISTANT

## 2025-08-27 PROCEDURE — 71101 X-RAY EXAM UNILAT RIBS/CHEST: CPT | Mod: RIGHT SIDE | Performed by: RADIOLOGY

## 2025-08-27 PROCEDURE — 99283 EMERGENCY DEPT VISIT LOW MDM: CPT

## 2025-08-27 PROCEDURE — 2500000001 HC RX 250 WO HCPCS SELF ADMINISTERED DRUGS (ALT 637 FOR MEDICARE OP): Performed by: PHYSICIAN ASSISTANT

## 2025-08-27 PROCEDURE — 71101 X-RAY EXAM UNILAT RIBS/CHEST: CPT | Mod: RT

## 2025-08-27 RX ORDER — OXYCODONE HYDROCHLORIDE 5 MG/1
5 TABLET ORAL ONCE
Refills: 0 | Status: COMPLETED | OUTPATIENT
Start: 2025-08-27 | End: 2025-08-27

## 2025-08-27 RX ORDER — LIDOCAINE 50 MG/G
1 PATCH TOPICAL DAILY
Qty: 30 PATCH | Refills: 0 | Status: SHIPPED | OUTPATIENT
Start: 2025-08-27 | End: 2025-09-26

## 2025-08-27 RX ORDER — LIDOCAINE 560 MG/1
1 PATCH PERCUTANEOUS; TOPICAL; TRANSDERMAL ONCE
Status: DISCONTINUED | OUTPATIENT
Start: 2025-08-27 | End: 2025-08-27 | Stop reason: HOSPADM

## 2025-08-27 RX ADMIN — OXYCODONE HYDROCHLORIDE 5 MG: 5 TABLET ORAL at 14:03

## 2025-08-27 RX ADMIN — LIDOCAINE 4% 1 PATCH: 40 PATCH TOPICAL at 14:47

## 2025-08-27 ASSESSMENT — PAIN DESCRIPTION - LOCATION: LOCATION: RIB CAGE

## 2025-08-27 ASSESSMENT — PAIN SCALES - GENERAL
PAINLEVEL_OUTOF10: 7
PAINLEVEL_OUTOF10: 0 - NO PAIN
PAINLEVEL_OUTOF10: 2

## 2025-08-27 ASSESSMENT — LIFESTYLE VARIABLES
HAVE PEOPLE ANNOYED YOU BY CRITICIZING YOUR DRINKING: NO
TOTAL SCORE: 0
EVER HAD A DRINK FIRST THING IN THE MORNING TO STEADY YOUR NERVES TO GET RID OF A HANGOVER: NO
HAVE YOU EVER FELT YOU SHOULD CUT DOWN ON YOUR DRINKING: NO
EVER FELT BAD OR GUILTY ABOUT YOUR DRINKING: NO

## 2025-08-27 ASSESSMENT — PAIN - FUNCTIONAL ASSESSMENT: PAIN_FUNCTIONAL_ASSESSMENT: 0-10

## 2025-08-27 ASSESSMENT — PAIN DESCRIPTION - ORIENTATION: ORIENTATION: RIGHT

## 2025-09-01 DIAGNOSIS — I10 PRIMARY HYPERTENSION: ICD-10-CM

## 2025-09-02 RX ORDER — AMLODIPINE BESYLATE 5 MG/1
5 TABLET ORAL DAILY
Qty: 90 TABLET | Refills: 0 | Status: SHIPPED | OUTPATIENT
Start: 2025-09-02

## 2025-10-10 ENCOUNTER — APPOINTMENT (OUTPATIENT)
Dept: VASCULAR SURGERY | Facility: HOSPITAL | Age: 85
End: 2025-10-10
Payer: MEDICARE

## 2026-05-15 ENCOUNTER — APPOINTMENT (OUTPATIENT)
Dept: UROLOGY | Facility: HOSPITAL | Age: 86
End: 2026-05-15
Payer: MEDICARE